# Patient Record
Sex: MALE | Race: WHITE | URBAN - METROPOLITAN AREA
[De-identification: names, ages, dates, MRNs, and addresses within clinical notes are randomized per-mention and may not be internally consistent; named-entity substitution may affect disease eponyms.]

---

## 2017-11-07 ENCOUNTER — OFFICE VISIT (OUTPATIENT)
Dept: DERMATOLOGY | Facility: AMBULATORY SURGERY CENTER | Age: 67
End: 2017-11-07

## 2017-11-07 VITALS
SYSTOLIC BLOOD PRESSURE: 122 MMHG | BODY MASS INDEX: 23.9 KG/M2 | DIASTOLIC BLOOD PRESSURE: 60 MMHG | HEIGHT: 64 IN | TEMPERATURE: 97.9 F | RESPIRATION RATE: 18 BRPM | OXYGEN SATURATION: 98 % | WEIGHT: 140 LBS | HEART RATE: 73 BPM

## 2017-11-07 DIAGNOSIS — D22.9 MULTIPLE BENIGN NEVI: ICD-10-CM

## 2017-11-07 DIAGNOSIS — L57.0 ACTINIC KERATOSIS: Primary | ICD-10-CM

## 2017-11-07 DIAGNOSIS — L82.0 INFLAMED SEBORRHEIC KERATOSIS: ICD-10-CM

## 2017-11-07 DIAGNOSIS — L82.1 SEBORRHEIC KERATOSES: ICD-10-CM

## 2017-11-07 DIAGNOSIS — L72.9 CYST OF SKIN: ICD-10-CM

## 2017-11-07 DIAGNOSIS — D18.01 CHERRY ANGIOMA: ICD-10-CM

## 2017-11-07 RX ORDER — BETAMETHASONE VALERATE 1.2 MG/G
OINTMENT TOPICAL 2 TIMES DAILY
Qty: 45 G | Refills: 2 | Status: SHIPPED | OUTPATIENT
Start: 2017-11-07

## 2017-11-07 RX ORDER — BENZONATATE 100 MG/1
100 CAPSULE ORAL AS NEEDED
COMMUNITY
Start: 2017-09-30

## 2017-11-07 RX ORDER — AZELASTINE 1 MG/ML
SPRAY, METERED NASAL
COMMUNITY
Start: 2017-10-29

## 2017-11-07 RX ORDER — CODEINE PHOSPHATE AND GUAIFENESIN 10; 100 MG/5ML; MG/5ML
5 SOLUTION ORAL AS NEEDED
COMMUNITY
Start: 2017-09-30

## 2017-11-07 NOTE — MR AVS SNAPSHOT
Visit Information Date & Time Provider Department Dept. Phone Encounter #  
 11/7/2017  9:00 AM Nicole Hdz NP Stephanie [phone] Upcoming Health Maintenance Date Due Hepatitis C Screening 1950 DTaP/Tdap/Td series (1 - Tdap) 11/11/1972 FOBT Q 1 YEAR AGE 50-75 11/11/2001 ZOSTER VACCINE AGE 60> 9/11/2011 GLAUCOMA SCREENING Q2Y 11/11/2016 Pneumococcal 65+ Low/Medium Risk (1 of 2 - PCV13) 11/11/2016 MEDICARE YEARLY EXAM 11/11/2016 Allergies as of 11/7/2017  Review Complete On: 11/7/2017 By: Dolly Alberto No Known Allergies Current Immunizations  Reviewed on 11/7/2017 Name Date Influenza High Dose Vaccine PF 10/6/2017 Reviewed by Andrea Galindo on 11/7/2017 at  9:51 AM  
Vitals BP Pulse Temp Resp Height(growth percentile) Weight(growth percentile) 122/60 (BP 1 Location: Right arm, BP Patient Position: Sitting) 73 97.9 °F (36.6 °C) (Oral) 18 5' 4\" (1.626 m) 140 lb (63.5 kg) SpO2 BMI Smoking Status 98% 24.03 kg/m2 Former Smoker BMI and BSA Data Body Mass Index Body Surface Area 24.03 kg/m 2 1.69 m 2 Preferred Pharmacy Pharmacy Name Phone [facility] [address], [address] [phone] Your Updated Medication List  
  
   
This list is accurate as of: 11/7/17  9:54 AM.  Always use your most recent med list. amLODIPine-benazepril 5-40 mg per capsule Commonly known as:  Tresa Rounds Take 1 Cap by mouth daily. aspirin 325 mg tablet Commonly known as:  ASPIRIN Take 325 mg by mouth daily. atorvastatin 20 mg tablet Commonly known as:  LIPITOR Take  by mouth daily. azelastine 137 mcg (0.1 %) nasal spray Commonly known as:  ASTELIN  
  
 benzonatate 100 mg capsule Commonly known as:  TESSALON  
  
 CLARITIN 10 mg tablet Generic drug:  loratadine Take 10 mg by mouth.  
  
 clobetasol 0.05 % ointment Commonly known as:  Charley Reagin Apply  to affected area two (2) times a day. FISH OIL 1,000 mg Cap Generic drug:  omega-3 fatty acids-vitamin e Take 1 Cap by mouth.  
  
 guaiFENesin-codeine 100-10 mg/5 mL solution Commonly known as:  ROBITUSSIN AC  
  
 metaxalone 800 mg tablet Commonly known as:  SKELAXIN Take  by mouth.  
  
 miSOPROStol 200 mcg tablet Commonly known as:  CYTOTEC Take 200 mcg by mouth four (4) times daily. multivitamin tablet Commonly known as:  ONE A DAY Take 1 Tab by mouth daily. Patient Instructions Self Skin Exam and Sunscreens Early detection and treatment is essential in the treatment of all forms of skin cancer. If caught early, all forms of skin cancer are curable. In addition to your regular visits, you should perform a monthly skin examination. Over time, you become familiar with what is normally found on your skin and can identify new or suspicious spots. One of the screening tools you can use to assess your skin is to follow the ABCDEs: 
 
A= Asymmetry (One half is unlike the other half) B= Border (An irregular, scalloped or poorly defined edge) C= Color (Is varied from one area to another, has shades of tan, brown/ black,       white, red or blue) D= Diameter (Spots larger than 6mm or a pencil eraser) E= Evolving (New spots or one that is changing in size, shape, or color) A follow- up interval will be customized based on your history of skin cancer or level of skin damage and risk factors. In any case, if you notice a suspicious or new spot, an appointment should be arranged between regular visits. Everyone should use sunscreen and sun-safe practices, which is especially important for those with a personal or family history of skin cancer. Suggestions for this include: 1. Use daily moisturizers containing SPF 30 or higher. 2. Wear long sleeve clothing with UPF ratings and a broad-brimmed hat. 3. Apply sunscreen with SPF 30 or higher to all sun exposed areas if you are going to be in the sun. A broad spectrum UVA/ UVB sunscreen is best.  Dont forget to REAPPLY every two hours or more often if swimming or sweating! 4. Avoid outside activities during peak sun hours, especially in the summer (10am- 2pm). 5. DO NOT use tanning beds. Using sunscreen and sun-safe practices can help reduce the likelihood of developing skin cancer or additional skin cancers in those previously diagnosed. Introducing [facility]! [address] [facility] introduces [facility] patient portal. Now you can access parts of your medical record, email your doctor's office, and request medication refills online. 1. In your internet browser, go to [email or web address] 2. Click on the First Time User? Click Here link in the Sign In box. You will see the New Member Sign Up page. 3. Enter your [facility] Access Code exactly as it appears below. You will not need to use this code after youve completed the sign-up process. If you do not sign up before the expiration date, you must request a new code. · [facility] Access Code: [email or web address] Expires: 2/5/2018  9:54 AM 
 
4. Enter the last four digits of your Social Security Number (xxxx) and Date of Birth (mm/dd/yyyy) as indicated and click Submit. You will be taken to the next sign-up page. 5. Create a [id number] ID. This will be your [facility] login ID and cannot be changed, so think of one that is secure and easy to remember. 6. Create a [facility] password. You can change your password at any time. 7. Enter your Password Reset Question and Answer. This can be used at a later time if you forget your password. 8. Enter your e-mail address. You will receive e-mail notification when new information is available in [address] 9. Click Sign Up. You can now view and download portions of your medical record. 10. Click the Download Summary menu link to download a portable copy of your medical information. If you have questions, please visit the Frequently Asked Questions section of the [employer] website. Remember, [facility] is NOT to be used for urgent needs. For medical emergencies, dial 911. Now available from your iPhone and Android! Please provide this summary of care documentation to your next provider. Your primary care clinician is listed as Kanwal Garcia. If you have any questions after today's visit, please call [phone].

## 2017-11-07 NOTE — PROGRESS NOTES
Pan Sood is a 73 y.o. male  Chief Complaint   Patient presents with    Skin Exam     1. Have you been to the ER, urgent care clinic since your last visit? Hospitalized since your last visit? No    2. Have you seen or consulted any other health care providers outside of the [address] [place] [address] since your last visit? Include any pap smears or colon screening.  No

## 2017-11-07 NOTE — PROGRESS NOTES
Name: Zeke Khalil       Age: 73 y.o. Date: 11/7/2017    Chief Complaint:   Chief Complaint   Patient presents with    Skin Exam       Subjective:    HPI  Mr. Zeke Khalil is a 73 y.o. male who presents for a skin exam.  The patient's last skin exam was on 8/9/16 and the patient does have current complaints related to his skin. He reports growing lesions on the forehead that bother. A lesion that is stable under his glasses on the nose on the right side. There is a scaly lesion in front of the left ear. The patient's pertinent skin history includes : no personal history of skin cancer but does have a history of AK. Reports significant sun exposure through work and recreation. ROS: Constitutional: Negative. Dermatological : positive for - skin lesion changes      Social History     Social History    Marital status:      Spouse name: N/A    Number of children: N/A    Years of education: N/A     Occupational History    Not on file. Social History Main Topics    Smoking status: Former Smoker    Smokeless tobacco: Never Used    Alcohol use No    Drug use: No    Sexual activity: Not on file     Other Topics Concern    Not on file     Social History Narrative       Family History   Problem Relation Age of Onset    No Known Problems Mother     No Known Problems Father        Past Medical History:   Diagnosis Date    Essential hypertension     High cholesterol     Sun-damaged skin        Past Surgical History:   Procedure Laterality Date    CARDIAC SURG PROCEDURE UNLIST      HX ORTHOPAEDIC         Current Outpatient Prescriptions   Medication Sig Dispense Refill    azelastine (ASTELIN) 137 mcg (0.1 %) nasal spray       betamethasone valerate (VALISONE) 0.1 % ointment Apply  to affected area two (2) times a day. 45 g 2    loratadine (CLARITIN) 10 mg tablet Take 10 mg by mouth.  atorvastatin (LIPITOR) 20 mg tablet Take  by mouth daily.       amLODIPine-benazepril (LOTREL) 5-40 mg per capsule Take 1 Cap by mouth daily.  misoprostol (CYTOTEC) 200 mcg tablet Take 200 mcg by mouth four (4) times daily.  multivitamin (ONE A DAY) tablet Take 1 Tab by mouth daily.  omega-3 fatty acids-vitamin e (FISH OIL) 1,000 mg cap Take 1 Cap by mouth.  aspirin (ASPIRIN) 325 mg tablet Take 325 mg by mouth daily.  benzonatate (TESSALON) 100 mg capsule       guaiFENesin-codeine (ROBITUSSIN AC) 100-10 mg/5 mL solution       metaxalone (SKELAXIN) 800 mg tablet Take  by mouth. No Known Allergies      Objective:    Visit Vitals    /60 (BP 1 Location: Right arm, BP Patient Position: Sitting)    Pulse 73    Temp 97.9 °F (36.6 °C) (Oral)    Resp 18    Ht 5' 4\" (1.626 m)    Wt 63.5 kg (140 lb)    SpO2 98%    BMI 24.03 kg/m2       Kaleigh Edmond is a 73 y.o. male who appears well and in no distress. He is awake, alert, and oriented. There is no preauricular, submandibular, or cervical lymphadenopathy. A skin examination was performed including his scalp, face (including eyelids), ears, neck, chest, back, abdomen, upper extremities (including digits/nails), lower extremities (mid thigh to ankles), breasts; genital skin was not examined. His scattered stuck on waxy macules and keratotic papules consistent with seborrheic keratoses-including lesions of concern on his forehead, nose, and left preauricular skin. There are scattered cherry angiomas. He has a thin scaled AK on the right mid helical rim. There are a few scattered medium brown nevi without concerning features for severe atypia. Two small cystic structures, non inflamed with punctum on the upper back. Assessment/Plan: 1. Actinic Keratoses. The diagnosis of this precancerous lesion related to sun exposure was reviewed. Verbal consent was obtained. I treated 1 lesions with cryotherapy and post-cryotherapy care was reviewed. 2.Seborrheic keratoses.   The diagnosis was reviewed and the patient was reassured that no treatment is needed for these benign lesions. 3.Inflamed seborrheic keratoses. The diagnosis and treatment with liquid nitrogen cryotherapy were reviewed. The risk or persistence or recurrence of the keratosis and the potential for pigment change at the treated site were reviewed. Verbal consent was obtained. I treated 2 lesions with cryotherapy and care was reviewed. 4. Normal nevi. The diagnosis of normal nevi was reviewed. I discussed sun protection, sunscreen use, the warning signs of skin cancer, the need for self-skin examinations, and the need for regular practitioner exams every 1 year. The patient should follow up sooner as needed if new, changing, or symptomatic skin lesions arise. 5.Cherry angiomas. The diagnosis was reviewed and the patient was reassured that no treatment is needed for these benign lesions. 6. Cyst of back skin. No treatment needed at present. [facility]   OFFICE PROCEDURE PROGRESS NOTE   Chart reviewed for the following:   Aj COX, have reviewed the History, Physical and updated the Allergic reactions for [place] performed immediately prior to start of procedure:   Aj COX, have performed the following reviews on [address]   prior to the start of the procedure:     * Patient was identified by name and date of birth   * Agreement on procedure being performed was verified   * Risks and Benefits explained to the patient   * Procedure site verified and marked as necessary   * Patient was positioned for comfort   * Verbal consent was obtained. Time: 5752  Date of procedure: 11/7/2017  Procedure performed by: Kingsley Perez.  Kathy Childs DNP  Provider assisted by: Jonathan Christiansen   Patient assisted by: self   How tolerated by patient: tolerated the procedure well with no complications   Comments: none

## 2018-10-08 ENCOUNTER — OFFICE VISIT (OUTPATIENT)
Dept: DERMATOLOGY | Facility: AMBULATORY SURGERY CENTER | Age: 68
End: 2018-10-08

## 2018-10-08 VITALS
SYSTOLIC BLOOD PRESSURE: 142 MMHG | HEIGHT: 64 IN | RESPIRATION RATE: 18 BRPM | OXYGEN SATURATION: 99 % | DIASTOLIC BLOOD PRESSURE: 80 MMHG | BODY MASS INDEX: 23.9 KG/M2 | TEMPERATURE: 98.3 F | WEIGHT: 140 LBS | HEART RATE: 91 BPM

## 2018-10-08 DIAGNOSIS — R23.3 BRUISES EASILY: ICD-10-CM

## 2018-10-08 DIAGNOSIS — L57.0 ACTINIC KERATOSES: ICD-10-CM

## 2018-10-08 DIAGNOSIS — L73.8 SEBACEOUS HYPERPLASIA OF FACE: ICD-10-CM

## 2018-10-08 DIAGNOSIS — D22.9 MULTIPLE BENIGN NEVI: ICD-10-CM

## 2018-10-08 DIAGNOSIS — L21.9 SEBORRHEIC DERMATITIS: ICD-10-CM

## 2018-10-08 DIAGNOSIS — L82.1 SEBORRHEIC KERATOSES: ICD-10-CM

## 2018-10-08 DIAGNOSIS — L82.0 INFLAMED SEBORRHEIC KERATOSIS: Primary | ICD-10-CM

## 2018-10-08 RX ORDER — METHOCARBAMOL 750 MG/1
TABLET, FILM COATED ORAL AS NEEDED
COMMUNITY
Start: 2018-09-03

## 2018-10-08 NOTE — PROGRESS NOTES
Written by Kash Mcnair, as dictated by Amber Peterson, [address]. Name: Jules Cedeno       Age: 78 y.o. Date: 10/8/2018    Chief Complaint:   Chief Complaint   Patient presents with    Skin Exam     spots on forehead, on under left arm, one on right arm        Subjective:    HPI  Mr. Jules Cedeno is a 78 y.o. male who presents for a partial skin exam.  The patient's last skin exam was on 11/07/17 and the patient does have current complaints related to his skin. He reports a few scaly spots on his forehead. He also reports a rough and raised lesion on his left forearm that he noted recently. He also reports an itchy and burning lesion on his right forearm that he picks at and it continues to scab. He reports a tender lesion on his chest that he also picks at and the lesion continues to scab. He notes some flay skin between his eyebrows. He also reports concern over bruising easily. The patient's pertinent skin history includes : no personal history of skin cancer but does have a history of AK. Reports significant sun exposure through work and recreation. ROS: Constitutional: Negative. Dermatological : positive for - skin lesion changes    Social History     Social History    Marital status:      Spouse name: N/A    Number of children: N/A    Years of education: N/A     Occupational History    Not on file.      Social History Main Topics    Smoking status: Former Smoker    Smokeless tobacco: Never Used    Alcohol use No    Drug use: No    Sexual activity: Not on file     Other Topics Concern    Not on file     Social History Narrative       Family History   Problem Relation Age of Onset    No Known Problems Mother     No Known Problems Father        Past Medical History:   Diagnosis Date    Essential hypertension     High cholesterol     Sun-damaged skin        Past Surgical History:   Procedure Laterality Date    CARDIAC SURG PROCEDURE UNLIST      HX ORTHOPAEDIC         Current Outpatient Prescriptions   Medication Sig Dispense Refill    methocarbamol (ROBAXIN) 750 mg tablet as needed.  azelastine (ASTELIN) 137 mcg (0.1 %) nasal spray       benzonatate (TESSALON) 100 mg capsule       guaiFENesin-codeine (ROBITUSSIN AC) 100-10 mg/5 mL solution       betamethasone valerate (VALISONE) 0.1 % ointment Apply  to affected area two (2) times a day. 45 g 2    loratadine (CLARITIN) 10 mg tablet Take 10 mg by mouth.  atorvastatin (LIPITOR) 20 mg tablet Take  by mouth daily.  amLODIPine-benazepril (LOTREL) 5-40 mg per capsule Take 1 Cap by mouth daily.  misoprostol (CYTOTEC) 200 mcg tablet Take 200 mcg by mouth four (4) times daily.  metaxalone (SKELAXIN) 800 mg tablet Take  by mouth.  multivitamin (ONE A DAY) tablet Take 1 Tab by mouth daily.  omega-3 fatty acids-vitamin e (FISH OIL) 1,000 mg cap Take 1 Cap by mouth.  aspirin (ASPIRIN) 325 mg tablet Take 325 mg by mouth daily. No Known Allergies      Objective:    Visit Vitals    /80 (BP 1 Location: Left arm, BP Patient Position: Sitting)    Pulse 91    Temp 98.3 °F (36.8 °C) (Oral)    Resp 18    Ht 5' 4\" (1.626 m)    Wt 140 lb (63.5 kg)    SpO2 99%    BMI 24.03 kg/m2       Rigo Schultz is a 78 y.o. male who appears well and in no distress. He is awake, alert, and oriented. There is no preauricular, submandibular, or cervical lymphadenopathy. A skin examination was performed including his scalp, face (including eyelids), ears, neck, chest, upper extremities. He has inflamed seborrheic keratoses on his left forearm, right forearm, mid chest, and forehead x2. He has thin-scaled actinic keratoses on his right nasal tip and left lateral cheek. He has scattered waxy macules and keratotic papules consistent with seborrheic keratoses. There are pink/yellow papules on the face consistent with sebaceous hyperplasia.  He has dry and flaky skin between his eyebrows consistent with seborrheic dermatitis. He has pink intradermal nevi and brown junctional nevi, no concerning features for severe atypia. He has ecchymosis on his forearms. Assessment/Plan:    1. Inflamed seborrheic keratoses. The diagnosis and treatment with liquid nitrogen cryotherapy were reviewed. The risk or persistence or recurrence of the keratosis and the potential for pigment change at the treated site were reviewed. Verbal consent was obtained. I treated 5 lesions with cryotherapy and care was reviewed.     2. Actinic Keratoses. The diagnosis of this precancerous lesion related to sun exposure was reviewed. Verbal consent was obtained. I treated 2 lesions with cryotherapy and post-cryotherapy care was reviewed. 3. Seborrheic keratoses. The diagnosis was reviewed and the patient was reassured that no treatment is needed for these benign lesions. 4. Sebaceous Hyperplasia. The diagnosis was discussed as well as the benign nature of this condition. The patient was reassured that no treatment is needed at this time. 5. Seborrheic Dermatitis. The diagnosis was discussed and over the counter recommendations [facility] and antifungal cream) were made for treatment. 6. Normal nevi. The diagnosis of normal nevi was reviewed. I discussed sun protection, sunscreen use, the warning signs of skin cancer, the need for self-skin examinations, and the need for regular practitioner exams every 1 year. The patient should follow up sooner as needed if new, changing, or symptomatic skin lesions arise. 7. Bruises easily. The diagnosis was discussed. I recommended the use of DerMend to help with healing of these lesions. This plan was reviewed with the patient and patient agrees. All questions were answered. This scribe documentation was reviewed by me and accurately reflects the examination and decisions made by me.     [address] [facility]   OFFICE PROCEDURE PROGRESS NOTE   Chart reviewed for the following:   Sindhu COX, have reviewed the History, Physical and updated the Allergic reactions for [place] performed immediately prior to start of procedure:   Sindhu COX, have performed the following reviews on [address]   prior to the start of the procedure:     * Patient was identified by name and date of birth   * Agreement on procedure being performed was verified   * Risks and Benefits explained to the patient   * Procedure site verified and marked as necessary   * Patient was positioned for comfort   * Consent was signed and verified     Time: 10:35 AM  Date of procedure: 10/8/2018  Procedure performed by: Leslie Santos.  Ankush Almanza DNP  Provider assisted by: self   Patient assisted by: self   How tolerated by patient: tolerated the procedure well with no complications   Comments: none

## 2018-10-08 NOTE — MR AVS SNAPSHOT
455 Located within [facility] [address] [place] [address] [address] [place] [address] 
[phone] Patient: Laure Peoples MRN: [id number] Visit Information Date & Time Provider Department Dept. Phone Encounter #  
 10/8/2018 10:30 AM BARRINGTON Garcia [id number] [phone] [id number] Your Appointments 10/8/2018 10:30 AM  
Office Visit with BARRINGTON Garcia [address]057 [address] [place] [address]) Appt Note: est.pt full skin exam  
 [facility] [address] [facility] [place] [address]  
[address] [facility] [address] [place] [id number] Upcoming Health Maintenance Date Due Hepatitis C Screening 1950 DTaP/Tdap/Td series (1 - Tdap) 11/11/1972 Shingrix Vaccine Age 50> (1 of 2) 11/11/2001 FOBT Q 1 YEAR AGE 50-75 11/11/2001 GLAUCOMA SCREENING Q2Y 11/11/2016 Pneumococcal 65+ Low/Medium Risk (1 of 2 - PCV13) 11/11/2016 MEDICARE YEARLY EXAM 3/14/2018 Influenza Age 5 to Adult 8/1/2018 Allergies as of 10/8/2018  Review Complete On: 10/8/2018 By: Moncho Nichols No Known Allergies Current Immunizations  Reviewed on 11/7/2017 Name Date Influenza High Dose Vaccine PF 10/6/2017 Not reviewed this visit Vitals BP Pulse Temp Resp Height(growth percentile) Weight(growth percentile) 142/80 (BP 1 Location: Left arm, BP Patient Position: Sitting) 91 98.3 °F (36.8 °C) (Oral) 18 5' 4\" (1.626 m) 140 lb (63.5 kg) SpO2 BMI Smoking Status 99% 24.03 kg/m2 Former Smoker BMI and BSA Data Body Mass Index Body Surface Area 24.03 kg/m 2 1.69 m 2 Preferred Pharmacy Pharmacy Name Phone [facility] [address], [address] [phone] Your Updated Medication List  
  
   
This list is accurate as of 10/8/18 10:26 AM.  Always use your most recent med list. amLODIPine-benazepril 5-40 mg per capsule Commonly known as:  Ashley Palin Take 1 Cap by mouth daily. aspirin 325 mg tablet Commonly known as:  ASPIRIN Take 325 mg by mouth daily. atorvastatin 20 mg tablet Commonly known as:  LIPITOR Take  by mouth daily. azelastine 137 mcg (0.1 %) nasal spray Commonly known as:  ASTELIN  
  
 benzonatate 100 mg capsule Commonly known as:  TESSALON  
  
 betamethasone valerate 0.1 % ointment Commonly known as:  Wadikacey Nordmann Apply  to affected area two (2) times a day. CLARITIN 10 mg tablet Generic drug:  loratadine Take 10 mg by mouth. FISH OIL 1,000 mg Cap Generic drug:  omega-3 fatty acids-vitamin e Take 1 Cap by mouth.  
  
 guaiFENesin-codeine 100-10 mg/5 mL solution Commonly known as:  ROBITUSSIN AC  
  
 metaxalone 800 mg tablet Commonly known as:  SKELAXIN Take  by mouth. methocarbamol 750 mg tablet Commonly known as:  ROBAXIN  
as needed. miSOPROStol 200 mcg tablet Commonly known as:  CYTOTEC Take 200 mcg by mouth four (4) times daily. multivitamin tablet Commonly known as:  ONE A DAY Take 1 Tab by mouth daily. Patient Instructions Self Skin Exam and Sunscreens Early detection and treatment is essential in the treatment of all forms of skin cancer. If caught early, all forms of skin cancer are curable. In addition to your regular visits, you should perform a monthly skin examination. Over time, you become familiar with what is normally found on your skin and can identify new or suspicious spots. One of the screening tools you can use to assess your skin is to follow the ABCDEs: 
 
A= Asymmetry (One half is unlike the other half) B= Border (An irregular, scalloped or poorly defined edge) C= Color (Is varied from one area to another, has shades of tan, brown/ black,       white, red or blue) D= Diameter (Spots larger than 6mm or a pencil eraser) E= Evolving (New spots or one that is changing in size, shape, or color) A follow- up interval will be customized based on your history of skin cancer or level of skin damage and risk factors. In any case, if you notice a suspicious or new spot, an appointment should be arranged between regular visits. Everyone should use sunscreen and sun-safe practices, which is especially important for those with a personal or family history of skin cancer. Suggestions for this include: 1. Use daily moisturizers containing SPF 30 or higher. 2. Wear long sleeve clothing with UPF ratings and a broad-brimmed hat. 3. Apply sunscreen with SPF 30 or higher to all sun exposed areas if you are going to be in the sun. A broad spectrum UVA/ UVB sunscreen is best.  Dont forget to REAPPLY every two hours or more often if swimming or sweating! 4. Avoid outside activities during peak sun hours, especially in the summer (10am- 2pm). 5. DO NOT use tanning beds. Using sunscreen and sun-safe practices can help reduce the likelihood of developing skin cancer or additional skin cancers in those previously diagnosed. Introducing [facility]! [employer] introduces [employer] patient portal. Now you can access parts of your medical record, email your doctor's office, and request medication refills online. 1. In your internet browser, go to [email or web address] 2. Click on the First Time User? Click Here link in the Sign In box. You will see the New Member Sign Up page. 3. Enter your [employer] Access Code exactly as it appears below. You will not need to use this code after youve completed the sign-up process. If you do not sign up before the expiration date, you must request a new code. · [employer] Access Code: [email or web address] Expires: 1/6/2019 10:26 AM 
 
4.  Enter the last four digits of your Social Security Number (xxxx) and Date of Birth (mm/dd/yyyy) as indicated and click Submit. You will be taken to the next sign-up page. 5. Create a [employer] ID. This will be your [employer] login ID and cannot be changed, so think of one that is secure and easy to remember. 6. Create a [employer] password. You can change your password at any time. 7. Enter your Password Reset Question and Answer. This can be used at a later time if you forget your password. 8. Enter your e-mail address. You will receive e-mail notification when new information is available in [address] 9. Click Sign Up. You can now view and download portions of your medical record. 10. Click the Download Summary menu link to download a portable copy of your medical information. If you have questions, please visit the Frequently Asked Questions section of the [employer] website. Remember, [employer] is NOT to be used for urgent needs. For medical emergencies, dial 911. Now available from your iPhone and Android! Please provide this summary of care documentation to your next provider. Your primary care clinician is listed as Melissa Marcelo. If you have any questions after today's visit, please call [phone].

## 2018-10-08 NOTE — PROGRESS NOTES
Chief Complaint   Patient presents with    Skin Exam     spots on forehead, on under left arm, one on right arm

## 2019-10-23 ENCOUNTER — OFFICE VISIT (OUTPATIENT)
Dept: DERMATOLOGY | Facility: AMBULATORY SURGERY CENTER | Age: 69
End: 2019-10-23

## 2019-10-23 VITALS
BODY MASS INDEX: 23.9 KG/M2 | SYSTOLIC BLOOD PRESSURE: 134 MMHG | HEART RATE: 67 BPM | RESPIRATION RATE: 16 BRPM | WEIGHT: 140 LBS | TEMPERATURE: 97.6 F | OXYGEN SATURATION: 98 % | HEIGHT: 64 IN | DIASTOLIC BLOOD PRESSURE: 68 MMHG

## 2019-10-23 DIAGNOSIS — L57.0 ACTINIC KERATOSIS: Primary | ICD-10-CM

## 2019-10-23 DIAGNOSIS — L82.1 SEBORRHEIC KERATOSES: ICD-10-CM

## 2019-10-23 DIAGNOSIS — B07.9 VERRUCA VULGARIS: ICD-10-CM

## 2019-10-23 DIAGNOSIS — D18.01 CHERRY ANGIOMA: ICD-10-CM

## 2019-10-23 DIAGNOSIS — L82.0 INFLAMED SEBORRHEIC KERATOSIS: ICD-10-CM

## 2019-10-23 DIAGNOSIS — D22.9 MULTIPLE BENIGN NEVI: ICD-10-CM

## 2019-10-23 NOTE — PROGRESS NOTES
Written by Joaquina Pena, as dictated by Jessica Santiago, [address]. Name: Marj Andres       Age: 80 y.o. Date: 10/23/2019    Chief Complaint:   Chief Complaint   Patient presents with    Skin Exam       Subjective:    HPI  Mr. Marj Andres is a 80 y.o. male who presents for a full skin exam.  The patient's last skin exam was on 10/8/18 and the patient does have current complaints related to his skin. He states that he has a bump on his right ala that he's noticed recently. This is asymptomatic. He is also bothered by a dark lesion on the right side of his nose where his glasses sit - present for many years. Additionally, he reports a wart on his right hand. He has tried tape but this did not give relief. He is feeling well and in his usual state of health today. He has no current illnesses, no other skin concerns. His allergies, medications, medical, and social history are reviewed by me today. The patient's pertinent skin history includes : no personal history of skin cancer but does have a history of AK.     ROS: Constitutional: Negative.     Dermatological : positive for - skin lesion changes    Social History     Socioeconomic History    Marital status:      Spouse name: Not on file    Number of children: Not on file    Years of education: Not on file    Highest education level: Not on file   Occupational History    Not on file   Social Needs    Financial resource strain: Not on file    Food insecurity:     Worry: Not on file     Inability: Not on file    Transportation needs:     Medical: Not on file     Non-medical: Not on file   Tobacco Use    Smoking status: Former Smoker    Smokeless tobacco: Never Used   Substance and Sexual Activity    Alcohol use: No     Alcohol/week: 0.0 standard drinks    Drug use: No    Sexual activity: Not on file   Lifestyle    Physical activity:     Days per week: Not on file     Minutes per session: Not on file    Stress: Not on file   Relationships    Social connections:     Talks on phone: Not on file     Gets together: Not on file     Attends [employer] service: Not on file     Active member of club or organization: Not on file     Attends meetings of clubs or organizations: Not on file     Relationship status: Not on file    Intimate partner violence:     Fear of current or ex partner: Not on file     Emotionally abused: Not on file     Physically abused: Not on file     Forced sexual activity: Not on file   Other Topics Concern    Not on file   Social History Narrative    Not on file       Family History   Problem Relation Age of Onset    No Known Problems Mother     No Known Problems Father        Past Medical History:   Diagnosis Date    Essential hypertension     High cholesterol     Sun-damaged skin        Past Surgical History:   Procedure Laterality Date    CARDIAC SURG PROCEDURE UNLIST      HX ORTHOPAEDIC         Current Outpatient Medications   Medication Sig Dispense Refill    methocarbamol (ROBAXIN) 750 mg tablet as needed.  azelastine (ASTELIN) 137 mcg (0.1 %) nasal spray       benzonatate (TESSALON) 100 mg capsule Take 100 mg by mouth as needed.  guaiFENesin-codeine (ROBITUSSIN AC) 100-10 mg/5 mL solution Take 5 mL by mouth as needed.  betamethasone valerate (VALISONE) 0.1 % ointment Apply  to affected area two (2) times a day. 45 g 2    loratadine (CLARITIN) 10 mg tablet Take 10 mg by mouth.  atorvastatin (LIPITOR) 20 mg tablet Take  by mouth daily.  amLODIPine-benazepril (LOTREL) 5-40 mg per capsule Take 1 Cap by mouth daily.  misoprostol (CYTOTEC) 200 mcg tablet Take 200 mcg by mouth daily.  multivitamin (ONE A DAY) tablet Take 1 Tab by mouth daily.  omega-3 fatty acids-vitamin e (FISH OIL) 1,000 mg cap Take 1 Cap by mouth.  aspirin (ASPIRIN) 325 mg tablet Take 325 mg by mouth daily.  metaxalone (SKELAXIN) 800 mg tablet Take  by mouth.          No Known Allergies      Objective:    Visit Vitals  /68 (BP 1 Location: Left arm, BP Patient Position: Sitting)   Pulse 67   Temp 97.6 °F (36.4 °C) (Oral)   Resp 16   Ht 5' 4\" (1.626 m)   Wt 140 lb (63.5 kg)   SpO2 98%   BMI 24.03 kg/m²       Virginia Riddle is a 80 y.o. male who appears well and in no distress. He is awake, alert, and oriented. A skin examination was performed including his scalp, face (including eyelids), ears, neck, chest, back, abdomen, upper extremities (including digits/nails), lower extremities, breasts, buttocks; genital skin was not examined. He has scattered waxy macules and keratotic papules consistent with seborrheic keratoses. He has pink intradermal nevi and brown junctional nevi, no concerning features for severe atypia. He has lentigines on sun exposed areas.  He has scattered red papules consistent with cherry angiomas. He has thin-scaled actinic keratoses on his right dorsal hand. He has inflamed seborrheic keratoses on the mid-abdomen and right upper nasal sidewall, including the lesions of his concern. He has a verrucous papule with dots of hemorrhage consistent with verruca vulgaris on his right thumb. He also has a subcutaneous nodule on his left upper back most consistent with an epidermal inclusion cyst. He has a fibrous papule on the right alar crease, including the lesion of his concern. Assessment/Plan:  1. Seborrheic keratoses. The diagnosis was reviewed and the patient was reassured that no treatment is needed for these benign lesions. 2. Normal nevi. The diagnosis of normal nevi was reviewed. I discussed sun protection, sunscreen use, the warning signs of skin cancer, mole monitoring, the need for self-skin examinations, and the need for regular practitioner exams. The patient should follow up sooner as needed if new, changing, or symptomatic skin lesions arise. 3. Solar lentigos. The diagnosis and relationship to sun exposure was reviewed.   Shelly Peterson protection advised. 4. Cherry angiomas. The diagnosis was reviewed and the patient was reassured that no treatment is needed for these benign lesions. 5. Actinic Keratoses. The diagnosis of this precancerous lesion related to sun exposure was reviewed. Verbal consent was obtained. I treated 1 lesions with cryotherapy and post-cryotherapy care was reviewed. 6. Inflamed seborrheic keratoses. The diagnosis and treatment with liquid nitrogen cryotherapy were reviewed. The risk or persistence or recurrence of the keratosis and the potential for pigment change at the treated site were reviewed. Verbal consent was obtained. I treated 2 lesions with cryotherapy and care was reviewed. 7. Verruca vulgaris. The diagnosis was discussed. The lesion was treated in double-freeze thaw method with cryotherapy. Next skin exam:  1 year    This plan was reviewed with the patient and patient agrees. All questions were answered. This scribe documentation was reviewed by me and accurately reflects the examination and decisions made by me. [facility]   OFFICE PROCEDURE PROGRESS NOTE   Chart reviewed for the following:   Aj COX, have reviewed the History, Physical and updated the Allergic reactions for [place] performed immediately prior to start of procedure:   Aj COX, have performed the following reviews on [address]   prior to the start of the procedure:     * Patient was identified by name and date of birth   * Agreement on procedure being performed was verified   * Risks and Benefits explained to the patient   * Procedure site verified and marked as necessary   * Patient was positioned for comfort   * Consent was signed and verified     Time: 3:10 PM  Date of procedure: 10/23/2019  Procedure performed by: Amirah Baker.  Felipe Jones  Provider assisted by: self  Patient assisted by: self   How tolerated by patient: tolerated the procedure well with no complications   Comments: none

## 2019-10-23 NOTE — PROGRESS NOTES
Room: 6    Identified pt with two pt identifiers(name and ). Reviewed record in preparation for visit and have obtained necessary documentation. All patient medications has been reviewed. Chief Complaint   Patient presents with    Skin Exam       Health Maintenance Due   Topic    Hepatitis C Screening     DTaP/Tdap/Td series (1 - Tdap)    Shingrix Vaccine Age 50> (1 of 2)    FOBT Q 1 YEAR AGE 54-65     GLAUCOMA SCREENING Q2Y     Pneumococcal 65+ years (1 of 2 - PCV13)    MEDICARE YEARLY EXAM     Influenza Age 5 to Adult        Vitals:    10/23/19 1441   Weight: 63.5 kg (140 lb)   Height: 5' 4\" (1.626 m)   PainSc:   0 - No pain           Patient is accompanied by self I have received verbal consent from Aftab Garcia to discuss any/all medical information while they are present in the room.

## 2019-11-20 ENCOUNTER — OFFICE VISIT (OUTPATIENT)
Dept: DERMATOLOGY | Facility: AMBULATORY SURGERY CENTER | Age: 69
End: 2019-11-20

## 2019-11-20 VITALS
HEIGHT: 64 IN | DIASTOLIC BLOOD PRESSURE: 70 MMHG | TEMPERATURE: 98 F | HEART RATE: 77 BPM | SYSTOLIC BLOOD PRESSURE: 130 MMHG | WEIGHT: 140 LBS | RESPIRATION RATE: 16 BRPM | BODY MASS INDEX: 23.9 KG/M2 | OXYGEN SATURATION: 97 %

## 2019-11-20 DIAGNOSIS — L82.0 INFLAMED SEBORRHEIC KERATOSIS: Primary | ICD-10-CM

## 2019-11-20 DIAGNOSIS — L82.1 SEBORRHEIC KERATOSES: ICD-10-CM

## 2019-11-20 NOTE — PROGRESS NOTES
Written by Allison Azul, as dictated by Betty Linder, [address]. Name: Sherman Vega       Age: 77 y.o. Date: 11/20/2019    Chief Complaint: No chief complaint on file. Subjective:    HPI:  Mr.. Sherman Vega is a 77 y.o. male who presents for for treatment of bothersome lesions on the mid abdomen, left chest, and the right nasal sidewall. The mid abdomen and right nasal sidewall was treated by me with cryotherapy at his last visit. He reports improvement but still raised and bothersome. He reports resolution of the AK on the right hand s/p cryotherapy at last visit. The lesion on the left chest is raised as well and bothersome due to this raised nature. He is feeling well and in his usual state of health today. He has no current illnesses, no other skin concerns. His allergies, medications, medical, and social history are reviewed by me today.     ROS: Consitutional: Negative  Dermatological : positive for - skin lesion changes    Social History     Socioeconomic History    Marital status:      Spouse name: Not on file    Number of children: Not on file    Years of education: Not on file    Highest education level: Not on file   Occupational History    Not on file   Social Needs    Financial resource strain: Not on file    Food insecurity:     Worry: Not on file     Inability: Not on file    Transportation needs:     Medical: Not on file     Non-medical: Not on file   Tobacco Use    Smoking status: Former Smoker    Smokeless tobacco: Never Used   Substance and Sexual Activity    Alcohol use: No     Alcohol/week: 0.0 standard drinks    Drug use: No    Sexual activity: Not on file   Lifestyle    Physical activity:     Days per week: Not on file     Minutes per session: Not on file    Stress: Not on file   Relationships    Social connections:     Talks on phone: Not on file     Gets together: Not on file     Attends Anabaptism service: Not on file     Active member of club or organization: Not on file     Attends meetings of clubs or organizations: Not on file     Relationship status: Not on file    Intimate partner violence:     Fear of current or ex partner: Not on file     Emotionally abused: Not on file     Physically abused: Not on file     Forced sexual activity: Not on file   Other Topics Concern    Not on file   Social History Narrative    Not on file       Family History   Problem Relation Age of Onset    No Known Problems Mother     No Known Problems Father        Past Medical History:   Diagnosis Date    Essential hypertension     High cholesterol     Sun-damaged skin        Past Surgical History:   Procedure Laterality Date    CARDIAC SURG PROCEDURE UNLIST      HX ORTHOPAEDIC         Current Outpatient Medications   Medication Sig Dispense Refill    methocarbamol (ROBAXIN) 750 mg tablet as needed.  azelastine (ASTELIN) 137 mcg (0.1 %) nasal spray       benzonatate (TESSALON) 100 mg capsule Take 100 mg by mouth as needed.  guaiFENesin-codeine (ROBITUSSIN AC) 100-10 mg/5 mL solution Take 5 mL by mouth as needed.  betamethasone valerate (VALISONE) 0.1 % ointment Apply  to affected area two (2) times a day. 45 g 2    loratadine (CLARITIN) 10 mg tablet Take 10 mg by mouth.  atorvastatin (LIPITOR) 20 mg tablet Take  by mouth daily.  amLODIPine-benazepril (LOTREL) 5-40 mg per capsule Take 1 Cap by mouth daily.  misoprostol (CYTOTEC) 200 mcg tablet Take 200 mcg by mouth daily.  multivitamin (ONE A DAY) tablet Take 1 Tab by mouth daily.  omega-3 fatty acids-vitamin e (FISH OIL) 1,000 mg cap Take 1 Cap by mouth.  aspirin (ASPIRIN) 325 mg tablet Take 325 mg by mouth daily.  metaxalone (SKELAXIN) 800 mg tablet Take  by mouth.          No Known Allergies      Objective:    Visit Vitals  /70 (BP 1 Location: Left arm, BP Patient Position: Sitting)   Pulse 77   Temp 98 °F (36.7 °C) (Oral)   Resp 16   Ht 5' 4\" (1.626 m)   Wt 140 lb (63.5 kg)   SpO2 97%   BMI 24.03 kg/m²       Je Travis is a 77 y.o. male who appears well and in no distress. He is awake, alert, and oriented. A limited skin examination was completed including the nose, chest, right hand and abdomen. .    He has scattered waxy macules and keratotic papules consistent with seborrheic keratoses. He has inflamed seborrheic keratoses on the left chest, mid abdomen, and right nasal sidewall. The AK on the right hand noted at the prior visit has resolved. Assessment/Plan:  1, Seborrheic keratoses. The diagnosis was reviewed and the patient was reassured that no treatment is needed for these benign lesions. 2. Inflamed seborrheic keratoses. The diagnosis and treatment with liquid nitrogen cryotherapy were reviewed. The risk or persistence or recurrence of the keratosis and the potential for pigment change at the treated site were reviewed. Verbal consent was obtained. I treated 3 lesions with cryotherapy and care was reviewed. 3. History of Actinic keratoses. Next skin exam:  1 year    This plan was reviewed with the patient and patient agrees. All questions were answered. This scribe documentation was reviewed by me and accurately reflects the examination and decisions made by me. [facility]   OFFICE PROCEDURE PROGRESS NOTE   Chart reviewed for the following:   IAj, have reviewed the History, Physical and updated the Allergic reactions for [place] performed immediately prior to start of procedure:   Aj COX, have performed the following reviews on Je Travis   prior to the start of the procedure:     * Patient was identified by name and date of birth   * Agreement on procedure being performed was verified   * Risks and Benefits explained to the patient   * Procedure site verified and marked as necessary   * Patient was positioned for comfort   * Consent was signed and verified     Time: 8:20 AM  Date of procedure: 11/20/2019  Procedure performed by: Alejandra Antoine.  Becki Johnson DNP  Provider assisted by: self  Patient assisted by: self   How tolerated by patient: tolerated the procedure well with no complications   Comments: none

## 2019-11-20 NOTE — PROGRESS NOTES
Room: 6    Identified pt with two pt identifiers(name and ). Reviewed record in preparation for visit and have obtained necessary documentation. All patient medications has been reviewed. Chief Complaint   Patient presents with    Skin Exam       Health Maintenance Due   Topic    Hepatitis C Screening     DTaP/Tdap/Td series (1 - Tdap)    Shingrix Vaccine Age 50> (1 of 2)    FOBT Q 1 YEAR AGE 50-75     GLAUCOMA SCREENING Q2Y     Pneumococcal 65+ years (1 of 1 - PPSV23)    MEDICARE YEARLY EXAM     Influenza Age 5 to Adult        Vitals:    19 0811   BP: 130/70   Pulse: 77   Resp: 16   Temp: 98 °F (36.7 °C)   TempSrc: Oral   SpO2: 97%   Weight: 63.5 kg (140 lb)   Height: 5' 4\" (1.626 m)   PainSc:   0 - No pain       1. Have you been to the ER, urgent care clinic since your last visit? Hospitalized since your last visit? No    2. Have you seen or consulted any other health care providers outside of the [address] [place] [address] since your last visit? Include any pap smears or colon screening. No    Patient is accompanied by self I have received verbal consent from Jennie Banerjee to discuss any/all medical information while they are present in the room.

## 2023-02-13 ENCOUNTER — OFFICE VISIT (OUTPATIENT)
Dept: CARDIOLOGY CLINIC | Age: 73
End: 2023-02-13
Payer: MEDICARE

## 2023-02-13 VITALS
WEIGHT: 135.2 LBS | RESPIRATION RATE: 18 BRPM | OXYGEN SATURATION: 99 % | BODY MASS INDEX: 23.08 KG/M2 | DIASTOLIC BLOOD PRESSURE: 80 MMHG | SYSTOLIC BLOOD PRESSURE: 150 MMHG | HEART RATE: 82 BPM | HEIGHT: 64 IN

## 2023-02-13 DIAGNOSIS — I10 ESSENTIAL HYPERTENSION: ICD-10-CM

## 2023-02-13 DIAGNOSIS — E78.2 MIXED HYPERLIPIDEMIA: ICD-10-CM

## 2023-02-13 DIAGNOSIS — Z86.73 HISTORY OF CVA (CEREBROVASCULAR ACCIDENT): ICD-10-CM

## 2023-02-13 DIAGNOSIS — R07.9 CHEST PAIN, UNSPECIFIED TYPE: Primary | ICD-10-CM

## 2023-02-13 DIAGNOSIS — I77.9 CAROTID ARTERY DISEASE, UNSPECIFIED LATERALITY, UNSPECIFIED TYPE (HCC): ICD-10-CM

## 2023-02-13 PROCEDURE — 1123F ACP DISCUSS/DSCN MKR DOCD: CPT | Performed by: STUDENT IN AN ORGANIZED HEALTH CARE EDUCATION/TRAINING PROGRAM

## 2023-02-13 PROCEDURE — 93010 ELECTROCARDIOGRAM REPORT: CPT | Performed by: STUDENT IN AN ORGANIZED HEALTH CARE EDUCATION/TRAINING PROGRAM

## 2023-02-13 PROCEDURE — 99204 OFFICE O/P NEW MOD 45 MIN: CPT | Performed by: STUDENT IN AN ORGANIZED HEALTH CARE EDUCATION/TRAINING PROGRAM

## 2023-02-13 PROCEDURE — 3017F COLORECTAL CA SCREEN DOC REV: CPT | Performed by: STUDENT IN AN ORGANIZED HEALTH CARE EDUCATION/TRAINING PROGRAM

## 2023-02-13 PROCEDURE — 3077F SYST BP >= 140 MM HG: CPT | Performed by: STUDENT IN AN ORGANIZED HEALTH CARE EDUCATION/TRAINING PROGRAM

## 2023-02-13 PROCEDURE — G8428 CUR MEDS NOT DOCUMENT: HCPCS | Performed by: STUDENT IN AN ORGANIZED HEALTH CARE EDUCATION/TRAINING PROGRAM

## 2023-02-13 PROCEDURE — G8420 CALC BMI NORM PARAMETERS: HCPCS | Performed by: STUDENT IN AN ORGANIZED HEALTH CARE EDUCATION/TRAINING PROGRAM

## 2023-02-13 PROCEDURE — G8536 NO DOC ELDER MAL SCRN: HCPCS | Performed by: STUDENT IN AN ORGANIZED HEALTH CARE EDUCATION/TRAINING PROGRAM

## 2023-02-13 PROCEDURE — G8510 SCR DEP NEG, NO PLAN REQD: HCPCS | Performed by: STUDENT IN AN ORGANIZED HEALTH CARE EDUCATION/TRAINING PROGRAM

## 2023-02-13 PROCEDURE — G0463 HOSPITAL OUTPT CLINIC VISIT: HCPCS | Performed by: STUDENT IN AN ORGANIZED HEALTH CARE EDUCATION/TRAINING PROGRAM

## 2023-02-13 PROCEDURE — 3079F DIAST BP 80-89 MM HG: CPT | Performed by: STUDENT IN AN ORGANIZED HEALTH CARE EDUCATION/TRAINING PROGRAM

## 2023-02-13 PROCEDURE — 1101F PT FALLS ASSESS-DOCD LE1/YR: CPT | Performed by: STUDENT IN AN ORGANIZED HEALTH CARE EDUCATION/TRAINING PROGRAM

## 2023-02-13 PROCEDURE — 93005 ELECTROCARDIOGRAM TRACING: CPT | Performed by: STUDENT IN AN ORGANIZED HEALTH CARE EDUCATION/TRAINING PROGRAM

## 2023-02-13 RX ORDER — METHOCARBAMOL 500 MG/1
TABLET, FILM COATED ORAL
COMMUNITY
Start: 2023-01-06

## 2023-02-13 RX ORDER — ASPIRIN 81 MG/1
TABLET ORAL DAILY
COMMUNITY

## 2023-02-13 NOTE — PROGRESS NOTES
Orders for echo, exercise cardiolite per Dr. Brenda Lemus' VO. Requested labs from PCP, Vasc doppler from [address] [place] [address].

## 2023-02-13 NOTE — LETTER
2/13/2023 9:25 AM    Mr. Consuelo Kevin  [address] [facility] [address]        Dear Dr. Nancy Gordon (vascular),    Please fax us the most recent:    PCP:  -most recent lab results    [address] [facility] Vascular:  -doppler within the last 6 months    so that we may update the patient's records for continuity of care.      Our fax number: [phone]    Patient:   Consuelo Kevin  1950      Future Appointments   Date Time Provider Andrew Golden   3/29/2023  8:00 AM ECHOMELANIE CAVSF BS AMB   3/29/2023  9:00 AM NUCLEAR, MELANIE [facility] BS AMB   4/12/2023  9:40 AM Maida Alarcon DO [facility]AV[facility] BS AMB         Sincerely,      Yola Beckman DO

## 2023-02-13 NOTE — PROGRESS NOTES
Rayray Gibson,   [facility] Erika Martinez, [address], [address] [facility]    Office [phone],ARN [phone]           Maldonado Dempsey is a 71 y.o. male self-referred for evaluation of chest pain symptoms      Assessment/Recommendations:      ICD-10-CM ICD-9-CM    1. Chest pain, unspecified type  R07.9 786.50 AMB POC EKG ROUTINE W/ 12 LEADS, INTER & REP      2. Essential hypertension  I10 401.9       3. Mixed hyperlipidemia  E78.2 272.2       4. History of CVA (cerebrovascular accident)  Z86.73 V12.54       5. Carotid artery disease, unspecified laterality, unspecified type (HCC)  I77.9 447.9           Stable angina symptoms. Patient reports if she starts walking at a brisk pace she will get some discomfort in his chest.  If he continues to walk the chest pain will subside. Mild escalation in symptoms recently. No exertional dyspnea. History of vascular disease including stroke at age 40 and carotid artery disease.  -Recommend to proceed with exercise Cardiolite and echocardiogram for further evaluation  -Continue aspirin and statin therapy. We will obtain lipids from his primary care physician. He is currently on atorvastatin 20 mg daily which likely is underdosed given his degree of vascular disease. Likely will need titration of his statin therapy. Retention-mildly elevated in the office today. Excellent blood pressure control at home. Blood pressures run in the 110s/70s on his current regiment of amlodipine-benazepril 5-40 mg daily    hx of CVA at age 40    Carotid artery disease. Reports having a right carotid artery stenosis of 85%. Followed by vascular surgery at [address] [place] [address].  Will obtain records. Primary Care Physician- Concepción Hoyos MD    Follow-up completion above stress testing        Subjective:  71 y.o. is the office for evaluation of chest pain symptoms. Patient was self-referred.   Reports having history of stroke at age 40 and right carotid artery stenosis. He has a history of controlled hypertension and hyperlipidemia. He is very active walks 4 to 6 miles per day with his daughter. He reports that if he starts walking at a brisk pace he will start developing some tightness in his chest and his bilateral arms. He continues to walk the chest pain symptoms will makenzie. If he begins walking at a slower pace he will develop any anginal chest pain symptoms. He does not notice any angina with any other activities. No exertional dyspnea. Remains on aspirin and statin therapy. Blood pressures at home are very well controlled at 110s/70s      Past Medical History:   Diagnosis Date    Essential hypertension     High cholesterol     Stroke ([facility] [address]     Sun-damaged skin         Past Surgical History:   Procedure Laterality Date    HX ORTHOPAEDIC      DE UNLISTED PROCEDURE CARDIAC SURGERY           Current Outpatient Medications:     methocarbamoL (ROBAXIN) 500 mg tablet, , Disp: , Rfl:     aspirin delayed-release 81 mg tablet, Take  by mouth daily. , Disp: , Rfl:     azelastine (ASTELIN) 137 mcg (0.1 %) nasal spray, , Disp: , Rfl:     benzonatate (TESSALON) 100 mg capsule, Take 100 mg by mouth as needed. , Disp: , Rfl:     guaiFENesin-codeine (ROBITUSSIN AC) 100-10 mg/5 mL solution, Take 5 mL by mouth as needed. , Disp: , Rfl:     loratadine (CLARITIN) 10 mg tablet, Take 10 mg by mouth., Disp: , Rfl:     atorvastatin (LIPITOR) 20 mg tablet, Take  by mouth daily. , Disp: , Rfl:     amLODIPine-benazepril (LOTREL) 5-40 mg per capsule, Take 1 Cap by mouth daily. , Disp: , Rfl:     misoprostol (CYTOTEC) 200 mcg tablet, Take 200 mcg by mouth daily. , Disp: , Rfl:     multivitamin (ONE A DAY) tablet, Take 1 Tab by mouth daily. , Disp: , Rfl:     omega-3 fatty acids-vitamin e 1,000 mg cap, Take 1 Cap by mouth., Disp: , Rfl:     methocarbamol (ROBAXIN) 750 mg tablet, as needed.  (Patient not taking: Reported on 2/13/2023), Disp: , Rfl:     betamethasone valerate (John Scales) 0.1 % ointment, Apply  to affected area two (2) times a day. (Patient not taking: Reported on 2/13/2023), Disp: 45 g, Rfl: 2    metaxalone (SKELAXIN) 800 mg tablet, Take  by mouth. (Patient not taking: Reported on 2/13/2023), Disp: , Rfl:     aspirin (ASPIRIN) 325 mg tablet, Take 325 mg by mouth daily. (Patient not taking: Reported on 2/13/2023), Disp: , Rfl:     No Known Allergies     Family History   Problem Relation Age of Onset    No Known Problems Mother     No Known Problems Father        Social History     Tobacco Use    Smoking status: Former    Smokeless tobacco: Never   Substance Use Topics    Alcohol use: No     Alcohol/week: 0.0 standard drinks    Drug use: No       Review of Symptoms:  Pertinent Positive:stable Angina pectoris  Pertinent Negative:No  dyspnea on exertion, shortness of breath, orthopnea, PND    All Other systems reviewed and are negative for a Comprehensive ROS (10+)    Physical Exam    Blood pressure (!) 150/80, pulse 82, resp. rate 18, height 5' 4\" (1.626 m), weight 135 lb 3.2 oz (61.3 kg), SpO2 99 %. Constitutional:  well-developed and well-nourished. No distress. HENT: Normocephalic. Eyes: No scleral icterus. Neck:  Neck supple. No JVD present. Pulmonary/Chest: Effort normal and breath sounds normal. No respiratory distress, wheezes or rales. Cardiovascular: Normal rate, regular rhythm, S1 S2 . Exam reveals no gallop and no friction rub. No murmur heard. No edema. Extremities:  Normal muscle tone  Abdominal:   No abnormal distension. Neurological:  Moving all extremities, cranial nerves appear grossly intact. Skin: Skin is not cold. Not diaphoretic. No erythema. Psychiatric:  Grossly normal mood and affect. Intact insight. Objective Data:     Investigations personally reviewed and interpreted    ECG 2/13/2023, normal sinus rhythm, normal electrocardiogram          Investigations reviewed                      ATTENTION:   This medical record was transcribed using an electronic medical records/speech recognition system. Although proofread, it may and can contain electronic, spelling and other errors. Corrections may be executed at a later time. Please feel free to contact us for any clarifications as needed.

## 2023-02-13 NOTE — PROGRESS NOTES
Room 6     Identified pt with two pt identifiers(name and ). Reviewed record in preparation for visit and have obtained necessary documentation. All patient medications has been reviewed. Chief Complaint   Patient presents with    New Patient     Patient c/o chest tightness with walking/ exercising x 1 year. 3 most recent PHQ Screens 2023   Little interest or pleasure in doing things Not at all   Feeling down, depressed, irritable, or hopeless Not at all   Total Score PHQ 2 0     No flowsheet data found. Health Maintenance Due   Topic    Hepatitis C Screening     Depression Screen     COVID-19 Vaccine (1)    Lipid Screen     DTaP/Tdap/Td series (1 - Tdap)    Colorectal Cancer Screening Combo     Shingles Vaccine (1 of 2)    Pneumococcal 65+ years (1 - PCV)    Flu Vaccine (1)    Medicare Yearly Exam      Health Maintenance Review: Patient reminded of \"due or due soon\" health maintenance. I have asked the patient to contact his/her primary care provider (PCP) for follow-up on his/her health maintenance. Vitals:    23 0851   BP: (!) 150/80   Pulse: 82   Resp: 18   SpO2: 99%   Weight: 135 lb 3.2 oz (61.3 kg)   Height: 5' 4\" (1.626 m)   PainSc:   0 - No pain       Wt Readings from Last 3 Encounters:   23 135 lb 3.2 oz (61.3 kg)   19 140 lb (63.5 kg)   10/23/19 140 lb (63.5 kg)     Temp Readings from Last 3 Encounters:   19 98 °F (36.7 °C) (Oral)   10/23/19 97.6 °F (36.4 °C) (Oral)   10/08/18 98.3 °F (36.8 °C) (Oral)     BP Readings from Last 3 Encounters:   23 (!) 150/80   19 130/70   10/23/19 134/68     Pulse Readings from Last 3 Encounters:   23 82   19 77   10/23/19 67       1. \"Have you been to the ER, urgent care clinic since your last visit? Hospitalized since your last visit? \" No    2. \"Have you seen or consulted any other health care providers outside of the [address] [place] [address] since your last visit? \" No     3.  For patients aged 39-70: Has the patient had a colonoscopy / FIT/ Cologuard? Yes      Patient is accompanied by daughter I have received verbal consent from Angelia Boggs to discuss any/all medical information while they are present in the room.

## 2023-03-29 ENCOUNTER — ANCILLARY PROCEDURE (OUTPATIENT)
Dept: CARDIOLOGY CLINIC | Age: 73
End: 2023-03-29
Payer: MEDICARE

## 2023-03-29 VITALS
BODY MASS INDEX: 23.05 KG/M2 | DIASTOLIC BLOOD PRESSURE: 82 MMHG | WEIGHT: 135 LBS | HEIGHT: 64 IN | SYSTOLIC BLOOD PRESSURE: 138 MMHG

## 2023-03-29 DIAGNOSIS — R07.9 CHEST PAIN, UNSPECIFIED TYPE: ICD-10-CM

## 2023-03-29 DIAGNOSIS — I10 ESSENTIAL HYPERTENSION: ICD-10-CM

## 2023-03-29 DIAGNOSIS — Z86.73 HISTORY OF CVA (CEREBROVASCULAR ACCIDENT): ICD-10-CM

## 2023-03-29 PROCEDURE — 93306 TTE W/DOPPLER COMPLETE: CPT | Performed by: STUDENT IN AN ORGANIZED HEALTH CARE EDUCATION/TRAINING PROGRAM

## 2023-03-30 LAB
ECHO AO ASC DIAM: 3.1 CM
ECHO AO ASCENDING AORTA INDEX: 1.87 CM/M2
ECHO AO ROOT DIAM: 2.8 CM
ECHO AO ROOT INDEX: 1.69 CM/M2
ECHO AV AREA PEAK VELOCITY: 1.3 CM2
ECHO AV AREA VTI: 1.4 CM2
ECHO AV AREA/BSA PEAK VELOCITY: 0.8 CM2/M2
ECHO AV AREA/BSA VTI: 0.8 CM2/M2
ECHO AV MEAN GRADIENT: 11 MMHG
ECHO AV MEAN VELOCITY: 1.5 M/S
ECHO AV PEAK GRADIENT: 23 MMHG
ECHO AV PEAK VELOCITY: 2.4 M/S
ECHO AV VELOCITY RATIO: 0.46
ECHO AV VTI: 42.6 CM
ECHO LA DIAMETER INDEX: 2.35 CM/M2
ECHO LA DIAMETER: 3.9 CM
ECHO LA TO AORTIC ROOT RATIO: 1.39
ECHO LA VOL 2C: 35 ML (ref 18–58)
ECHO LA VOL 4C: 32 ML (ref 18–58)
ECHO LA VOL BP: 34 ML (ref 18–58)
ECHO LA VOL/BSA BIPLANE: 20 ML/M2 (ref 16–34)
ECHO LA VOLUME AREA LENGTH: 36 ML
ECHO LA VOLUME INDEX A2C: 21 ML/M2 (ref 16–34)
ECHO LA VOLUME INDEX A4C: 19 ML/M2 (ref 16–34)
ECHO LA VOLUME INDEX AREA LENGTH: 22 ML/M2 (ref 16–34)
ECHO LV E' LATERAL VELOCITY: 11 CM/S
ECHO LV E' SEPTAL VELOCITY: 11 CM/S
ECHO LV EDV A2C: 69 ML
ECHO LV EDV A4C: 79 ML
ECHO LV EDV BP: 75 ML (ref 67–155)
ECHO LV EDV INDEX A4C: 48 ML/M2
ECHO LV EDV INDEX BP: 45 ML/M2
ECHO LV EDV NDEX A2C: 42 ML/M2
ECHO LV EJECTION FRACTION A2C: 59 %
ECHO LV EJECTION FRACTION A4C: 67 %
ECHO LV EJECTION FRACTION BIPLANE: 64 % (ref 55–100)
ECHO LV ESV A2C: 28 ML
ECHO LV ESV A4C: 26 ML
ECHO LV ESV BP: 27 ML (ref 22–58)
ECHO LV ESV INDEX A2C: 17 ML/M2
ECHO LV ESV INDEX A4C: 16 ML/M2
ECHO LV ESV INDEX BP: 16 ML/M2
ECHO LV FRACTIONAL SHORTENING: 36 % (ref 28–44)
ECHO LV INTERNAL DIMENSION DIASTOLE INDEX: 2.71 CM/M2
ECHO LV INTERNAL DIMENSION DIASTOLIC: 4.5 CM (ref 4.2–5.9)
ECHO LV INTERNAL DIMENSION SYSTOLIC INDEX: 1.75 CM/M2
ECHO LV INTERNAL DIMENSION SYSTOLIC: 2.9 CM
ECHO LV IVSD: 0.8 CM (ref 0.6–1)
ECHO LV MASS 2D: 113.6 G (ref 88–224)
ECHO LV MASS INDEX 2D: 68.5 G/M2 (ref 49–115)
ECHO LV POSTERIOR WALL DIASTOLIC: 0.8 CM (ref 0.6–1)
ECHO LV RELATIVE WALL THICKNESS RATIO: 0.36
ECHO LVOT AREA: 2.8 CM2
ECHO LVOT AV VTI INDEX: 0.51
ECHO LVOT DIAM: 1.9 CM
ECHO LVOT MEAN GRADIENT: 3 MMHG
ECHO LVOT PEAK GRADIENT: 5 MMHG
ECHO LVOT PEAK VELOCITY: 1.1 M/S
ECHO LVOT STROKE VOLUME INDEX: 37.2 ML/M2
ECHO LVOT SV: 61.8 ML
ECHO LVOT VTI: 21.8 CM
ECHO MV A VELOCITY: 0.81 M/S
ECHO MV AREA PHT: 5.3 CM2
ECHO MV E DECELERATION TIME (DT): 144.2 MS
ECHO MV E VELOCITY: 1 M/S
ECHO MV E/A RATIO: 1.23
ECHO MV E/E' LATERAL: 9.09
ECHO MV E/E' RATIO (AVERAGED): 9.09
ECHO MV E/E' SEPTAL: 9.09
ECHO MV PRESSURE HALF TIME (PHT): 41.8 MS
ECHO RV FREE WALL PEAK S': 18 CM/S
ECHO RV INTERNAL DIMENSION: 3 CM
ECHO RV TAPSE: 2.2 CM (ref 1.7–?)

## 2023-03-30 PROCEDURE — 93306 TTE W/DOPPLER COMPLETE: CPT | Performed by: STUDENT IN AN ORGANIZED HEALTH CARE EDUCATION/TRAINING PROGRAM

## 2023-04-05 ENCOUNTER — ANCILLARY PROCEDURE (OUTPATIENT)
Dept: CARDIOLOGY CLINIC | Age: 73
End: 2023-04-05
Payer: MEDICARE

## 2023-04-05 LAB
NUC STRESS EJECTION FRACTION: 74 %
STRESS ANGINA INDEX: 0
STRESS BASELINE DIAS BP: 82 MMHG
STRESS BASELINE HR: 104 BPM
STRESS BASELINE SYS BP: 166 MMHG
STRESS ESTIMATED WORKLOAD: 4.6 METS
STRESS EXERCISE DUR MIN: 2 MIN
STRESS EXERCISE DUR SEC: 46 SEC
STRESS O2 SAT PEAK: 99 %
STRESS O2 SAT REST: 99 %
STRESS PEAK DIAS BP: 70 MMHG
STRESS PEAK SYS BP: 152 MMHG
STRESS PERCENT HR ACHIEVED: 95 %
STRESS POST PEAK HR: 142 BPM
STRESS RATE PRESSURE PRODUCT: NORMAL BPM*MMHG
STRESS TARGET HR: 149 BPM
TID: 1.14

## 2023-04-05 PROCEDURE — 93017 CV STRESS TEST TRACING ONLY: CPT | Performed by: STUDENT IN AN ORGANIZED HEALTH CARE EDUCATION/TRAINING PROGRAM

## 2023-04-05 PROCEDURE — A9500 TC99M SESTAMIBI: HCPCS | Performed by: STUDENT IN AN ORGANIZED HEALTH CARE EDUCATION/TRAINING PROGRAM

## 2023-04-05 RX ORDER — TETRAKIS(2-METHOXYISOBUTYLISOCYANIDE)COPPER(I) TETRAFLUOROBORATE 1 MG/ML
30 INJECTION, POWDER, LYOPHILIZED, FOR SOLUTION INTRAVENOUS ONCE
Status: COMPLETED
Start: 2023-04-05 | End: 2023-04-05

## 2023-04-05 RX ORDER — TETRAKIS(2-METHOXYISOBUTYLISOCYANIDE)COPPER(I) TETRAFLUOROBORATE 1 MG/ML
8.2 INJECTION, POWDER, LYOPHILIZED, FOR SOLUTION INTRAVENOUS ONCE
Status: COMPLETED
Start: 2023-04-05 | End: 2023-04-05

## 2023-04-05 RX ADMIN — TECHNETIUM TC 99M SESTAMIBI 8.2 MILLICURIE: 1 INJECTION, POWDER, FOR SOLUTION INTRAVENOUS at 14:21

## 2023-04-05 RX ADMIN — TECHNETIUM TC 99M SESTAMIBI 25.2 MILLICURIE: 1 INJECTION, POWDER, FOR SOLUTION INTRAVENOUS at 13:55

## 2023-04-06 PROCEDURE — 78452 HT MUSCLE IMAGE SPECT MULT: CPT | Performed by: STUDENT IN AN ORGANIZED HEALTH CARE EDUCATION/TRAINING PROGRAM

## 2023-04-06 PROCEDURE — 93016 CV STRESS TEST SUPVJ ONLY: CPT | Performed by: STUDENT IN AN ORGANIZED HEALTH CARE EDUCATION/TRAINING PROGRAM

## 2023-04-06 PROCEDURE — 93018 CV STRESS TEST I&R ONLY: CPT | Performed by: STUDENT IN AN ORGANIZED HEALTH CARE EDUCATION/TRAINING PROGRAM

## 2023-04-27 ENCOUNTER — OFFICE VISIT (OUTPATIENT)
Dept: CARDIOLOGY CLINIC | Age: 73
End: 2023-04-27
Payer: MEDICARE

## 2023-04-27 VITALS
DIASTOLIC BLOOD PRESSURE: 88 MMHG | HEIGHT: 64 IN | SYSTOLIC BLOOD PRESSURE: 158 MMHG | HEART RATE: 96 BPM | WEIGHT: 138 LBS | OXYGEN SATURATION: 98 % | BODY MASS INDEX: 23.56 KG/M2

## 2023-04-27 DIAGNOSIS — I20.8 STABLE ANGINA PECTORIS (HCC): Primary | ICD-10-CM

## 2023-04-27 DIAGNOSIS — I77.9 CAROTID ARTERY DISEASE, UNSPECIFIED LATERALITY, UNSPECIFIED TYPE (HCC): ICD-10-CM

## 2023-04-27 DIAGNOSIS — I10 ESSENTIAL HYPERTENSION: ICD-10-CM

## 2023-04-27 DIAGNOSIS — E78.2 MIXED HYPERLIPIDEMIA: ICD-10-CM

## 2023-04-27 PROCEDURE — G0463 HOSPITAL OUTPT CLINIC VISIT: HCPCS | Performed by: STUDENT IN AN ORGANIZED HEALTH CARE EDUCATION/TRAINING PROGRAM

## 2023-04-27 RX ORDER — ASPIRIN 81 MG/1
81 TABLET ORAL DAILY
COMMUNITY

## 2023-04-27 NOTE — PROGRESS NOTES
Radhika Farrell, DO  [facility] Erika Martinez, [address] [facility], [address] [facility], [place]    Office [phone],[facility] [phone]           Zac Sifuentes is a 71 y.o. male self-referred for evaluation of chest pain symptoms      Assessment/Recommendations:      ICD-10-CM ICD-9-CM    1. Stable angina pectoris (HCC)  I20.8 413.9       2. Essential hypertension  I10 401.9       3. Mixed hyperlipidemia  E78.2 272.2       4. Carotid artery disease, unspecified laterality, unspecified type (HCC)  I77.9 447.9             Stable angina symptoms. Myocardial perfusion imaging 3/23 showed normal myocardial perfusion imaging. Echo showed normal LV function with mild aortic stenosis  -Continue aspirin and statin therapy.    -Obtain lipid panel from primary care physician thank you    Mild aortic stenosis- noted on echo 3/23, recommend f/up in ~ 2 years    Hypertension, demonstrating severe whitecoat syndrome. Home blood pressure this morning was 112/70, continue amlodipine-benazepril 5-40 mg daily    hx of CVA at age 40  -Continue aspirin and statin therapy    Carotid artery disease. Reports having a right carotid artery stenosis of 85%. Followed by vascular surgery at [address] Physician- Zachary Izaguirre MD      Follow-up 1 year        Subjective:  71 y.o. is the office for evaluation of chest pain symptoms. Reports no anginal chest pain symptoms with day-to-day activity. Reports if he tries a walk at a faster pace with his daughter he will initially get some tightness in his chest that will subside if he continues to walk. He walked nearly 5 miles today without any anginal chest pain symptoms.       Past Medical History:   Diagnosis Date    Essential hypertension     High cholesterol     Stroke ([address]     Sun-damaged skin         Past Surgical History:   Procedure Laterality Date    HX ORTHOPAEDIC      IN UNLISTED PROCEDURE CARDIAC SURGERY           Current Outpatient Medications:     aspirin delayed-release 81 mg tablet, Take 1 Tablet by mouth daily. , Disp: , Rfl:     methocarbamoL (ROBAXIN) 500 mg tablet, , Disp: , Rfl:     aspirin delayed-release 81 mg tablet, Take  by mouth daily. , Disp: , Rfl:     azelastine (ASTELIN) 137 mcg (0.1 %) nasal spray, , Disp: , Rfl:     benzonatate (TESSALON) 100 mg capsule, Take 1 Capsule by mouth as needed. , Disp: , Rfl:     loratadine (CLARITIN) 10 mg tablet, Take 1 Tablet by mouth., Disp: , Rfl:     atorvastatin (LIPITOR) 20 mg tablet, Take  by mouth daily. , Disp: , Rfl:     amLODIPine-benazepril (LOTREL) 5-40 mg per capsule, Take 1 Capsule by mouth daily. , Disp: , Rfl:     misoprostol (CYTOTEC) 200 mcg tablet, Take 1 Tablet by mouth daily. , Disp: , Rfl:     multivitamin (ONE A DAY) tablet, Take 1 Tablet by mouth daily. , Disp: , Rfl:     omega-3 fatty acids-vitamin e 1,000 mg cap, Take 1 Capsule by mouth., Disp: , Rfl:     aspirin (ASPIRIN) 325 mg tablet, Take 325 mg by mouth daily. (Patient not taking: Reported on 2/13/2023), Disp: , Rfl:     No Known Allergies     Family History   Problem Relation Age of Onset    No Known Problems Mother     No Known Problems Father        Social History     Tobacco Use    Smoking status: Former    Smokeless tobacco: Never   Substance Use Topics    Alcohol use: No     Alcohol/week: 0.0 standard drinks    Drug use: No       Review of Symptoms:  Pertinent Positive:stable Angina pectoris  Pertinent Negative:No  dyspnea on exertion, shortness of breath, orthopnea, PND    All Other systems reviewed and are negative for a Comprehensive ROS (10+)    Physical Exam    Blood pressure (!) 158/88, pulse 96, height 5' 4\" (1.626 m), weight 138 lb (62.6 kg), SpO2 98 %. Constitutional:  well-developed and well-nourished. No distress. HENT: Normocephalic. Eyes: No scleral icterus. Neck:  Neck supple. No JVD present.    Pulmonary/Chest: Effort normal and breath sounds normal. No respiratory distress, wheezes or rales. Cardiovascular: Normal rate, regular rhythm, S1 S2 . Exam reveals no gallop and no friction rub. No murmur heard. No edema. Extremities:  Normal muscle tone  Abdominal:   No abnormal distension. Neurological:  Moving all extremities, cranial nerves appear grossly intact. Skin: Skin is not cold. Not diaphoretic. No erythema. Psychiatric:  Grossly normal mood and affect. Intact insight. Objective Data: Investigations personally reviewed and interpreted    ECG 2/13/2023, normal sinus rhythm, normal electrocardiogram          Investigations reviewed        04/05/23    NUCLEAR CARDIAC STRESS TEST 04/06/2023 4/13/2023    Interpretation Summary    Nuclear Findings: LV perfusion is normal.    Nuclear Findings: Normal left ventricular systolic function post-stress. Nuclear Findings: The study is negative for myocardial ischemia. ECG: Resting ECG demonstrates normal sinus rhythm. Stress Test: Patient had episodes of NSVT during pretest which resolved to NSR w/cough. Dr. Deysi Garduno consulted. Advised OK to stress as long as her stays in NSR. A Farzad protocol stress test was performed. Overall, the patient's exercise capacity was below average for their age. The patient reached stage 1 of the protocol and was stressed for 2 min and 46 sec. Hemodynamics are adequate for diagnosis. Blood pressure demonstrated a normal response and heart rate demonstrated an exaggerated response to stress. The patient's heart rate recovery was normal. The patient reported fatigue and no chest pain during the stress test.    Post-stress ejection fraction is 74%. Signed by: Kareem Hathaway DO on 4/6/2023  8:39 AM  03/29/23    ECHO ADULT COMPLETE 03/30/2023 3/30/2023    Interpretation Summary    Left Ventricle: Normal left ventricular systolic function with an estimated EF of 60 - 65%. Left ventricle size is normal. Normal wall thickness. Normal wall motion. Normal diastolic function. Aortic Valve: Tricuspid valve. Mildly calcified cusp. Mild stenosis of the aortic valve. AV mean gradient is 11 mmHg. AV peak gradient is 23 mmHg. AV peak velocity is 2.4 m/s. AV area by continuity VTI is 1.4 cm2. Mitral Valve: Trace regurgitation. Tricuspid Valve: Trace regurgitation. Signed by: Re Crow DO on 3/30/2023 10:57 AM                  ATTENTION:   This medical record was transcribed using an electronic medical records/speech recognition system. Although proofread, it may and can contain electronic, spelling and other errors. Corrections may be executed at a later time. Please feel free to contact us for any clarifications as needed.

## 2023-04-27 NOTE — PROGRESS NOTES
Diaz Ceron is a 71 y.o. male    158/88, P 96, O2 98%    Chief Complaint   Patient presents with    Follow-up       Chest pain NO  SOB NO  Dizziness NO  Swelling NO  Recent hospital visit NO  Refills NO  COVID VACCINE YES  HAD COVID?  NO    HAD RECENT NUC AND ECHO Estlander Flap (Upper To Lower Lip) Text: The defect of the lower lip was assessed and measured.  Given the location and size of the defect, an Estlander flap was deemed most appropriate.  Using a sterile surgical marker, an appropriate Estlander flap was measured and drawn on the upper lip. Local anesthesia was then infiltrated. A scalpel was then used to incise the lateral aspect of the flap, through skin, muscle and mucosa, leaving the flap pedicled medially.  The flap was then rotated and positioned to fill the lower lip defect.  The flap was then sutured into place with a three layer technique, closing the orbicularis oris muscle layer with subcutaneous buried sutures, followed by a mucosal layer and an epidermal layer.

## 2023-05-12 ENCOUNTER — CLINICAL DOCUMENTATION (OUTPATIENT)
Age: 73
End: 2023-05-12

## 2023-05-25 RX ORDER — ATORVASTATIN CALCIUM 20 MG/1
TABLET, FILM COATED ORAL DAILY
COMMUNITY

## 2023-05-25 RX ORDER — MISOPROSTOL 200 UG/1
200 TABLET ORAL DAILY
COMMUNITY

## 2023-05-25 RX ORDER — AMLODIPINE AND BENAZEPRIL HYDROCHLORIDE 5; 40 MG/1; MG/1
1 CAPSULE ORAL DAILY
COMMUNITY

## 2023-05-25 RX ORDER — LORATADINE 10 MG/1
10 TABLET ORAL
COMMUNITY

## 2023-05-25 RX ORDER — ASPIRIN 325 MG
325 TABLET ORAL DAILY
COMMUNITY

## 2023-05-25 RX ORDER — ASPIRIN 81 MG/1
TABLET ORAL DAILY
COMMUNITY

## 2023-05-25 RX ORDER — BENZONATATE 100 MG/1
100 CAPSULE ORAL PRN
COMMUNITY
Start: 2017-09-30

## 2023-05-25 RX ORDER — AZELASTINE 1 MG/ML
SPRAY, METERED NASAL
COMMUNITY
Start: 2017-10-29

## 2023-05-25 RX ORDER — METHOCARBAMOL 500 MG/1
TABLET, FILM COATED ORAL
COMMUNITY
Start: 2023-01-06

## 2023-12-29 ENCOUNTER — ANESTHESIA (OUTPATIENT)
Facility: HOSPITAL | Age: 73
End: 2023-12-29
Payer: MEDICARE

## 2023-12-29 ENCOUNTER — ANESTHESIA EVENT (OUTPATIENT)
Facility: HOSPITAL | Age: 73
End: 2023-12-29
Payer: MEDICARE

## 2023-12-29 ENCOUNTER — HOSPITAL ENCOUNTER (OUTPATIENT)
Facility: HOSPITAL | Age: 73
Setting detail: OUTPATIENT SURGERY
Discharge: HOME | End: 2023-12-29
Attending: INTERNAL MEDICINE | Admitting: INTERNAL MEDICINE
Payer: MEDICARE

## 2023-12-29 VITALS
SYSTOLIC BLOOD PRESSURE: 116 MMHG | HEART RATE: 89 BPM | RESPIRATION RATE: 17 BRPM | WEIGHT: 128.53 LBS | OXYGEN SATURATION: 100 % | BODY MASS INDEX: 23.65 KG/M2 | DIASTOLIC BLOOD PRESSURE: 63 MMHG | TEMPERATURE: 98.5 F | HEIGHT: 62 IN

## 2023-12-29 PROCEDURE — 2580000003 HC RX 258: Performed by: NURSE ANESTHETIST, CERTIFIED REGISTERED

## 2023-12-29 PROCEDURE — 3700000000 HC ANESTHESIA ATTENDED CARE: Performed by: INTERNAL MEDICINE

## 2023-12-29 PROCEDURE — 7100000011 HC PHASE II RECOVERY - ADDTL 15 MIN: Performed by: INTERNAL MEDICINE

## 2023-12-29 PROCEDURE — 6360000002 HC RX W HCPCS: Performed by: NURSE ANESTHETIST, CERTIFIED REGISTERED

## 2023-12-29 PROCEDURE — 3700000001 HC ADD 15 MINUTES (ANESTHESIA): Performed by: INTERNAL MEDICINE

## 2023-12-29 PROCEDURE — 2500000003 HC RX 250 WO HCPCS: Performed by: NURSE ANESTHETIST, CERTIFIED REGISTERED

## 2023-12-29 PROCEDURE — 7100000010 HC PHASE II RECOVERY - FIRST 15 MIN: Performed by: INTERNAL MEDICINE

## 2023-12-29 PROCEDURE — 3600007502: Performed by: INTERNAL MEDICINE

## 2023-12-29 PROCEDURE — 88305 TISSUE EXAM BY PATHOLOGIST: CPT

## 2023-12-29 PROCEDURE — 3600007512: Performed by: INTERNAL MEDICINE

## 2023-12-29 RX ORDER — SODIUM CHLORIDE 0.9 % (FLUSH) 0.9 %
5-40 SYRINGE (ML) INJECTION EVERY 12 HOURS SCHEDULED
Status: DISCONTINUED | OUTPATIENT
Start: 2023-12-29 | End: 2023-12-29 | Stop reason: HOSPADM

## 2023-12-29 RX ORDER — PROPOFOL 10 MG/ML
INJECTION, EMULSION INTRAVENOUS PRN
Status: DISCONTINUED | OUTPATIENT
Start: 2023-12-29 | End: 2023-12-29 | Stop reason: SDUPTHER

## 2023-12-29 RX ORDER — SODIUM CHLORIDE 9 MG/ML
25 INJECTION, SOLUTION INTRAVENOUS PRN
Status: DISCONTINUED | OUTPATIENT
Start: 2023-12-29 | End: 2023-12-29 | Stop reason: HOSPADM

## 2023-12-29 RX ORDER — EPHEDRINE SULFATE/0.9% NACL/PF 50 MG/5 ML
SYRINGE (ML) INTRAVENOUS PRN
Status: DISCONTINUED | OUTPATIENT
Start: 2023-12-29 | End: 2023-12-29 | Stop reason: SDUPTHER

## 2023-12-29 RX ORDER — SODIUM CHLORIDE 0.9 % (FLUSH) 0.9 %
5-40 SYRINGE (ML) INJECTION PRN
Status: DISCONTINUED | OUTPATIENT
Start: 2023-12-29 | End: 2023-12-29 | Stop reason: HOSPADM

## 2023-12-29 RX ORDER — 0.9 % SODIUM CHLORIDE 0.9 %
INTRAVENOUS SOLUTION INTRAVENOUS PRN
Status: DISCONTINUED | OUTPATIENT
Start: 2023-12-29 | End: 2023-12-29 | Stop reason: SDUPTHER

## 2023-12-29 RX ORDER — LIDOCAINE HYDROCHLORIDE 20 MG/ML
INJECTION, SOLUTION EPIDURAL; INFILTRATION; INTRACAUDAL; PERINEURAL PRN
Status: DISCONTINUED | OUTPATIENT
Start: 2023-12-29 | End: 2023-12-29 | Stop reason: SDUPTHER

## 2023-12-29 RX ADMIN — Medication 15 MG: at 13:03

## 2023-12-29 RX ADMIN — PROPOFOL 80 MG: 10 INJECTION, EMULSION INTRAVENOUS at 12:50

## 2023-12-29 RX ADMIN — PROPOFOL 150 MCG/KG/MIN: 10 INJECTION, EMULSION INTRAVENOUS at 12:49

## 2023-12-29 RX ADMIN — SODIUM CHLORIDE 350 ML: 900 INJECTION, SOLUTION INTRAVENOUS at 13:08

## 2023-12-29 RX ADMIN — LIDOCAINE HYDROCHLORIDE 25 MG: 20 INJECTION, SOLUTION EPIDURAL; INFILTRATION; INTRACAUDAL; PERINEURAL at 12:49

## 2023-12-29 ASSESSMENT — PAIN - FUNCTIONAL ASSESSMENT: PAIN_FUNCTIONAL_ASSESSMENT: 0-10

## 2023-12-29 NOTE — H&P
[address] Esthela Arteaga MD  [phone]          Outpatient H&P Note    Shreya Barry  :  1950  [facility] Medical Record Number: [id number]  Patient: Shreya Barry    Physician: Julio Cesar Sarah MD    Vital Signs: See nursing notes    Allergies: No Known Allergies    Chief Complaint: Colon cancer screening    History of Present Illness: Last exam . #8 personal history of mild aortic stenosis, normal myocardial perfusion studies, normal left ventricular function. No difficulty breathing, extremity edema    Justification for Procedure: Colon cancer screening    History:  Past Medical History:   Diagnosis Date    CAD (coronary artery disease)     Colon polyp     Environmental and seasonal allergies     Essential hypertension     High cholesterol     Stroke ([address])     Sun-damaged skin       Past Surgical History:   Procedure Laterality Date    COLONOSCOPY      ELBOW SURGERY Left     DC UNLISTED PROCEDURE CARDIAC SURGERY        Social History     Socioeconomic History    Marital status:      Spouse name: None    Number of children: None    Years of education: None    Highest education level: None   Tobacco Use    Smoking status: Former     Types: Cigarettes    Smokeless tobacco: Never   Vaping Use    Vaping Use: Never used   Substance and Sexual Activity    Alcohol use: No     Alcohol/week: 0.0 standard drinks of alcohol    Drug use: No      Family History   Problem Relation Age of Onset    No Known Problems Father     No Known Problems Mother        Medications:   Prior to Admission medications    Medication Sig Start Date End Date Taking?  Authorizing Provider   Omega-3 Fatty Acids (FISH OIL PO) Take 1 capsule by mouth daily   Yes ProviderKelsey MD   Triamcinolone Acetonide (NASACORT ALLERGY 24HR NA) 1 spray by Nasal route as needed   Yes ProviderKelsey MD   amLODIPine-benazepril (LOTREL) 5-40 MG per capsule Take 1 capsule by mouth daily    Automatic Reconciliation, Ar   aspirin 81 MG EC tablet Take by mouth daily    Automatic Reconciliation, Ar   atorvastatin (LIPITOR) 20 MG tablet Take by mouth daily    Automatic Reconciliation, Ar   azelastine (ASTELIN) 0.1 % nasal spray ceived the following from [facility]: Outside name: azelastine (ASTELIN) 137 mcg (0.1 %) nasal spray 10/29/17   Automatic Reconciliation, Ar   benzonatate (TESSALON) 100 MG capsule Take 1 capsule by mouth as needed 9/30/17   Automatic Reconciliation, Ar   loratadine (CLARITIN) 10 MG tablet Take 1 tablet by mouth    Automatic Reconciliation, Ar   methocarbamol (ROBAXIN) 500 MG tablet ceived the following from [facility]: Outside name: methocarbamoL (ROBAXIN) 500 mg tablet 1/6/23   Automatic Reconciliation, Ar   miSOPROStol (CYTOTEC) 200 MCG tablet Take 1 tablet by mouth daily    Automatic Reconciliation, Ar       Physical Exam:   General: Comfortable   HEENT: Head: Normal, normocephalic, atraumatic.   Heart: regular rate and rhythm   Lungs: chest clear, no wheezing, rales, normal symmetric air entry   Abdominal: Bowel sounds are normal, liver is not enlarged, spleen is not enlarged           Findings/Diagnosis: Colon cancer screening    Plan of Care/Planned Procedure: I've discussed colonoscopy possible biopsy, polypectomy, cautery, injection, alternatives, complications including but not limited to pain, cardiopulmonary event, bleeding, perforation requiring additional blood transfusion or operative repair; all questions answered.

## 2023-12-29 NOTE — ANESTHESIA POSTPROCEDURE EVALUATION
Department of Anesthesiology  Postprocedure Note    Patient: Chiquita Kate  MRN: [id number]  YOB: 1950  Date of evaluation: 12/29/2023    Procedure Summary       Date: 12/29/23 Room / Location: [facility] ENDO 02 / [facility] ENDOSCOPY    Anesthesia Start: 1245 Anesthesia Stop: 5    Procedures:       COLONOSCOPY (Lower GI Region)      COLONOSCOPY POLYPECTOMY SNARE/COLD BIOPSY (Lower GI Region) Diagnosis:       Special screening for malignant neoplasms, colon      Aortic valve stenosis, etiology of cardiac valve disease unspecified      (Special screening for malignant neoplasms, colon [Z12.11])      (Aortic valve stenosis, etiology of cardiac valve disease unspecified [I35.0])    Surgeons: Josh Whittington MD Responsible Provider: Rafael Saenz MD    Anesthesia Type: MAC ASA Status: 3            Anesthesia Type: MAC    Matthew Phase I: Matthew Score: 10    Matthew Phase II: Matthew Score: 10    Anesthesia Post Evaluation    No notable events documented.

## 2023-12-29 NOTE — PROGRESS NOTES
Kya [facility]  1950  [id number]    Situation:  Verbal report received from: Michael aBrnes RN  Procedure: Procedure(s):  COLONOSCOPY  COLONOSCOPY POLYPECTOMY SNARE/COLD BIOPSY    Background:    Preoperative diagnosis: Special screening for malignant neoplasms, colon [Z12.11]  Aortic valve stenosis, etiology of cardiac valve disease unspecified [I35.0]  Postoperative diagnosis: * No post-op diagnosis entered *    :  Dr. Laura So  Assistant(s): Circulator: Lori Arechiga RN  Endoscopy Technician: Bina Gaxiola      Assessment:  Intra-procedure medications     Anesthesia gave intra-procedure sedation and medications, see anesthesia flow sheet     Intravenous fluids: NS@ Roxene Lowe     Vital signs stable yes    Abdominal assessment: round and soft yes    Recommendation:  Discharge patient per MD order yes  Family or Friend yes  Permission to share finding with family or friend yes    Endoscopy discharge instructions have been reviewed and given to patient. The patient verbalized understanding and acceptance of instructions @ 1:32pm.    Dr. Laura So discussed with patient procedure findings and next steps.

## 2023-12-29 NOTE — PROGRESS NOTES
Initial RN admission and assessment performed and documented in Endoscopy navigator. Patient evaluated by anesthesia in pre-procedure holding. All procedural vital signs, airway assessment, and level of consciousness information monitored and recorded by anesthesia staff on the anesthesia record. Report received from [address] [place] [address] post procedure. Patient transported to recovery area by RN. Report given to post procedure RNFloyd [facility] bedside immediately following procedure by [facility].

## 2023-12-29 NOTE — OP NOTE
[address] Esthela Arteaga MD  [phone]      2023    Colonoscopy Procedure Note  Tommy Iraheta  :  1950  [facility] Medical Record Number: [id number]    Indications:   Colon cancer screening  PCP:  Fran Barton MD  Anesthesia/Sedation: see nursing notes  Endoscopist:  Dr. Iban Simon  Assistants: None  Complications:  None  Estimate Blood Loss:  None    Permit:  The indications, risks, benefits and alternatives were reviewed with the patient or their decision maker who was provided an opportunity to ask questions and all questions were answered. The specific risks of colonoscopy with conscious sedation were reviewed, including but not limited to anesthetic complication, bleeding, adverse drug reaction, missed lesion, infection, IV site reactions, and intestinal perforation which would lead to the need for surgical repair. Alternatives to colonoscopy including radiographic imaging, observation without testing, or laboratory testing were reviewed including the limitations of those alternatives. After considering the options and having all their questions answered, the patient or their decision maker provided both verbal and written consent to proceed. Procedure in Detail:  After obtaining informed consent, positioning of the patient in the left lateral decubitus position, and conduction of a pre-procedure pause or \"time out\" the endoscope was introduced into the anus and advanced to the terminal ileum. The quality of the colonic preparation was good. A careful inspection was made as the colonoscope was withdrawn, findings and interventions are described below.     Appendiceal orifice photographed    Findings:       -Diverticulosis numerous with colonic deformity; inflammatory changes not identified  3 mm sessile cecal polyp  Exam otherwise unremarkable    Specimens:   Polyp removed

## 2023-12-29 NOTE — DISCHARGE INSTRUCTIONS
Raizamadeleine Griffin  [id number]  1950    DISCHARGE INSTRUCTIONS    Results:  Impression:  Colon polyp, colonic diverticulosis    Recommendations:   Because of age routine follow-up examination not recommended  Discomfort:  Redness at IV site- apply warm compress to area; if redness or soreness persist- contact your physician. There may be a slight amount of blood passed from the rectum. Gaseous discomfort - walking, belching will help relieve any discomfort. You may not operate a vehicle for 12 hours. You may not engage in an occupation involving machinery or appliances for rest of today. You may not drink alcoholic beverages for at least 12 hours. Avoid making any critical decisions for at least 24 hours. DIET:   High fiber diet. Medications:                Resume usual medications today   ACTIVITY:  You may resume your normal daily activities it is recommended that you spend the remainder of the day resting -  avoid any strenuous activity. CALL M.D. ANY SIGN OF:   Increasing pain, nausea, vomiting  Abdominal distension (swelling)  New increased bleeding (oral or rectal)  Fever (chills)  Pain in chest area  Bloody discharge from nose or mouth  Shortness of breath     Follow-up Instructions:  Call Dr. Hernando Hernandez if you have any questions or problems.       DISCHARGE SUMMARY from Nurse    The following personal items collected during your admission are returned to you:   Dental Appliance:    Vision:    Hearing Aid:    Jewelry:    Clothing:    Other Valuables:    Valuables sent to safe:

## 2024-02-20 ENCOUNTER — HOSPITAL ENCOUNTER (OUTPATIENT)
Facility: HOSPITAL | Age: 74
Discharge: HOME | End: 2024-02-23
Payer: MEDICARE

## 2024-02-20 DIAGNOSIS — M54.14 RADICULOPATHY OF THORACIC REGION: ICD-10-CM

## 2024-02-20 PROCEDURE — 72146 MRI CHEST SPINE W/O DYE: CPT

## 2024-04-22 ENCOUNTER — OFFICE VISIT (OUTPATIENT)
Age: 74
End: 2024-04-22
Payer: MEDICARE

## 2024-04-22 VITALS
WEIGHT: 134 LBS | SYSTOLIC BLOOD PRESSURE: 150 MMHG | BODY MASS INDEX: 24.66 KG/M2 | HEIGHT: 62 IN | DIASTOLIC BLOOD PRESSURE: 80 MMHG

## 2024-04-22 DIAGNOSIS — E78.2 MIXED HYPERLIPIDEMIA: ICD-10-CM

## 2024-04-22 DIAGNOSIS — I77.9 CAROTID ARTERY DISORDER (HCC): ICD-10-CM

## 2024-04-22 DIAGNOSIS — I10 ESSENTIAL (PRIMARY) HYPERTENSION: Primary | ICD-10-CM

## 2024-04-22 DIAGNOSIS — I35.0 NONRHEUMATIC AORTIC VALVE STENOSIS: ICD-10-CM

## 2024-04-22 DIAGNOSIS — I25.10 CORONARY ARTERY DISEASE INVOLVING NATIVE CORONARY ARTERY OF NATIVE HEART WITHOUT ANGINA PECTORIS: ICD-10-CM

## 2024-04-22 PROCEDURE — 93005 ELECTROCARDIOGRAM TRACING: CPT | Performed by: STUDENT IN AN ORGANIZED HEALTH CARE EDUCATION/TRAINING PROGRAM

## 2024-04-22 PROCEDURE — 3077F SYST BP >= 140 MM HG: CPT | Performed by: STUDENT IN AN ORGANIZED HEALTH CARE EDUCATION/TRAINING PROGRAM

## 2024-04-22 PROCEDURE — 1123F ACP DISCUSS/DSCN MKR DOCD: CPT | Performed by: STUDENT IN AN ORGANIZED HEALTH CARE EDUCATION/TRAINING PROGRAM

## 2024-04-22 PROCEDURE — 93010 ELECTROCARDIOGRAM REPORT: CPT | Performed by: STUDENT IN AN ORGANIZED HEALTH CARE EDUCATION/TRAINING PROGRAM

## 2024-04-22 PROCEDURE — 99214 OFFICE O/P EST MOD 30 MIN: CPT | Performed by: STUDENT IN AN ORGANIZED HEALTH CARE EDUCATION/TRAINING PROGRAM

## 2024-04-22 PROCEDURE — 3079F DIAST BP 80-89 MM HG: CPT | Performed by: STUDENT IN AN ORGANIZED HEALTH CARE EDUCATION/TRAINING PROGRAM

## 2024-04-22 PROCEDURE — G8420 CALC BMI NORM PARAMETERS: HCPCS | Performed by: STUDENT IN AN ORGANIZED HEALTH CARE EDUCATION/TRAINING PROGRAM

## 2024-04-22 PROCEDURE — 1036F TOBACCO NON-USER: CPT | Performed by: STUDENT IN AN ORGANIZED HEALTH CARE EDUCATION/TRAINING PROGRAM

## 2024-04-22 PROCEDURE — 3017F COLORECTAL CA SCREEN DOC REV: CPT | Performed by: STUDENT IN AN ORGANIZED HEALTH CARE EDUCATION/TRAINING PROGRAM

## 2024-04-22 PROCEDURE — G8428 CUR MEDS NOT DOCUMENT: HCPCS | Performed by: STUDENT IN AN ORGANIZED HEALTH CARE EDUCATION/TRAINING PROGRAM

## 2024-04-22 RX ORDER — CHOLECALCIFEROL (VITAMIN D3) 1250 MCG
2000 CAPSULE ORAL DAILY
COMMUNITY

## 2024-04-22 RX ORDER — ALENDRONATE SODIUM 70 MG/1
70 TABLET ORAL
COMMUNITY

## 2024-04-22 RX ORDER — CELECOXIB 100 MG/1
100 CAPSULE ORAL 2 TIMES DAILY
COMMUNITY

## 2024-04-22 NOTE — PROGRESS NOTES
[place]  [address] [facility], [address]  [place] [address]    Office [phone],Fax [phone]           Hector Jensen Jr is a 73 y.o. male presents to the office for f/up visit.      Assessment/Recommendations:     Diagnosis Orders   1. Essential (primary) hypertension  EKG 12 Lead      2. Mixed hyperlipidemia        3. Coronary artery disease involving native coronary artery of native heart without angina pectoris  Vascular duplex carotid bilateral    Echo (TTE) complete (PRN contrast/bubble/strain/3D)      4. Carotid artery disorder (HCC)  Vascular duplex carotid bilateral    Echo (TTE) complete (PRN contrast/bubble/strain/3D)      5. Nonrheumatic aortic valve stenosis  Echo (TTE) complete (PRN contrast/bubble/strain/3D)           Stable angina symptoms.  Myocardial perfusion imaging 3/23 showed normal myocardial perfusion imaging.  Echo showed normal LV function with mild aortic stenosis   -Continue aspirin and statin therapy.        Mild aortic stenosis- noted on echo 3/23,repeat echo @ f/up in 1 year      Hypertension,  continue amlodipine-benazepril 5-40 mg daily  recently elevated due to nsaid use.  Advised pt to limit nsaid due to htn and vascular disease      hx of CVA at age 46   -Continue aspirin and statin therapy      Carotid artery disease.  Reports having a right carotid artery stenosis of 85%.   - repeat carotid duplex    Osteoperosis with recent pathological spine fracture         Primary Care Physician- Dada Goss Jr., APRN - NP    Follow-up 1 year        Subjective:   Activity limited by recent spine fracture.  No angina, dyspnea.  Bp elevated to ~ 150mmhg since starting celebrex      Past Medical History:   Diagnosis Date    CAD (coronary artery disease)     Colon polyp     Environmental and seasonal allergies     Essential hypertension     High cholesterol     Stroke (HCC)     Sun-damaged skin         Past Surgical History:   Procedure

## 2024-04-22 NOTE — PROGRESS NOTES
Chief Complaint   Patient presents with    Hypertension    Coronary Artery Disease     HDL     Vitals:    04/22/24 0826   Height: 1.575 m (5' 2\")      Ht 1.575 m (5' 2\")   BMI 23.51 kg/m²         Discharged

## 2024-06-11 ENCOUNTER — ANCILLARY PROCEDURE (OUTPATIENT)
Age: 74
End: 2024-06-11
Payer: MEDICARE

## 2024-06-11 DIAGNOSIS — I25.10 CORONARY ARTERY DISEASE INVOLVING NATIVE CORONARY ARTERY OF NATIVE HEART WITHOUT ANGINA PECTORIS: ICD-10-CM

## 2024-06-11 DIAGNOSIS — I77.9 CAROTID ARTERY DISORDER (HCC): ICD-10-CM

## 2024-06-11 LAB
VAS LEFT CCA DIST EDV: 14.9 CM/S
VAS LEFT CCA DIST PSV: 89 CM/S
VAS LEFT CCA PROX EDV: 16.2 CM/S
VAS LEFT CCA PROX PSV: 124.4 CM/S
VAS LEFT ECA EDV: 8.54 CM/S
VAS LEFT ECA PSV: 80.5 CM/S
VAS LEFT ICA DIST EDV: 18.4 CM/S
VAS LEFT ICA DIST PSV: 92.4 CM/S
VAS LEFT ICA MID EDV: 25 CM/S
VAS LEFT ICA MID PSV: 100.6 CM/S
VAS LEFT ICA PROX EDV: 23.7 CM/S
VAS LEFT ICA PROX PSV: 93.7 CM/S
VAS LEFT ICA/CCA PSV: 0.81 NO UNITS
VAS LEFT VERTEBRAL EDV: 19.34 CM/S
VAS LEFT VERTEBRAL PSV: 57.7 CM/S
VAS RIGHT CCA DIST EDV: 16.3 CM/S
VAS RIGHT CCA DIST PSV: 92.3 CM/S
VAS RIGHT CCA PROX EDV: 8.8 CM/S
VAS RIGHT CCA PROX PSV: 103.5 CM/S
VAS RIGHT ECA EDV: 12.16 CM/S
VAS RIGHT ECA PSV: 112.1 CM/S
VAS RIGHT ICA DIST EDV: 16.2 CM/S
VAS RIGHT ICA DIST PSV: 70.7 CM/S
VAS RIGHT ICA MID EDV: 14.8 CM/S
VAS RIGHT ICA MID PSV: 70.7 CM/S
VAS RIGHT ICA PROX EDV: 15.3 CM/S
VAS RIGHT ICA PROX PSV: 83.4 CM/S
VAS RIGHT ICA/CCA PSV: 0.8 NO UNITS
VAS RIGHT VERTEBRAL EDV: 5.46 CM/S
VAS RIGHT VERTEBRAL PSV: 17.6 CM/S

## 2024-06-11 PROCEDURE — 93880 EXTRACRANIAL BILAT STUDY: CPT | Performed by: SPECIALIST

## 2024-10-28 ENCOUNTER — TELEPHONE (OUTPATIENT)
Age: 74
End: 2024-10-28

## 2024-10-28 NOTE — TELEPHONE ENCOUNTER
Spoke with the pts daughter,  identified the pt with name and .  B/P 160-170/70's. HR 70-80, and some \"blood in his eye\"    I assured her that the B/Ps are alright, but if he continues to get high readings when he's at rest, to let us know.    As for the blood in the eye, the pt is only on ASA 81 mg, and his B/P was not high enough to indicate vessel eruption. I advised if he's having any visual disturbances to be evaluated by an eye doctor. Debby expressed understanding and agreement. Pt has no further questions or concerns at this time.

## 2024-11-04 PROBLEM — I20.89 OTHER FORMS OF ANGINA PECTORIS (HCC): Status: ACTIVE | Noted: 2024-11-04

## 2024-11-04 NOTE — PROGRESS NOTES
Primary Cardiologist:  DO Erika Collier, APRN - NP   [address] [address], [address]  [place] [address]    Office [phone],Fax [phone]           Hector Jensen Jr is a 73 y.o. male presents to the office for f/up visit.      Assessment/Recommendations:     Diagnosis Orders   1. Benign hypertension        2. Coronary artery disease involving native coronary artery of native heart without angina pectoris        3. Other forms of angina pectoris (HCC)        4. Nonrheumatic aortic valve stenosis        5. Personal history of transient ischemic attack (TIA), and cerebral infarction without residual deficits        6. Hx of completed stroke        7. Carotid artery disorder (HCC)               Stable angina symptoms.  Myocardial perfusion imaging 3/23 showed normal myocardial perfusion imaging.  Echo showed normal LV function with mild aortic stenosis   -Continue aspirin and statin therapy.        Mild aortic stenosis- noted on echo 3/23,repeat echo @ f/up in 1 year      Hypertension- continue amlodipine-benazepril 5-40 mg daily, bisoprolol 5mg added by PCP recently. BP controlled today. Will bring cuff in for correlation BP elevated at times due to chronic pain. Advised pt to limit nsaid due to htn and vascular disease. Will monitor BP and inform us if BP >140 consistently.      hx of CVA at age 46   -Continue aspirin and statin therapy      Carotid artery disease.  Reports having a right carotid artery stenosis of 85%.   - 6/11/24 mild <50% LICA. Intimal hyperplasia without significant stenosis within the endarterectomy site in the right internal carotid artery.Normal antegrade flow involving the bilateral vertebral artery, left > right    Osteoperosis with recent pathological spine fracture       Primary Care Physician- Dada Goss Jr., APRN - NP    Follow-up in April with Echo        Subjective:  Here with family. Discussed meds and BP control.

## 2024-11-05 ENCOUNTER — OFFICE VISIT (OUTPATIENT)
Age: 74
End: 2024-11-05
Payer: MEDICARE

## 2024-11-05 VITALS
HEIGHT: 62 IN | OXYGEN SATURATION: 98 % | RESPIRATION RATE: 18 BRPM | HEART RATE: 64 BPM | BODY MASS INDEX: 22.45 KG/M2 | SYSTOLIC BLOOD PRESSURE: 138 MMHG | WEIGHT: 122 LBS | DIASTOLIC BLOOD PRESSURE: 62 MMHG

## 2024-11-05 DIAGNOSIS — I77.9 CAROTID ARTERY DISORDER (HCC): ICD-10-CM

## 2024-11-05 DIAGNOSIS — I35.0 NONRHEUMATIC AORTIC VALVE STENOSIS: ICD-10-CM

## 2024-11-05 DIAGNOSIS — Z86.73 HX OF COMPLETED STROKE: ICD-10-CM

## 2024-11-05 DIAGNOSIS — I25.10 CORONARY ARTERY DISEASE INVOLVING NATIVE CORONARY ARTERY OF NATIVE HEART WITHOUT ANGINA PECTORIS: ICD-10-CM

## 2024-11-05 DIAGNOSIS — I10 BENIGN HYPERTENSION: Primary | ICD-10-CM

## 2024-11-05 DIAGNOSIS — Z86.73 PERSONAL HISTORY OF TRANSIENT ISCHEMIC ATTACK (TIA), AND CEREBRAL INFARCTION WITHOUT RESIDUAL DEFICITS: ICD-10-CM

## 2024-11-05 DIAGNOSIS — I20.89 OTHER FORMS OF ANGINA PECTORIS (HCC): ICD-10-CM

## 2024-11-05 PROCEDURE — 99214 OFFICE O/P EST MOD 30 MIN: CPT

## 2024-11-05 RX ORDER — BISOPROLOL FUMARATE 5 MG/1
5 TABLET, FILM COATED ORAL DAILY
COMMUNITY

## 2024-11-05 NOTE — PROGRESS NOTES
had concerns including Coronary Artery Disease and Hypertension.    Vitals:    11/05/24 1521   BP: 138/62   Site: Left Upper Arm   Position: Sitting   Pulse: 64   Resp: 18   SpO2: 98%   Weight: 55.3 kg (122 lb)   Height: 1.575 m (5' 2\")        Chest pain No    Refills No        1. Have you been to the ER, urgent care clinic since your last visit? No       Hospitalized since your last visit? No       Where?        Date?

## 2025-04-22 ENCOUNTER — ANCILLARY PROCEDURE (OUTPATIENT)
Age: 75
End: 2025-04-22
Payer: MEDICARE

## 2025-04-22 VITALS
WEIGHT: 122 LBS | HEART RATE: 61 BPM | BODY MASS INDEX: 22.45 KG/M2 | DIASTOLIC BLOOD PRESSURE: 70 MMHG | HEIGHT: 62 IN | SYSTOLIC BLOOD PRESSURE: 130 MMHG

## 2025-04-22 DIAGNOSIS — I77.9 CAROTID ARTERY DISORDER: ICD-10-CM

## 2025-04-22 DIAGNOSIS — I35.0 NONRHEUMATIC AORTIC VALVE STENOSIS: ICD-10-CM

## 2025-04-22 DIAGNOSIS — I25.10 CORONARY ARTERY DISEASE INVOLVING NATIVE CORONARY ARTERY OF NATIVE HEART WITHOUT ANGINA PECTORIS: ICD-10-CM

## 2025-04-22 PROCEDURE — 93306 TTE W/DOPPLER COMPLETE: CPT | Performed by: SPECIALIST

## 2025-04-23 LAB
ECHO AO ASC DIAM: 3.1 CM
ECHO AO ASCENDING AORTA INDEX: 2 CM/M2
ECHO AO ROOT DIAM: 3 CM
ECHO AO ROOT INDEX: 1.94 CM/M2
ECHO AV AREA PEAK VELOCITY: 1.9 CM2
ECHO AV AREA VTI: 1.9 CM2
ECHO AV AREA/BSA PEAK VELOCITY: 1.2 CM2/M2
ECHO AV AREA/BSA VTI: 1.2 CM2/M2
ECHO AV MEAN GRADIENT: 11 MMHG
ECHO AV MEAN VELOCITY: 1.5 M/S
ECHO AV PEAK GRADIENT: 23 MMHG
ECHO AV PEAK VELOCITY: 2.4 M/S
ECHO AV VELOCITY RATIO: 0.5
ECHO AV VTI: 51.1 CM
ECHO BSA: 1.56 M2
ECHO EST RA PRESSURE: 3 MMHG
ECHO LA DIAMETER INDEX: 2.52 CM/M2
ECHO LA DIAMETER: 3.9 CM
ECHO LA TO AORTIC ROOT RATIO: 1.3
ECHO LA VOL A-L A2C: 45 ML (ref 18–58)
ECHO LA VOL A-L A4C: 43 ML (ref 18–58)
ECHO LA VOL MOD A2C: 42 ML (ref 18–58)
ECHO LA VOL MOD A4C: 40 ML (ref 18–58)
ECHO LA VOLUME AREA LENGTH: 45 ML
ECHO LA VOLUME INDEX A-L A2C: 29 ML/M2 (ref 16–34)
ECHO LA VOLUME INDEX A-L A4C: 28 ML/M2 (ref 16–34)
ECHO LA VOLUME INDEX AREA LENGTH: 29 ML/M2 (ref 16–34)
ECHO LA VOLUME INDEX MOD A2C: 27 ML/M2 (ref 16–34)
ECHO LA VOLUME INDEX MOD A4C: 26 ML/M2 (ref 16–34)
ECHO LV E' LATERAL VELOCITY: 8.8 CM/S
ECHO LV E' SEPTAL VELOCITY: 7.35 CM/S
ECHO LV EDV A2C: 70 ML
ECHO LV EDV A4C: 76 ML
ECHO LV EDV BP: 73 ML (ref 67–155)
ECHO LV EDV INDEX A4C: 49 ML/M2
ECHO LV EDV INDEX BP: 47 ML/M2
ECHO LV EDV NDEX A2C: 45 ML/M2
ECHO LV EF PHYSICIAN: 60 %
ECHO LV EJECTION FRACTION A2C: 61 %
ECHO LV EJECTION FRACTION A4C: 60 %
ECHO LV ESV A2C: 27 ML
ECHO LV ESV A4C: 30 ML
ECHO LV ESV BP: 30 ML (ref 22–58)
ECHO LV ESV INDEX A2C: 17 ML/M2
ECHO LV ESV INDEX A4C: 19 ML/M2
ECHO LV ESV INDEX BP: 19 ML/M2
ECHO LV FRACTIONAL SHORTENING: 31 % (ref 28–44)
ECHO LV INTERNAL DIMENSION DIASTOLE INDEX: 2.9 CM/M2
ECHO LV INTERNAL DIMENSION DIASTOLIC: 4.5 CM (ref 4.2–5.9)
ECHO LV INTERNAL DIMENSION SYSTOLIC INDEX: 2 CM/M2
ECHO LV INTERNAL DIMENSION SYSTOLIC: 3.1 CM
ECHO LV IVSD: 0.8 CM (ref 0.6–1)
ECHO LV MASS 2D: 123.1 G (ref 88–224)
ECHO LV MASS INDEX 2D: 79.4 G/M2 (ref 49–115)
ECHO LV POSTERIOR WALL DIASTOLIC: 0.9 CM (ref 0.6–1)
ECHO LV RELATIVE WALL THICKNESS RATIO: 0.4
ECHO LVOT AREA: 3.8 CM2
ECHO LVOT AV VTI INDEX: 0.51
ECHO LVOT DIAM: 2.2 CM
ECHO LVOT MEAN GRADIENT: 3 MMHG
ECHO LVOT PEAK GRADIENT: 6 MMHG
ECHO LVOT PEAK VELOCITY: 1.2 M/S
ECHO LVOT STROKE VOLUME INDEX: 64 ML/M2
ECHO LVOT SV: 99.2 ML
ECHO LVOT VTI: 26.1 CM
ECHO MV A VELOCITY: 0.74 M/S
ECHO MV AREA PHT: 4.7 CM2
ECHO MV E DECELERATION TIME (DT): 161.9 MS
ECHO MV E VELOCITY: 0.99 M/S
ECHO MV E/A RATIO: 1.34
ECHO MV E/E' LATERAL: 11.25
ECHO MV E/E' RATIO (AVERAGED): 12.36
ECHO MV E/E' SEPTAL: 13.47
ECHO MV PRESSURE HALF TIME (PHT): 47 MS
ECHO RIGHT VENTRICULAR SYSTOLIC PRESSURE (RVSP): 31 MMHG
ECHO RV FREE WALL PEAK S': 13.6 CM/S
ECHO RV INTERNAL DIMENSION: 3.3 CM
ECHO RV TAPSE: 2.1 CM (ref 1.7–?)
ECHO TV REGURGITANT MAX VELOCITY: 2.64 M/S
ECHO TV REGURGITANT PEAK GRADIENT: 28 MMHG

## 2025-04-26 ENCOUNTER — RESULTS FOLLOW-UP (OUTPATIENT)
Age: 75
End: 2025-04-26

## 2025-05-05 ENCOUNTER — OFFICE VISIT (OUTPATIENT)
Age: 75
End: 2025-05-05
Payer: MEDICARE

## 2025-05-05 VITALS
HEART RATE: 65 BPM | DIASTOLIC BLOOD PRESSURE: 70 MMHG | WEIGHT: 135 LBS | HEIGHT: 62 IN | SYSTOLIC BLOOD PRESSURE: 140 MMHG | BODY MASS INDEX: 24.84 KG/M2 | RESPIRATION RATE: 18 BRPM | OXYGEN SATURATION: 99 %

## 2025-05-05 DIAGNOSIS — I20.2 REFRACTORY ANGINA PECTORIS: Primary | ICD-10-CM

## 2025-05-05 DIAGNOSIS — I35.0 NONRHEUMATIC AORTIC VALVE STENOSIS: ICD-10-CM

## 2025-05-05 DIAGNOSIS — E78.2 MIXED HYPERLIPIDEMIA: ICD-10-CM

## 2025-05-05 DIAGNOSIS — I10 BENIGN HYPERTENSION: ICD-10-CM

## 2025-05-05 DIAGNOSIS — Z01.818 PRE-OP TESTING: Primary | ICD-10-CM

## 2025-05-05 PROCEDURE — 99214 OFFICE O/P EST MOD 30 MIN: CPT | Performed by: STUDENT IN AN ORGANIZED HEALTH CARE EDUCATION/TRAINING PROGRAM

## 2025-05-05 PROCEDURE — 93005 ELECTROCARDIOGRAM TRACING: CPT | Performed by: STUDENT IN AN ORGANIZED HEALTH CARE EDUCATION/TRAINING PROGRAM

## 2025-05-05 PROCEDURE — G8420 CALC BMI NORM PARAMETERS: HCPCS | Performed by: STUDENT IN AN ORGANIZED HEALTH CARE EDUCATION/TRAINING PROGRAM

## 2025-05-05 PROCEDURE — G2211 COMPLEX E/M VISIT ADD ON: HCPCS | Performed by: STUDENT IN AN ORGANIZED HEALTH CARE EDUCATION/TRAINING PROGRAM

## 2025-05-05 PROCEDURE — 3078F DIAST BP <80 MM HG: CPT | Performed by: STUDENT IN AN ORGANIZED HEALTH CARE EDUCATION/TRAINING PROGRAM

## 2025-05-05 PROCEDURE — 3017F COLORECTAL CA SCREEN DOC REV: CPT | Performed by: STUDENT IN AN ORGANIZED HEALTH CARE EDUCATION/TRAINING PROGRAM

## 2025-05-05 PROCEDURE — 1159F MED LIST DOCD IN RCRD: CPT | Performed by: STUDENT IN AN ORGANIZED HEALTH CARE EDUCATION/TRAINING PROGRAM

## 2025-05-05 PROCEDURE — 1036F TOBACCO NON-USER: CPT | Performed by: STUDENT IN AN ORGANIZED HEALTH CARE EDUCATION/TRAINING PROGRAM

## 2025-05-05 PROCEDURE — 93010 ELECTROCARDIOGRAM REPORT: CPT | Performed by: STUDENT IN AN ORGANIZED HEALTH CARE EDUCATION/TRAINING PROGRAM

## 2025-05-05 PROCEDURE — 1126F AMNT PAIN NOTED NONE PRSNT: CPT | Performed by: STUDENT IN AN ORGANIZED HEALTH CARE EDUCATION/TRAINING PROGRAM

## 2025-05-05 PROCEDURE — G8427 DOCREV CUR MEDS BY ELIG CLIN: HCPCS | Performed by: STUDENT IN AN ORGANIZED HEALTH CARE EDUCATION/TRAINING PROGRAM

## 2025-05-05 PROCEDURE — 1123F ACP DISCUSS/DSCN MKR DOCD: CPT | Performed by: STUDENT IN AN ORGANIZED HEALTH CARE EDUCATION/TRAINING PROGRAM

## 2025-05-05 PROCEDURE — 3077F SYST BP >= 140 MM HG: CPT | Performed by: STUDENT IN AN ORGANIZED HEALTH CARE EDUCATION/TRAINING PROGRAM

## 2025-05-05 NOTE — PROGRESS NOTES
[address] [address] [facility] [address], [address]  [place] [address]    Office [phone],Fax [phone]           Hector Jensen Jr is a 74 y.o. male presents to the office for f/up visit.      Assessment/Recommendations:     Diagnosis Orders   1. Refractory angina pectoris  EKG 12 Lead      2. Nonrheumatic aortic valve stenosis        3. Benign hypertension        4. Mixed hyperlipidemia              Angina pectoris.  Escalating angina symptoms   Myocardial perfusion imaging 3/23 showed normal myocardial perfusion imaging.  Echo showed normal LV function with mild aortic stenosis   -Continue aspirin and statin therapy.     - recommend cardiac catheterization due to escalating angina pectoris symptoms  Risk and benefit of cardiac catheterization/PCI was described in detail to patient.  (risk of vascular access complication with potential surgical intervention for management, stroke, myocardial infarction, emergent open heart surgery and death).  Patient wishes to proceed with coronary angiography with possible intervention.         Mild aortic stenosis  Echo 4/2025.  AV mean gradient is 11 mmHg. AV peak gradient is 23 mmHg. AV peak velocity is 2.4 m/s. AV area by continuity VTI is 1.9 cm2.       Hypertension-mildly elevated in the office today.  Continue amlodipine-benazepril 5-40 mg daily, bisoprolol 5mg      hx of CVA at age 46   -Continue aspirin and statin therapy       Carotid artery disease.  Reports having a right carotid artery stenosis of 85%.   - 6/11/24 mild <50% LICA. Intimal hyperplasia without significant stenosis within the endarterectomy site in the right internal carotid artery.Normal antegrade flow involving the bilateral vertebral artery, left > right      Osteoperosis with hx of pathological spine fracture       Primary Care Physician- Dada Goss Jr., APRN - NP    Patient is an established with plan for longitudinal relationship and ongoing assessment and management of CAD

## 2025-05-05 NOTE — PROGRESS NOTES
had concerns including Coronary Artery Disease.    Vitals:    05/05/25 1134 05/05/25 1151   BP: (!) 140/70 (!) 140/70   BP Site: Left Upper Arm Left Upper Arm   Patient Position: Sitting Sitting   Pulse: 65    Resp: 18    SpO2: 99%    Weight: 61.2 kg (135 lb)    Height: 1.575 m (5' 2\")         Chest pain No    Refills No        1. Have you been to the ER, urgent care clinic since your last visit? No       Hospitalized since your last visit? No       Where?        Date?

## 2025-05-07 ENCOUNTER — TELEPHONE (OUTPATIENT)
Age: 75
End: 2025-05-07

## 2025-05-11 ENCOUNTER — HOSPITAL ENCOUNTER (INPATIENT)
Facility: HOSPITAL | Age: 75
LOS: 1 days | Discharge: ANOTHER HOSPITAL | DRG: 281 | End: 2025-05-12
Attending: STUDENT IN AN ORGANIZED HEALTH CARE EDUCATION/TRAINING PROGRAM | Admitting: FAMILY MEDICINE
Payer: MEDICARE

## 2025-05-11 ENCOUNTER — APPOINTMENT (OUTPATIENT)
Facility: HOSPITAL | Age: 75
DRG: 281 | End: 2025-05-11
Payer: MEDICARE

## 2025-05-11 DIAGNOSIS — I24.9 ACUTE CORONARY SYNDROME (HCC): ICD-10-CM

## 2025-05-11 DIAGNOSIS — R07.9 CHEST PAIN, UNSPECIFIED TYPE: ICD-10-CM

## 2025-05-11 DIAGNOSIS — I21.4 NSTEMI (NON-ST ELEVATED MYOCARDIAL INFARCTION) (HCC): ICD-10-CM

## 2025-05-11 DIAGNOSIS — R79.89 ELEVATED TROPONIN: Primary | ICD-10-CM

## 2025-05-11 DIAGNOSIS — R79.89 ELEVATED BRAIN NATRIURETIC PEPTIDE (BNP) LEVEL: ICD-10-CM

## 2025-05-11 LAB
ALBUMIN SERPL-MCNC: 3.8 G/DL (ref 3.5–5)
ALBUMIN/GLOB SERPL: 1.1 (ref 1.1–2.2)
ALP SERPL-CCNC: 60 U/L (ref 45–117)
ALT SERPL-CCNC: 26 U/L (ref 12–78)
ANION GAP SERPL CALC-SCNC: 6 MMOL/L (ref 2–12)
AST SERPL-CCNC: 18 U/L (ref 15–37)
BASOPHILS # BLD: 0.03 K/UL (ref 0–0.1)
BASOPHILS NFR BLD: 0.4 % (ref 0–1)
BILIRUB SERPL-MCNC: 0.3 MG/DL (ref 0.2–1)
BUN SERPL-MCNC: 10 MG/DL (ref 6–20)
BUN/CREAT SERPL: 10 (ref 12–20)
CALCIUM SERPL-MCNC: 9.3 MG/DL (ref 8.5–10.1)
CHLORIDE SERPL-SCNC: 102 MMOL/L (ref 97–108)
CO2 SERPL-SCNC: 26 MMOL/L (ref 21–32)
COMMENT:: NORMAL
CREAT SERPL-MCNC: 1.03 MG/DL (ref 0.7–1.3)
DIFFERENTIAL METHOD BLD: NORMAL
EOSINOPHIL # BLD: 0.06 K/UL (ref 0–0.4)
EOSINOPHIL NFR BLD: 0.8 % (ref 0–7)
ERYTHROCYTE [DISTWIDTH] IN BLOOD BY AUTOMATED COUNT: 11.9 % (ref 11.5–14.5)
GLOBULIN SER CALC-MCNC: 3.5 G/DL (ref 2–4)
GLUCOSE SERPL-MCNC: 179 MG/DL (ref 65–100)
HCT VFR BLD AUTO: 40.2 % (ref 36.6–50.3)
HGB BLD-MCNC: 13.6 G/DL (ref 12.1–17)
IMM GRANULOCYTES # BLD AUTO: 0.01 K/UL (ref 0–0.04)
IMM GRANULOCYTES NFR BLD AUTO: 0.1 % (ref 0–0.5)
LYMPHOCYTES # BLD: 1.62 K/UL (ref 0.8–3.5)
LYMPHOCYTES NFR BLD: 21.6 % (ref 12–49)
MAGNESIUM SERPL-MCNC: 1.9 MG/DL (ref 1.6–2.4)
MCH RBC QN AUTO: 31.3 PG (ref 26–34)
MCHC RBC AUTO-ENTMCNC: 33.8 G/DL (ref 30–36.5)
MCV RBC AUTO: 92.4 FL (ref 80–99)
MONOCYTES # BLD: 0.76 K/UL (ref 0–1)
MONOCYTES NFR BLD: 10.1 % (ref 5–13)
NEUTS SEG # BLD: 5.01 K/UL (ref 1.8–8)
NEUTS SEG NFR BLD: 67 % (ref 32–75)
NRBC # BLD: 0 K/UL (ref 0–0.01)
NRBC BLD-RTO: 0 PER 100 WBC
NT PRO BNP: 293 PG/ML
PLATELET # BLD AUTO: 181 K/UL (ref 150–400)
PMV BLD AUTO: 9.8 FL (ref 8.9–12.9)
POTASSIUM SERPL-SCNC: 3.4 MMOL/L (ref 3.5–5.1)
PROT SERPL-MCNC: 7.3 G/DL (ref 6.4–8.2)
RBC # BLD AUTO: 4.35 M/UL (ref 4.1–5.7)
SODIUM SERPL-SCNC: 134 MMOL/L (ref 136–145)
SPECIMEN HOLD: NORMAL
TROPONIN I SERPL HS-MCNC: 80 NG/L (ref 0–76)
WBC # BLD AUTO: 7.5 K/UL (ref 4.1–11.1)

## 2025-05-11 PROCEDURE — 99285 EMERGENCY DEPT VISIT HI MDM: CPT

## 2025-05-11 PROCEDURE — 85025 COMPLETE CBC W/AUTO DIFF WBC: CPT

## 2025-05-11 PROCEDURE — 93005 ELECTROCARDIOGRAM TRACING: CPT | Performed by: STUDENT IN AN ORGANIZED HEALTH CARE EDUCATION/TRAINING PROGRAM

## 2025-05-11 PROCEDURE — 36415 COLL VENOUS BLD VENIPUNCTURE: CPT

## 2025-05-11 PROCEDURE — 83880 ASSAY OF NATRIURETIC PEPTIDE: CPT

## 2025-05-11 PROCEDURE — 83735 ASSAY OF MAGNESIUM: CPT

## 2025-05-11 PROCEDURE — 83036 HEMOGLOBIN GLYCOSYLATED A1C: CPT

## 2025-05-11 PROCEDURE — 80053 COMPREHEN METABOLIC PANEL: CPT

## 2025-05-11 PROCEDURE — 84484 ASSAY OF TROPONIN QUANT: CPT

## 2025-05-11 PROCEDURE — 6370000000 HC RX 637 (ALT 250 FOR IP): Performed by: STUDENT IN AN ORGANIZED HEALTH CARE EDUCATION/TRAINING PROGRAM

## 2025-05-11 PROCEDURE — 71046 X-RAY EXAM CHEST 2 VIEWS: CPT

## 2025-05-11 RX ORDER — ASPIRIN 81 MG/1
324 TABLET, CHEWABLE ORAL
Status: COMPLETED | OUTPATIENT
Start: 2025-05-11 | End: 2025-05-11

## 2025-05-11 RX ADMIN — ASPIRIN 324 MG: 81 TABLET, CHEWABLE ORAL at 22:54

## 2025-05-11 ASSESSMENT — PAIN DESCRIPTION - LOCATION: LOCATION: BACK

## 2025-05-11 ASSESSMENT — PAIN DESCRIPTION - DESCRIPTORS: DESCRIPTORS: ACHING

## 2025-05-11 ASSESSMENT — PAIN SCALES - GENERAL
PAINLEVEL_OUTOF10: 3
PAINLEVEL_OUTOF10: 0

## 2025-05-11 ASSESSMENT — PAIN DESCRIPTION - PAIN TYPE: TYPE: CHRONIC PAIN

## 2025-05-11 ASSESSMENT — PAIN DESCRIPTION - ORIENTATION: ORIENTATION: POSTERIOR

## 2025-05-11 ASSESSMENT — PAIN - FUNCTIONAL ASSESSMENT: PAIN_FUNCTIONAL_ASSESSMENT: 0-10

## 2025-05-12 ENCOUNTER — APPOINTMENT (OUTPATIENT)
Facility: HOSPITAL | Age: 75
DRG: 281 | End: 2025-05-12
Attending: FAMILY MEDICINE
Payer: MEDICARE

## 2025-05-12 ENCOUNTER — HOSPITAL ENCOUNTER (INPATIENT)
Facility: HOSPITAL | Age: 75
LOS: 8 days | Discharge: HOME HEALTH | DRG: 235 | End: 2025-05-20
Attending: INTERNAL MEDICINE | Admitting: STUDENT IN AN ORGANIZED HEALTH CARE EDUCATION/TRAINING PROGRAM
Payer: MEDICARE

## 2025-05-12 VITALS
WEIGHT: 135 LBS | BODY MASS INDEX: 23.05 KG/M2 | TEMPERATURE: 97.9 F | HEIGHT: 64 IN | OXYGEN SATURATION: 94 % | SYSTOLIC BLOOD PRESSURE: 121 MMHG | RESPIRATION RATE: 17 BRPM | DIASTOLIC BLOOD PRESSURE: 65 MMHG | HEART RATE: 60 BPM

## 2025-05-12 DIAGNOSIS — I25.10 CAD IN NATIVE ARTERY: Primary | ICD-10-CM

## 2025-05-12 DIAGNOSIS — I21.4 NON-ST ELEVATION MYOCARDIAL INFARCTION (NSTEMI) (HCC): Primary | ICD-10-CM

## 2025-05-12 DIAGNOSIS — Z95.1 S/P CABG X 3: ICD-10-CM

## 2025-05-12 PROBLEM — R79.89 ELEVATED TROPONIN: Status: ACTIVE | Noted: 2025-05-12

## 2025-05-12 LAB
ANION GAP SERPL CALC-SCNC: 4 MMOL/L (ref 2–12)
APTT PPP: 25.7 SEC (ref 22.1–31)
BUN SERPL-MCNC: 9 MG/DL (ref 6–20)
BUN/CREAT SERPL: 11 (ref 12–20)
CALCIUM SERPL-MCNC: 9 MG/DL (ref 8.5–10.1)
CHLORIDE SERPL-SCNC: 106 MMOL/L (ref 97–108)
CHOLEST SERPL-MCNC: 117 MG/DL
CO2 SERPL-SCNC: 25 MMOL/L (ref 21–32)
CREAT SERPL-MCNC: 0.8 MG/DL (ref 0.7–1.3)
ECHO AO ARCH DIAM: 2.8 CM
ECHO AO ASC DIAM: 3 CM
ECHO AO ASCENDING AORTA INDEX: 1.81 CM/M2
ECHO AV AREA PEAK VELOCITY: 2.1 CM2
ECHO AV AREA VTI: 2.4 CM2
ECHO AV AREA/BSA PEAK VELOCITY: 1.3 CM2/M2
ECHO AV AREA/BSA VTI: 1.4 CM2/M2
ECHO AV MEAN GRADIENT: 11 MMHG
ECHO AV MEAN VELOCITY: 1.6 M/S
ECHO AV PEAK GRADIENT: 21 MMHG
ECHO AV PEAK VELOCITY: 2.3 M/S
ECHO AV VELOCITY RATIO: 0.65
ECHO AV VTI: 49.6 CM
ECHO BSA: 1.66 M2
ECHO EST RA PRESSURE: 3 MMHG
ECHO LA DIAMETER INDEX: 1.57 CM/M2
ECHO LA DIAMETER: 2.6 CM
ECHO LA VOL A-L A2C: 49 ML (ref 18–58)
ECHO LA VOL A-L A4C: 49 ML (ref 18–58)
ECHO LA VOL BP: 47 ML (ref 18–58)
ECHO LA VOL MOD A2C: 47 ML (ref 18–58)
ECHO LA VOL MOD A4C: 45 ML (ref 18–58)
ECHO LA VOL/BSA BIPLANE: 28 ML/M2 (ref 16–34)
ECHO LA VOLUME AREA LENGTH: 50 ML
ECHO LA VOLUME INDEX A-L A2C: 30 ML/M2 (ref 16–34)
ECHO LA VOLUME INDEX A-L A4C: 30 ML/M2 (ref 16–34)
ECHO LA VOLUME INDEX AREA LENGTH: 30 ML/M2 (ref 16–34)
ECHO LA VOLUME INDEX MOD A2C: 28 ML/M2 (ref 16–34)
ECHO LA VOLUME INDEX MOD A4C: 27 ML/M2 (ref 16–34)
ECHO LV E' LATERAL VELOCITY: 11.25 CM/S
ECHO LV E' SEPTAL VELOCITY: 8.39 CM/S
ECHO LV EDV A2C: 53 ML
ECHO LV EDV A4C: 71 ML
ECHO LV EDV BP: 66 ML (ref 67–155)
ECHO LV EDV INDEX A4C: 43 ML/M2
ECHO LV EDV INDEX BP: 40 ML/M2
ECHO LV EDV NDEX A2C: 32 ML/M2
ECHO LV EF PHYSICIAN: 55 %
ECHO LV EJECTION FRACTION A2C: 47 %
ECHO LV EJECTION FRACTION A4C: 60 %
ECHO LV EJECTION FRACTION BIPLANE: 53 % (ref 55–100)
ECHO LV ESV A2C: 28 ML
ECHO LV ESV A4C: 28 ML
ECHO LV ESV BP: 31 ML (ref 22–58)
ECHO LV ESV INDEX A2C: 17 ML/M2
ECHO LV ESV INDEX A4C: 17 ML/M2
ECHO LV ESV INDEX BP: 19 ML/M2
ECHO LV FRACTIONAL SHORTENING: 31 % (ref 28–44)
ECHO LV INTERNAL DIMENSION DIASTOLE INDEX: 2.89 CM/M2
ECHO LV INTERNAL DIMENSION DIASTOLIC: 4.8 CM (ref 4.2–5.9)
ECHO LV INTERNAL DIMENSION SYSTOLIC INDEX: 1.99 CM/M2
ECHO LV INTERNAL DIMENSION SYSTOLIC: 3.3 CM
ECHO LV IVSD: 0.6 CM (ref 0.6–1)
ECHO LV MASS 2D: 88.3 G (ref 88–224)
ECHO LV MASS INDEX 2D: 53.2 G/M2 (ref 49–115)
ECHO LV POSTERIOR WALL DIASTOLIC: 0.6 CM (ref 0.6–1)
ECHO LV RELATIVE WALL THICKNESS RATIO: 0.25
ECHO LVOT AREA: 3.1 CM2
ECHO LVOT AV VTI INDEX: 0.72
ECHO LVOT DIAM: 2 CM
ECHO LVOT MEAN GRADIENT: 6 MMHG
ECHO LVOT PEAK GRADIENT: 9 MMHG
ECHO LVOT PEAK VELOCITY: 1.5 M/S
ECHO LVOT STROKE VOLUME INDEX: 67.5 ML/M2
ECHO LVOT SV: 112.1 ML
ECHO LVOT VTI: 35.7 CM
ECHO MV A VELOCITY: 1.09 M/S
ECHO MV E DECELERATION TIME (DT): 198.9 MS
ECHO MV E VELOCITY: 0.83 M/S
ECHO MV E/A RATIO: 0.76
ECHO MV E/E' LATERAL: 7.38
ECHO MV E/E' RATIO (AVERAGED): 8.64
ECHO MV E/E' SEPTAL: 9.89
ECHO MV REGURGITANT PEAK GRADIENT: 92 MMHG
ECHO MV REGURGITANT PEAK VELOCITY: 4.8 M/S
ECHO PULMONARY ARTERY END DIASTOLIC PRESSURE: 4 MMHG
ECHO PV MAX VELOCITY: 1.7 M/S
ECHO PV PEAK GRADIENT: 12 MMHG
ECHO PV REGURGITANT MAX VELOCITY: 1.1 M/S
ECHO RV FREE WALL PEAK S': 17.3 CM/S
ECHO RV INTERNAL DIMENSION: 3.2 CM
ECHO RV TAPSE: 2.2 CM (ref 1.7–?)
ECHO RVOT MEAN GRADIENT: 1 MMHG
ECHO RVOT PEAK GRADIENT: 2 MMHG
ECHO RVOT PEAK VELOCITY: 0.8 M/S
ECHO RVOT VTI: 18.1 CM
EKG ATRIAL RATE: 68 BPM
EKG ATRIAL RATE: 69 BPM
EKG DIAGNOSIS: NORMAL
EKG DIAGNOSIS: NORMAL
EKG P AXIS: 36 DEGREES
EKG P AXIS: 44 DEGREES
EKG P-R INTERVAL: 182 MS
EKG P-R INTERVAL: 190 MS
EKG Q-T INTERVAL: 370 MS
EKG Q-T INTERVAL: 386 MS
EKG QRS DURATION: 90 MS
EKG QRS DURATION: 98 MS
EKG QTC CALCULATION (BAZETT): 396 MS
EKG QTC CALCULATION (BAZETT): 410 MS
EKG R AXIS: 13 DEGREES
EKG R AXIS: 18 DEGREES
EKG T AXIS: 12 DEGREES
EKG T AXIS: 27 DEGREES
EKG VENTRICULAR RATE: 68 BPM
EKG VENTRICULAR RATE: 69 BPM
ERYTHROCYTE [DISTWIDTH] IN BLOOD BY AUTOMATED COUNT: 12 % (ref 11.5–14.5)
EST. AVERAGE GLUCOSE BLD GHB EST-MCNC: 108 MG/DL
GLUCOSE SERPL-MCNC: 114 MG/DL (ref 65–100)
HBA1C MFR BLD: 5.4 % (ref 4–5.6)
HCT VFR BLD AUTO: 39.7 % (ref 36.6–50.3)
HDLC SERPL-MCNC: 48 MG/DL
HDLC SERPL: 2.4 (ref 0–5)
HGB BLD-MCNC: 13.3 G/DL (ref 12.1–17)
INR PPP: 1 (ref 0.9–1.1)
LDLC SERPL CALC-MCNC: 61 MG/DL (ref 0–100)
MAGNESIUM SERPL-MCNC: 2 MG/DL (ref 1.6–2.4)
MAGNESIUM SERPL-MCNC: 2 MG/DL (ref 1.6–2.4)
MCH RBC QN AUTO: 31.5 PG (ref 26–34)
MCHC RBC AUTO-ENTMCNC: 33.5 G/DL (ref 30–36.5)
MCV RBC AUTO: 94.1 FL (ref 80–99)
NRBC # BLD: 0 K/UL (ref 0–0.01)
NRBC BLD-RTO: 0 PER 100 WBC
PLATELET # BLD AUTO: 175 K/UL (ref 150–400)
PMV BLD AUTO: 10.2 FL (ref 8.9–12.9)
POTASSIUM SERPL-SCNC: 3.9 MMOL/L (ref 3.5–5.1)
PROTHROMBIN TIME: 10.7 SEC (ref 9.2–11.2)
RBC # BLD AUTO: 4.22 M/UL (ref 4.1–5.7)
SODIUM SERPL-SCNC: 135 MMOL/L (ref 136–145)
THERAPEUTIC RANGE: NORMAL SECS (ref 58–77)
TRIGL SERPL-MCNC: 40 MG/DL
TROPONIN I SERPL HS-MCNC: 1679 NG/L (ref 0–76)
TROPONIN I SERPL HS-MCNC: 3312 NG/L (ref 0–76)
TROPONIN I SERPL HS-MCNC: 512 NG/L (ref 0–76)
TROPONIN I SERPL HS-MCNC: 5283 NG/L (ref 0–76)
TROPONIN I SERPL HS-MCNC: 6937 NG/L (ref 0–76)
UFH PPP CHRO-ACNC: 0.36 IU/ML
UFH PPP CHRO-ACNC: 0.46 IU/ML
UFH PPP CHRO-ACNC: <0.1 IU/ML
VLDLC SERPL CALC-MCNC: 8 MG/DL
WBC # BLD AUTO: 8.1 K/UL (ref 4.1–11.1)

## 2025-05-12 PROCEDURE — B2111ZZ FLUOROSCOPY OF MULTIPLE CORONARY ARTERIES USING LOW OSMOLAR CONTRAST: ICD-10-PCS | Performed by: INTERNAL MEDICINE

## 2025-05-12 PROCEDURE — 6360000002 HC RX W HCPCS: Performed by: FAMILY MEDICINE

## 2025-05-12 PROCEDURE — 6370000000 HC RX 637 (ALT 250 FOR IP): Performed by: FAMILY MEDICINE

## 2025-05-12 PROCEDURE — 36415 COLL VENOUS BLD VENIPUNCTURE: CPT

## 2025-05-12 PROCEDURE — 85520 HEPARIN ASSAY: CPT

## 2025-05-12 PROCEDURE — 94761 N-INVAS EAR/PLS OXIMETRY MLT: CPT

## 2025-05-12 PROCEDURE — 85730 THROMBOPLASTIN TIME PARTIAL: CPT

## 2025-05-12 PROCEDURE — 93458 L HRT ARTERY/VENTRICLE ANGIO: CPT | Performed by: INTERNAL MEDICINE

## 2025-05-12 PROCEDURE — 2580000003 HC RX 258: Performed by: STUDENT IN AN ORGANIZED HEALTH CARE EDUCATION/TRAINING PROGRAM

## 2025-05-12 PROCEDURE — 84484 ASSAY OF TROPONIN QUANT: CPT

## 2025-05-12 PROCEDURE — 85027 COMPLETE CBC AUTOMATED: CPT

## 2025-05-12 PROCEDURE — 93005 ELECTROCARDIOGRAM TRACING: CPT | Performed by: FAMILY MEDICINE

## 2025-05-12 PROCEDURE — 93306 TTE W/DOPPLER COMPLETE: CPT | Performed by: INTERNAL MEDICINE

## 2025-05-12 PROCEDURE — APPSS30 APP SPLIT SHARED TIME 16-30 MINUTES: Performed by: NURSE PRACTITIONER

## 2025-05-12 PROCEDURE — 6360000002 HC RX W HCPCS: Performed by: INTERNAL MEDICINE

## 2025-05-12 PROCEDURE — 93452 LEFT HRT CATH W/VENTRCLGRPHY: CPT | Performed by: INTERNAL MEDICINE

## 2025-05-12 PROCEDURE — 2500000003 HC RX 250 WO HCPCS: Performed by: STUDENT IN AN ORGANIZED HEALTH CARE EDUCATION/TRAINING PROGRAM

## 2025-05-12 PROCEDURE — 99152 MOD SED SAME PHYS/QHP 5/>YRS: CPT | Performed by: INTERNAL MEDICINE

## 2025-05-12 PROCEDURE — 93005 ELECTROCARDIOGRAM TRACING: CPT | Performed by: EMERGENCY MEDICINE

## 2025-05-12 PROCEDURE — 2580000003 HC RX 258: Performed by: FAMILY MEDICINE

## 2025-05-12 PROCEDURE — C1769 GUIDE WIRE: HCPCS | Performed by: INTERNAL MEDICINE

## 2025-05-12 PROCEDURE — 76937 US GUIDE VASCULAR ACCESS: CPT | Performed by: INTERNAL MEDICINE

## 2025-05-12 PROCEDURE — 2500000003 HC RX 250 WO HCPCS: Performed by: INTERNAL MEDICINE

## 2025-05-12 PROCEDURE — 6360000004 HC RX CONTRAST MEDICATION: Performed by: INTERNAL MEDICINE

## 2025-05-12 PROCEDURE — 2500000003 HC RX 250 WO HCPCS: Performed by: FAMILY MEDICINE

## 2025-05-12 PROCEDURE — C1894 INTRO/SHEATH, NON-LASER: HCPCS | Performed by: INTERNAL MEDICINE

## 2025-05-12 PROCEDURE — 2060000000 HC ICU INTERMEDIATE R&B

## 2025-05-12 PROCEDURE — 4A023N7 MEASUREMENT OF CARDIAC SAMPLING AND PRESSURE, LEFT HEART, PERCUTANEOUS APPROACH: ICD-10-PCS | Performed by: INTERNAL MEDICINE

## 2025-05-12 PROCEDURE — 93306 TTE W/DOPPLER COMPLETE: CPT

## 2025-05-12 PROCEDURE — 80048 BASIC METABOLIC PNL TOTAL CA: CPT

## 2025-05-12 PROCEDURE — 2709999900 HC NON-CHARGEABLE SUPPLY: Performed by: INTERNAL MEDICINE

## 2025-05-12 PROCEDURE — 80061 LIPID PANEL: CPT

## 2025-05-12 PROCEDURE — 85610 PROTHROMBIN TIME: CPT

## 2025-05-12 PROCEDURE — 83735 ASSAY OF MAGNESIUM: CPT

## 2025-05-12 RX ORDER — MORPHINE SULFATE 4 MG/ML
4 INJECTION, SOLUTION INTRAMUSCULAR; INTRAVENOUS EVERY 4 HOURS PRN
Status: DISCONTINUED | OUTPATIENT
Start: 2025-05-12 | End: 2025-05-12 | Stop reason: HOSPADM

## 2025-05-12 RX ORDER — SODIUM CHLORIDE 9 MG/ML
INJECTION, SOLUTION INTRAVENOUS CONTINUOUS
Status: CANCELLED | OUTPATIENT
Start: 2025-05-12 | End: 2025-05-13

## 2025-05-12 RX ORDER — HEPARIN SODIUM 1000 [USP'U]/ML
60 INJECTION, SOLUTION INTRAVENOUS; SUBCUTANEOUS ONCE
Status: COMPLETED | OUTPATIENT
Start: 2025-05-12 | End: 2025-05-12

## 2025-05-12 RX ORDER — HEPARIN SODIUM 1000 [USP'U]/ML
30 INJECTION, SOLUTION INTRAVENOUS; SUBCUTANEOUS PRN
Status: DISCONTINUED | OUTPATIENT
Start: 2025-05-12 | End: 2025-05-14

## 2025-05-12 RX ORDER — MORPHINE SULFATE 4 MG/ML
4 INJECTION, SOLUTION INTRAMUSCULAR; INTRAVENOUS EVERY 4 HOURS PRN
Refills: 0 | Status: CANCELLED | OUTPATIENT
Start: 2025-05-12

## 2025-05-12 RX ORDER — ASPIRIN 81 MG/1
81 TABLET ORAL DAILY
Status: DISCONTINUED | OUTPATIENT
Start: 2025-05-12 | End: 2025-05-12 | Stop reason: HOSPADM

## 2025-05-12 RX ORDER — ACETAMINOPHEN 325 MG/1
650 TABLET ORAL EVERY 4 HOURS PRN
Status: DISCONTINUED | OUTPATIENT
Start: 2025-05-12 | End: 2025-05-12 | Stop reason: HOSPADM

## 2025-05-12 RX ORDER — NITROGLYCERIN 0.4 MG/1
0.4 TABLET SUBLINGUAL EVERY 5 MIN PRN
Status: DISCONTINUED | OUTPATIENT
Start: 2025-05-12 | End: 2025-05-12 | Stop reason: HOSPADM

## 2025-05-12 RX ORDER — POTASSIUM CHLORIDE 7.45 MG/ML
10 INJECTION INTRAVENOUS PRN
Status: DISCONTINUED | OUTPATIENT
Start: 2025-05-12 | End: 2025-05-12 | Stop reason: HOSPADM

## 2025-05-12 RX ORDER — POTASSIUM CHLORIDE 750 MG/1
40 TABLET, EXTENDED RELEASE ORAL PRN
Status: CANCELLED | OUTPATIENT
Start: 2025-05-12

## 2025-05-12 RX ORDER — METOPROLOL SUCCINATE 50 MG/1
50 TABLET, EXTENDED RELEASE ORAL DAILY
Status: DISCONTINUED | OUTPATIENT
Start: 2025-05-12 | End: 2025-05-12 | Stop reason: HOSPADM

## 2025-05-12 RX ORDER — POTASSIUM CHLORIDE 7.45 MG/ML
10 INJECTION INTRAVENOUS PRN
Status: CANCELLED | OUTPATIENT
Start: 2025-05-12

## 2025-05-12 RX ORDER — DEXTROSE MONOHYDRATE 100 MG/ML
INJECTION, SOLUTION INTRAVENOUS CONTINUOUS PRN
Status: DISCONTINUED | OUTPATIENT
Start: 2025-05-12 | End: 2025-05-12 | Stop reason: HOSPADM

## 2025-05-12 RX ORDER — POLYETHYLENE GLYCOL 3350 17 G/17G
17 POWDER, FOR SOLUTION ORAL DAILY PRN
Status: DISCONTINUED | OUTPATIENT
Start: 2025-05-12 | End: 2025-05-14

## 2025-05-12 RX ORDER — TRAMADOL HYDROCHLORIDE 50 MG/1
50 TABLET ORAL EVERY 8 HOURS PRN
Status: CANCELLED | OUTPATIENT
Start: 2025-05-12

## 2025-05-12 RX ORDER — NITROGLYCERIN 0.4 MG/1
0.4 TABLET SUBLINGUAL EVERY 5 MIN PRN
Status: CANCELLED | OUTPATIENT
Start: 2025-05-12

## 2025-05-12 RX ORDER — HEPARIN SODIUM 10000 [USP'U]/100ML
5-30 INJECTION, SOLUTION INTRAVENOUS CONTINUOUS
Status: DISCONTINUED | OUTPATIENT
Start: 2025-05-12 | End: 2025-05-12 | Stop reason: HOSPADM

## 2025-05-12 RX ORDER — ATORVASTATIN CALCIUM 20 MG/1
80 TABLET, FILM COATED ORAL NIGHTLY
Status: CANCELLED | OUTPATIENT
Start: 2025-05-12

## 2025-05-12 RX ORDER — SODIUM CHLORIDE 0.9 % (FLUSH) 0.9 %
5-40 SYRINGE (ML) INJECTION EVERY 12 HOURS SCHEDULED
Status: CANCELLED | OUTPATIENT
Start: 2025-05-12

## 2025-05-12 RX ORDER — SODIUM CHLORIDE 0.9 % (FLUSH) 0.9 %
5-40 SYRINGE (ML) INJECTION EVERY 12 HOURS SCHEDULED
Status: DISCONTINUED | OUTPATIENT
Start: 2025-05-12 | End: 2025-05-14

## 2025-05-12 RX ORDER — LIDOCAINE HYDROCHLORIDE 10 MG/ML
INJECTION, SOLUTION INFILTRATION; PERINEURAL PRN
Status: DISCONTINUED | OUTPATIENT
Start: 2025-05-12 | End: 2025-05-12 | Stop reason: HOSPADM

## 2025-05-12 RX ORDER — SODIUM CHLORIDE 0.9 % (FLUSH) 0.9 %
5-40 SYRINGE (ML) INJECTION EVERY 12 HOURS SCHEDULED
Status: DISCONTINUED | OUTPATIENT
Start: 2025-05-12 | End: 2025-05-12 | Stop reason: HOSPADM

## 2025-05-12 RX ORDER — MAGNESIUM SULFATE IN WATER 40 MG/ML
2000 INJECTION, SOLUTION INTRAVENOUS PRN
Status: CANCELLED | OUTPATIENT
Start: 2025-05-12

## 2025-05-12 RX ORDER — MAGNESIUM SULFATE IN WATER 40 MG/ML
2000 INJECTION, SOLUTION INTRAVENOUS PRN
Status: DISCONTINUED | OUTPATIENT
Start: 2025-05-12 | End: 2025-05-12 | Stop reason: HOSPADM

## 2025-05-12 RX ORDER — ONDANSETRON 4 MG/1
4 TABLET, ORALLY DISINTEGRATING ORAL EVERY 8 HOURS PRN
Status: DISCONTINUED | OUTPATIENT
Start: 2025-05-12 | End: 2025-05-12 | Stop reason: HOSPADM

## 2025-05-12 RX ORDER — METOPROLOL SUCCINATE 50 MG/1
50 TABLET, EXTENDED RELEASE ORAL DAILY
Status: CANCELLED | OUTPATIENT
Start: 2025-05-13

## 2025-05-12 RX ORDER — MIDAZOLAM HYDROCHLORIDE 1 MG/ML
INJECTION, SOLUTION INTRAMUSCULAR; INTRAVENOUS PRN
Status: DISCONTINUED | OUTPATIENT
Start: 2025-05-12 | End: 2025-05-12 | Stop reason: HOSPADM

## 2025-05-12 RX ORDER — POTASSIUM CHLORIDE 750 MG/1
40 TABLET, EXTENDED RELEASE ORAL PRN
Status: DISCONTINUED | OUTPATIENT
Start: 2025-05-12 | End: 2025-05-12 | Stop reason: HOSPADM

## 2025-05-12 RX ORDER — SODIUM CHLORIDE 0.9 % (FLUSH) 0.9 %
5-40 SYRINGE (ML) INJECTION PRN
Status: CANCELLED | OUTPATIENT
Start: 2025-05-12

## 2025-05-12 RX ORDER — POTASSIUM CHLORIDE 750 MG/1
40 TABLET, EXTENDED RELEASE ORAL PRN
Status: DISCONTINUED | OUTPATIENT
Start: 2025-05-12 | End: 2025-05-13 | Stop reason: SDUPTHER

## 2025-05-12 RX ORDER — HEPARIN SODIUM 10000 [USP'U]/100ML
5-30 INJECTION, SOLUTION INTRAVENOUS CONTINUOUS
Status: CANCELLED | OUTPATIENT
Start: 2025-05-12

## 2025-05-12 RX ORDER — ACETAMINOPHEN 325 MG/1
650 TABLET ORAL EVERY 6 HOURS PRN
Status: DISCONTINUED | OUTPATIENT
Start: 2025-05-12 | End: 2025-05-12 | Stop reason: HOSPADM

## 2025-05-12 RX ORDER — SODIUM CHLORIDE 9 MG/ML
INJECTION, SOLUTION INTRAVENOUS CONTINUOUS
Status: DISPENSED | OUTPATIENT
Start: 2025-05-12 | End: 2025-05-13

## 2025-05-12 RX ORDER — HEPARIN SODIUM 1000 [USP'U]/ML
60 INJECTION, SOLUTION INTRAVENOUS; SUBCUTANEOUS PRN
Status: CANCELLED | OUTPATIENT
Start: 2025-05-12

## 2025-05-12 RX ORDER — SODIUM CHLORIDE 9 MG/ML
INJECTION, SOLUTION INTRAVENOUS PRN
Status: DISCONTINUED | OUTPATIENT
Start: 2025-05-12 | End: 2025-05-14

## 2025-05-12 RX ORDER — PHENYLEPHRINE HCL IN 0.9% NACL 0.4MG/10ML
SYRINGE (ML) INTRAVENOUS PRN
Status: DISCONTINUED | OUTPATIENT
Start: 2025-05-12 | End: 2025-05-12 | Stop reason: HOSPADM

## 2025-05-12 RX ORDER — PROCHLORPERAZINE EDISYLATE 5 MG/ML
10 INJECTION INTRAMUSCULAR; INTRAVENOUS EVERY 6 HOURS PRN
Status: DISCONTINUED | OUTPATIENT
Start: 2025-05-12 | End: 2025-05-12 | Stop reason: HOSPADM

## 2025-05-12 RX ORDER — HEPARIN SODIUM 1000 [USP'U]/ML
30 INJECTION, SOLUTION INTRAVENOUS; SUBCUTANEOUS PRN
Status: DISCONTINUED | OUTPATIENT
Start: 2025-05-12 | End: 2025-05-12 | Stop reason: HOSPADM

## 2025-05-12 RX ORDER — HEPARIN SODIUM 1000 [USP'U]/ML
INJECTION, SOLUTION INTRAVENOUS; SUBCUTANEOUS PRN
Status: DISCONTINUED | OUTPATIENT
Start: 2025-05-12 | End: 2025-05-12 | Stop reason: HOSPADM

## 2025-05-12 RX ORDER — ATORVASTATIN CALCIUM 20 MG/1
40 TABLET, FILM COATED ORAL NIGHTLY
Status: DISCONTINUED | OUTPATIENT
Start: 2025-05-12 | End: 2025-05-12

## 2025-05-12 RX ORDER — SODIUM CHLORIDE 9 MG/ML
INJECTION, SOLUTION INTRAVENOUS PRN
Status: DISCONTINUED | OUTPATIENT
Start: 2025-05-12 | End: 2025-05-12 | Stop reason: HOSPADM

## 2025-05-12 RX ORDER — FENTANYL CITRATE 50 UG/ML
INJECTION, SOLUTION INTRAMUSCULAR; INTRAVENOUS PRN
Status: DISCONTINUED | OUTPATIENT
Start: 2025-05-12 | End: 2025-05-12 | Stop reason: HOSPADM

## 2025-05-12 RX ORDER — HEPARIN SODIUM 1000 [USP'U]/ML
60 INJECTION, SOLUTION INTRAVENOUS; SUBCUTANEOUS PRN
Status: DISCONTINUED | OUTPATIENT
Start: 2025-05-12 | End: 2025-05-12 | Stop reason: HOSPADM

## 2025-05-12 RX ORDER — ONDANSETRON 2 MG/ML
4 INJECTION INTRAMUSCULAR; INTRAVENOUS EVERY 6 HOURS PRN
Status: DISCONTINUED | OUTPATIENT
Start: 2025-05-12 | End: 2025-05-12 | Stop reason: HOSPADM

## 2025-05-12 RX ORDER — CALCITONIN SALMON 200 [IU]/.09ML
1 SPRAY, METERED NASAL DAILY
Status: CANCELLED | OUTPATIENT
Start: 2025-05-13

## 2025-05-12 RX ORDER — ACETAMINOPHEN 650 MG/1
650 SUPPOSITORY RECTAL EVERY 6 HOURS PRN
Status: DISCONTINUED | OUTPATIENT
Start: 2025-05-12 | End: 2025-05-13 | Stop reason: SDUPTHER

## 2025-05-12 RX ORDER — FLUTICASONE PROPIONATE 50 MCG
1 SPRAY, SUSPENSION (ML) NASAL DAILY PRN
Status: DISCONTINUED | OUTPATIENT
Start: 2025-05-12 | End: 2025-05-12 | Stop reason: HOSPADM

## 2025-05-12 RX ORDER — TRAMADOL HYDROCHLORIDE 50 MG/1
50 TABLET ORAL EVERY 8 HOURS PRN
Status: DISCONTINUED | OUTPATIENT
Start: 2025-05-12 | End: 2025-05-12 | Stop reason: HOSPADM

## 2025-05-12 RX ORDER — ONDANSETRON 4 MG/1
4 TABLET, ORALLY DISINTEGRATING ORAL EVERY 8 HOURS PRN
Status: DISCONTINUED | OUTPATIENT
Start: 2025-05-12 | End: 2025-05-13 | Stop reason: SDUPTHER

## 2025-05-12 RX ORDER — ONDANSETRON 2 MG/ML
4 INJECTION INTRAMUSCULAR; INTRAVENOUS EVERY 6 HOURS PRN
Status: CANCELLED | OUTPATIENT
Start: 2025-05-12

## 2025-05-12 RX ORDER — LISINOPRIL 20 MG/1
40 TABLET ORAL DAILY
Status: DISCONTINUED | OUTPATIENT
Start: 2025-05-12 | End: 2025-05-12

## 2025-05-12 RX ORDER — BISACODYL 5 MG/1
5 TABLET, DELAYED RELEASE ORAL DAILY PRN
Status: DISCONTINUED | OUTPATIENT
Start: 2025-05-12 | End: 2025-05-12 | Stop reason: HOSPADM

## 2025-05-12 RX ORDER — ACETAMINOPHEN 325 MG/1
650 TABLET ORAL EVERY 6 HOURS PRN
Status: CANCELLED | OUTPATIENT
Start: 2025-05-12

## 2025-05-12 RX ORDER — ONDANSETRON 4 MG/1
4 TABLET, ORALLY DISINTEGRATING ORAL EVERY 8 HOURS PRN
Status: CANCELLED | OUTPATIENT
Start: 2025-05-12

## 2025-05-12 RX ORDER — HEPARIN SODIUM 1000 [USP'U]/ML
30 INJECTION, SOLUTION INTRAVENOUS; SUBCUTANEOUS PRN
Status: CANCELLED | OUTPATIENT
Start: 2025-05-12

## 2025-05-12 RX ORDER — MAGNESIUM SULFATE IN WATER 40 MG/ML
2000 INJECTION, SOLUTION INTRAVENOUS PRN
Status: DISCONTINUED | OUTPATIENT
Start: 2025-05-12 | End: 2025-05-13 | Stop reason: SDUPTHER

## 2025-05-12 RX ORDER — BISACODYL 5 MG/1
5 TABLET, DELAYED RELEASE ORAL DAILY PRN
Status: CANCELLED | OUTPATIENT
Start: 2025-05-12

## 2025-05-12 RX ORDER — ACETAMINOPHEN 650 MG/1
650 SUPPOSITORY RECTAL EVERY 6 HOURS PRN
Status: DISCONTINUED | OUTPATIENT
Start: 2025-05-12 | End: 2025-05-12 | Stop reason: HOSPADM

## 2025-05-12 RX ORDER — PROCHLORPERAZINE EDISYLATE 5 MG/ML
10 INJECTION INTRAMUSCULAR; INTRAVENOUS EVERY 6 HOURS PRN
Status: CANCELLED | OUTPATIENT
Start: 2025-05-12

## 2025-05-12 RX ORDER — ATORVASTATIN CALCIUM 20 MG/1
80 TABLET, FILM COATED ORAL NIGHTLY
Status: DISCONTINUED | OUTPATIENT
Start: 2025-05-12 | End: 2025-05-12 | Stop reason: HOSPADM

## 2025-05-12 RX ORDER — DEXTROSE MONOHYDRATE 100 MG/ML
INJECTION, SOLUTION INTRAVENOUS CONTINUOUS PRN
Status: CANCELLED | OUTPATIENT
Start: 2025-05-12

## 2025-05-12 RX ORDER — ASPIRIN 81 MG/1
81 TABLET ORAL DAILY
Status: CANCELLED | OUTPATIENT
Start: 2025-05-13

## 2025-05-12 RX ORDER — ONDANSETRON 2 MG/ML
4 INJECTION INTRAMUSCULAR; INTRAVENOUS EVERY 6 HOURS PRN
Status: DISCONTINUED | OUTPATIENT
Start: 2025-05-12 | End: 2025-05-13 | Stop reason: SDUPTHER

## 2025-05-12 RX ORDER — MISOPROSTOL 200 UG/1
200 TABLET ORAL DAILY
Status: DISCONTINUED | OUTPATIENT
Start: 2025-05-12 | End: 2025-05-12

## 2025-05-12 RX ORDER — IOPAMIDOL 755 MG/ML
INJECTION, SOLUTION INTRAVASCULAR PRN
Status: DISCONTINUED | OUTPATIENT
Start: 2025-05-12 | End: 2025-05-12 | Stop reason: HOSPADM

## 2025-05-12 RX ORDER — POTASSIUM CHLORIDE 7.45 MG/ML
10 INJECTION INTRAVENOUS PRN
Status: DISCONTINUED | OUTPATIENT
Start: 2025-05-12 | End: 2025-05-13 | Stop reason: SDUPTHER

## 2025-05-12 RX ORDER — HEPARIN SODIUM 10000 [USP'U]/100ML
5-30 INJECTION, SOLUTION INTRAVENOUS CONTINUOUS
Status: ACTIVE | OUTPATIENT
Start: 2025-05-12 | End: 2025-05-13

## 2025-05-12 RX ORDER — VERAPAMIL HYDROCHLORIDE 2.5 MG/ML
INJECTION INTRAVENOUS PRN
Status: DISCONTINUED | OUTPATIENT
Start: 2025-05-12 | End: 2025-05-12 | Stop reason: HOSPADM

## 2025-05-12 RX ORDER — SODIUM CHLORIDE 9 MG/ML
INJECTION, SOLUTION INTRAVENOUS PRN
Status: CANCELLED | OUTPATIENT
Start: 2025-05-12

## 2025-05-12 RX ORDER — ACETAMINOPHEN 325 MG/1
650 TABLET ORAL EVERY 4 HOURS PRN
Status: CANCELLED | OUTPATIENT
Start: 2025-05-12

## 2025-05-12 RX ORDER — SODIUM CHLORIDE 9 MG/ML
INJECTION, SOLUTION INTRAVENOUS CONTINUOUS
Status: DISCONTINUED | OUTPATIENT
Start: 2025-05-12 | End: 2025-05-12 | Stop reason: HOSPADM

## 2025-05-12 RX ORDER — SODIUM CHLORIDE 0.9 % (FLUSH) 0.9 %
5-40 SYRINGE (ML) INJECTION PRN
Status: DISCONTINUED | OUTPATIENT
Start: 2025-05-12 | End: 2025-05-12 | Stop reason: HOSPADM

## 2025-05-12 RX ORDER — ACETAMINOPHEN 325 MG/1
650 TABLET ORAL EVERY 6 HOURS PRN
Status: DISCONTINUED | OUTPATIENT
Start: 2025-05-12 | End: 2025-05-13 | Stop reason: SDUPTHER

## 2025-05-12 RX ORDER — ACETAMINOPHEN 650 MG/1
650 SUPPOSITORY RECTAL EVERY 6 HOURS PRN
Status: CANCELLED | OUTPATIENT
Start: 2025-05-12

## 2025-05-12 RX ORDER — CALCITONIN SALMON 200 [IU]/.09ML
1 SPRAY, METERED NASAL DAILY
Status: DISCONTINUED | OUTPATIENT
Start: 2025-05-12 | End: 2025-05-12 | Stop reason: HOSPADM

## 2025-05-12 RX ORDER — SODIUM CHLORIDE 0.9 % (FLUSH) 0.9 %
5-40 SYRINGE (ML) INJECTION PRN
Status: DISCONTINUED | OUTPATIENT
Start: 2025-05-12 | End: 2025-05-14

## 2025-05-12 RX ORDER — HEPARIN SODIUM 1000 [USP'U]/ML
60 INJECTION, SOLUTION INTRAVENOUS; SUBCUTANEOUS PRN
Status: DISCONTINUED | OUTPATIENT
Start: 2025-05-12 | End: 2025-05-14

## 2025-05-12 RX ADMIN — METOPROLOL SUCCINATE 50 MG: 50 TABLET, EXTENDED RELEASE ORAL at 08:58

## 2025-05-12 RX ADMIN — SODIUM CHLORIDE, PRESERVATIVE FREE 10 ML: 5 INJECTION INTRAVENOUS at 23:52

## 2025-05-12 RX ADMIN — SODIUM CHLORIDE: 0.9 INJECTION, SOLUTION INTRAVENOUS at 01:21

## 2025-05-12 RX ADMIN — ASPIRIN 81 MG: 81 TABLET, COATED ORAL at 08:58

## 2025-05-12 RX ADMIN — HEPARIN SODIUM 12 UNITS/KG/HR: 10000 INJECTION, SOLUTION INTRAVENOUS at 00:55

## 2025-05-12 RX ADMIN — HEPARIN SODIUM 3700 UNITS: 1000 INJECTION INTRAVENOUS; SUBCUTANEOUS at 00:51

## 2025-05-12 RX ADMIN — HEPARIN SODIUM 12 UNITS/KG/HR: 10000 INJECTION, SOLUTION INTRAVENOUS at 19:12

## 2025-05-12 RX ADMIN — SODIUM CHLORIDE, PRESERVATIVE FREE 10 ML: 5 INJECTION INTRAVENOUS at 08:58

## 2025-05-12 RX ADMIN — LISINOPRIL 40 MG: 20 TABLET ORAL at 08:58

## 2025-05-12 ASSESSMENT — PAIN DESCRIPTION - PAIN TYPE: TYPE: CHRONIC PAIN

## 2025-05-12 ASSESSMENT — ENCOUNTER SYMPTOMS
CHEST TIGHTNESS: 1
VOMITING: 0
ABDOMINAL PAIN: 0
NAUSEA: 0
SHORTNESS OF BREATH: 0
COLOR CHANGE: 0
DIARRHEA: 0
BLOOD IN STOOL: 0

## 2025-05-12 ASSESSMENT — PAIN SCALES - GENERAL
PAINLEVEL_OUTOF10: 0
PAINLEVEL_OUTOF10: 2

## 2025-05-12 ASSESSMENT — PAIN DESCRIPTION - LOCATION: LOCATION: BACK

## 2025-05-12 ASSESSMENT — PAIN DESCRIPTION - ORIENTATION: ORIENTATION: LOWER

## 2025-05-12 ASSESSMENT — PAIN DESCRIPTION - DESCRIPTORS: DESCRIPTORS: ACHING

## 2025-05-12 NOTE — ED NOTES
TRANSFER - OUT REPORT:    Verbal report given to Aimee RNrn on Hector Jensen Jr  being transferred to 3rd floor for routine progression of patient care       Report consisted of patient's Situation, Background, Assessment and   Recommendations(SBAR).     Information from the following report(s) Nurse Handoff Report, Intake/Output, MAR, and Recent Results was reviewed with the receiving nurse.    [place] Fall Assessment:    Presents to emergency department  because of falls (Syncope, seizure, or loss of consciousness): No  Age > 70: Yes  Altered Mental Status, Intoxication with alcohol or substance confusion (Disorientation, impaired judgment, poor safety awaremess, or inability to follow instructions): No  Impaired Mobility: Ambulates or transfers with assistive devices or assistance; Unable to ambulate or transer.: Yes  Nursing Judgement: Yes          Lines:   Peripheral IV 05/11/25 Distal;Left;Anterior Cephalic (Active)   Site Assessment Clean, dry & intact 05/12/25 0003   Line Status Blood return noted 05/12/25 0003   Line Care Connections checked and tightened 05/12/25 0003   Phlebitis Assessment No symptoms 05/12/25 0003   Infiltration Assessment 0 05/12/25 0003   Alcohol Cap Used No 05/12/25 0003   Dressing Status Clean, dry & intact 05/12/25 0003   Dressing Type Transparent 05/12/25 0003        Opportunity for questions and clarification was provided.      Patient transported with:  Monitor and Tech

## 2025-05-12 NOTE — DISCHARGE INSTRUCTIONS
Patient Discharge Instructions    Hetcor Jensen Jr / [id number] : 1950    Admitted 2025 Discharged: 2025     Radial Cardiac Catheterization/Angiography Discharge Instructions   It is normal to feel tired the first couple days. Take it easy and follow the physician’s instructions.   CHECK THE CATHETER INSERTION SITE DAILY:   Remove the wrist dressing 24 hours after the procedure.   You may shower 24 hours after the procedure. Wash with soap and water and pat dry.   Gentle cleaning of the site with soap and water is sufficient, cover with a dry clean dressing or bandage. Do not apply creams or powders to the area.   No soaking the wrist for 3 days.   Leave the puncture site open to air after 24 hours post-procedure.   CALL THE PHYSICIANS:   If the site becomes red, swollen or feels warm to the touch   If there is bleeding or drainage or if there is unusual pain at the radial site.   If there is any minor oozing, you may apply a band-aid and remove after 12 hours.   If the bleeding continues, hold pressure with the middle finger against the puncture site and the thumb against the back of the wrist,call 911 to be transported to the hospital.   DO NOT DRIVE YOURSELF, OR HAVE ANYONE ELSE DRIVE YOU - CALL 911.   ACTIVITY:   For the first 24 hours do not manipulate the wrist.   No lifting, pushing or pulling over 3-5 pounds with the affected wrist for 7 daysand no straining the insertion site. Do not life grocery bags or the garbage can, do not run the vacuum  or  for 7 days.   Start with short walks as in the hospital and gradually increase as tolerated each day. It is recommended to walk 30 minutes 5-7 days per week.   Follow your physician’s instructions on activity. Avoid walking outside in extremes of heat or cold. Walk inside when it is cold and windy or hot and humid.   Things to keep in mind:   No driving for at least 24 hours, or as designated by your physician.   Limit the

## 2025-05-12 NOTE — CARDIO/PULMONARY
[facility]- Referral  Chart review completed.  Noted: NSTEMI  MI patient meets criteria for outpatient cardiac rehab.  [facility] outpatient cardiac rehab referral will be initiated.    Educational handouts reviewed and provided on: MI  procedure, coronary artery disease, coronary artery risk factors, heart healthy eating, and community resources.    Reference information on [facility] also provided.    Cardiac Catheterization on 5/12/25 shows 3 vessel disease.  Pt will be evaluated for cardiac surgery.  Will hold on contacting the patient for CR enrollment until a decision regarding plan of care-CABG- will be decided.  SANDER Cowart RN

## 2025-05-12 NOTE — ED TRIAGE NOTES
Patient is a 74 year old male complaining of chest tightness x2 years and it has recently got worse.  Pt states it worsens on exertion and located on his \"chest muscles\".    Pt scheduled for blood work at [facility] tomorrow for potential stents to be paced.    Pt sees Luis Armando Golden for cardiology.    Denies n/v  Endorses SOB.

## 2025-05-12 NOTE — PROCEDURES
BRIEF PROCEDURE NOTE    Date of Procedure: 5/12/2025   Preoperative Diagnosis: NSTEMI  Postoperative Diagnosis: 3 V CAD    Procedure: Left heart cath, LV angiography, coronary angiography  Interventional Cardiologist: Braydon Sorto MD  Assistant : none  Anesthesia: local + IV sedation  I administered moderate sedation throughout this procedure. An independent trained observer pushed medications at my direction, and monitored the patient’s level of consciousness and physiological status throughout.  Estimated Blood Loss: Minimal    Access: R Radial Access - 6 F sheath    R CFA - Ultrasound/Fluoroscopic guided micropuncture access - 6 F sheath    Catheters: RCA : JR4                    LCA : JL4    Findings:     Calcified cors    L Main:  Med to large; Nml    LAD: Prox - Med; Distal - small ; Prox/ Mid - 80%; Small D1 - mild dz    LCflex:  Prox/Mid - Med; Prox 90%; Mid tandem 70-80%; Small OM1; OM2 - Med; prox 80%    RCA: Small to med; Mid 90% ; Mid to distal - [id number]% ; Haziness - prob calcified; PDA and PLB - small    LVEDP: 12 mm Hg    LVEF: Not assessed    No significant gradient across aortic valve.    PCI: none      Specimens Removed : None    Devices implanted : None    Complications: None    Closure Device: R Radial - TR band     3 V Dz - Recommend CTS eval    See full cath note.    Complications: none    Braydon Sorto MD

## 2025-05-12 NOTE — ED PROVIDER NOTES
[facility] EMERGENCY DEPARTMENT  EMERGENCY DEPARTMENT ENCOUNTER      Pt Name: Hector Jensen Jr  MRN: [id number]  Birthdate 1950  Date of evaluation: 5/11/2025  Provider: Jaspal Dumont DO    CHIEF COMPLAINT       Chief Complaint   Patient presents with    Chest Pain         HISTORY OF PRESENT ILLNESS   (Location/Symptom, Timing/Onset, Context/Setting, Quality, Duration, Modifying Factors, Severity)  Note limiting factors.   73-year-old male presents ED for evaluation of chest discomfort.  His symptoms happened when he ambulates.  Has been getting worse, worse describes it as pressure sensation in his chest.  No history of previous MI.  Does have history of coronary artery disease and follows with cardiology Dr. Golden and reports that they are going to schedule a cardiac catheterization.  He has a history of hypertension high cholesterol and coronary artery disease and stroke.              Review of External Medical Records:     Nursing Notes were reviewed.    REVIEW OF SYSTEMS    (2-9 systems for level 4, 10 or more for level 5)     Review of Systems   Constitutional:  Negative for fever.   Respiratory:  Negative for shortness of breath.    Cardiovascular:  Positive for chest pain.       Except as noted above the remainder of the review of systems was reviewed and negative.       PAST MEDICAL HISTORY     Past Medical History:   Diagnosis Date    CAD (coronary artery disease)     Colon polyp     Environmental and seasonal allergies     Essential hypertension     High cholesterol     Stroke (HCC)     Sun-damaged skin          SURGICAL HISTORY       Past Surgical History:   Procedure Laterality Date    CAROTID ENDARTERECTOMY Right     1996    COLONOSCOPY      COLONOSCOPY N/A 12/29/2023    COLONOSCOPY performed by Finesse Cruz MD at [facility] ENDOSCOPY    COLONOSCOPY N/A 12/29/2023    COLONOSCOPY POLYPECTOMY SNARE/COLD BIOPSY performed by Finesse Cruz MD at [facility] ENDOSCOPY    ELBOW SURGERY Left

## 2025-05-12 NOTE — PLAN OF CARE
Problem: Chronic Conditions and Co-morbidities  Goal: Patient's chronic conditions and co-morbidity symptoms are monitored and maintained or improved  Outcome: Progressing  Flowsheets (Taken 5/12/2025 0149)  Care Plan - Patient's Chronic Conditions and Co-Morbidity Symptoms are Monitored and Maintained or Improved: Monitor and assess patient's chronic conditions and comorbid symptoms for stability, deterioration, or improvement     Problem: Discharge Planning  Goal: Discharge to home or other facility with appropriate resources  Outcome: Progressing  Flowsheets (Taken 5/12/2025 0149)  Discharge to home or other facility with appropriate resources: Identify barriers to discharge with patient and caregiver     Problem: Pain  Goal: Verbalizes/displays adequate comfort level or baseline comfort level  Outcome: Progressing     Problem: ABCDS Injury Assessment  Goal: Absence of physical injury  Outcome: Progressing     Problem: Safety - Adult  Goal: Free from fall injury  Outcome: Progressing

## 2025-05-12 NOTE — PROGRESS NOTES
1717 TR Band removed.    1745 Per Pharmacy, start Heparin at 12 units. Recheck Heparin Anti Xa in 6 hours from the start.

## 2025-05-12 NOTE — DISCHARGE SUMMARY
Hospitalist Discharge Summary     Patient ID:  Hector Jensen Jr  [id number]  74 y.o.  1950    Admit date: 5/11/2025    Discharge date and time: 5/12/2025    Admission Diagnoses: Acute coronary syndrome (HCC) [I24.9]  Elevated troponin [R79.89]  NSTEMI (non-ST elevated myocardial infarction) (HCC) [I21.4]  Elevated brain natriuretic peptide (BNP) level [R79.89]  Chest pain, unspecified type [R07.9]    Discharge Diagnoses:    Principal Problem:    NSTEMI (non-ST elevated myocardial infarction) (HCC)  Active Problems:    Elevated troponin  Resolved Problems:    * No resolved hospital problems. *         Hospital Course:   Admit HPI:     Hector is a 74 y.o. male  with a medical history significant for hypertension, hyperlipidemia, environmental allergies, who presents to the ER with chest discomfort.   Describes chest discomfort as a pressure-like sensation in midsternal area and radiates to b/l UE.   Started a while ago. Usually intermittent and self limiting, however yesterday was persistent and would not subside.  Occurs mostly with activity, however occurred also at rest yesterday.  Has progressively worsened since initial onset associated with chest tightness, .  Denies palpitation, lower extremity edema, dyspnea, chills, body aches, fever, fatigue, generalized weakness, urinary changes, abdominal pain, nausea, vomiting, diarrhea, dark and/or bloody stools, lightheadedness, dizziness, headaches. Has been monitored by outpatient cardiology (Dr. Golden, [facility]). Was supposed to have Cardiac Cath scheduled soon to assess possible coronary artery disease.         During his ER evaluation, patient was hemodynamically stable, howeve elevated blood pressures. Labs were significant for decreased potassium, elevated glucose, elevated troponin x 2, slightly elevated BNP. EKG significant for NSR HR 72.  Borderline SC prolongation of 188.  No QTc prolongation.  No Q waves seen.  Good R wave

## 2025-05-12 NOTE — CARE COORDINATION
Care Management Initial Assessment  5/12/2025 9:17 AM  If patient is discharged prior to next notation, then this note serves as note for discharge by case management.    Reason for Admission:   Acute coronary syndrome (HCC) [I24.9]  Elevated troponin [R79.89]  NSTEMI (non-ST elevated myocardial infarction) (HCC) [I21.4]  Elevated brain natriuretic peptide (BNP) level [R79.89]  Chest pain, unspecified type [R07.9]  Procedure(s) (LRB):  Left heart cath / coronary angiography (N/A)       Patient Admission Status: Inpatient  Date Admitted to INP: 5/12  RUR: Readmission Risk Score: 5.5    Hospitalization in the last 30 days (Readmission):  No        Advance Care Planning:  Code Status: Full Code  Primary Healthcare Decision Maker:     Advance Directive: has NO advanced directive - not interested in additional information     __________________________________________________________________________  Assessment:      05/12/25 0917   Service Assessment   Patient Orientation Alert and Oriented   Cognition Alert   History Provided By Medical Record   Support Systems Spouse/Significant Other   Prior Functional Level Independent in ADLs/IADLs   Discharge Planning   Patient expects to be discharged to: House   Services At/After Discharge   Mode of Transport at Discharge Self   Confirm Follow Up Transport Self     Comments: Patient with low readmission risk score of 6%. No identified CM needs at this time. Please consult CM for any discharge planning needs that may arise.     Discharge Concerns: []Yes [x]No []Unknown   Describe:    Financial concerns/barriers: []Yes, explain: []No [x]Unknown/Not discussed  __________________________________________________________________________    Insurer:   Active Insurance as of 5/11/2025       Primary Coverage       Payor Plan Insurance Group Employer/Plan Group    MEDICARE MEDICARE PART A AND B        Payor Address Payor Phone Number Payor Fax Number Effective Dates    [address] [address]

## 2025-05-12 NOTE — PROGRESS NOTES
Chart reviewed in preparation for transfer to [place].    Please continue to hold lisinopril (ACE Inhibitors) and misoprostol for consideration of CABG.  Please utilize other anti-hypertensives and Tylenol for pain in the interim.    Complete consult to follow pending transfer to facility.    Susu Brandon PA-C

## 2025-05-12 NOTE — CONSULTS
ATTENDING CARDIOLOGIST  The patient was personally examined and chart reviewed. All the elements of history and examination were personally performed and I agree with the plan as listed by advanced practice provider.    Treatment plan was addressed with the patient.      Subjective:  Exertional chest pain that sounds like classic angina    NSTEMI troponin greater than 5000    Recently seen by Dr. Golden who advised cardiac catheterization    Blood pressure (!) 148/65, pulse 75, temperature 98.4 °F (36.9 °C), temperature source Tympanic, resp. rate 18, height 1.626 m (5' 4\"), weight 61.2 kg (135 lb), SpO2 100%.  Normal rate, regular rhythm, S1/S2  Lungs clear      A/P:  #1.  NSTEMI-plans for cardiac catheterization today.  Continue aspirin, statin, beta-blocker, IV heparin.  Echo shows normal LV function.  There was mention of mild aortic stenosis on prior echo but on my interpretation is  aortic sclerosis without stenosis-peak velocity 2.3 m/s, peak gradient 21 mmHg.  #2.  History of R carotid endarterectomy  #3. HTN -lisinopril 40, Toprol-XL 50  #4. HLD-atorvastatin 80  Lab Results   Component Value Date    LDL 61 2025             []    High complexity decision making was performed  []    Patient is at high-risk of decompensation with multiple organ involvement        Amaya Monroe MS, MD, [facility]        Amaya Monroe MS, MD, [facility]  [phone] (P)  [phone] (F)        [facility] CARDIOLOGY                    Cardiology Care Note     [x]Initial Encounter     []Follow-up    Patient Name: Hector Jensen Jr - :1950 - MRN:[id number]  Primary Cardiologist: Luis Armando Golden DO  Consulting Cardiologist: Amaya Monroe MD     Reason for encounter: NSTEMI    HPI:       Hector Jensen Jr is a 74 y.o. male with PMH significant for AS, HTN, prior CVA, carotid artery dz s/p endarterectomy and HLD.     Pt presented to the ED with complaints of progressing chest discomfort, described as

## 2025-05-12 NOTE — H&P
Hospitalist Admission Note    NAME: Hector Jensen Jr   :  1950   MRN:  [id number]     Date/Time:  2025 11:20 PM    Patient PCP: Dada Goss Jr., APRN - NP - Admission Date: 2025       Assessment & Plan:     Primary Problem:    # NSTEMI (non-ST elevated myocardial infarction) ([facility])  - Troponin 80-->512-->1679-->3312  - Initial EKG showed inverted T waves in lead III with diffuse peaked T waves in multiple leads otherwise no significant ischemic changes were noted.  Repeat EKG unchanged.  - Chest pain/discomfort resolved  - Admit to inpatient on IMCU med/tele  - Start heparin per ACS protocol  - Continue ASA 81 mg daily  - Increase home atorvastatin dose to 80 mg nightly  - Continue home beta-blocker and ACE equivalent  - Hold home amlodipine   - Trend troponin  - N.p.o.  - IV fluids  - Sublingual nitro and morphine as needed chest pain  - As needed antiemetics  - Echo in a.m.  - Check lipids and hemoglobin A1c  - Consult cardiology follow-up recommendation ([facility] group)    Active Hospital Problems:    # Elevated BNP  -   - Although not significantly elevated, may be initial sign of HF in the current setting of ongoing NSTEMI  - No significant signs of volume overload on imaging and on exam  - Strict I's and O's  - Daily weights  - Echo and cardiology as above  - To consider trending BNP, however will defer to daytime provider at this time.    # Hypokalemia  - Potassium 3.4  - Check magnesium  - Monitor replete per electrolyte protocol    # Hyperglycemia  -   - No known history of diabetes, however will assess in the setting of ongoing NSTEMI  - Check hemoglobin A1c  - Monitor BG with routine labs    # Chronic T7 Compression Fracture   # History of Osteoprosis   - Noted on recent imaging   - Intranasal Calcitonin daily   - Tramadol prn   - Check Vitamin D             Chronic Condition as

## 2025-05-13 ENCOUNTER — ANESTHESIA EVENT (OUTPATIENT)
Facility: HOSPITAL | Age: 75
End: 2025-05-13
Payer: MEDICARE

## 2025-05-13 ENCOUNTER — APPOINTMENT (OUTPATIENT)
Facility: HOSPITAL | Age: 75
DRG: 235 | End: 2025-05-13
Attending: INTERNAL MEDICINE
Payer: MEDICARE

## 2025-05-13 LAB
25(OH)D3 SERPL-MCNC: 33.8 NG/ML (ref 30–100)
ALBUMIN SERPL-MCNC: 3.3 G/DL (ref 3.5–5)
ALBUMIN/GLOB SERPL: 1.2 (ref 1.1–2.2)
ALP SERPL-CCNC: 60 U/L (ref 45–117)
ALT SERPL-CCNC: 23 U/L (ref 12–78)
ANION GAP SERPL CALC-SCNC: 3 MMOL/L (ref 2–12)
APPEARANCE UR: CLEAR
APTT PPP: 50.9 SEC (ref 22.1–31)
AST SERPL-CCNC: 32 U/L (ref 15–37)
BACTERIA URNS QL MICRO: NEGATIVE /HPF
BILIRUB SERPL-MCNC: 0.5 MG/DL (ref 0.2–1)
BILIRUB UR QL: NEGATIVE
BUN SERPL-MCNC: 9 MG/DL (ref 6–20)
BUN/CREAT SERPL: 12 (ref 12–20)
CALCIUM SERPL-MCNC: 8.5 MG/DL (ref 8.5–10.1)
CHLORIDE SERPL-SCNC: 106 MMOL/L (ref 97–108)
CO2 SERPL-SCNC: 24 MMOL/L (ref 21–32)
COLOR UR: ABNORMAL
CREAT SERPL-MCNC: 0.78 MG/DL (ref 0.7–1.3)
ECHO BSA: 1.66 M2
EPITH CASTS URNS QL MICRO: ABNORMAL /LPF
ERYTHROCYTE [DISTWIDTH] IN BLOOD BY AUTOMATED COUNT: 12.1 % (ref 11.5–14.5)
EST. AVERAGE GLUCOSE BLD GHB EST-MCNC: 105 MG/DL
FIBRINOGEN PPP-MCNC: 331 MG/DL (ref 200–475)
GLOBULIN SER CALC-MCNC: 2.7 G/DL (ref 2–4)
GLUCOSE SERPL-MCNC: 93 MG/DL (ref 65–100)
GLUCOSE UR STRIP.AUTO-MCNC: NEGATIVE MG/DL
HBA1C MFR BLD: 5.3 % (ref 4–5.6)
HCT VFR BLD AUTO: 41.2 % (ref 36.6–50.3)
HEMOCCULT STL QL: NEGATIVE
HGB BLD-MCNC: 13.6 G/DL (ref 12.1–17)
HGB UR QL STRIP: NEGATIVE
HYALINE CASTS URNS QL MICRO: ABNORMAL /LPF (ref 0–5)
INR PPP: 1 (ref 0.9–1.1)
KETONES UR QL STRIP.AUTO: 15 MG/DL
LEUKOCYTE ESTERASE UR QL STRIP.AUTO: NEGATIVE
MAGNESIUM SERPL-MCNC: 2.2 MG/DL (ref 1.6–2.4)
MCH RBC QN AUTO: 31.6 PG (ref 26–34)
MCHC RBC AUTO-ENTMCNC: 33 G/DL (ref 30–36.5)
MCV RBC AUTO: 95.6 FL (ref 80–99)
NITRITE UR QL STRIP.AUTO: NEGATIVE
NRBC # BLD: 0 K/UL (ref 0–0.01)
NRBC BLD-RTO: 0 PER 100 WBC
NT PRO BNP: 1003 PG/ML
PH UR STRIP: 7 (ref 5–8)
PLATELET # BLD AUTO: 154 K/UL (ref 150–400)
PMV BLD AUTO: 10.3 FL (ref 8.9–12.9)
POTASSIUM SERPL-SCNC: 4.2 MMOL/L (ref 3.5–5.1)
PROT SERPL-MCNC: 6 G/DL (ref 6.4–8.2)
PROT UR STRIP-MCNC: NEGATIVE MG/DL
PROTHROMBIN TIME: 11.1 SEC (ref 9.2–11.2)
RBC # BLD AUTO: 4.31 M/UL (ref 4.1–5.7)
RBC #/AREA URNS HPF: ABNORMAL /HPF (ref 0–5)
SODIUM SERPL-SCNC: 133 MMOL/L (ref 136–145)
SP GR UR REFRACTOMETRY: 1.01 (ref 1–1.03)
THERAPEUTIC RANGE: ABNORMAL SECS (ref 58–77)
UFH PPP CHRO-ACNC: 0.29 IU/ML
UFH PPP CHRO-ACNC: 0.45 IU/ML
UFH PPP CHRO-ACNC: 0.55 IU/ML
URINE CULTURE IF INDICATED: ABNORMAL
UROBILINOGEN UR QL STRIP.AUTO: 0.2 EU/DL (ref 0.2–1)
VAS LEFT CCA DIST EDV: 15.7 CM/S
VAS LEFT CCA DIST PSV: 67.6 CM/S
VAS LEFT CCA PROX EDV: 19.3 CM/S
VAS LEFT CCA PROX PSV: 83.1 CM/S
VAS LEFT ECA EDV: 11.8 CM/S
VAS LEFT ECA PSV: 73.4 CM/S
VAS LEFT ICA DIST EDV: 29 CM/S
VAS LEFT ICA DIST PSV: 80.6 CM/S
VAS LEFT ICA MID EDV: 31.4 CM/S
VAS LEFT ICA MID PSV: 111.3 CM/S
VAS LEFT ICA PROX EDV: 33.9 CM/S
VAS LEFT ICA PROX PSV: 105.1 CM/S
VAS LEFT ICA/CCA PSV: 1.6 NO UNITS
VAS LEFT VERTEBRAL EDV: 24.2 CM/S
VAS LEFT VERTEBRAL PSV: 70.5 CM/S
VAS RIGHT CCA DIST EDV: 17.1 CM/S
VAS RIGHT CCA DIST PSV: 66.6 CM/S
VAS RIGHT CCA PROX EDV: 17.1 CM/S
VAS RIGHT CCA PROX PSV: 66.6 CM/S
VAS RIGHT ECA EDV: 7.4 CM/S
VAS RIGHT ECA PSV: 53.8 CM/S
VAS RIGHT ICA DIST EDV: 21.5 CM/S
VAS RIGHT ICA DIST PSV: 67.7 CM/S
VAS RIGHT ICA MID EDV: 20.4 CM/S
VAS RIGHT ICA MID PSV: 68.8 CM/S
VAS RIGHT ICA PROX EDV: 27 CM/S
VAS RIGHT ICA PROX PSV: 76.5 CM/S
VAS RIGHT ICA/CCA PSV: 1.1 NO UNITS
VAS RIGHT VERTEBRAL EDV: 9.5 CM/S
VAS RIGHT VERTEBRAL PSV: 45.7 CM/S
WBC # BLD AUTO: 8.9 K/UL (ref 4.1–11.1)
WBC URNS QL MICRO: ABNORMAL /HPF (ref 0–4)

## 2025-05-13 PROCEDURE — 82272 OCCULT BLD FECES 1-3 TESTS: CPT

## 2025-05-13 PROCEDURE — 6370000000 HC RX 637 (ALT 250 FOR IP): Performed by: PHYSICIAN ASSISTANT

## 2025-05-13 PROCEDURE — 2500000003 HC RX 250 WO HCPCS: Performed by: FAMILY MEDICINE

## 2025-05-13 PROCEDURE — 6360000002 HC RX W HCPCS: Performed by: STUDENT IN AN ORGANIZED HEALTH CARE EDUCATION/TRAINING PROGRAM

## 2025-05-13 PROCEDURE — 80053 COMPREHEN METABOLIC PANEL: CPT

## 2025-05-13 PROCEDURE — 6370000000 HC RX 637 (ALT 250 FOR IP): Performed by: STUDENT IN AN ORGANIZED HEALTH CARE EDUCATION/TRAINING PROGRAM

## 2025-05-13 PROCEDURE — 94729 DIFFUSING CAPACITY: CPT

## 2025-05-13 PROCEDURE — 86850 RBC ANTIBODY SCREEN: CPT

## 2025-05-13 PROCEDURE — 81001 URINALYSIS AUTO W/SCOPE: CPT

## 2025-05-13 PROCEDURE — 2500000003 HC RX 250 WO HCPCS: Performed by: STUDENT IN AN ORGANIZED HEALTH CARE EDUCATION/TRAINING PROGRAM

## 2025-05-13 PROCEDURE — 84443 ASSAY THYROID STIM HORMONE: CPT

## 2025-05-13 PROCEDURE — 86901 BLOOD TYPING SEROLOGIC RH(D): CPT

## 2025-05-13 PROCEDURE — 85027 COMPLETE CBC AUTOMATED: CPT

## 2025-05-13 PROCEDURE — 6370000000 HC RX 637 (ALT 250 FOR IP): Performed by: FAMILY MEDICINE

## 2025-05-13 PROCEDURE — 86900 BLOOD TYPING SEROLOGIC ABO: CPT

## 2025-05-13 PROCEDURE — 97165 OT EVAL LOW COMPLEX 30 MIN: CPT

## 2025-05-13 PROCEDURE — 93970 EXTREMITY STUDY: CPT

## 2025-05-13 PROCEDURE — 85384 FIBRINOGEN ACTIVITY: CPT

## 2025-05-13 PROCEDURE — 85730 THROMBOPLASTIN TIME PARTIAL: CPT

## 2025-05-13 PROCEDURE — 94060 EVALUATION OF WHEEZING: CPT

## 2025-05-13 PROCEDURE — 83036 HEMOGLOBIN GLYCOSYLATED A1C: CPT

## 2025-05-13 PROCEDURE — 85610 PROTHROMBIN TIME: CPT

## 2025-05-13 PROCEDURE — 94726 PLETHYSMOGRAPHY LUNG VOLUMES: CPT

## 2025-05-13 PROCEDURE — 86923 COMPATIBILITY TEST ELECTRIC: CPT

## 2025-05-13 PROCEDURE — 83880 ASSAY OF NATRIURETIC PEPTIDE: CPT

## 2025-05-13 PROCEDURE — 82306 VITAMIN D 25 HYDROXY: CPT

## 2025-05-13 PROCEDURE — 97535 SELF CARE MNGMENT TRAINING: CPT

## 2025-05-13 PROCEDURE — 97116 GAIT TRAINING THERAPY: CPT

## 2025-05-13 PROCEDURE — 99223 1ST HOSP IP/OBS HIGH 75: CPT | Performed by: THORACIC SURGERY (CARDIOTHORACIC VASCULAR SURGERY)

## 2025-05-13 PROCEDURE — 85520 HEPARIN ASSAY: CPT

## 2025-05-13 PROCEDURE — 83735 ASSAY OF MAGNESIUM: CPT

## 2025-05-13 PROCEDURE — 93922 UPR/L XTREMITY ART 2 LEVELS: CPT

## 2025-05-13 PROCEDURE — 94010 BREATHING CAPACITY TEST: CPT

## 2025-05-13 PROCEDURE — 97161 PT EVAL LOW COMPLEX 20 MIN: CPT

## 2025-05-13 PROCEDURE — 93880 EXTRACRANIAL BILAT STUDY: CPT

## 2025-05-13 PROCEDURE — 2060000000 HC ICU INTERMEDIATE R&B

## 2025-05-13 RX ORDER — HEPARIN SODIUM 1000 [USP'U]/ML
60 INJECTION, SOLUTION INTRAVENOUS; SUBCUTANEOUS PRN
Status: DISCONTINUED | OUTPATIENT
Start: 2025-05-13 | End: 2025-05-13 | Stop reason: SDUPTHER

## 2025-05-13 RX ORDER — ONDANSETRON 4 MG/1
4 TABLET, ORALLY DISINTEGRATING ORAL EVERY 8 HOURS PRN
Status: DISCONTINUED | OUTPATIENT
Start: 2025-05-13 | End: 2025-05-14

## 2025-05-13 RX ORDER — ATORVASTATIN CALCIUM 40 MG/1
80 TABLET, FILM COATED ORAL NIGHTLY
Status: DISCONTINUED | OUTPATIENT
Start: 2025-05-13 | End: 2025-05-13 | Stop reason: SDUPTHER

## 2025-05-13 RX ORDER — ACETAMINOPHEN 325 MG/1
650 TABLET ORAL EVERY 6 HOURS PRN
Status: DISCONTINUED | OUTPATIENT
Start: 2025-05-13 | End: 2025-05-14

## 2025-05-13 RX ORDER — DEXTROSE MONOHYDRATE 100 MG/ML
INJECTION, SOLUTION INTRAVENOUS CONTINUOUS PRN
Status: DISCONTINUED | OUTPATIENT
Start: 2025-05-13 | End: 2025-05-14

## 2025-05-13 RX ORDER — SODIUM CHLORIDE 0.9 % (FLUSH) 0.9 %
5-40 SYRINGE (ML) INJECTION EVERY 8 HOURS
Status: DISCONTINUED | OUTPATIENT
Start: 2025-05-13 | End: 2025-05-14

## 2025-05-13 RX ORDER — ONDANSETRON 2 MG/ML
4 INJECTION INTRAMUSCULAR; INTRAVENOUS EVERY 6 HOURS PRN
Status: DISCONTINUED | OUTPATIENT
Start: 2025-05-13 | End: 2025-05-14

## 2025-05-13 RX ORDER — ATORVASTATIN CALCIUM 40 MG/1
80 TABLET, FILM COATED ORAL NIGHTLY
Status: DISCONTINUED | OUTPATIENT
Start: 2025-05-13 | End: 2025-05-14

## 2025-05-13 RX ORDER — MAGNESIUM SULFATE IN WATER 40 MG/ML
2000 INJECTION, SOLUTION INTRAVENOUS PRN
Status: DISCONTINUED | OUTPATIENT
Start: 2025-05-13 | End: 2025-05-14

## 2025-05-13 RX ORDER — SODIUM CHLORIDE 9 MG/ML
INJECTION, SOLUTION INTRAVENOUS PRN
Status: DISCONTINUED | OUTPATIENT
Start: 2025-05-13 | End: 2025-05-14

## 2025-05-13 RX ORDER — NITROGLYCERIN 0.4 MG/1
0.4 TABLET SUBLINGUAL EVERY 5 MIN PRN
Status: DISCONTINUED | OUTPATIENT
Start: 2025-05-13 | End: 2025-05-14

## 2025-05-13 RX ORDER — CHLORHEXIDINE GLUCONATE ORAL RINSE 1.2 MG/ML
15 SOLUTION DENTAL 2 TIMES DAILY
Status: DISCONTINUED | OUTPATIENT
Start: 2025-05-13 | End: 2025-05-14

## 2025-05-13 RX ORDER — ACETAMINOPHEN 650 MG/1
650 SUPPOSITORY RECTAL EVERY 6 HOURS PRN
Status: DISCONTINUED | OUTPATIENT
Start: 2025-05-13 | End: 2025-05-14

## 2025-05-13 RX ORDER — METOPROLOL SUCCINATE 50 MG/1
50 TABLET, EXTENDED RELEASE ORAL DAILY
Status: DISCONTINUED | OUTPATIENT
Start: 2025-05-13 | End: 2025-05-13 | Stop reason: SDUPTHER

## 2025-05-13 RX ORDER — POTASSIUM CHLORIDE 750 MG/1
40 TABLET, EXTENDED RELEASE ORAL PRN
Status: DISCONTINUED | OUTPATIENT
Start: 2025-05-13 | End: 2025-05-14

## 2025-05-13 RX ORDER — HEPARIN SODIUM 1000 [USP'U]/ML
30 INJECTION, SOLUTION INTRAVENOUS; SUBCUTANEOUS PRN
Status: DISCONTINUED | OUTPATIENT
Start: 2025-05-13 | End: 2025-05-13 | Stop reason: SDUPTHER

## 2025-05-13 RX ORDER — TRAMADOL HYDROCHLORIDE 50 MG/1
50 TABLET ORAL EVERY 8 HOURS PRN
Status: DISCONTINUED | OUTPATIENT
Start: 2025-05-13 | End: 2025-05-14

## 2025-05-13 RX ORDER — SODIUM CHLORIDE 9 MG/ML
INJECTION, SOLUTION INTRAVENOUS CONTINUOUS
Status: DISPENSED | OUTPATIENT
Start: 2025-05-13 | End: 2025-05-13

## 2025-05-13 RX ORDER — HEPARIN SODIUM 1000 [USP'U]/ML
30 INJECTION, SOLUTION INTRAVENOUS; SUBCUTANEOUS ONCE
Status: CANCELLED | OUTPATIENT
Start: 2025-05-13 | End: 2025-05-13

## 2025-05-13 RX ORDER — MORPHINE SULFATE 4 MG/ML
4 INJECTION, SOLUTION INTRAMUSCULAR; INTRAVENOUS EVERY 4 HOURS PRN
Refills: 0 | Status: DISCONTINUED | OUTPATIENT
Start: 2025-05-13 | End: 2025-05-14

## 2025-05-13 RX ORDER — HEPARIN SODIUM 10000 [USP'U]/100ML
5-30 INJECTION, SOLUTION INTRAVENOUS CONTINUOUS
Status: DISCONTINUED | OUTPATIENT
Start: 2025-05-13 | End: 2025-05-13 | Stop reason: SDUPTHER

## 2025-05-13 RX ORDER — BISACODYL 5 MG/1
5 TABLET, DELAYED RELEASE ORAL DAILY PRN
Status: DISCONTINUED | OUTPATIENT
Start: 2025-05-13 | End: 2025-05-14

## 2025-05-13 RX ORDER — PROCHLORPERAZINE EDISYLATE 5 MG/ML
10 INJECTION INTRAMUSCULAR; INTRAVENOUS EVERY 6 HOURS PRN
Status: DISCONTINUED | OUTPATIENT
Start: 2025-05-13 | End: 2025-05-14

## 2025-05-13 RX ORDER — SODIUM CHLORIDE 0.9 % (FLUSH) 0.9 %
5-40 SYRINGE (ML) INJECTION PRN
Status: DISCONTINUED | OUTPATIENT
Start: 2025-05-13 | End: 2025-05-14

## 2025-05-13 RX ORDER — ASPIRIN 81 MG/1
81 TABLET ORAL DAILY
Status: DISCONTINUED | OUTPATIENT
Start: 2025-05-13 | End: 2025-05-14

## 2025-05-13 RX ORDER — METOPROLOL SUCCINATE 25 MG/1
50 TABLET, EXTENDED RELEASE ORAL DAILY
Status: DISCONTINUED | OUTPATIENT
Start: 2025-05-13 | End: 2025-05-14

## 2025-05-13 RX ORDER — MUPIROCIN 20 MG/G
OINTMENT TOPICAL 2 TIMES DAILY
Status: DISCONTINUED | OUTPATIENT
Start: 2025-05-13 | End: 2025-05-14

## 2025-05-13 RX ORDER — CALCITONIN SALMON 200 [IU]/.09ML
1 SPRAY, METERED NASAL DAILY
Status: DISCONTINUED | OUTPATIENT
Start: 2025-05-13 | End: 2025-05-14

## 2025-05-13 RX ORDER — ACETAMINOPHEN 325 MG/1
650 TABLET ORAL EVERY 4 HOURS PRN
Status: DISCONTINUED | OUTPATIENT
Start: 2025-05-13 | End: 2025-05-13 | Stop reason: SDUPTHER

## 2025-05-13 RX ORDER — SODIUM CHLORIDE 0.9 % (FLUSH) 0.9 %
5-40 SYRINGE (ML) INJECTION EVERY 12 HOURS SCHEDULED
Status: DISCONTINUED | OUTPATIENT
Start: 2025-05-13 | End: 2025-05-14

## 2025-05-13 RX ORDER — POTASSIUM CHLORIDE 7.45 MG/ML
10 INJECTION INTRAVENOUS PRN
Status: DISCONTINUED | OUTPATIENT
Start: 2025-05-13 | End: 2025-05-14

## 2025-05-13 RX ADMIN — HEPARIN SODIUM 14 UNITS/KG/HR: 10000 INJECTION, SOLUTION INTRAVENOUS at 03:21

## 2025-05-13 RX ADMIN — SODIUM CHLORIDE, PRESERVATIVE FREE 10 ML: 5 INJECTION INTRAVENOUS at 22:52

## 2025-05-13 RX ADMIN — CHLORHEXIDINE GLUCONATE 15 ML: 1.2 RINSE ORAL at 22:52

## 2025-05-13 RX ADMIN — ASPIRIN 81 MG: 81 TABLET, COATED ORAL at 08:47

## 2025-05-13 RX ADMIN — ATORVASTATIN CALCIUM 80 MG: 40 TABLET, FILM COATED ORAL at 22:52

## 2025-05-13 RX ADMIN — MUPIROCIN: 20 OINTMENT TOPICAL at 12:01

## 2025-05-13 RX ADMIN — ACETAMINOPHEN 650 MG: 325 TABLET ORAL at 06:19

## 2025-05-13 RX ADMIN — MUPIROCIN: 20 OINTMENT TOPICAL at 22:52

## 2025-05-13 RX ADMIN — ATORVASTATIN CALCIUM 80 MG: 40 TABLET, FILM COATED ORAL at 01:50

## 2025-05-13 RX ADMIN — HEPARIN SODIUM 1800 UNITS: 1000 INJECTION INTRAVENOUS; SUBCUTANEOUS at 03:19

## 2025-05-13 RX ADMIN — CHLORHEXIDINE GLUCONATE 15 ML: 1.2 RINSE ORAL at 15:10

## 2025-05-13 ASSESSMENT — PAIN DESCRIPTION - DESCRIPTORS: DESCRIPTORS: ACHING

## 2025-05-13 ASSESSMENT — PAIN DESCRIPTION - ORIENTATION: ORIENTATION: LEFT

## 2025-05-13 ASSESSMENT — PAIN SCALES - GENERAL
PAINLEVEL_OUTOF10: 1
PAINLEVEL_OUTOF10: 4

## 2025-05-13 ASSESSMENT — PAIN DESCRIPTION - LOCATION: LOCATION: SHOULDER

## 2025-05-13 NOTE — CARE COORDINATION
05/13/25 1341   Readmission Assessment   Number of Days since last admission? 1-7 days   Previous Disposition Other (comment)  (Patient transferred from [place] for a CABG work up.)   Who is being Interviewed Patient  (Chart review)   What was the patient's/caregiver's perception as to why they think they needed to return back to the hospital? Other (Comment)  (Patient transferred from [place] for a CABG work up.)   Did you visit your Primary Care Physician after you left the hospital, before you returned this time? No  (Patient transferred from [place] for a CABG work up.)   Why weren't you able to visit your PCP? Other (Comment)  (Patient transferred from [place] for a CABG work up.)   Did you see a specialist, such as Cardiac, Pulmonary, Orthopedic Physician, etc. after you left the hospital? Other (Comment)  (Patient transferred from [place] for a CABG work up.)   Who advised the patient to return to the hospital? Physician   Does the patient report anything that got in the way of taking their medications? No   In our efforts to provide the best possible care to you and others like you, can you think of anything that we could have done to help you after you left the hospital the first time, so that you might not have needed to return so soon? Teach back instructions regarding management of illness;Teaching during hospitalization regarding your illness        05/13/25 1341   Readmission Assessment   Number of Days since last admission? 1-7 days   Previous Disposition Other (comment)  (Patient transferred from [place] for a CABG work up.)   Who is being Interviewed Patient  (Chart review)   What was the patient's/caregiver's perception as to why they think they needed to return back to the hospital? Other (Comment)  (Patient transferred from [place] for a CABG work up.)   Did you visit your Primary Care Physician after you left the hospital, before you returned this time?

## 2025-05-13 NOTE — CONSENT
Informed Consent for Blood Component Transfusion Note    I have discussed with the patient the rationale for blood component transfusion; its benefits in treating or preventing fatigue, organ damage, or death; and its risk which includes mild transfusion reactions, rare risk of blood borne infection, or more serious but rare reactions. I have discussed the alternatives to transfusion, including the risk and consequences of not receiving transfusion. The patient had an opportunity to ask questions and had agreed to proceed with transfusion of blood components.    Electronically signed by Susu Brandon PA-C on 5/13/25 at 1:35 PM EDT

## 2025-05-13 NOTE — CONSULTS
Consult received for Diabetes mgmt for pre-op CABG s/p ACS with NSTEMI.  Transferred from [facility] to [facility] for CABG work up and evaluation.  S/p cardiac cath 5/12 which showed multi vessel disease.     Past Medical History:   Diagnosis Date    CAD (coronary artery disease)     Colon polyp     Environmental and seasonal allergies     Essential hypertension     High cholesterol     Stroke (HCC)     Sun-damaged skin         Noted current A1C 5.4% indicating no diabetes.  Will discuss need for insulin infusion in the post op setting with patient and my role in his care. CABG surgery scheduling in progress.  Will peripherally follow along until surgery.         EAN CARDONA - CNS   [facility]

## 2025-05-13 NOTE — CARE COORDINATION
Transition of Care Plan:    Care Management Initial Assessment       RUR: 9% Low  Readmission? Yes - Patient transferred from [place] for CABG work up  1st IM letter given? Yes - 5/13/25  1st  letter given: No    The patient transferred from [place] to [place] for CABG work up. Noted: surgery planned for Wednesday 5/15/25. The patient is from home, lives with spouse and adopted daughter, drives a vehicle, was independent, does not own or use any dme, no history of home health or rehab and plans to discharge home with family to transport. No CM orders at this time. CM following for discharge needs.     05/13/25 1343   Service Assessment   Patient Orientation Alert and Oriented   Cognition Alert   History Provided By Other (see comment);Medical Record  (Chart review)   Primary Caregiver Spouse   Support Systems Spouse/Significant Other   PCP Verified by CM Yes   Last Visit to PCP Within last 3 months   Prior Functional Level Independent in ADLs/IADLs   Current Functional Level Other (see comment)  (Patient transferred from [place] for a CABG work up.)   Can patient return to prior living arrangement Unknown at present   Ability to make needs known: Good   Family able to assist with home care needs: Yes   Would you like for me to discuss the discharge plan with any other family members/significant others, and if so, who? Yes  (Spouse)   Financial Resources Medicare   Social/Functional History   Lives With Spouse   Home Layout Two level;Able to Live on Main level with bedroom/bathroom   Home Access Stairs to enter with rails   Entrance Stairs - Number of Steps 4   Bathroom Equipment None   Home Equipment None   Receives Help From Family   Prior Level of Assist for ADLs Independent   Prior Level of Assist for Homemaking Independent   Ambulation Assistance Independent   Prior Level of Assist for Transfers Independent   Active  Yes   Occupation Retired   Discharge Planning   Type of Residence

## 2025-05-13 NOTE — CONSULTS
[facility]   History and Physical    Subjective:      Hector Jensen Jr is a 74 y.o. male with PMH of HTN, HLD, CVA (s/p R CEA 1996), T7 fracture and osteoporosis who was seen in consultation for multivessel CAD and possible CABG.      Mr. Jensen presented to [place] ED on 5/11 with worsening CP/discomfort, diagnosed with NSTEMI and subsequently underwent LHC which revealed multivessel CAD. TTE with LVEF 55% and no significant valvular abnormalities.  He was then transferred for further CABG workup/evaluation.  At the time of his presentation he also noted some trouble catching his breath with the pain, but denies any new palpitations, orthopnea/PND, edema or claudication.  He had been recommended to have an outpatient LHC but his chest discomfort progressed prompting his visit to the ED.  Currently Mr. Jensen is without CP, hemodynamically stable on a heparin infusion.    He lives independently with his wife and is able to complete his ADLs without issue.  His daughter and grand-daughter are at bedside today.    Remote tobacco history (~26 pk yrs), no significant ETOH history or drug use.  No known family history of heart disease.    Outpatient cardiologist: Dr. Golden.    Cardiac Testing    [facility] 5/12/2025:   Left Anterior Descending   Prox LAD to Mid LAD lesion, 80% stenosed.      Left Circumflex   Ost Cx to Prox Cx lesion, 90% stenosed.      First Obtuse Marginal Branch   1st Mrg lesion, 80% stenosed.      Right Coronary Artery   Prox RCA to Mid RCA lesion, 90% stenosed.   Mid RCA to Dist RCA lesion, 80% stenosed.        ECHO:  ECHO (TTE) COMPLETE (PRN CONTRAST/BUBBLE/STRAIN/3D) 05/12/2025  1:55 PM (Final)    Interpretation Summary    Left Ventricle: Normal left ventricular systolic function with a visually estimated EF of 55 - 60%. EF by 2D Simpsons Biplane is 53%. Left ventricle size is normal. Normal wall thickness. Normal wall motion. Normal diastolic function.    Aortic Valve: Trileaflet valve. Mildly calcified

## 2025-05-13 NOTE — CONSULTS
Thoracic Surgery at [place] Note    Thoracic Surgery has been consulted for CABG. Please note that thoracic surgery at [facility] is a general thoracic surgery team and does not operate on the heart or the aorta. Please consult cardiovascular surgery [cardiac] surgery in regards to this patient.     Odilia Castillo MD  Thoracic Surgeon  [facility] Thoracic Surgery at [address], [address]  Phone: [phone]  Fax: [phone]

## 2025-05-13 NOTE — H&P
ESV Index A2C 17 mL/m2    LV EDV Index A2C 32 mL/m2    LVIDd Index 2.89 cm/m2    LVIDs Index 1.99 cm/m2    LV RWT Ratio 0.25     LV Mass 2D 88.3 88 - 224 g    LV Mass 2D Index 53.2 49 - 115 g/m2    MV E/A 0.76     E/E' Ratio (Averaged) 8.64     E/E' Lateral 7.38     E/E' Septal 9.89     LA Volume Index BP 28 16 - 34 ml/m2    LA Volume Index A/L 30 16 - 34 mL/m2    LVOT Stroke Volume Index 67.5 mL/m2    LVOT Area 3.1 cm2    LA Volume Index A-L A2C 30 16 - 34 mL/m2    LA Volume Index A-L A4C 30 16 - 34 mL/m2    LA Volume Index MOD A2C 28 16 - 34 ml/m2    LA Volume Index MOD A4C 27 16 - 34 ml/m2    LA Size Index 1.57 cm/m2    Ascending Aorta Index 1.81 cm/m2    AV Velocity Ratio 0.65     LVOT:AV VTI Index 0.72     NOEL/BSA VTI 1.4 cm2/m2    NOEL/BSA Peak Velocity 1.3 cm2/m2    Est. RA Pressure 3 mmHg    EF Physician 55 %   Anti-Xa, Unfractionated Heparin    Collection Time: 05/12/25  1:04 PM   Result Value Ref Range    Heparin Xa,LMWH and Unfrac 0.36 IU/mL   Troponin    Collection Time: 05/12/25  1:04 PM   Result Value Ref Range    Troponin, High Sensitivity 6,937 (HH) 0 - 76 ng/L   Cardiac procedure    Collection Time: 05/12/25  3:21 PM   Result Value Ref Range    Body Surface Area 1.66 m2         Imaging:     Assessment:     Hector Jensen Jr is a 74 y.o. male with past medical history significant for hypertension, dyslipidemia chronic T7 compression fracture, history of osteoporosis who is admitted for CAD       Plan:       Three-vessel CAD  NSTE-ACS  - Continue heparin GTT  - Continue aspirin, statin and beta-blocker  - Hold ACE  - Keep n.p.o. at midnight  -Morphine and nitro as needed for pain control  - CT surgery evaluation in a.m.    Hypertension  - Continue metoprolol  - Hold Norvasc    Hypokalemia  - Resolved.   Daily BMP    History of chronic T7 compression fracture  - Morphine as needed    Dyslipidemia  - Continue Lipitor        FEN/GI -  ns@75ml/hr  Activity - as tolerated  DVT prophylaxis - HEP  GI

## 2025-05-14 ENCOUNTER — HOSPITAL ENCOUNTER (OUTPATIENT)
Facility: HOSPITAL | Age: 75
Discharge: HOME | End: 2025-05-16
Attending: THORACIC SURGERY (CARDIOTHORACIC VASCULAR SURGERY)

## 2025-05-14 ENCOUNTER — APPOINTMENT (OUTPATIENT)
Facility: HOSPITAL | Age: 75
DRG: 235 | End: 2025-05-14
Attending: INTERNAL MEDICINE
Payer: MEDICARE

## 2025-05-14 ENCOUNTER — ANESTHESIA (OUTPATIENT)
Facility: HOSPITAL | Age: 75
End: 2025-05-14
Payer: MEDICARE

## 2025-05-14 DIAGNOSIS — Z95.1 POSTSURGICAL AORTOCORONARY BYPASS STATUS: Primary | ICD-10-CM

## 2025-05-14 LAB
ABO + RH BLD: NORMAL
ACUTE KIDNEY INJURY RISK NEPHROCHECK: 0.19 (ref 0–0.3)
ALBUMIN SERPL-MCNC: 3.3 G/DL (ref 3.5–5)
ALBUMIN SERPL-MCNC: 3.6 G/DL (ref 3.5–5)
ALBUMIN/GLOB SERPL: 1.9 (ref 1.1–2.2)
ALBUMIN/GLOB SERPL: 2.1 (ref 1.1–2.2)
ALP SERPL-CCNC: 31 U/L (ref 45–117)
ALP SERPL-CCNC: 32 U/L (ref 45–117)
ALT SERPL-CCNC: 16 U/L (ref 12–78)
ALT SERPL-CCNC: 18 U/L (ref 12–78)
ANION GAP BLD CALC-SCNC: 10 (ref 10–20)
ANION GAP BLD CALC-SCNC: 13 (ref 10–20)
ANION GAP BLD CALC-SCNC: 14 (ref 10–20)
ANION GAP BLD CALC-SCNC: 14 (ref 10–20)
ANION GAP BLD CALC-SCNC: 15 (ref 10–20)
ANION GAP BLD CALC-SCNC: 8 (ref 10–20)
ANION GAP SERPL CALC-SCNC: 0 MMOL/L (ref 2–12)
ANION GAP SERPL CALC-SCNC: 2 MMOL/L (ref 2–12)
APTT PPP: 32.5 SEC (ref 22.1–31)
AST SERPL-CCNC: 26 U/L (ref 15–37)
AST SERPL-CCNC: 35 U/L (ref 15–37)
BASE DEFICIT BLD-SCNC: 1.1 MMOL/L
BASE DEFICIT BLD-SCNC: 1.2 MMOL/L
BASE DEFICIT BLD-SCNC: 3.1 MMOL/L
BASE DEFICIT BLD-SCNC: 4.2 MMOL/L
BASE DEFICIT BLD-SCNC: 4.5 MMOL/L
BASE DEFICIT BLD-SCNC: 4.8 MMOL/L
BASE DEFICIT BLD-SCNC: 5.3 MMOL/L
BASE EXCESS BLD CALC-SCNC: 0.1 MMOL/L
BILIRUB SERPL-MCNC: 0.5 MG/DL (ref 0.2–1)
BILIRUB SERPL-MCNC: 0.7 MG/DL (ref 0.2–1)
BLD PROD TYP BPU: NORMAL
BLD PROD TYP BPU: NORMAL
BLOOD BANK DISPENSE STATUS: NORMAL
BLOOD BANK DISPENSE STATUS: NORMAL
BLOOD GROUP ANTIBODIES SERPL: NORMAL
BPU ID: NORMAL
BPU ID: NORMAL
BUN SERPL-MCNC: 5 MG/DL (ref 6–20)
BUN SERPL-MCNC: 7 MG/DL (ref 6–20)
BUN/CREAT SERPL: 10 (ref 12–20)
BUN/CREAT SERPL: 6 (ref 12–20)
CA-I BLD-MCNC: 0.98 MMOL/L (ref 1.15–1.33)
CA-I BLD-MCNC: 1.03 MMOL/L (ref 1.15–1.33)
CA-I BLD-MCNC: 1.13 MMOL/L (ref 1.15–1.33)
CA-I BLD-MCNC: 1.15 MMOL/L (ref 1.15–1.33)
CA-I BLD-MCNC: 1.15 MMOL/L (ref 1.15–1.33)
CA-I BLD-MCNC: 1.22 MMOL/L (ref 1.15–1.33)
CA-I BLD-SCNC: 1.17 MMOL/L (ref 1.12–1.32)
CALCIUM SERPL-MCNC: 7.6 MG/DL (ref 8.5–10.1)
CALCIUM SERPL-MCNC: 7.7 MG/DL (ref 8.5–10.1)
CHLORIDE BLD-SCNC: 103 MMOL/L (ref 100–111)
CHLORIDE BLD-SCNC: 103 MMOL/L (ref 100–111)
CHLORIDE BLD-SCNC: 104 MMOL/L (ref 100–111)
CHLORIDE BLD-SCNC: 104 MMOL/L (ref 100–111)
CHLORIDE BLD-SCNC: 107 MMOL/L (ref 100–111)
CHLORIDE BLD-SCNC: 108 MMOL/L (ref 100–111)
CHLORIDE SERPL-SCNC: 110 MMOL/L (ref 97–108)
CHLORIDE SERPL-SCNC: 112 MMOL/L (ref 97–108)
CO2 BLD-SCNC: 20 MMOL/L (ref 22–29)
CO2 BLD-SCNC: 21 MMOL/L (ref 22–29)
CO2 BLD-SCNC: 23 MMOL/L (ref 22–29)
CO2 SERPL-SCNC: 25 MMOL/L (ref 21–32)
CO2 SERPL-SCNC: 26 MMOL/L (ref 21–32)
CREAT SERPL-MCNC: 0.72 MG/DL (ref 0.7–1.3)
CREAT SERPL-MCNC: 0.84 MG/DL (ref 0.7–1.3)
CREAT UR-MCNC: 0.7 MG/DL (ref 0.6–1.3)
CREAT UR-MCNC: 0.8 MG/DL (ref 0.6–1.3)
CREAT UR-MCNC: 0.9 MG/DL (ref 0.6–1.3)
CROSSMATCH RESULT: NORMAL
CROSSMATCH RESULT: NORMAL
ECHO BSA: 1.63 M2
ECHO BSA: 1.66 M2
EKG ATRIAL RATE: 72 BPM
EKG DIAGNOSIS: NORMAL
EKG P AXIS: 50 DEGREES
EKG P-R INTERVAL: 182 MS
EKG Q-T INTERVAL: 378 MS
EKG QRS DURATION: 90 MS
EKG QTC CALCULATION (BAZETT): 413 MS
EKG R AXIS: 30 DEGREES
EKG T AXIS: 34 DEGREES
EKG VENTRICULAR RATE: 72 BPM
ERYTHROCYTE [DISTWIDTH] IN BLOOD BY AUTOMATED COUNT: 12 % (ref 11.5–14.5)
ERYTHROCYTE [DISTWIDTH] IN BLOOD BY AUTOMATED COUNT: 12.1 % (ref 11.5–14.5)
ERYTHROCYTE [DISTWIDTH] IN BLOOD BY AUTOMATED COUNT: 12.1 % (ref 11.5–14.5)
GLOBULIN SER CALC-MCNC: 1.7 G/DL (ref 2–4)
GLOBULIN SER CALC-MCNC: 1.7 G/DL (ref 2–4)
GLUCOSE BLD STRIP.AUTO-MCNC: 105 MG/DL (ref 65–117)
GLUCOSE BLD STRIP.AUTO-MCNC: 113 MG/DL (ref 65–117)
GLUCOSE BLD STRIP.AUTO-MCNC: 114 MG/DL (ref 65–117)
GLUCOSE BLD STRIP.AUTO-MCNC: 114 MG/DL (ref 74–99)
GLUCOSE BLD STRIP.AUTO-MCNC: 120 MG/DL (ref 65–117)
GLUCOSE BLD STRIP.AUTO-MCNC: 121 MG/DL (ref 74–99)
GLUCOSE BLD STRIP.AUTO-MCNC: 125 MG/DL (ref 74–99)
GLUCOSE BLD STRIP.AUTO-MCNC: 139 MG/DL (ref 74–99)
GLUCOSE BLD STRIP.AUTO-MCNC: 140 MG/DL (ref 65–117)
GLUCOSE BLD STRIP.AUTO-MCNC: 141 MG/DL (ref 65–117)
GLUCOSE BLD STRIP.AUTO-MCNC: 148 MG/DL (ref 65–117)
GLUCOSE BLD STRIP.AUTO-MCNC: 160 MG/DL (ref 74–99)
GLUCOSE BLD STRIP.AUTO-MCNC: 165 MG/DL (ref 74–99)
GLUCOSE BLD STRIP.AUTO-MCNC: 95 MG/DL (ref 65–117)
GLUCOSE BLD STRIP.AUTO-MCNC: 98 MG/DL (ref 65–117)
GLUCOSE SERPL-MCNC: 115 MG/DL (ref 65–100)
GLUCOSE SERPL-MCNC: 144 MG/DL (ref 65–100)
HCO3 BLD-SCNC: 25.1 MMOL/L (ref 21–28)
HCO3 BLD-SCNC: 25.6 MMOL/L (ref 21–28)
HCO3 BLDA-SCNC: 20 MMOL/L
HCO3 BLDA-SCNC: 22 MMOL/L
HCO3 BLDA-SCNC: 24 MMOL/L
HCT VFR BLD AUTO: 26.6 % (ref 36.6–50.3)
HCT VFR BLD AUTO: 28 % (ref 36.6–50.3)
HCT VFR BLD AUTO: 38.6 % (ref 36.6–50.3)
HGB BLD-MCNC: 11.7 G/DL (ref 12.1–17)
HGB BLD-MCNC: 12.1 G/DL (ref 12.1–17)
HGB BLD-MCNC: 12.9 G/DL (ref 12.1–17)
HGB BLD-MCNC: 8.3 G/DL (ref 12.1–17)
HGB BLD-MCNC: 8.9 G/DL (ref 12.1–17)
HGB BLD-MCNC: 9.1 G/DL (ref 12.1–17)
HGB BLD-MCNC: 9.4 G/DL (ref 12.1–17)
HGB BLD-MCNC: 9.5 G/DL (ref 12.1–17)
HGB BLD-MCNC: 9.7 G/DL (ref 12.1–17)
HISTORY CHECK: NORMAL
INR PPP: 1.3 (ref 0.9–1.1)
LACTATE BLD-SCNC: 0.59 MMOL/L (ref 0.4–2)
LACTATE BLD-SCNC: 0.69 MMOL/L (ref 0.4–2)
LACTATE BLD-SCNC: 0.73 MMOL/L (ref 0.4–2)
LACTATE BLD-SCNC: 1.5 MMOL/L (ref 0.4–2)
LACTATE BLD-SCNC: 1.65 MMOL/L (ref 0.4–2)
LACTATE BLD-SCNC: <0.4 MMOL/L (ref 0.4–2)
MAGNESIUM SERPL-MCNC: 2.3 MG/DL (ref 1.6–2.4)
MCH RBC QN AUTO: 31 PG (ref 26–34)
MCH RBC QN AUTO: 31.2 PG (ref 26–34)
MCH RBC QN AUTO: 31.8 PG (ref 26–34)
MCHC RBC AUTO-ENTMCNC: 33.4 G/DL (ref 30–36.5)
MCHC RBC AUTO-ENTMCNC: 33.6 G/DL (ref 30–36.5)
MCHC RBC AUTO-ENTMCNC: 34.2 G/DL (ref 30–36.5)
MCV RBC AUTO: 92.4 FL (ref 80–99)
MCV RBC AUTO: 93 FL (ref 80–99)
MCV RBC AUTO: 93.5 FL (ref 80–99)
NRBC # BLD: 0 K/UL (ref 0–0.01)
NRBC BLD-RTO: 0 PER 100 WBC
PCO2 BLD: 37.4 MMHG (ref 35–48)
PCO2 BLD: 37.7 MMHG (ref 35–48)
PCO2 BLD: 38.8 MMHG (ref 35–48)
PCO2 BLD: 42.2 MMHG (ref 35–48)
PCO2 BLD: 44.5 MMHG (ref 35–48)
PCO2 BLD: 44.7 MMHG (ref 35–48)
PCO2 BLD: 48 MMHG (ref 35–48)
PCO2 BLD: 48.1 MMHG (ref 35–48)
PH BLD: 7.26 (ref 7.35–7.45)
PH BLD: 7.3 (ref 7.35–7.45)
PH BLD: 7.32 (ref 7.35–7.45)
PH BLD: 7.33 (ref 7.35–7.45)
PH BLD: 7.35 (ref 7.35–7.45)
PH BLD: 7.37 (ref 7.35–7.45)
PH BLD: 7.37 (ref 7.35–7.45)
PH BLD: 7.39 (ref 7.35–7.45)
PLATELET # BLD AUTO: 148 K/UL (ref 150–400)
PLATELET # BLD AUTO: 71 K/UL (ref 150–400)
PLATELET # BLD AUTO: 81 K/UL (ref 150–400)
PMV BLD AUTO: 10.5 FL (ref 8.9–12.9)
PMV BLD AUTO: 10.5 FL (ref 8.9–12.9)
PMV BLD AUTO: 10.7 FL (ref 8.9–12.9)
PO2 BLD: 149 MMHG (ref 83–108)
PO2 BLD: 238 MMHG (ref 83–108)
PO2 BLD: 348 MMHG (ref 83–108)
PO2 BLD: 403 MMHG (ref 83–108)
PO2 BLD: 451 MMHG (ref 83–108)
PO2 BLD: 468 MMHG (ref 83–108)
PO2 BLD: 484 MMHG (ref 83–108)
PO2 BLD: 511 MMHG (ref 83–108)
POTASSIUM BLD-SCNC: 3.7 MMOL/L (ref 3.5–5.5)
POTASSIUM BLD-SCNC: 3.9 MMOL/L (ref 3.5–5.5)
POTASSIUM BLD-SCNC: 4 MMOL/L (ref 3.5–5.5)
POTASSIUM BLD-SCNC: 4.1 MMOL/L (ref 3.5–5.5)
POTASSIUM BLD-SCNC: 4.1 MMOL/L (ref 3.5–5.5)
POTASSIUM BLD-SCNC: 4.4 MMOL/L (ref 3.5–5.5)
POTASSIUM SERPL-SCNC: 4 MMOL/L (ref 3.5–5.1)
POTASSIUM SERPL-SCNC: 4.1 MMOL/L (ref 3.5–5.1)
PROT SERPL-MCNC: 5 G/DL (ref 6.4–8.2)
PROT SERPL-MCNC: 5.3 G/DL (ref 6.4–8.2)
PROTHROMBIN TIME: 13.8 SEC (ref 9.2–11.2)
RBC # BLD AUTO: 2.86 M/UL (ref 4.1–5.7)
RBC # BLD AUTO: 3.03 M/UL (ref 4.1–5.7)
RBC # BLD AUTO: 4.13 M/UL (ref 4.1–5.7)
SAO2 % BLD: 100 % (ref 94–98)
SAO2 % BLD: 99.2 % (ref 92–97)
SAO2 % BLD: 99.9 % (ref 92–97)
SERVICE CMNT-IMP: ABNORMAL
SERVICE CMNT-IMP: NORMAL
SODIUM BLD-SCNC: 137 MMOL/L (ref 136–145)
SODIUM BLD-SCNC: 138 MMOL/L (ref 136–145)
SODIUM BLD-SCNC: 139 MMOL/L (ref 136–145)
SODIUM BLD-SCNC: 140 MMOL/L (ref 136–145)
SODIUM SERPL-SCNC: 137 MMOL/L (ref 136–145)
SODIUM SERPL-SCNC: 138 MMOL/L (ref 136–145)
SPECIMEN EXP DATE BLD: NORMAL
SPECIMEN SITE: ABNORMAL
SPECIMEN TYPE: ABNORMAL
SPECIMEN TYPE: ABNORMAL
THERAPEUTIC RANGE: ABNORMAL SECS (ref 58–77)
TSH SERPL DL<=0.05 MIU/L-ACNC: 1.5 UIU/ML (ref 0.45–4.5)
UFH PPP CHRO-ACNC: 0.33 IU/ML
UNIT DIVISION: 0
UNIT DIVISION: 0
WBC # BLD AUTO: 11.6 K/UL (ref 4.1–11.1)
WBC # BLD AUTO: 12.3 K/UL (ref 4.1–11.1)
WBC # BLD AUTO: 8.5 K/UL (ref 4.1–11.1)

## 2025-05-14 PROCEDURE — 2500000003 HC RX 250 WO HCPCS

## 2025-05-14 PROCEDURE — 2580000003 HC RX 258: Performed by: THORACIC SURGERY (CARDIOTHORACIC VASCULAR SURGERY)

## 2025-05-14 PROCEDURE — 33508 ENDOSCOPIC VEIN HARVEST: CPT

## 2025-05-14 PROCEDURE — 2580000003 HC RX 258: Performed by: STUDENT IN AN ORGANIZED HEALTH CARE EDUCATION/TRAINING PROGRAM

## 2025-05-14 PROCEDURE — 85018 HEMOGLOBIN: CPT

## 2025-05-14 PROCEDURE — 3600000018 HC SURGERY OHS ADDTL 15MIN: Performed by: THORACIC SURGERY (CARDIOTHORACIC VASCULAR SURGERY)

## 2025-05-14 PROCEDURE — 2720000010 HC SURG SUPPLY STERILE: Performed by: THORACIC SURGERY (CARDIOTHORACIC VASCULAR SURGERY)

## 2025-05-14 PROCEDURE — 80053 COMPREHEN METABOLIC PANEL: CPT

## 2025-05-14 PROCEDURE — 06BQ4ZZ EXCISION OF LEFT SAPHENOUS VEIN, PERCUTANEOUS ENDOSCOPIC APPROACH: ICD-10-PCS | Performed by: THORACIC SURGERY (CARDIOTHORACIC VASCULAR SURGERY)

## 2025-05-14 PROCEDURE — 6360000002 HC RX W HCPCS

## 2025-05-14 PROCEDURE — 94002 VENT MGMT INPAT INIT DAY: CPT

## 2025-05-14 PROCEDURE — 02HV33Z INSERTION OF INFUSION DEVICE INTO SUPERIOR VENA CAVA, PERCUTANEOUS APPROACH: ICD-10-PCS | Performed by: ANESTHESIOLOGY

## 2025-05-14 PROCEDURE — B24BZZ4 ULTRASONOGRAPHY OF HEART WITH AORTA, TRANSESOPHAGEAL: ICD-10-PCS | Performed by: ANESTHESIOLOGY

## 2025-05-14 PROCEDURE — 33533 CABG ARTERIAL SINGLE: CPT | Performed by: THORACIC SURGERY (CARDIOTHORACIC VASCULAR SURGERY)

## 2025-05-14 PROCEDURE — C1713 ANCHOR/SCREW BN/BN,TIS/BN: HCPCS | Performed by: THORACIC SURGERY (CARDIOTHORACIC VASCULAR SURGERY)

## 2025-05-14 PROCEDURE — 71045 X-RAY EXAM CHEST 1 VIEW: CPT

## 2025-05-14 PROCEDURE — 02100Z9 BYPASS CORONARY ARTERY, ONE ARTERY FROM LEFT INTERNAL MAMMARY, OPEN APPROACH: ICD-10-PCS | Performed by: THORACIC SURGERY (CARDIOTHORACIC VASCULAR SURGERY)

## 2025-05-14 PROCEDURE — 33533 CABG ARTERIAL SINGLE: CPT

## 2025-05-14 PROCEDURE — C1729 CATH, DRAINAGE: HCPCS | Performed by: THORACIC SURGERY (CARDIOTHORACIC VASCULAR SURGERY)

## 2025-05-14 PROCEDURE — 06BP4ZZ EXCISION OF RIGHT SAPHENOUS VEIN, PERCUTANEOUS ENDOSCOPIC APPROACH: ICD-10-PCS | Performed by: THORACIC SURGERY (CARDIOTHORACIC VASCULAR SURGERY)

## 2025-05-14 PROCEDURE — 82803 BLOOD GASES ANY COMBINATION: CPT

## 2025-05-14 PROCEDURE — 36415 COLL VENOUS BLD VENIPUNCTURE: CPT

## 2025-05-14 PROCEDURE — 85027 COMPLETE CBC AUTOMATED: CPT

## 2025-05-14 PROCEDURE — 3700000000 HC ANESTHESIA ATTENDED CARE: Performed by: THORACIC SURGERY (CARDIOTHORACIC VASCULAR SURGERY)

## 2025-05-14 PROCEDURE — 85730 THROMBOPLASTIN TIME PARTIAL: CPT

## 2025-05-14 PROCEDURE — 6370000000 HC RX 637 (ALT 250 FOR IP): Performed by: PHYSICIAN ASSISTANT

## 2025-05-14 PROCEDURE — 2000000000 HC ICU R&B

## 2025-05-14 PROCEDURE — 6360000002 HC RX W HCPCS: Performed by: THORACIC SURGERY (CARDIOTHORACIC VASCULAR SURGERY)

## 2025-05-14 PROCEDURE — 82330 ASSAY OF CALCIUM: CPT

## 2025-05-14 PROCEDURE — 33518 CABG ARTERY-VEIN TWO: CPT | Performed by: THORACIC SURGERY (CARDIOTHORACIC VASCULAR SURGERY)

## 2025-05-14 PROCEDURE — P9045 ALBUMIN (HUMAN), 5%, 250 ML: HCPCS

## 2025-05-14 PROCEDURE — 2500000003 HC RX 250 WO HCPCS: Performed by: PHYSICIAN ASSISTANT

## 2025-05-14 PROCEDURE — 3600000008 HC SURGERY OHS BASE: Performed by: THORACIC SURGERY (CARDIOTHORACIC VASCULAR SURGERY)

## 2025-05-14 PROCEDURE — 84295 ASSAY OF SERUM SODIUM: CPT

## 2025-05-14 PROCEDURE — 33518 CABG ARTERY-VEIN TWO: CPT

## 2025-05-14 PROCEDURE — 85610 PROTHROMBIN TIME: CPT

## 2025-05-14 PROCEDURE — 82947 ASSAY GLUCOSE BLOOD QUANT: CPT

## 2025-05-14 PROCEDURE — 2580000003 HC RX 258

## 2025-05-14 PROCEDURE — 3700000001 HC ADD 15 MINUTES (ANESTHESIA): Performed by: THORACIC SURGERY (CARDIOTHORACIC VASCULAR SURGERY)

## 2025-05-14 PROCEDURE — 5A1221Z PERFORMANCE OF CARDIAC OUTPUT, CONTINUOUS: ICD-10-PCS | Performed by: THORACIC SURGERY (CARDIOTHORACIC VASCULAR SURGERY)

## 2025-05-14 PROCEDURE — 021109W BYPASS CORONARY ARTERY, TWO ARTERIES FROM AORTA WITH AUTOLOGOUS VENOUS TISSUE, OPEN APPROACH: ICD-10-PCS | Performed by: THORACIC SURGERY (CARDIOTHORACIC VASCULAR SURGERY)

## 2025-05-14 PROCEDURE — 6370000000 HC RX 637 (ALT 250 FOR IP)

## 2025-05-14 PROCEDURE — 93010 ELECTROCARDIOGRAM REPORT: CPT | Performed by: INTERNAL MEDICINE

## 2025-05-14 PROCEDURE — 03HY32Z INSERTION OF MONITORING DEVICE INTO UPPER ARTERY, PERCUTANEOUS APPROACH: ICD-10-PCS | Performed by: ANESTHESIOLOGY

## 2025-05-14 PROCEDURE — 83735 ASSAY OF MAGNESIUM: CPT

## 2025-05-14 PROCEDURE — 2580000003 HC RX 258: Performed by: ANESTHESIOLOGY

## 2025-05-14 PROCEDURE — 33508 ENDOSCOPIC VEIN HARVEST: CPT | Performed by: THORACIC SURGERY (CARDIOTHORACIC VASCULAR SURGERY)

## 2025-05-14 PROCEDURE — 93005 ELECTROCARDIOGRAM TRACING: CPT

## 2025-05-14 PROCEDURE — 6360000002 HC RX W HCPCS: Performed by: ANESTHESIOLOGY

## 2025-05-14 PROCEDURE — 2709999900 HC NON-CHARGEABLE SUPPLY: Performed by: THORACIC SURGERY (CARDIOTHORACIC VASCULAR SURGERY)

## 2025-05-14 PROCEDURE — 82962 GLUCOSE BLOOD TEST: CPT

## 2025-05-14 PROCEDURE — 84132 ASSAY OF SERUM POTASSIUM: CPT

## 2025-05-14 PROCEDURE — 76998 US GUIDE INTRAOP: CPT | Performed by: THORACIC SURGERY (CARDIOTHORACIC VASCULAR SURGERY)

## 2025-05-14 RX ORDER — MIDAZOLAM HYDROCHLORIDE 2 MG/2ML
2 INJECTION, SOLUTION INTRAMUSCULAR; INTRAVENOUS PRN
Status: DISCONTINUED | OUTPATIENT
Start: 2025-05-14 | End: 2025-05-14 | Stop reason: HOSPADM

## 2025-05-14 RX ORDER — SODIUM CHLORIDE, SODIUM LACTATE, POTASSIUM CHLORIDE, AND CALCIUM CHLORIDE .6; .31; .03; .02 G/100ML; G/100ML; G/100ML; G/100ML
1000 INJECTION, SOLUTION INTRAVENOUS ONCE
Status: COMPLETED | OUTPATIENT
Start: 2025-05-14 | End: 2025-05-14

## 2025-05-14 RX ORDER — MIDAZOLAM HYDROCHLORIDE 2 MG/2ML
1 INJECTION, SOLUTION INTRAMUSCULAR; INTRAVENOUS
Status: DISCONTINUED | OUTPATIENT
Start: 2025-05-14 | End: 2025-05-17

## 2025-05-14 RX ORDER — GABAPENTIN 100 MG/1
200 CAPSULE ORAL 3 TIMES DAILY
Status: DISCONTINUED | OUTPATIENT
Start: 2025-05-14 | End: 2025-05-18

## 2025-05-14 RX ORDER — LANOLIN ALCOHOL/MO/W.PET/CERES
400 CREAM (GRAM) TOPICAL 2 TIMES DAILY
Status: DISCONTINUED | OUTPATIENT
Start: 2025-05-15 | End: 2025-05-20 | Stop reason: HOSPADM

## 2025-05-14 RX ORDER — SENNA AND DOCUSATE SODIUM 50; 8.6 MG/1; MG/1
1 TABLET, FILM COATED ORAL 2 TIMES DAILY
Status: DISCONTINUED | OUTPATIENT
Start: 2025-05-14 | End: 2025-05-20 | Stop reason: HOSPADM

## 2025-05-14 RX ORDER — OXYCODONE HYDROCHLORIDE 5 MG/1
10 TABLET ORAL EVERY 4 HOURS PRN
Status: DISCONTINUED | OUTPATIENT
Start: 2025-05-14 | End: 2025-05-20 | Stop reason: HOSPADM

## 2025-05-14 RX ORDER — FENTANYL CITRATE 50 UG/ML
50 INJECTION, SOLUTION INTRAMUSCULAR; INTRAVENOUS PRN
Status: DISCONTINUED | OUTPATIENT
Start: 2025-05-14 | End: 2025-05-14 | Stop reason: HOSPADM

## 2025-05-14 RX ORDER — SODIUM CHLORIDE 0.9 % (FLUSH) 0.9 %
5-40 SYRINGE (ML) INJECTION PRN
Status: DISCONTINUED | OUTPATIENT
Start: 2025-05-14 | End: 2025-05-20 | Stop reason: HOSPADM

## 2025-05-14 RX ORDER — METOPROLOL TARTRATE 1 MG/ML
INJECTION, SOLUTION INTRAVENOUS
Status: DISCONTINUED | OUTPATIENT
Start: 2025-05-14 | End: 2025-05-14 | Stop reason: SDUPTHER

## 2025-05-14 RX ORDER — ASPIRIN 81 MG/1
81 TABLET ORAL DAILY
Status: DISCONTINUED | OUTPATIENT
Start: 2025-05-15 | End: 2025-05-20 | Stop reason: HOSPADM

## 2025-05-14 RX ORDER — ACETAMINOPHEN 500 MG
1000 TABLET ORAL ONCE
Status: COMPLETED | OUTPATIENT
Start: 2025-05-14 | End: 2025-05-14

## 2025-05-14 RX ORDER — GABAPENTIN 300 MG/1
300 CAPSULE ORAL ONCE
Status: COMPLETED | OUTPATIENT
Start: 2025-05-14 | End: 2025-05-14

## 2025-05-14 RX ORDER — LIDOCAINE 4 G/G
2 PATCH TOPICAL DAILY
Status: DISCONTINUED | OUTPATIENT
Start: 2025-05-14 | End: 2025-05-20 | Stop reason: HOSPADM

## 2025-05-14 RX ORDER — IPRATROPIUM BROMIDE AND ALBUTEROL SULFATE 2.5; .5 MG/3ML; MG/3ML
1 SOLUTION RESPIRATORY (INHALATION) EVERY 4 HOURS PRN
Status: DISCONTINUED | OUTPATIENT
Start: 2025-05-14 | End: 2025-05-20 | Stop reason: HOSPADM

## 2025-05-14 RX ORDER — SODIUM CHLORIDE, SODIUM LACTATE, POTASSIUM CHLORIDE, CALCIUM CHLORIDE 600; 310; 30; 20 MG/100ML; MG/100ML; MG/100ML; MG/100ML
INJECTION, SOLUTION INTRAVENOUS CONTINUOUS
Status: DISCONTINUED | OUTPATIENT
Start: 2025-05-14 | End: 2025-05-14 | Stop reason: HOSPADM

## 2025-05-14 RX ORDER — CEFAZOLIN SODIUM 1 G/3ML
INJECTION, POWDER, FOR SOLUTION INTRAMUSCULAR; INTRAVENOUS
Status: DISCONTINUED | OUTPATIENT
Start: 2025-05-14 | End: 2025-05-14 | Stop reason: SDUPTHER

## 2025-05-14 RX ORDER — FAMOTIDINE 20 MG/1
20 TABLET, FILM COATED ORAL 2 TIMES DAILY
Status: COMPLETED | OUTPATIENT
Start: 2025-05-14 | End: 2025-05-18

## 2025-05-14 RX ORDER — PHENYLEPHRINE HCL IN 0.9% NACL 0.4MG/10ML
SYRINGE (ML) INTRAVENOUS
Status: DISCONTINUED | OUTPATIENT
Start: 2025-05-14 | End: 2025-05-14 | Stop reason: SDUPTHER

## 2025-05-14 RX ORDER — NOREPINEPHRINE BITARTRATE 0.06 MG/ML
1-100 INJECTION, SOLUTION INTRAVENOUS CONTINUOUS
Status: DISCONTINUED | OUTPATIENT
Start: 2025-05-14 | End: 2025-05-18

## 2025-05-14 RX ORDER — INSULIN GLARGINE 100 [IU]/ML
1-50 INJECTION, SOLUTION SUBCUTANEOUS
Status: COMPLETED | OUTPATIENT
Start: 2025-05-16 | End: 2025-05-16

## 2025-05-14 RX ORDER — SODIUM CHLORIDE 9 MG/ML
INJECTION, SOLUTION INTRAVENOUS CONTINUOUS
Status: DISCONTINUED | OUTPATIENT
Start: 2025-05-14 | End: 2025-05-14 | Stop reason: HOSPADM

## 2025-05-14 RX ORDER — ALBUMIN HUMAN 50 G/1000ML
SOLUTION INTRAVENOUS
Status: DISCONTINUED | OUTPATIENT
Start: 2025-05-14 | End: 2025-05-14 | Stop reason: SDUPTHER

## 2025-05-14 RX ORDER — LIDOCAINE HYDROCHLORIDE 10 MG/ML
1 INJECTION, SOLUTION EPIDURAL; INFILTRATION; INTRACAUDAL; PERINEURAL
Status: COMPLETED | OUTPATIENT
Start: 2025-05-14 | End: 2025-05-14

## 2025-05-14 RX ORDER — INSULIN LISPRO 100 [IU]/ML
0-12 INJECTION, SOLUTION INTRAVENOUS; SUBCUTANEOUS
Status: DISCONTINUED | OUTPATIENT
Start: 2025-05-16 | End: 2025-05-17

## 2025-05-14 RX ORDER — ALBUMIN HUMAN 50 G/1000ML
12.5 SOLUTION INTRAVENOUS PRN
Status: COMPLETED | OUTPATIENT
Start: 2025-05-14 | End: 2025-05-15

## 2025-05-14 RX ORDER — ROPIVACAINE HYDROCHLORIDE 5 MG/ML
INJECTION, SOLUTION EPIDURAL; INFILTRATION; PERINEURAL PRN
Status: DISCONTINUED | OUTPATIENT
Start: 2025-05-14 | End: 2025-05-14 | Stop reason: ALTCHOICE

## 2025-05-14 RX ORDER — CHLORHEXIDINE GLUCONATE ORAL RINSE 1.2 MG/ML
15 SOLUTION DENTAL 2 TIMES DAILY
Status: DISCONTINUED | OUTPATIENT
Start: 2025-05-14 | End: 2025-05-20 | Stop reason: HOSPADM

## 2025-05-14 RX ORDER — MUPIROCIN 20 MG/G
OINTMENT TOPICAL 2 TIMES DAILY
Status: COMPLETED | OUTPATIENT
Start: 2025-05-14 | End: 2025-05-18

## 2025-05-14 RX ORDER — SODIUM CHLORIDE 0.9 % (FLUSH) 0.9 %
5-40 SYRINGE (ML) INJECTION EVERY 12 HOURS SCHEDULED
Status: DISCONTINUED | OUTPATIENT
Start: 2025-05-14 | End: 2025-05-14 | Stop reason: HOSPADM

## 2025-05-14 RX ORDER — ONDANSETRON 2 MG/ML
4 INJECTION INTRAMUSCULAR; INTRAVENOUS ONCE
Status: COMPLETED | OUTPATIENT
Start: 2025-05-14 | End: 2025-05-14

## 2025-05-14 RX ORDER — CEFAZOLIN SODIUM 1 G/3ML
INJECTION, POWDER, FOR SOLUTION INTRAMUSCULAR; INTRAVENOUS PRN
Status: DISCONTINUED | OUTPATIENT
Start: 2025-05-14 | End: 2025-05-14 | Stop reason: ALTCHOICE

## 2025-05-14 RX ORDER — INSULIN LISPRO 100 [IU]/ML
1-20 INJECTION, SOLUTION INTRAVENOUS; SUBCUTANEOUS
Status: DISCONTINUED | OUTPATIENT
Start: 2025-05-14 | End: 2025-05-16

## 2025-05-14 RX ORDER — DESMOPRESSIN ACETATE 4 UG/ML
INJECTION, SOLUTION INTRAVENOUS; SUBCUTANEOUS
Status: DISCONTINUED | OUTPATIENT
Start: 2025-05-14 | End: 2025-05-14 | Stop reason: SDUPTHER

## 2025-05-14 RX ORDER — ACETAMINOPHEN 325 MG/1
650 TABLET ORAL ONCE
Status: DISCONTINUED | OUTPATIENT
Start: 2025-05-14 | End: 2025-05-14 | Stop reason: HOSPADM

## 2025-05-14 RX ORDER — ACETAMINOPHEN 500 MG
1000 TABLET ORAL EVERY 6 HOURS
Status: DISCONTINUED | OUTPATIENT
Start: 2025-05-14 | End: 2025-05-20 | Stop reason: HOSPADM

## 2025-05-14 RX ORDER — POLYETHYLENE GLYCOL 3350 17 G/17G
17 POWDER, FOR SOLUTION ORAL DAILY
Status: DISCONTINUED | OUTPATIENT
Start: 2025-05-14 | End: 2025-05-20 | Stop reason: HOSPADM

## 2025-05-14 RX ORDER — SODIUM CHLORIDE 9 MG/ML
INJECTION, SOLUTION INTRAVENOUS CONTINUOUS
Status: DISPENSED | OUTPATIENT
Start: 2025-05-14 | End: 2025-05-14

## 2025-05-14 RX ORDER — ONDANSETRON 2 MG/ML
4 INJECTION INTRAMUSCULAR; INTRAVENOUS EVERY 4 HOURS PRN
Status: DISCONTINUED | OUTPATIENT
Start: 2025-05-14 | End: 2025-05-20 | Stop reason: HOSPADM

## 2025-05-14 RX ORDER — HYDRALAZINE HYDROCHLORIDE 20 MG/ML
10 INJECTION INTRAMUSCULAR; INTRAVENOUS EVERY 6 HOURS PRN
Status: DISCONTINUED | OUTPATIENT
Start: 2025-05-14 | End: 2025-05-20 | Stop reason: HOSPADM

## 2025-05-14 RX ORDER — PAPAVERINE HYDROCHLORIDE 30 MG/ML
INJECTION INTRAMUSCULAR; INTRAVENOUS PRN
Status: DISCONTINUED | OUTPATIENT
Start: 2025-05-14 | End: 2025-05-14 | Stop reason: ALTCHOICE

## 2025-05-14 RX ORDER — MAGNESIUM SULFATE IN WATER 40 MG/ML
2000 INJECTION, SOLUTION INTRAVENOUS PRN
Status: DISCONTINUED | OUTPATIENT
Start: 2025-05-14 | End: 2025-05-20 | Stop reason: HOSPADM

## 2025-05-14 RX ORDER — BISACODYL 10 MG
10 SUPPOSITORY, RECTAL RECTAL DAILY PRN
Status: DISCONTINUED | OUTPATIENT
Start: 2025-05-14 | End: 2025-05-20 | Stop reason: HOSPADM

## 2025-05-14 RX ORDER — SODIUM CHLORIDE 0.9 % (FLUSH) 0.9 %
5-40 SYRINGE (ML) INJECTION PRN
Status: DISCONTINUED | OUTPATIENT
Start: 2025-05-14 | End: 2025-05-14 | Stop reason: HOSPADM

## 2025-05-14 RX ORDER — HEPARIN SODIUM 1000 [USP'U]/ML
INJECTION, SOLUTION INTRAVENOUS; SUBCUTANEOUS
Status: DISCONTINUED | OUTPATIENT
Start: 2025-05-14 | End: 2025-05-14 | Stop reason: SDUPTHER

## 2025-05-14 RX ORDER — SODIUM CHLORIDE, SODIUM LACTATE, POTASSIUM CHLORIDE, CALCIUM CHLORIDE 600; 310; 30; 20 MG/100ML; MG/100ML; MG/100ML; MG/100ML
INJECTION, SOLUTION INTRAVENOUS CONTINUOUS
Status: DISCONTINUED | OUTPATIENT
Start: 2025-05-14 | End: 2025-05-17

## 2025-05-14 RX ORDER — FENTANYL CITRATE 50 UG/ML
25 INJECTION, SOLUTION INTRAMUSCULAR; INTRAVENOUS PRN
Status: DISCONTINUED | OUTPATIENT
Start: 2025-05-14 | End: 2025-05-14 | Stop reason: HOSPADM

## 2025-05-14 RX ORDER — ROCURONIUM BROMIDE 10 MG/ML
INJECTION, SOLUTION INTRAVENOUS
Status: DISCONTINUED | OUTPATIENT
Start: 2025-05-14 | End: 2025-05-14 | Stop reason: SDUPTHER

## 2025-05-14 RX ORDER — ATORVASTATIN CALCIUM 40 MG/1
40 TABLET, FILM COATED ORAL NIGHTLY
Status: DISCONTINUED | OUTPATIENT
Start: 2025-05-14 | End: 2025-05-20 | Stop reason: HOSPADM

## 2025-05-14 RX ORDER — INDOMETHACIN 25 MG/1
CAPSULE ORAL
Status: DISCONTINUED | OUTPATIENT
Start: 2025-05-14 | End: 2025-05-14 | Stop reason: SDUPTHER

## 2025-05-14 RX ORDER — SODIUM CHLORIDE 9 MG/ML
INJECTION, SOLUTION INTRAVENOUS PRN
Status: DISCONTINUED | OUTPATIENT
Start: 2025-05-14 | End: 2025-05-14 | Stop reason: HOSPADM

## 2025-05-14 RX ORDER — HEPARIN SODIUM 10000 [USP'U]/ML
INJECTION, SOLUTION INTRAVENOUS; SUBCUTANEOUS PRN
Status: DISCONTINUED | OUTPATIENT
Start: 2025-05-14 | End: 2025-05-14 | Stop reason: ALTCHOICE

## 2025-05-14 RX ORDER — INSULIN LISPRO 100 [IU]/ML
0-6 INJECTION, SOLUTION INTRAVENOUS; SUBCUTANEOUS NIGHTLY
Status: DISCONTINUED | OUTPATIENT
Start: 2025-05-16 | End: 2025-05-17

## 2025-05-14 RX ORDER — DEXTROSE MONOHYDRATE 100 MG/ML
INJECTION, SOLUTION INTRAVENOUS CONTINUOUS PRN
Status: DISCONTINUED | OUTPATIENT
Start: 2025-05-14 | End: 2025-05-20 | Stop reason: HOSPADM

## 2025-05-14 RX ORDER — OXYCODONE HYDROCHLORIDE 5 MG/1
5 TABLET ORAL EVERY 4 HOURS PRN
Status: DISCONTINUED | OUTPATIENT
Start: 2025-05-14 | End: 2025-05-20 | Stop reason: HOSPADM

## 2025-05-14 RX ORDER — DIPHENHYDRAMINE HCL 25 MG
25 CAPSULE ORAL NIGHTLY PRN
Status: DISCONTINUED | OUTPATIENT
Start: 2025-05-15 | End: 2025-05-20 | Stop reason: HOSPADM

## 2025-05-14 RX ORDER — DEXMEDETOMIDINE HYDROCHLORIDE 4 UG/ML
.1-1.5 INJECTION, SOLUTION INTRAVENOUS CONTINUOUS
Status: DISCONTINUED | OUTPATIENT
Start: 2025-05-14 | End: 2025-05-17

## 2025-05-14 RX ORDER — SODIUM CHLORIDE 450 MG/100ML
INJECTION, SOLUTION INTRAVENOUS CONTINUOUS
Status: DISCONTINUED | OUTPATIENT
Start: 2025-05-14 | End: 2025-05-18

## 2025-05-14 RX ORDER — PROTAMINE SULFATE 10 MG/ML
INJECTION, SOLUTION INTRAVENOUS
Status: DISCONTINUED | OUTPATIENT
Start: 2025-05-14 | End: 2025-05-14 | Stop reason: SDUPTHER

## 2025-05-14 RX ORDER — SODIUM CHLORIDE 0.9 % (FLUSH) 0.9 %
5-40 SYRINGE (ML) INJECTION EVERY 12 HOURS SCHEDULED
Status: DISCONTINUED | OUTPATIENT
Start: 2025-05-14 | End: 2025-05-20 | Stop reason: HOSPADM

## 2025-05-14 RX ORDER — POTASSIUM CHLORIDE 29.8 MG/ML
20 INJECTION INTRAVENOUS PRN
Status: DISCONTINUED | OUTPATIENT
Start: 2025-05-14 | End: 2025-05-18

## 2025-05-14 RX ORDER — FENTANYL CITRATE 50 UG/ML
INJECTION, SOLUTION INTRAMUSCULAR; INTRAVENOUS
Status: DISCONTINUED | OUTPATIENT
Start: 2025-05-14 | End: 2025-05-14 | Stop reason: SDUPTHER

## 2025-05-14 RX ORDER — SODIUM CHLORIDE 9 MG/ML
INJECTION, SOLUTION INTRAVENOUS CONTINUOUS
Status: DISCONTINUED | OUTPATIENT
Start: 2025-05-14 | End: 2025-05-17

## 2025-05-14 RX ADMIN — ONDANSETRON 4 MG: 2 INJECTION, SOLUTION INTRAMUSCULAR; INTRAVENOUS at 15:07

## 2025-05-14 RX ADMIN — ALBUMIN (HUMAN) 12.5 G: 12.5 INJECTION, SOLUTION INTRAVENOUS at 10:35

## 2025-05-14 RX ADMIN — PHENYLEPHRINE HYDROCHLORIDE 20 MCG/MIN: 10 INJECTION INTRAVENOUS at 07:40

## 2025-05-14 RX ADMIN — MUPIROCIN: 20 OINTMENT TOPICAL at 21:22

## 2025-05-14 RX ADMIN — SODIUM CHLORIDE: 9 INJECTION, SOLUTION INTRAVENOUS at 07:30

## 2025-05-14 RX ADMIN — Medication 80 MCG: at 09:19

## 2025-05-14 RX ADMIN — ACETAMINOPHEN 1000 MG: 500 TABLET ORAL at 18:41

## 2025-05-14 RX ADMIN — ALBUMIN (HUMAN) 12.5 G: 12.5 INJECTION, SOLUTION INTRAVENOUS at 15:09

## 2025-05-14 RX ADMIN — ROCURONIUM BROMIDE 50 MG: 10 SOLUTION INTRAVENOUS at 09:18

## 2025-05-14 RX ADMIN — FAMOTIDINE 20 MG: 10 INJECTION, SOLUTION INTRAVENOUS at 15:06

## 2025-05-14 RX ADMIN — CEFAZOLIN 2 G: 1 INJECTION, POWDER, FOR SOLUTION INTRAMUSCULAR; INTRAVENOUS at 08:26

## 2025-05-14 RX ADMIN — NOREPINEPHRINE BITARTRATE 8 MCG: 1 INJECTION, SOLUTION, CONCENTRATE INTRAVENOUS at 11:02

## 2025-05-14 RX ADMIN — OXYCODONE 10 MG: 5 TABLET ORAL at 21:02

## 2025-05-14 RX ADMIN — DEXMEDETOMIDINE 0.3 MCG/KG/HR: 100 INJECTION, SOLUTION, CONCENTRATE INTRAVENOUS at 08:00

## 2025-05-14 RX ADMIN — ALBUMIN (HUMAN) 12.5 G: 12.5 INJECTION, SOLUTION INTRAVENOUS at 20:41

## 2025-05-14 RX ADMIN — Medication 80 MCG: at 08:53

## 2025-05-14 RX ADMIN — HEPARIN SODIUM 1000 UNITS: 1000 INJECTION, SOLUTION INTRAVENOUS; SUBCUTANEOUS at 08:35

## 2025-05-14 RX ADMIN — FENTANYL CITRATE 100 MCG: 50 INJECTION, SOLUTION INTRAMUSCULAR; INTRAVENOUS at 07:35

## 2025-05-14 RX ADMIN — PROPOFOL 90 MG: 10 INJECTION, EMULSION INTRAVENOUS at 07:35

## 2025-05-14 RX ADMIN — Medication 120 MCG: at 08:06

## 2025-05-14 RX ADMIN — GABAPENTIN 300 MG: 300 CAPSULE ORAL at 06:35

## 2025-05-14 RX ADMIN — FENTANYL CITRATE 100 MCG: 50 INJECTION, SOLUTION INTRAMUSCULAR; INTRAVENOUS at 08:33

## 2025-05-14 RX ADMIN — Medication 10 MCG: at 10:24

## 2025-05-14 RX ADMIN — PROPOFOL 20 MG: 10 INJECTION, EMULSION INTRAVENOUS at 10:17

## 2025-05-14 RX ADMIN — ACETAMINOPHEN 1000 MG: 500 TABLET ORAL at 22:16

## 2025-05-14 RX ADMIN — GABAPENTIN 200 MG: 100 CAPSULE ORAL at 21:03

## 2025-05-14 RX ADMIN — DESMOPRESSIN ACETATE 18 MCG: 4 INJECTION, SOLUTION INTRAVENOUS; SUBCUTANEOUS at 10:24

## 2025-05-14 RX ADMIN — SODIUM CHLORIDE 0.65 UNITS/HR: 9 INJECTION, SOLUTION INTRAVENOUS at 09:42

## 2025-05-14 RX ADMIN — Medication 80 MCG: at 09:27

## 2025-05-14 RX ADMIN — SODIUM CHLORIDE, POTASSIUM CHLORIDE, SODIUM LACTATE AND CALCIUM CHLORIDE: 600; 310; 30; 20 INJECTION, SOLUTION INTRAVENOUS at 07:42

## 2025-05-14 RX ADMIN — CEFAZOLIN 2000 MG: 1 INJECTION, POWDER, FOR SOLUTION INTRAMUSCULAR; INTRAVENOUS at 18:41

## 2025-05-14 RX ADMIN — Medication 80 MCG: at 10:24

## 2025-05-14 RX ADMIN — NOREPINEPHRINE BITARTRATE 8 MCG: 1 INJECTION, SOLUTION, CONCENTRATE INTRAVENOUS at 10:52

## 2025-05-14 RX ADMIN — SODIUM CHLORIDE: 0.9 INJECTION, SOLUTION INTRAVENOUS at 12:00

## 2025-05-14 RX ADMIN — ACETAMINOPHEN 1000 MG: 500 TABLET ORAL at 15:08

## 2025-05-14 RX ADMIN — Medication 80 MCG: at 07:45

## 2025-05-14 RX ADMIN — SODIUM BICARBONATE 50 MEQ: 84 INJECTION, SOLUTION INTRAVENOUS at 10:54

## 2025-05-14 RX ADMIN — FENTANYL CITRATE 50 MCG: 50 INJECTION INTRAMUSCULAR; INTRAVENOUS at 06:50

## 2025-05-14 RX ADMIN — SODIUM CHLORIDE, SODIUM LACTATE, POTASSIUM CHLORIDE, AND CALCIUM CHLORIDE 1000 ML: .6; .31; .03; .02 INJECTION, SOLUTION INTRAVENOUS at 12:12

## 2025-05-14 RX ADMIN — ATORVASTATIN CALCIUM 40 MG: 40 TABLET, FILM COATED ORAL at 21:03

## 2025-05-14 RX ADMIN — CHLORHEXIDINE GLUCONATE 15 ML: 1.2 RINSE ORAL at 21:24

## 2025-05-14 RX ADMIN — OXYCODONE 5 MG: 5 TABLET ORAL at 14:57

## 2025-05-14 RX ADMIN — HYDROMORPHONE HYDROCHLORIDE 0.5 MG: 1 INJECTION, SOLUTION INTRAMUSCULAR; INTRAVENOUS; SUBCUTANEOUS at 16:10

## 2025-05-14 RX ADMIN — LIDOCAINE HYDROCHLORIDE 100 MG: 10 INJECTION, SOLUTION EPIDURAL; INFILTRATION; INTRACAUDAL; PERINEURAL at 07:35

## 2025-05-14 RX ADMIN — FENTANYL CITRATE 50 MCG: 50 INJECTION, SOLUTION INTRAMUSCULAR; INTRAVENOUS at 08:56

## 2025-05-14 RX ADMIN — MUPIROCIN: 20 OINTMENT TOPICAL at 15:06

## 2025-05-14 RX ADMIN — FENTANYL CITRATE 100 MCG: 50 INJECTION, SOLUTION INTRAMUSCULAR; INTRAVENOUS at 10:16

## 2025-05-14 RX ADMIN — ALBUMIN (HUMAN) 12.5 G: 12.5 INJECTION, SOLUTION INTRAVENOUS at 10:15

## 2025-05-14 RX ADMIN — ROCURONIUM BROMIDE 100 MG: 10 SOLUTION INTRAVENOUS at 07:35

## 2025-05-14 RX ADMIN — Medication 20 MCG: at 10:37

## 2025-05-14 RX ADMIN — SODIUM CHLORIDE, PRESERVATIVE FREE 10 ML: 5 INJECTION INTRAVENOUS at 05:51

## 2025-05-14 RX ADMIN — SENNOSIDES AND DOCUSATE SODIUM 1 TABLET: 50; 8.6 TABLET ORAL at 21:03

## 2025-05-14 RX ADMIN — SODIUM CHLORIDE, PRESERVATIVE FREE 10 ML: 5 INJECTION INTRAVENOUS at 15:09

## 2025-05-14 RX ADMIN — HEPARIN SODIUM 25000 UNITS: 1000 INJECTION, SOLUTION INTRAVENOUS; SUBCUTANEOUS at 09:03

## 2025-05-14 RX ADMIN — CHLORHEXIDINE GLUCONATE 15 ML: 1.2 RINSE ORAL at 15:06

## 2025-05-14 RX ADMIN — GABAPENTIN 200 MG: 100 CAPSULE ORAL at 13:51

## 2025-05-14 RX ADMIN — Medication 120 MCG: at 07:41

## 2025-05-14 RX ADMIN — Medication 80 MCG: at 07:49

## 2025-05-14 RX ADMIN — SODIUM CHLORIDE, PRESERVATIVE FREE 10 ML: 5 INJECTION INTRAVENOUS at 21:13

## 2025-05-14 RX ADMIN — FAMOTIDINE 20 MG: 20 TABLET, FILM COATED ORAL at 21:03

## 2025-05-14 RX ADMIN — HYDROMORPHONE HYDROCHLORIDE 0.5 MG: 1 INJECTION, SOLUTION INTRAMUSCULAR; INTRAVENOUS; SUBCUTANEOUS at 22:14

## 2025-05-14 RX ADMIN — SODIUM CHLORIDE, SODIUM LACTATE, POTASSIUM CHLORIDE, AND CALCIUM CHLORIDE: .6; .31; .03; .02 INJECTION, SOLUTION INTRAVENOUS at 19:36

## 2025-05-14 RX ADMIN — FENTANYL CITRATE 100 MCG: 50 INJECTION, SOLUTION INTRAMUSCULAR; INTRAVENOUS at 10:20

## 2025-05-14 RX ADMIN — ACETAMINOPHEN 1000 MG: 500 TABLET ORAL at 06:35

## 2025-05-14 RX ADMIN — METOPROLOL TARTRATE 1 MG: 1 INJECTION, SOLUTION INTRAVENOUS at 07:45

## 2025-05-14 RX ADMIN — Medication 80 MCG: at 07:55

## 2025-05-14 RX ADMIN — TRANEXAMIC ACID 600 G: 100 INJECTION, SOLUTION INTRAVENOUS at 07:35

## 2025-05-14 RX ADMIN — MIDAZOLAM 2 MG: 1 INJECTION INTRAMUSCULAR; INTRAVENOUS at 06:50

## 2025-05-14 RX ADMIN — PROPOFOL 20 MG: 10 INJECTION, EMULSION INTRAVENOUS at 10:37

## 2025-05-14 RX ADMIN — PROTAMINE SULFATE 300 MG: 10 INJECTION, SOLUTION INTRAVENOUS at 10:15

## 2025-05-14 RX ADMIN — FENTANYL CITRATE 50 MCG: 50 INJECTION, SOLUTION INTRAMUSCULAR; INTRAVENOUS at 09:05

## 2025-05-14 RX ADMIN — TRANEXAMIC ACID 1 MG/KG/HR: 100 INJECTION, SOLUTION INTRAVENOUS at 08:20

## 2025-05-14 RX ADMIN — CEFAZOLIN 2 G: 1 INJECTION, POWDER, FOR SOLUTION INTRAMUSCULAR; INTRAVENOUS at 11:00

## 2025-05-14 RX ADMIN — NOREPINEPHRINE BITARTRATE 8 MCG: 1 INJECTION, SOLUTION, CONCENTRATE INTRAVENOUS at 10:36

## 2025-05-14 ASSESSMENT — PAIN DESCRIPTION - ORIENTATION
ORIENTATION: MID;LEFT;ANTERIOR
ORIENTATION: ANTERIOR;LEFT
ORIENTATION: ANTERIOR
ORIENTATION: ANTERIOR

## 2025-05-14 ASSESSMENT — PAIN DESCRIPTION - LOCATION
LOCATION: CHEST;BACK
LOCATION: CHEST

## 2025-05-14 ASSESSMENT — PAIN DESCRIPTION - DESCRIPTORS
DESCRIPTORS: ACHING

## 2025-05-14 ASSESSMENT — PAIN SCALES - GENERAL
PAINLEVEL_OUTOF10: 3
PAINLEVEL_OUTOF10: 6
PAINLEVEL_OUTOF10: 8
PAINLEVEL_OUTOF10: 3
PAINLEVEL_OUTOF10: 7
PAINLEVEL_OUTOF10: 0
PAINLEVEL_OUTOF10: 8
PAINLEVEL_OUTOF10: 3
PAINLEVEL_OUTOF10: 8

## 2025-05-14 ASSESSMENT — PULMONARY FUNCTION TESTS
PIF_VALUE: 18
PIF_VALUE: 11

## 2025-05-14 NOTE — BRIEF OP NOTE
BRIEF OP NOTE  Pre-Op Diagnosis: Coronary artery disease, unspecified vessel or lesion type, unspecified whether angina present, unspecified whether native or transplanted heart [I25.10]    Post-Op Diagnosis: Coronary artery disease, unspecified vessel or lesion type, unspecified whether angina present, unspecified whether native or transplanted heart [I25.10]      Procedure: Procedure(s):  CORONARY ARTERY BYPASS GRAFTING X 3 (LIMA-LAD, SVG-OM, SVG-RCA) WITH LIMA, BESVGH, ECC, NEHEMIAH AND EPIAORTIC U/S BY DR KERR    Surgeon:  Dr. Melyssa MD    Assistant(s): Venkat Banda PA-C    Anesthesia: General      Infusions:  Levo, Precedex, Insulin     Estimated Blood Loss: 480 ml     Cell Saver: 300 ml     Bypass Time: 52 min     Cross Clamp Time: 39 min    Specimens: * No specimens in log *    Drains and pacing wires: 1 Bipolar Ventricular Wire 3 Jackson Drains (2 Mediastinal, 1 L Pleural)     Wires completed by: Venkat Banda PA-C    Sternal incision closed by: Venkat Banda PA-C    Leg incisions closed by: Venkat Banda PA-C    Complications: None    Findings: See full operative note.    Implants: * No implants in log *

## 2025-05-14 NOTE — OP NOTE
[address]  [address]                            OPERATIVE REPORT      PATIENT NAME: RIGO KERR JR            : 1950  MED REC NO: [id number]                       ROOM: 4334  ACCOUNT NO: [id number]                       ADMIT DATE: 2025  PROVIDER: Shola Ragsdale MD    DATE OF SERVICE:  2025    PREOPERATIVE DIAGNOSES:       1. Severe multivessel coronary artery disease.     2. Non-ST elevation myocardial infarction.     3. Osteoporosis.     4. Hypertension.     5. Hyperlipidemia.     6. Cerebrovascular accident, status post right carotid endarterectomy in .     7. Mild aortic stenosis.    POSTOPERATIVE DIAGNOSES:       1. Severe multivessel coronary artery disease.     2. Non-ST elevation myocardial infarction.     3. Osteoporosis.     4. Hypertension.     5. Hyperlipidemia.     6. Cerebrovascular accident, status post right carotid endarterectomy in .     7. Mild aortic stenosis.    PROCEDURES PERFORMED:       1. Coronary artery bypass grafting x3:        a. Reverse saphenous vein graft to PDA.        b. Reverse saphenous vein graft to OM.        c. Left internal mammary artery to LAD.     2. Endoscopic harvest of saphenous vein.     3. Epiaortic ultrasound.    SURGEON:  Shola Ragsdale MD    ASSISTANT:  Venkat Banda PA-C    ANESTHESIA:  General endotracheal.    ESTIMATED BLOOD LOSS:  500 mL.    SPECIMENS REMOVED:  None.    INTRAOPERATIVE FINDINGS:      COMPLICATIONS:  None.    IMPLANTS:  None.    INDICATIONS:  The patient is a 74-year-old gentleman who had an NSTEMI, who was referred by Dr. Sorto for coronary artery bypass grafting.    DESCRIPTION OF PROCEDURE:  He was prepped and draped in a sterile fashion.  Time-out was performed.  Incision was made over the sternum and median sternotomy was performed.  The left hemisternum was elevated.  The left internal mammary artery was dissected free from the left

## 2025-05-14 NOTE — ANESTHESIA PROCEDURE NOTES
Arterial Line:    An arterial line was placed using ultrasound guidance, in the pre-op for the following indication(s): continuous blood pressure monitoring and blood sampling needed.    A 20 gauge (size), 1 and 3/8 inch (length), Arrow (type) catheter was placed, Seldinger technique used, into the right radial artery, secured by tape and Tegaderm.  Anesthesia type: Local  Local infiltration: Injection    Events:  patient tolerated procedure well with no complications.5/14/2025 6:57 AM5/14/2025 7:09 AM  Performed: Anesthesiologist   Preanesthetic Checklist  Completed: patient identified, IV checked, site marked, risks and benefits discussed, surgical/procedural consents, equipment checked, pre-op evaluation, timeout performed, anesthesia consent given, oxygen available and monitors applied/VS acknowledged        
Central Venous Line:    A central venous line was placed using ultrasound guidance, in the pre-op for the following indication(s): central venous access and CVP monitoring.5/14/2025 7:11 AM5/14/2025 7:27 AM    Sterility preparation included the following: provider used sterile gloves, gown, hat and mask, hand hygiene performed prior to central venous catheter insertion, sterile gel and probe cover used in ultrasound-guided central venous catheter insertion and maximum sterile barriers used during central venous catheter insertion.    The patient was placed in Trendelenburg position.The right internal jugular vein was prepped.    The site was prepped with Chloraprep.  A 9 Fr (size), 12 (length), introducer double lumen was placed.    During the procedure, the following specific steps were taken: target vein identified, needle advanced into vein and blood aspirated and guidewire advanced into vein.    Intravenous verification was obtained by ultrasound and venous blood return.    Post insertion care included: all ports aspirated, all ports flushed easily, guidewire removed intact, line sutured in place and dressing applied.    During the procedure the patient experienced: patient tolerated procedure well with no complications.      Outcomes: uncomplicated  Real-time US image taken/store: Yes  Anesthesia type: local..No  Staffing  Performed: Anesthesiologist and Resident/CRNA   Performed by: Gamaliel Jensen MD  Authorized by: Gamaliel Jensen MD    Preanesthetic Checklist  Completed: patient identified, IV checked, site marked, risks and benefits discussed, surgical/procedural consents, equipment checked, pre-op evaluation, timeout performed, anesthesia consent given, oxygen available, monitors applied/VS acknowledged, fire risk safety assessment completed and verbalized and blood product R/B/A discussed and consented          
normal   not present         none              Aorta     Aorta  Size  Diam(cm)  Dissection Marquez Classification    Ascending normal     II    Arch normal     II   Descending normal    Dissection not present.   III             Atria     Size  SEC (smoke)  Thrombus  Tumor  Device    Rt Atrium normal No.   No.   No.   No.        Lt Atrium normal No.   No No No.         Left Atrial Appendage: normal        Septa    Interatrial Septal Morphology: normal          Interventricular Septal Morphology: normal          Diastolic Function Measurements:  Diastolic Dysfunction Grade= I (Delayed relaxation)  E=  ms  A=  ms  E/A Ratio=  0.8  DT=  ms  S/D=   IVRT=    Other Findings  Pericardium:  normal  Pleural Effusion:  none  Pulmonary Arteries:  normal  Pulmonary Venous Flow:  normal  Post Intervention Follow-up Study         Valve  Function  Regurgitation  Area Prosthetic?    Aortic  unchanged       Mitral  unchanged       Tricuspid  unchanged       Prosthetic         None  Comments: CABG x 3V: 2-5 mcg/min NE, Vpaced, mild hypokinesis of the RV, no change in LV function LVEF=60%. No significant valvular changes.

## 2025-05-14 NOTE — CONSULTS
CRITICAL CARE ADMISSION NOTE      Name: Hector Jensen Jr   : 1950   MRN: [id number]   Date: 2025      Reason for ICU Admission: s/p CABG    ICU PROBLEM LIST   Multivessel CAD s/p CABG x3  Mild AS  HTN  HLD  Hx distant CVA  Chronic T7 compression fx    HISTORY OF PRESENT ILLNESS:   Pt is a 75yo F w/ PMH as noted above who presented to [facility] for CTS eval for multivessel disease found during w/u for NSTEMI at OSH. Pt now s/p CABG x3 (LIMA-LAD, SVG-OM, SVG-RCA). Pt w/ intermittent 2nd degree type 1 AVB.     NEHEMIAH Mild AS, otherwise preserved biventricular funstion     CPB 52 min  XC 39 min      ml, w/ 300 ml Cell saver     CXR w/ expected post sternotomy changes, supportive lines in place      2 atrial wires, bipolar ventricular wire, 4 jackson drains       24 HOUR EVENTS:   Pt arrives to CVICU in NSR, intubated, sedated on precedex, insulin gtt     NEUROLOGICAL:    On Precedex, wean  Monitor mentation as awakens off sedation  As needed pain regimen     PULMONOLOGY:   Lung protective mechanical ventilation  Goal extubation postop day 0  As needed nebs  IS, PFV, out of bed to chair as tolerated once extubated     CARDIOVASCULAR:   IVFs to euvolemia  PRN vasopressors for goal MAP greater than 65  Goal SBP less than 130, CI >2, CO >3.5  Pacer wires and Jackson drain management per CT surgery   Telemetry     GASTROINTESTINAL:   Pepcid  ADAT once extubated     RENAL/ELECTROLYTE/FLUIDS:   Strict ins and outs  Daily renal panel  Monitor and replace electrolytes     ENDOCRINE:   Insulin drip per protocol  Glucose goal 120-180     HEMATOLOGY/ONCOLOGY:   SCDs  Chemical prophylaxis as cleared by surgery     ID/MICRO:      Perioperative Ancef      ICU DAILY CHECKLIST      Code Status: Full  DVT Prophylaxis: SCDs  T/L/D: ETT, CVC, A-line, Jackson x4, Masters   SUP: Pepcid  Diet: ADA T  Activity Level: Ad frances.  ABCDEF Bundle/Checklist Completed:Yes  Disposition: Stay in ICU  Multidisciplinary Rounds Completed:

## 2025-05-14 NOTE — ANESTHESIA POSTPROCEDURE EVALUATION
Department of Anesthesiology  Postprocedure Note    Patient: Hector Kerr Jr  MRN: [id number]  YOB: 1950  Date of evaluation: 5/14/2025    Procedure Summary       Date: 05/14/25 Room / Location: [facility] HEART OR 07 / [facility] OPEN HEART    Anesthesia Start: 0700 Anesthesia Stop: 1152    Procedure: CORONARY ARTERY BYPASS GRAFTING X 3 WITH LIMA, BESVGH, ECC, NEHEMIAH AND EPIAORTIC U/S BY DR KERR (Chest) Diagnosis:       Coronary artery disease, unspecified vessel or lesion type, unspecified whether angina present, unspecified whether native or transplanted heart      (Coronary artery disease, unspecified vessel or lesion type, unspecified whether angina present, unspecified whether native or transplanted heart [I25.10])    Providers: Shola Ragsdale MD Responsible Provider: Gamaliel Kerr MD    Anesthesia Type: general ASA Status: 4            Anesthesia Type: No value filed.    Matthew Phase I:      Matthew Phase II:      Anesthesia Post Evaluation    Patient location during evaluation: ICU  Patient participation: complete - patient cannot participate  Level of consciousness: sedated and ventilated  Pain score: 0  Airway patency: patent  Nausea & Vomiting: no nausea and no vomiting  Cardiovascular status: hemodynamically stable  Respiratory status: acceptable and ventilator  Hydration status: euvolemic  Multimodal analgesia pain management approach  Pain management: adequate    No notable events documented.

## 2025-05-14 NOTE — ANESTHESIA PRE PROCEDURE
Department of Anesthesiology  Preprocedure Note       Name:  Hector Jensen Jr   Age:  74 y.o.  :  1950                                          MRN:  [id number]         Date:  2025      Surgeon: Surgeon(s):  Shola Ragsdale MD    Procedure: Procedure(s):  ON PUMP CORONARY ARTERY BYPASS GRAFT WITH ENDOSCOPIC VEIN HARVEST (NO PAC) (ERAS)    Medications prior to admission:   Prior to Admission medications    Medication Sig Start Date End Date Taking? Authorizing Provider   bisoprolol (ZEBETA) 5 MG tablet Take 1 tablet by mouth daily    Kelsey Pennington MD   Calcium Carb-Cholecalciferol (CALCIUM 500 + D PO) Take by mouth    Kelsey Pennington MD   vitamin D (VITAMIN D3) 23198 UNIT CAPS Take 2,000 Units by mouth daily    Kelsey Pennington MD   alendronate (FOSAMAX) 70 MG tablet Take 1 tablet by mouth every 7 days    Kelsey Pennington MD   celecoxib (CELEBREX) 100 MG capsule Take 1 capsule by mouth 2 times daily  Patient not taking: Reported on 2024    Kelsey Pennington MD   Omega-3 Fatty Acids (FISH OIL PO) Take 1 capsule by mouth daily    Kelsey Pennington MD   Triamcinolone Acetonide (NASACORT ALLERGY 24HR NA) 1 spray by Nasal route as needed    Kelsey Pennington MD   amLODIPine-benazepril (LOTREL) 5-40 MG per capsule Take 1 capsule by mouth daily    Automatic Reconciliation, Ar   aspirin 81 MG EC tablet Take by mouth daily    Automatic Reconciliation, Ar   atorvastatin (LIPITOR) 20 MG tablet Take by mouth daily    Automatic Reconciliation, Ar   azelastine (ASTELIN) 0.1 % nasal spray ceived the following from [facility]: Outside name: azelastine (ASTELIN) 137 mcg (0.1 %) nasal spray  Patient not taking: Reported on 2025 10/29/17   Automatic Reconciliation, Ar   benzonatate (TESSALON) 100 MG capsule Take 1 capsule by mouth as needed  Patient not taking: Reported on 2025   Automatic Reconciliation, Ar   loratadine (CLARITIN) 10 MG tablet

## 2025-05-15 ENCOUNTER — APPOINTMENT (OUTPATIENT)
Facility: HOSPITAL | Age: 75
DRG: 235 | End: 2025-05-15
Attending: INTERNAL MEDICINE
Payer: MEDICARE

## 2025-05-15 LAB
ACUTE KIDNEY INJURY RISK NEPHROCHECK: 0.52 (ref 0–0.3)
ACUTE KIDNEY INJURY RISK NEPHROCHECK: 0.89 (ref 0–0.3)
ANION GAP SERPL CALC-SCNC: 6 MMOL/L (ref 2–12)
BUN SERPL-MCNC: 7 MG/DL (ref 6–20)
BUN/CREAT SERPL: 10 (ref 12–20)
CALCIUM SERPL-MCNC: 7.7 MG/DL (ref 8.5–10.1)
CHLORIDE SERPL-SCNC: 107 MMOL/L (ref 97–108)
CO2 SERPL-SCNC: 24 MMOL/L (ref 21–32)
CREAT SERPL-MCNC: 0.68 MG/DL (ref 0.7–1.3)
CREAT SERPL-MCNC: 0.8 MG/DL (ref 0.7–1.3)
EKG ATRIAL RATE: 81 BPM
EKG DIAGNOSIS: NORMAL
EKG P AXIS: 57 DEGREES
EKG P-R INTERVAL: 154 MS
EKG Q-T INTERVAL: 368 MS
EKG QRS DURATION: 86 MS
EKG QTC CALCULATION (BAZETT): 427 MS
EKG R AXIS: -8 DEGREES
EKG T AXIS: 24 DEGREES
EKG VENTRICULAR RATE: 81 BPM
ERYTHROCYTE [DISTWIDTH] IN BLOOD BY AUTOMATED COUNT: 12.3 % (ref 11.5–14.5)
GLUCOSE BLD STRIP.AUTO-MCNC: 100 MG/DL (ref 65–117)
GLUCOSE BLD STRIP.AUTO-MCNC: 101 MG/DL (ref 65–117)
GLUCOSE BLD STRIP.AUTO-MCNC: 102 MG/DL (ref 65–117)
GLUCOSE BLD STRIP.AUTO-MCNC: 108 MG/DL (ref 65–117)
GLUCOSE BLD STRIP.AUTO-MCNC: 115 MG/DL (ref 65–117)
GLUCOSE BLD STRIP.AUTO-MCNC: 118 MG/DL (ref 65–117)
GLUCOSE BLD STRIP.AUTO-MCNC: 119 MG/DL (ref 65–117)
GLUCOSE BLD STRIP.AUTO-MCNC: 123 MG/DL (ref 65–117)
GLUCOSE BLD STRIP.AUTO-MCNC: 124 MG/DL (ref 65–117)
GLUCOSE BLD STRIP.AUTO-MCNC: 126 MG/DL (ref 65–117)
GLUCOSE BLD STRIP.AUTO-MCNC: 145 MG/DL (ref 65–117)
GLUCOSE BLD STRIP.AUTO-MCNC: 146 MG/DL (ref 65–117)
GLUCOSE BLD STRIP.AUTO-MCNC: 146 MG/DL (ref 65–117)
GLUCOSE BLD STRIP.AUTO-MCNC: 148 MG/DL (ref 65–117)
GLUCOSE BLD STRIP.AUTO-MCNC: 157 MG/DL (ref 65–117)
GLUCOSE BLD STRIP.AUTO-MCNC: 94 MG/DL (ref 65–117)
GLUCOSE BLD STRIP.AUTO-MCNC: 95 MG/DL (ref 65–117)
GLUCOSE SERPL-MCNC: 115 MG/DL (ref 65–100)
HCT VFR BLD AUTO: 25.7 % (ref 36.6–50.3)
HGB BLD-MCNC: 8.6 G/DL (ref 12.1–17)
LACTATE SERPL-SCNC: 1.8 MMOL/L (ref 0.4–2)
MAGNESIUM SERPL-MCNC: 2.1 MG/DL (ref 1.6–2.4)
MCH RBC QN AUTO: 31.5 PG (ref 26–34)
MCHC RBC AUTO-ENTMCNC: 33.5 G/DL (ref 30–36.5)
MCV RBC AUTO: 94.1 FL (ref 80–99)
NRBC # BLD: 0 K/UL (ref 0–0.01)
NRBC BLD-RTO: 0 PER 100 WBC
PLATELET # BLD AUTO: 95 K/UL (ref 150–400)
PMV BLD AUTO: 10.3 FL (ref 8.9–12.9)
POTASSIUM SERPL-SCNC: 4 MMOL/L (ref 3.5–5.1)
RBC # BLD AUTO: 2.73 M/UL (ref 4.1–5.7)
SERVICE CMNT-IMP: ABNORMAL
SERVICE CMNT-IMP: NORMAL
SODIUM SERPL-SCNC: 137 MMOL/L (ref 136–145)
WBC # BLD AUTO: 10.4 K/UL (ref 4.1–11.1)

## 2025-05-15 PROCEDURE — 85027 COMPLETE CBC AUTOMATED: CPT

## 2025-05-15 PROCEDURE — 6360000002 HC RX W HCPCS

## 2025-05-15 PROCEDURE — 6360000002 HC RX W HCPCS: Performed by: STUDENT IN AN ORGANIZED HEALTH CARE EDUCATION/TRAINING PROGRAM

## 2025-05-15 PROCEDURE — 71045 X-RAY EXAM CHEST 1 VIEW: CPT

## 2025-05-15 PROCEDURE — 6360000002 HC RX W HCPCS: Performed by: THORACIC SURGERY (CARDIOTHORACIC VASCULAR SURGERY)

## 2025-05-15 PROCEDURE — 6370000000 HC RX 637 (ALT 250 FOR IP): Performed by: PHYSICIAN ASSISTANT

## 2025-05-15 PROCEDURE — 97530 THERAPEUTIC ACTIVITIES: CPT

## 2025-05-15 PROCEDURE — 93010 ELECTROCARDIOGRAM REPORT: CPT | Performed by: SPECIALIST

## 2025-05-15 PROCEDURE — 2580000003 HC RX 258: Performed by: STUDENT IN AN ORGANIZED HEALTH CARE EDUCATION/TRAINING PROGRAM

## 2025-05-15 PROCEDURE — P9045 ALBUMIN (HUMAN), 5%, 250 ML: HCPCS

## 2025-05-15 PROCEDURE — 2000000000 HC ICU R&B

## 2025-05-15 PROCEDURE — 2700000000 HC OXYGEN THERAPY PER DAY

## 2025-05-15 PROCEDURE — 6370000000 HC RX 637 (ALT 250 FOR IP)

## 2025-05-15 PROCEDURE — 2580000003 HC RX 258: Performed by: THORACIC SURGERY (CARDIOTHORACIC VASCULAR SURGERY)

## 2025-05-15 PROCEDURE — 97164 PT RE-EVAL EST PLAN CARE: CPT

## 2025-05-15 PROCEDURE — 97116 GAIT TRAINING THERAPY: CPT

## 2025-05-15 PROCEDURE — 82962 GLUCOSE BLOOD TEST: CPT

## 2025-05-15 PROCEDURE — 80048 BASIC METABOLIC PNL TOTAL CA: CPT

## 2025-05-15 PROCEDURE — 97168 OT RE-EVAL EST PLAN CARE: CPT

## 2025-05-15 PROCEDURE — P9045 ALBUMIN (HUMAN), 5%, 250 ML: HCPCS | Performed by: STUDENT IN AN ORGANIZED HEALTH CARE EDUCATION/TRAINING PROGRAM

## 2025-05-15 PROCEDURE — 93005 ELECTROCARDIOGRAM TRACING: CPT

## 2025-05-15 PROCEDURE — 83735 ASSAY OF MAGNESIUM: CPT

## 2025-05-15 PROCEDURE — 2500000003 HC RX 250 WO HCPCS

## 2025-05-15 PROCEDURE — 94760 N-INVAS EAR/PLS OXIMETRY 1: CPT

## 2025-05-15 PROCEDURE — 83605 ASSAY OF LACTIC ACID: CPT

## 2025-05-15 RX ORDER — METHOCARBAMOL 750 MG/1
750 TABLET, FILM COATED ORAL 4 TIMES DAILY
Status: DISCONTINUED | OUTPATIENT
Start: 2025-05-15 | End: 2025-05-20 | Stop reason: HOSPADM

## 2025-05-15 RX ORDER — ALBUMIN HUMAN 50 G/1000ML
12.5 SOLUTION INTRAVENOUS ONCE
Status: COMPLETED | OUTPATIENT
Start: 2025-05-15 | End: 2025-05-15

## 2025-05-15 RX ORDER — SODIUM CHLORIDE, SODIUM LACTATE, POTASSIUM CHLORIDE, AND CALCIUM CHLORIDE .6; .31; .03; .02 G/100ML; G/100ML; G/100ML; G/100ML
500 INJECTION, SOLUTION INTRAVENOUS ONCE
Status: COMPLETED | OUTPATIENT
Start: 2025-05-15 | End: 2025-05-15

## 2025-05-15 RX ORDER — AMIODARONE HYDROCHLORIDE 200 MG/1
400 TABLET ORAL 2 TIMES DAILY
Status: DISCONTINUED | OUTPATIENT
Start: 2025-05-15 | End: 2025-05-20 | Stop reason: HOSPADM

## 2025-05-15 RX ORDER — ALBUMIN HUMAN 50 G/1000ML
SOLUTION INTRAVENOUS
Status: COMPLETED
Start: 2025-05-15 | End: 2025-05-15

## 2025-05-15 RX ADMIN — FAMOTIDINE 20 MG: 20 TABLET, FILM COATED ORAL at 08:51

## 2025-05-15 RX ADMIN — METHOCARBAMOL 750 MG: 750 TABLET ORAL at 17:02

## 2025-05-15 RX ADMIN — MUPIROCIN: 20 OINTMENT TOPICAL at 20:41

## 2025-05-15 RX ADMIN — GABAPENTIN 200 MG: 100 CAPSULE ORAL at 20:35

## 2025-05-15 RX ADMIN — ATORVASTATIN CALCIUM 40 MG: 40 TABLET, FILM COATED ORAL at 20:35

## 2025-05-15 RX ADMIN — CHLORHEXIDINE GLUCONATE 15 ML: 1.2 RINSE ORAL at 09:00

## 2025-05-15 RX ADMIN — ACETAMINOPHEN 1000 MG: 500 TABLET ORAL at 07:30

## 2025-05-15 RX ADMIN — SODIUM CHLORIDE, PRESERVATIVE FREE 10 ML: 5 INJECTION INTRAVENOUS at 22:00

## 2025-05-15 RX ADMIN — AMIODARONE HYDROCHLORIDE 0.5 MG/MIN: 50 INJECTION, SOLUTION INTRAVENOUS at 22:26

## 2025-05-15 RX ADMIN — CEFAZOLIN 2000 MG: 1 INJECTION, POWDER, FOR SOLUTION INTRAMUSCULAR; INTRAVENOUS at 03:22

## 2025-05-15 RX ADMIN — AMIODARONE HYDROCHLORIDE 400 MG: 200 TABLET ORAL at 20:34

## 2025-05-15 RX ADMIN — AMIODARONE HYDROCHLORIDE 0.5 MG/MIN: 50 INJECTION, SOLUTION INTRAVENOUS at 09:12

## 2025-05-15 RX ADMIN — SODIUM CHLORIDE, PRESERVATIVE FREE 10 ML: 5 INJECTION INTRAVENOUS at 09:00

## 2025-05-15 RX ADMIN — Medication 400 MG: at 08:51

## 2025-05-15 RX ADMIN — ACETAMINOPHEN 1000 MG: 500 TABLET ORAL at 22:27

## 2025-05-15 RX ADMIN — AMIODARONE HYDROCHLORIDE 150 MG: 50 INJECTION, SOLUTION INTRAVENOUS at 08:43

## 2025-05-15 RX ADMIN — GABAPENTIN 200 MG: 100 CAPSULE ORAL at 08:52

## 2025-05-15 RX ADMIN — ACETAMINOPHEN 1000 MG: 500 TABLET ORAL at 17:02

## 2025-05-15 RX ADMIN — OXYCODONE 10 MG: 5 TABLET ORAL at 08:51

## 2025-05-15 RX ADMIN — ALBUMIN (HUMAN) 12.5 G: 12.5 INJECTION, SOLUTION INTRAVENOUS at 02:35

## 2025-05-15 RX ADMIN — FAMOTIDINE 20 MG: 20 TABLET, FILM COATED ORAL at 20:35

## 2025-05-15 RX ADMIN — ASPIRIN 81 MG: 81 TABLET, COATED ORAL at 08:51

## 2025-05-15 RX ADMIN — METHOCARBAMOL 750 MG: 750 TABLET ORAL at 13:46

## 2025-05-15 RX ADMIN — ALBUMIN HUMAN 12.5 G: 50 SOLUTION INTRAVENOUS at 14:43

## 2025-05-15 RX ADMIN — SENNOSIDES AND DOCUSATE SODIUM 1 TABLET: 50; 8.6 TABLET ORAL at 08:52

## 2025-05-15 RX ADMIN — AMIODARONE HYDROCHLORIDE 400 MG: 200 TABLET ORAL at 09:17

## 2025-05-15 RX ADMIN — MUPIROCIN: 20 OINTMENT TOPICAL at 09:00

## 2025-05-15 RX ADMIN — ALBUMIN (HUMAN) 12.5 G: 12.5 INJECTION, SOLUTION INTRAVENOUS at 04:40

## 2025-05-15 RX ADMIN — OXYCODONE 10 MG: 5 TABLET ORAL at 21:49

## 2025-05-15 RX ADMIN — CEFAZOLIN 2000 MG: 1 INJECTION, POWDER, FOR SOLUTION INTRAMUSCULAR; INTRAVENOUS at 18:53

## 2025-05-15 RX ADMIN — CHLORHEXIDINE GLUCONATE 15 ML: 1.2 RINSE ORAL at 20:42

## 2025-05-15 RX ADMIN — SODIUM CHLORIDE, SODIUM LACTATE, POTASSIUM CHLORIDE, AND CALCIUM CHLORIDE: .6; .31; .03; .02 INJECTION, SOLUTION INTRAVENOUS at 21:00

## 2025-05-15 RX ADMIN — CEFAZOLIN 2000 MG: 1 INJECTION, POWDER, FOR SOLUTION INTRAMUSCULAR; INTRAVENOUS at 10:46

## 2025-05-15 RX ADMIN — SENNOSIDES AND DOCUSATE SODIUM 1 TABLET: 50; 8.6 TABLET ORAL at 20:35

## 2025-05-15 RX ADMIN — METHOCARBAMOL 750 MG: 750 TABLET ORAL at 20:35

## 2025-05-15 RX ADMIN — SODIUM CHLORIDE, SODIUM LACTATE, POTASSIUM CHLORIDE, AND CALCIUM CHLORIDE 500 ML: .6; .31; .03; .02 INJECTION, SOLUTION INTRAVENOUS at 08:09

## 2025-05-15 RX ADMIN — POLYETHYLENE GLYCOL 3350 17 G: 17 POWDER, FOR SOLUTION ORAL at 08:52

## 2025-05-15 RX ADMIN — Medication 400 MG: at 20:35

## 2025-05-15 RX ADMIN — GABAPENTIN 200 MG: 100 CAPSULE ORAL at 13:46

## 2025-05-15 RX ADMIN — ALBUMIN (HUMAN) 12.5 G: 12.5 INJECTION, SOLUTION INTRAVENOUS at 14:43

## 2025-05-15 ASSESSMENT — PAIN SCALES - GENERAL
PAINLEVEL_OUTOF10: 2
PAINLEVEL_OUTOF10: 7
PAINLEVEL_OUTOF10: 3
PAINLEVEL_OUTOF10: 3
PAINLEVEL_OUTOF10: 2
PAINLEVEL_OUTOF10: 2
PAINLEVEL_OUTOF10: 8

## 2025-05-15 ASSESSMENT — PAIN DESCRIPTION - ORIENTATION
ORIENTATION: ANTERIOR;LOWER
ORIENTATION: MID

## 2025-05-15 ASSESSMENT — PAIN DESCRIPTION - DESCRIPTORS
DESCRIPTORS: ACHING
DESCRIPTORS: ACHING

## 2025-05-15 ASSESSMENT — PAIN DESCRIPTION - LOCATION
LOCATION: CHEST
LOCATION: BACK

## 2025-05-16 ENCOUNTER — APPOINTMENT (OUTPATIENT)
Facility: HOSPITAL | Age: 75
DRG: 235 | End: 2025-05-16
Attending: INTERNAL MEDICINE
Payer: MEDICARE

## 2025-05-16 PROBLEM — R73.9 STRESS HYPERGLYCEMIA: Status: ACTIVE | Noted: 2025-05-16

## 2025-05-16 LAB
ACUTE KIDNEY INJURY RISK NEPHROCHECK: 0.45 (ref 0–0.3)
ANION GAP SERPL CALC-SCNC: 6 MMOL/L (ref 2–12)
ANION GAP SERPL CALC-SCNC: 6 MMOL/L (ref 2–12)
BUN SERPL-MCNC: 12 MG/DL (ref 6–20)
BUN SERPL-MCNC: 12 MG/DL (ref 6–20)
BUN/CREAT SERPL: 15 (ref 12–20)
BUN/CREAT SERPL: 16 (ref 12–20)
CALCIUM SERPL-MCNC: 7.4 MG/DL (ref 8.5–10.1)
CALCIUM SERPL-MCNC: 7.5 MG/DL (ref 8.5–10.1)
CHLORIDE SERPL-SCNC: 96 MMOL/L (ref 97–108)
CHLORIDE SERPL-SCNC: 96 MMOL/L (ref 97–108)
CO2 SERPL-SCNC: 26 MMOL/L (ref 21–32)
CO2 SERPL-SCNC: 26 MMOL/L (ref 21–32)
CREAT SERPL-MCNC: 0.77 MG/DL (ref 0.7–1.3)
CREAT SERPL-MCNC: 0.8 MG/DL (ref 0.7–1.3)
ECHO BSA: 1.63 M2
ECHO BSA: 1.63 M2
EKG ATRIAL RATE: 63 BPM
EKG DIAGNOSIS: NORMAL
EKG P AXIS: 71 DEGREES
EKG P-R INTERVAL: 194 MS
EKG Q-T INTERVAL: 406 MS
EKG QRS DURATION: 88 MS
EKG QTC CALCULATION (BAZETT): 415 MS
EKG R AXIS: 44 DEGREES
EKG T AXIS: 20 DEGREES
EKG VENTRICULAR RATE: 63 BPM
ERYTHROCYTE [DISTWIDTH] IN BLOOD BY AUTOMATED COUNT: 12.3 % (ref 11.5–14.5)
GLUCOSE BLD STRIP.AUTO-MCNC: 101 MG/DL (ref 65–117)
GLUCOSE BLD STRIP.AUTO-MCNC: 102 MG/DL (ref 65–117)
GLUCOSE BLD STRIP.AUTO-MCNC: 102 MG/DL (ref 65–117)
GLUCOSE BLD STRIP.AUTO-MCNC: 105 MG/DL (ref 65–117)
GLUCOSE BLD STRIP.AUTO-MCNC: 106 MG/DL (ref 65–117)
GLUCOSE BLD STRIP.AUTO-MCNC: 112 MG/DL (ref 65–117)
GLUCOSE BLD STRIP.AUTO-MCNC: 112 MG/DL (ref 65–117)
GLUCOSE BLD STRIP.AUTO-MCNC: 134 MG/DL (ref 65–117)
GLUCOSE BLD STRIP.AUTO-MCNC: 139 MG/DL (ref 65–117)
GLUCOSE BLD STRIP.AUTO-MCNC: 148 MG/DL (ref 65–117)
GLUCOSE BLD STRIP.AUTO-MCNC: 175 MG/DL (ref 65–117)
GLUCOSE BLD STRIP.AUTO-MCNC: 86 MG/DL (ref 65–117)
GLUCOSE SERPL-MCNC: 103 MG/DL (ref 65–100)
GLUCOSE SERPL-MCNC: 142 MG/DL (ref 65–100)
HCT VFR BLD AUTO: 25 % (ref 36.6–50.3)
HGB BLD-MCNC: 8.4 G/DL (ref 12.1–17)
LACTATE SERPL-SCNC: 1.2 MMOL/L (ref 0.4–2)
MAGNESIUM SERPL-MCNC: 2.1 MG/DL (ref 1.6–2.4)
MCH RBC QN AUTO: 31.8 PG (ref 26–34)
MCHC RBC AUTO-ENTMCNC: 33.6 G/DL (ref 30–36.5)
MCV RBC AUTO: 94.7 FL (ref 80–99)
NRBC # BLD: 0 K/UL (ref 0–0.01)
NRBC BLD-RTO: 0 PER 100 WBC
PLATELET # BLD AUTO: 73 K/UL (ref 150–400)
PMV BLD AUTO: 10.9 FL (ref 8.9–12.9)
POTASSIUM SERPL-SCNC: 3.8 MMOL/L (ref 3.5–5.1)
POTASSIUM SERPL-SCNC: 4 MMOL/L (ref 3.5–5.1)
RBC # BLD AUTO: 2.64 M/UL (ref 4.1–5.7)
SERVICE CMNT-IMP: ABNORMAL
SERVICE CMNT-IMP: NORMAL
SODIUM SERPL-SCNC: 128 MMOL/L (ref 136–145)
SODIUM SERPL-SCNC: 128 MMOL/L (ref 136–145)
VAS LEFT ABI: 1.1
VAS LEFT ARM BP: 158 MMHG
VAS LEFT DORSALIS PEDIS BP: 154 MMHG
VAS LEFT GSV ANKLE DIAM: 1.8 MM
VAS LEFT GSV AT KNEE DIAM: 1.7 MM
VAS LEFT GSV BK PROX DIAM: 1.6 MM
VAS LEFT GSV THIGH DIST DIAM: 2 MM
VAS LEFT GSV THIGH MID DIAM: 2 MM
VAS LEFT GSV THIGH PROX DIAM: 2.2 MM
VAS LEFT PTA BP: 182 MMHG
VAS LEFT SSV DIST DIAM: 1.5 MM
VAS LEFT SSV PROX DIAM: 1.3 MM
VAS RIGHT ABI: 0.96
VAS RIGHT ARM BP: 166 MMHG
VAS RIGHT DORSALIS PEDIS BP: 158 MMHG
VAS RIGHT GSV ANKLE DIAM: 1.4 MM
VAS RIGHT GSV AT KNEE DIAM: 1.9 MM
VAS RIGHT GSV BK PROX DIAM: 1 MM
VAS RIGHT GSV THIGH DIST DIAM: 1.5 MM
VAS RIGHT GSV THIGH MID DIAM: 1.3 MM
VAS RIGHT GSV THIGH PROX DIAM: 1.6 MM
VAS RIGHT PTA BP: 160 MMHG
VAS RIGHT SSV DIST DIAM: 1.3 MM
VAS RIGHT SSV PROX DIAM: 1.4 MM
WBC # BLD AUTO: 10.1 K/UL (ref 4.1–11.1)

## 2025-05-16 PROCEDURE — 2500000003 HC RX 250 WO HCPCS: Performed by: PHYSICIAN ASSISTANT

## 2025-05-16 PROCEDURE — 6370000000 HC RX 637 (ALT 250 FOR IP): Performed by: PHYSICIAN ASSISTANT

## 2025-05-16 PROCEDURE — 94760 N-INVAS EAR/PLS OXIMETRY 1: CPT

## 2025-05-16 PROCEDURE — 85027 COMPLETE CBC AUTOMATED: CPT

## 2025-05-16 PROCEDURE — 83605 ASSAY OF LACTIC ACID: CPT

## 2025-05-16 PROCEDURE — P9045 ALBUMIN (HUMAN), 5%, 250 ML: HCPCS | Performed by: PHYSICIAN ASSISTANT

## 2025-05-16 PROCEDURE — 2500000003 HC RX 250 WO HCPCS

## 2025-05-16 PROCEDURE — 2700000000 HC OXYGEN THERAPY PER DAY

## 2025-05-16 PROCEDURE — 83735 ASSAY OF MAGNESIUM: CPT

## 2025-05-16 PROCEDURE — 97530 THERAPEUTIC ACTIVITIES: CPT

## 2025-05-16 PROCEDURE — 93010 ELECTROCARDIOGRAM REPORT: CPT | Performed by: SPECIALIST

## 2025-05-16 PROCEDURE — 99221 1ST HOSP IP/OBS SF/LOW 40: CPT | Performed by: CLINICAL NURSE SPECIALIST

## 2025-05-16 PROCEDURE — 2580000003 HC RX 258: Performed by: THORACIC SURGERY (CARDIOTHORACIC VASCULAR SURGERY)

## 2025-05-16 PROCEDURE — 80048 BASIC METABOLIC PNL TOTAL CA: CPT

## 2025-05-16 PROCEDURE — 2500000003 HC RX 250 WO HCPCS: Performed by: THORACIC SURGERY (CARDIOTHORACIC VASCULAR SURGERY)

## 2025-05-16 PROCEDURE — 6360000002 HC RX W HCPCS: Performed by: PHYSICIAN ASSISTANT

## 2025-05-16 PROCEDURE — 82962 GLUCOSE BLOOD TEST: CPT

## 2025-05-16 PROCEDURE — 2000000000 HC ICU R&B

## 2025-05-16 PROCEDURE — 6370000000 HC RX 637 (ALT 250 FOR IP)

## 2025-05-16 PROCEDURE — 71045 X-RAY EXAM CHEST 1 VIEW: CPT

## 2025-05-16 PROCEDURE — 2580000003 HC RX 258: Performed by: STUDENT IN AN ORGANIZED HEALTH CARE EDUCATION/TRAINING PROGRAM

## 2025-05-16 PROCEDURE — 6360000002 HC RX W HCPCS

## 2025-05-16 PROCEDURE — 2580000003 HC RX 258: Performed by: PHYSICIAN ASSISTANT

## 2025-05-16 RX ORDER — POTASSIUM CHLORIDE 750 MG/1
40 TABLET, EXTENDED RELEASE ORAL DAILY
Status: DISCONTINUED | OUTPATIENT
Start: 2025-05-16 | End: 2025-05-16

## 2025-05-16 RX ORDER — ALBUMIN HUMAN 50 G/1000ML
12.5 SOLUTION INTRAVENOUS ONCE
Status: COMPLETED | OUTPATIENT
Start: 2025-05-16 | End: 2025-05-16

## 2025-05-16 RX ORDER — HEPARIN SODIUM 5000 [USP'U]/ML
5000 INJECTION, SOLUTION INTRAVENOUS; SUBCUTANEOUS EVERY 8 HOURS SCHEDULED
Status: DISCONTINUED | OUTPATIENT
Start: 2025-05-16 | End: 2025-05-17

## 2025-05-16 RX ORDER — 0.9 % SODIUM CHLORIDE 0.9 %
250 INTRAVENOUS SOLUTION INTRAVENOUS ONCE
Status: COMPLETED | OUTPATIENT
Start: 2025-05-16 | End: 2025-05-16

## 2025-05-16 RX ORDER — POTASSIUM CHLORIDE 750 MG/1
40 TABLET, EXTENDED RELEASE ORAL DAILY
Status: DISCONTINUED | OUTPATIENT
Start: 2025-05-17 | End: 2025-05-19

## 2025-05-16 RX ORDER — METOPROLOL TARTRATE 25 MG/1
12.5 TABLET, FILM COATED ORAL 2 TIMES DAILY
Status: DISCONTINUED | OUTPATIENT
Start: 2025-05-16 | End: 2025-05-20 | Stop reason: HOSPADM

## 2025-05-16 RX ORDER — FUROSEMIDE 10 MG/ML
40 INJECTION INTRAMUSCULAR; INTRAVENOUS 2 TIMES DAILY
Status: DISCONTINUED | OUTPATIENT
Start: 2025-05-16 | End: 2025-05-19

## 2025-05-16 RX ORDER — SODIUM CHLORIDE 9 MG/ML
INJECTION, SOLUTION INTRAVENOUS CONTINUOUS
Status: DISCONTINUED | OUTPATIENT
Start: 2025-05-16 | End: 2025-05-18

## 2025-05-16 RX ADMIN — CEFAZOLIN 2000 MG: 1 INJECTION, POWDER, FOR SOLUTION INTRAMUSCULAR; INTRAVENOUS at 03:30

## 2025-05-16 RX ADMIN — Medication 400 MG: at 20:33

## 2025-05-16 RX ADMIN — INSULIN GLARGINE 10 UNITS: 100 INJECTION, SOLUTION SUBCUTANEOUS at 10:53

## 2025-05-16 RX ADMIN — POTASSIUM CHLORIDE 20 MEQ: 29.8 INJECTION INTRAVENOUS at 07:12

## 2025-05-16 RX ADMIN — ACETAMINOPHEN 1000 MG: 500 TABLET ORAL at 06:06

## 2025-05-16 RX ADMIN — MUPIROCIN: 20 OINTMENT TOPICAL at 09:25

## 2025-05-16 RX ADMIN — SODIUM CHLORIDE, PRESERVATIVE FREE 10 ML: 5 INJECTION INTRAVENOUS at 09:25

## 2025-05-16 RX ADMIN — SODIUM CHLORIDE 250 ML: 0.9 INJECTION, SOLUTION INTRAVENOUS at 13:08

## 2025-05-16 RX ADMIN — CALCIUM CHLORIDE 1000 MG: 100 INJECTION, SOLUTION INTRAVENOUS at 16:45

## 2025-05-16 RX ADMIN — ONDANSETRON 4 MG: 2 INJECTION, SOLUTION INTRAMUSCULAR; INTRAVENOUS at 14:21

## 2025-05-16 RX ADMIN — AMIODARONE HYDROCHLORIDE 400 MG: 200 TABLET ORAL at 20:33

## 2025-05-16 RX ADMIN — INSULIN LISPRO 2 UNITS: 100 INJECTION, SOLUTION INTRAVENOUS; SUBCUTANEOUS at 10:32

## 2025-05-16 RX ADMIN — Medication 400 MG: at 09:25

## 2025-05-16 RX ADMIN — FAMOTIDINE 20 MG: 20 TABLET, FILM COATED ORAL at 20:34

## 2025-05-16 RX ADMIN — ACETAMINOPHEN 1000 MG: 500 TABLET ORAL at 18:40

## 2025-05-16 RX ADMIN — METHOCARBAMOL 750 MG: 750 TABLET ORAL at 12:09

## 2025-05-16 RX ADMIN — SENNOSIDES AND DOCUSATE SODIUM 1 TABLET: 50; 8.6 TABLET ORAL at 09:24

## 2025-05-16 RX ADMIN — METHOCARBAMOL 750 MG: 750 TABLET ORAL at 16:46

## 2025-05-16 RX ADMIN — AMIODARONE HYDROCHLORIDE 400 MG: 200 TABLET ORAL at 09:23

## 2025-05-16 RX ADMIN — ASPIRIN 81 MG: 81 TABLET, COATED ORAL at 09:23

## 2025-05-16 RX ADMIN — CHLORHEXIDINE GLUCONATE 15 ML: 1.2 RINSE ORAL at 20:35

## 2025-05-16 RX ADMIN — CHLORHEXIDINE GLUCONATE 15 ML: 1.2 RINSE ORAL at 09:25

## 2025-05-16 RX ADMIN — FAMOTIDINE 20 MG: 20 TABLET, FILM COATED ORAL at 09:23

## 2025-05-16 RX ADMIN — METHOCARBAMOL 750 MG: 750 TABLET ORAL at 20:34

## 2025-05-16 RX ADMIN — NOREPINEPHRINE BITARTRATE 5 MCG/MIN: 1 INJECTION, SOLUTION, CONCENTRATE INTRAVENOUS at 14:33

## 2025-05-16 RX ADMIN — HEPARIN SODIUM 5000 UNITS: 5000 INJECTION INTRAVENOUS; SUBCUTANEOUS at 14:20

## 2025-05-16 RX ADMIN — SODIUM CHLORIDE: 0.9 INJECTION, SOLUTION INTRAVENOUS at 14:45

## 2025-05-16 RX ADMIN — ALBUMIN (HUMAN) 12.5 G: 12.5 INJECTION, SOLUTION INTRAVENOUS at 12:09

## 2025-05-16 RX ADMIN — SODIUM CHLORIDE: 0.9 INJECTION, SOLUTION INTRAVENOUS at 04:43

## 2025-05-16 RX ADMIN — METHOCARBAMOL 750 MG: 750 TABLET ORAL at 09:24

## 2025-05-16 RX ADMIN — GABAPENTIN 200 MG: 100 CAPSULE ORAL at 20:34

## 2025-05-16 RX ADMIN — ATORVASTATIN CALCIUM 40 MG: 40 TABLET, FILM COATED ORAL at 20:34

## 2025-05-16 RX ADMIN — MAGNESIUM HYDROXIDE 30 ML: 400 SUSPENSION ORAL at 10:32

## 2025-05-16 RX ADMIN — MUPIROCIN: 20 OINTMENT TOPICAL at 20:35

## 2025-05-16 RX ADMIN — SODIUM CHLORIDE, PRESERVATIVE FREE 10 ML: 5 INJECTION INTRAVENOUS at 20:35

## 2025-05-16 RX ADMIN — ACETAMINOPHEN 1000 MG: 500 TABLET ORAL at 12:09

## 2025-05-16 RX ADMIN — POLYETHYLENE GLYCOL 3350 17 G: 17 POWDER, FOR SOLUTION ORAL at 09:24

## 2025-05-16 RX ADMIN — FUROSEMIDE 40 MG: 10 INJECTION, SOLUTION INTRAMUSCULAR; INTRAVENOUS at 09:24

## 2025-05-16 RX ADMIN — HEPARIN SODIUM 5000 UNITS: 5000 INJECTION INTRAVENOUS; SUBCUTANEOUS at 09:24

## 2025-05-16 RX ADMIN — METOPROLOL TARTRATE 12.5 MG: 25 TABLET, FILM COATED ORAL at 09:25

## 2025-05-16 RX ADMIN — HEPARIN SODIUM 5000 UNITS: 5000 INJECTION INTRAVENOUS; SUBCUTANEOUS at 21:40

## 2025-05-16 RX ADMIN — INSULIN LISPRO 2 UNITS: 100 INJECTION, SOLUTION INTRAVENOUS; SUBCUTANEOUS at 16:44

## 2025-05-16 ASSESSMENT — PAIN - FUNCTIONAL ASSESSMENT: PAIN_FUNCTIONAL_ASSESSMENT: ACTIVITIES ARE NOT PREVENTED

## 2025-05-16 ASSESSMENT — PAIN DESCRIPTION - LOCATION: LOCATION: CHEST

## 2025-05-16 ASSESSMENT — PAIN DESCRIPTION - PAIN TYPE: TYPE: SURGICAL PAIN

## 2025-05-16 ASSESSMENT — PAIN SCALES - GENERAL
PAINLEVEL_OUTOF10: 2

## 2025-05-16 ASSESSMENT — PAIN DESCRIPTION - DESCRIPTORS: DESCRIPTORS: ACHING

## 2025-05-16 ASSESSMENT — PAIN DESCRIPTION - ORIENTATION: ORIENTATION: ANTERIOR;MID

## 2025-05-16 ASSESSMENT — PAIN DESCRIPTION - ONSET: ONSET: ON-GOING

## 2025-05-16 ASSESSMENT — PAIN DESCRIPTION - FREQUENCY: FREQUENCY: INTERMITTENT

## 2025-05-16 NOTE — CARE COORDINATION
Transition of Care Plan:    RUR: 15%  Prior Level of Functioning: Lives w spouse and reg, 1st floor set up, ind and drives   Disposition: Pending AT Home Care HH  Follow up appointments: Defer to AVS recommendations  DME needed: TBD  Transportation at discharge: Family  IM/IMM Medicare/ letter given: 5/13  Is patient a  and connected with [facility]?    If yes, was [facility] transfer form completed and [facility] notified?   Caregiver Contact: Wife, Suzanna Jensen [phone]  Discharge Caregiver contacted prior to discharge?   Care Conference needed?   Barriers to discharge: Medical    Met w patient at bedside to discuss recommendation for HH and provide education on this level of care.  Patient requests I call his daughter, Giovanna and provided a number but this number was for another family member.  She was w Giovanna so we spoke and family agreeable to any HH agency that can accept.    HH referral in [facility].    REBECA Pryor CCM  Care Management   Available on Perfect Serve or [phone]

## 2025-05-16 NOTE — PROCEDURES
PROCEDURE NOTE    Patient was assisted in a recumbent position. Jackson drain dressing removed, site cleaned with CHG, sutures x 3 cut, Jackson drain x 3 removed without difficulty. New occlusive dressing placed. VS stable. RN updated.      Susu Brandon PA-C

## 2025-05-16 NOTE — DIABETES MGMT
[facility]ES HEALTH  DIABETES MANAGEMENT CONSULT    Consulted by  Susu Brandon PA-C  for advanced nursing evaluation and care for inpatient blood glucose management.    Evaluation and Action Plan   Hector Jensen Jr is a 74-year-old gentleman with hypertension, hyperlipidemia and chronic T7 compression fraction and history of osteoporosis who presented to [facility] on April 11, 2025 with an NSTEMI.  He underwent a cardiac cath which revealed severe multivessel coronary artery disease and underwent a CABG x 3 on May 14.  Diabetes [facility] was asked to assist in management this admission.    Mr. Jensen does not have any prior history of prediabetes or diabetes and A1c was normal on admission at 5.4%.  He did have very mild hyperglycemia in the setting of NSTEMI however this naturally resolved with clinical recovery.  He did transition to an insulin infusion postoperatively on May 14, 2025 for management of stress hyperglycemia.  He did require 20 to 43 units of insulin on the infusion and is now awake and eating.  It is reasonable to transition off the insulin infusion with improvement in glucose and insulin needs.  Do not suspect hyperglycemia moving forward.    Blood glucose pattern    Significant diabetes-related events over the past 24-72 hours  A1C 5.4%  On insulin and amiodarone (mixed in D5) infusions      Action Plan    Transition off the insulin infusion per protocol.  Point-of-care glucose ACHS with correctional Humalog  No diet restrictions at this time  If glucose remains normal after transitioning off the insulin infusion can DC scheduled glucose checks and correctional Humalog.            Initial Presentation   Hector Jensen Jr is a 74 y.o. male who presented to [facility] on May 11, 2025 with an NSTEMI.  LAB: Glucose 179, troponin initially 80 with a peak of 6937,     HX:   Past Medical History:   Diagnosis Date    CAD (coronary artery disease)

## 2025-05-17 ENCOUNTER — APPOINTMENT (OUTPATIENT)
Facility: HOSPITAL | Age: 75
DRG: 235 | End: 2025-05-17
Attending: INTERNAL MEDICINE
Payer: MEDICARE

## 2025-05-17 LAB
ANION GAP SERPL CALC-SCNC: 7 MMOL/L (ref 2–12)
BUN SERPL-MCNC: 13 MG/DL (ref 6–20)
BUN/CREAT SERPL: 22 (ref 12–20)
CALCIUM SERPL-MCNC: 7.7 MG/DL (ref 8.5–10.1)
CHLORIDE SERPL-SCNC: 98 MMOL/L (ref 97–108)
CO2 SERPL-SCNC: 22 MMOL/L (ref 21–32)
CREAT SERPL-MCNC: 0.6 MG/DL (ref 0.7–1.3)
ERYTHROCYTE [DISTWIDTH] IN BLOOD BY AUTOMATED COUNT: 12.3 % (ref 11.5–14.5)
GLUCOSE BLD STRIP.AUTO-MCNC: 101 MG/DL (ref 65–117)
GLUCOSE BLD STRIP.AUTO-MCNC: 137 MG/DL (ref 65–117)
GLUCOSE SERPL-MCNC: 106 MG/DL (ref 65–100)
HCT VFR BLD AUTO: 25.8 % (ref 36.6–50.3)
HGB BLD-MCNC: 8.6 G/DL (ref 12.1–17)
MAGNESIUM SERPL-MCNC: 2.3 MG/DL (ref 1.6–2.4)
MCH RBC QN AUTO: 30.5 PG (ref 26–34)
MCHC RBC AUTO-ENTMCNC: 33.3 G/DL (ref 30–36.5)
MCV RBC AUTO: 91.5 FL (ref 80–99)
NRBC # BLD: 0 K/UL (ref 0–0.01)
NRBC BLD-RTO: 0 PER 100 WBC
PLATELET # BLD AUTO: 93 K/UL (ref 150–400)
PMV BLD AUTO: 11.6 FL (ref 8.9–12.9)
POTASSIUM SERPL-SCNC: 4.3 MMOL/L (ref 3.5–5.1)
RBC # BLD AUTO: 2.82 M/UL (ref 4.1–5.7)
SERVICE CMNT-IMP: ABNORMAL
SERVICE CMNT-IMP: NORMAL
SODIUM SERPL-SCNC: 127 MMOL/L (ref 136–145)
WBC # BLD AUTO: 11.1 K/UL (ref 4.1–11.1)

## 2025-05-17 PROCEDURE — 85027 COMPLETE CBC AUTOMATED: CPT

## 2025-05-17 PROCEDURE — 2580000003 HC RX 258: Performed by: STUDENT IN AN ORGANIZED HEALTH CARE EDUCATION/TRAINING PROGRAM

## 2025-05-17 PROCEDURE — 80048 BASIC METABOLIC PNL TOTAL CA: CPT

## 2025-05-17 PROCEDURE — 6360000002 HC RX W HCPCS: Performed by: PHYSICIAN ASSISTANT

## 2025-05-17 PROCEDURE — 6370000000 HC RX 637 (ALT 250 FOR IP): Performed by: THORACIC SURGERY (CARDIOTHORACIC VASCULAR SURGERY)

## 2025-05-17 PROCEDURE — 6360000002 HC RX W HCPCS: Performed by: THORACIC SURGERY (CARDIOTHORACIC VASCULAR SURGERY)

## 2025-05-17 PROCEDURE — 6370000000 HC RX 637 (ALT 250 FOR IP)

## 2025-05-17 PROCEDURE — 6370000000 HC RX 637 (ALT 250 FOR IP): Performed by: INTERNAL MEDICINE

## 2025-05-17 PROCEDURE — 2580000003 HC RX 258

## 2025-05-17 PROCEDURE — 97116 GAIT TRAINING THERAPY: CPT

## 2025-05-17 PROCEDURE — 94760 N-INVAS EAR/PLS OXIMETRY 1: CPT

## 2025-05-17 PROCEDURE — 2700000000 HC OXYGEN THERAPY PER DAY

## 2025-05-17 PROCEDURE — 97530 THERAPEUTIC ACTIVITIES: CPT

## 2025-05-17 PROCEDURE — 82962 GLUCOSE BLOOD TEST: CPT

## 2025-05-17 PROCEDURE — 2500000003 HC RX 250 WO HCPCS

## 2025-05-17 PROCEDURE — 6370000000 HC RX 637 (ALT 250 FOR IP): Performed by: PHYSICIAN ASSISTANT

## 2025-05-17 PROCEDURE — 71045 X-RAY EXAM CHEST 1 VIEW: CPT

## 2025-05-17 PROCEDURE — 2000000000 HC ICU R&B

## 2025-05-17 PROCEDURE — 83735 ASSAY OF MAGNESIUM: CPT

## 2025-05-17 RX ORDER — GINSENG 100 MG
CAPSULE ORAL PRN
Status: DISCONTINUED | OUTPATIENT
Start: 2025-05-17 | End: 2025-05-20 | Stop reason: HOSPADM

## 2025-05-17 RX ORDER — FUROSEMIDE 10 MG/ML
20 INJECTION INTRAMUSCULAR; INTRAVENOUS 2 TIMES DAILY
Status: DISCONTINUED | OUTPATIENT
Start: 2025-05-17 | End: 2025-05-20 | Stop reason: HOSPADM

## 2025-05-17 RX ADMIN — AMIODARONE HYDROCHLORIDE 400 MG: 200 TABLET ORAL at 08:45

## 2025-05-17 RX ADMIN — FAMOTIDINE 20 MG: 20 TABLET, FILM COATED ORAL at 21:39

## 2025-05-17 RX ADMIN — MUPIROCIN: 20 OINTMENT TOPICAL at 21:40

## 2025-05-17 RX ADMIN — MUPIROCIN: 20 OINTMENT TOPICAL at 08:46

## 2025-05-17 RX ADMIN — ASPIRIN 81 MG: 81 TABLET, COATED ORAL at 08:45

## 2025-05-17 RX ADMIN — SODIUM CHLORIDE, PRESERVATIVE FREE 10 ML: 5 INJECTION INTRAVENOUS at 08:46

## 2025-05-17 RX ADMIN — APIXABAN 5 MG: 5 TABLET, FILM COATED ORAL at 09:45

## 2025-05-17 RX ADMIN — CHLORHEXIDINE GLUCONATE 15 ML: 1.2 RINSE ORAL at 08:46

## 2025-05-17 RX ADMIN — DIPHENHYDRAMINE HYDROCHLORIDE 25 MG: 25 CAPSULE ORAL at 21:39

## 2025-05-17 RX ADMIN — CHLORHEXIDINE GLUCONATE 15 ML: 1.2 RINSE ORAL at 21:40

## 2025-05-17 RX ADMIN — METHOCARBAMOL 750 MG: 750 TABLET ORAL at 16:46

## 2025-05-17 RX ADMIN — OXYCODONE 5 MG: 5 TABLET ORAL at 16:46

## 2025-05-17 RX ADMIN — HEPARIN SODIUM 5000 UNITS: 5000 INJECTION INTRAVENOUS; SUBCUTANEOUS at 06:26

## 2025-05-17 RX ADMIN — APIXABAN 5 MG: 5 TABLET, FILM COATED ORAL at 21:30

## 2025-05-17 RX ADMIN — GABAPENTIN 200 MG: 100 CAPSULE ORAL at 08:47

## 2025-05-17 RX ADMIN — FUROSEMIDE 20 MG: 10 INJECTION, SOLUTION INTRAMUSCULAR; INTRAVENOUS at 11:10

## 2025-05-17 RX ADMIN — FUROSEMIDE 20 MG: 10 INJECTION, SOLUTION INTRAMUSCULAR; INTRAVENOUS at 17:24

## 2025-05-17 RX ADMIN — ACETAMINOPHEN 1000 MG: 500 TABLET ORAL at 06:26

## 2025-05-17 RX ADMIN — SODIUM CHLORIDE: 0.45 INJECTION, SOLUTION INTRAVENOUS at 01:59

## 2025-05-17 RX ADMIN — SODIUM CHLORIDE: 0.9 INJECTION, SOLUTION INTRAVENOUS at 22:34

## 2025-05-17 RX ADMIN — METHOCARBAMOL 750 MG: 750 TABLET ORAL at 08:45

## 2025-05-17 RX ADMIN — ATORVASTATIN CALCIUM 40 MG: 40 TABLET, FILM COATED ORAL at 21:39

## 2025-05-17 RX ADMIN — OXYCODONE 5 MG: 5 TABLET ORAL at 07:35

## 2025-05-17 RX ADMIN — AMIODARONE HYDROCHLORIDE 400 MG: 200 TABLET ORAL at 21:38

## 2025-05-17 RX ADMIN — POLYETHYLENE GLYCOL 3350 17 G: 17 POWDER, FOR SOLUTION ORAL at 08:45

## 2025-05-17 RX ADMIN — Medication 400 MG: at 21:39

## 2025-05-17 RX ADMIN — SENNOSIDES AND DOCUSATE SODIUM 1 TABLET: 50; 8.6 TABLET ORAL at 08:45

## 2025-05-17 RX ADMIN — ACETAMINOPHEN 1000 MG: 500 TABLET ORAL at 12:30

## 2025-05-17 RX ADMIN — ACETAMINOPHEN 1000 MG: 500 TABLET ORAL at 17:24

## 2025-05-17 RX ADMIN — METHOCARBAMOL 750 MG: 750 TABLET ORAL at 21:39

## 2025-05-17 RX ADMIN — SODIUM CHLORIDE: 0.9 INJECTION, SOLUTION INTRAVENOUS at 12:29

## 2025-05-17 RX ADMIN — METHOCARBAMOL 750 MG: 750 TABLET ORAL at 12:30

## 2025-05-17 RX ADMIN — Medication 400 MG: at 08:45

## 2025-05-17 RX ADMIN — FAMOTIDINE 20 MG: 20 TABLET, FILM COATED ORAL at 08:45

## 2025-05-17 RX ADMIN — BACITRACIN 1 PACKET: 500 OINTMENT TOPICAL at 11:10

## 2025-05-17 RX ADMIN — SODIUM CHLORIDE, PRESERVATIVE FREE 10 ML: 5 INJECTION INTRAVENOUS at 21:42

## 2025-05-17 ASSESSMENT — PAIN SCALES - GENERAL
PAINLEVEL_OUTOF10: 4
PAINLEVEL_OUTOF10: 5
PAINLEVEL_OUTOF10: 2
PAINLEVEL_OUTOF10: 5

## 2025-05-17 ASSESSMENT — PAIN - FUNCTIONAL ASSESSMENT: PAIN_FUNCTIONAL_ASSESSMENT: ACTIVITIES ARE NOT PREVENTED

## 2025-05-17 ASSESSMENT — PAIN DESCRIPTION - LOCATION
LOCATION: BACK

## 2025-05-17 ASSESSMENT — PAIN DESCRIPTION - ORIENTATION
ORIENTATION: LOWER
ORIENTATION: MID

## 2025-05-17 ASSESSMENT — PAIN DESCRIPTION - DESCRIPTORS
DESCRIPTORS: ACHING

## 2025-05-17 ASSESSMENT — PAIN DESCRIPTION - PAIN TYPE: TYPE: CHRONIC PAIN

## 2025-05-17 ASSESSMENT — PAIN DESCRIPTION - ONSET: ONSET: ON-GOING

## 2025-05-17 ASSESSMENT — PAIN DESCRIPTION - FREQUENCY: FREQUENCY: INTERMITTENT

## 2025-05-18 ENCOUNTER — APPOINTMENT (OUTPATIENT)
Facility: HOSPITAL | Age: 75
DRG: 235 | End: 2025-05-18
Attending: INTERNAL MEDICINE
Payer: MEDICARE

## 2025-05-18 LAB
ANION GAP SERPL CALC-SCNC: 5 MMOL/L (ref 2–12)
BUN SERPL-MCNC: 13 MG/DL (ref 6–20)
BUN/CREAT SERPL: 19 (ref 12–20)
CALCIUM SERPL-MCNC: 7.7 MG/DL (ref 8.5–10.1)
CHLORIDE SERPL-SCNC: 102 MMOL/L (ref 97–108)
CO2 SERPL-SCNC: 25 MMOL/L (ref 21–32)
CREAT SERPL-MCNC: 0.69 MG/DL (ref 0.7–1.3)
ERYTHROCYTE [DISTWIDTH] IN BLOOD BY AUTOMATED COUNT: 12.5 % (ref 11.5–14.5)
GLUCOSE SERPL-MCNC: 110 MG/DL (ref 65–100)
HCT VFR BLD AUTO: 30.5 % (ref 36.6–50.3)
HGB BLD-MCNC: 10.4 G/DL (ref 12.1–17)
MAGNESIUM SERPL-MCNC: 2.5 MG/DL (ref 1.6–2.4)
MCH RBC QN AUTO: 31.6 PG (ref 26–34)
MCHC RBC AUTO-ENTMCNC: 34.1 G/DL (ref 30–36.5)
MCV RBC AUTO: 92.7 FL (ref 80–99)
NRBC # BLD: 0 K/UL (ref 0–0.01)
NRBC BLD-RTO: 0 PER 100 WBC
PLATELET # BLD AUTO: 145 K/UL (ref 150–400)
PMV BLD AUTO: 10.1 FL (ref 8.9–12.9)
POTASSIUM SERPL-SCNC: 3.9 MMOL/L (ref 3.5–5.1)
RBC # BLD AUTO: 3.29 M/UL (ref 4.1–5.7)
SODIUM SERPL-SCNC: 132 MMOL/L (ref 136–145)
WBC # BLD AUTO: 9.5 K/UL (ref 4.1–11.1)

## 2025-05-18 PROCEDURE — 6370000000 HC RX 637 (ALT 250 FOR IP): Performed by: THORACIC SURGERY (CARDIOTHORACIC VASCULAR SURGERY)

## 2025-05-18 PROCEDURE — 83735 ASSAY OF MAGNESIUM: CPT

## 2025-05-18 PROCEDURE — 6370000000 HC RX 637 (ALT 250 FOR IP)

## 2025-05-18 PROCEDURE — 80048 BASIC METABOLIC PNL TOTAL CA: CPT

## 2025-05-18 PROCEDURE — 2500000003 HC RX 250 WO HCPCS

## 2025-05-18 PROCEDURE — 6360000002 HC RX W HCPCS: Performed by: THORACIC SURGERY (CARDIOTHORACIC VASCULAR SURGERY)

## 2025-05-18 PROCEDURE — 94760 N-INVAS EAR/PLS OXIMETRY 1: CPT

## 2025-05-18 PROCEDURE — 6360000002 HC RX W HCPCS

## 2025-05-18 PROCEDURE — 97530 THERAPEUTIC ACTIVITIES: CPT

## 2025-05-18 PROCEDURE — 6370000000 HC RX 637 (ALT 250 FOR IP): Performed by: PHYSICIAN ASSISTANT

## 2025-05-18 PROCEDURE — 6370000000 HC RX 637 (ALT 250 FOR IP): Performed by: INTERNAL MEDICINE

## 2025-05-18 PROCEDURE — 2500000003 HC RX 250 WO HCPCS: Performed by: INTERNAL MEDICINE

## 2025-05-18 PROCEDURE — 71045 X-RAY EXAM CHEST 1 VIEW: CPT

## 2025-05-18 PROCEDURE — 2060000000 HC ICU INTERMEDIATE R&B

## 2025-05-18 PROCEDURE — 85027 COMPLETE CBC AUTOMATED: CPT

## 2025-05-18 RX ORDER — METOPROLOL TARTRATE 1 MG/ML
5 INJECTION, SOLUTION INTRAVENOUS ONCE
Status: COMPLETED | OUTPATIENT
Start: 2025-05-18 | End: 2025-05-18

## 2025-05-18 RX ADMIN — ASPIRIN 81 MG: 81 TABLET, COATED ORAL at 08:45

## 2025-05-18 RX ADMIN — METOPROLOL TARTRATE 5 MG: 5 INJECTION INTRAVENOUS at 05:13

## 2025-05-18 RX ADMIN — AMIODARONE HYDROCHLORIDE 400 MG: 200 TABLET ORAL at 20:16

## 2025-05-18 RX ADMIN — CHLORHEXIDINE GLUCONATE 15 ML: 1.2 RINSE ORAL at 08:47

## 2025-05-18 RX ADMIN — METOPROLOL TARTRATE 12.5 MG: 25 TABLET, FILM COATED ORAL at 20:16

## 2025-05-18 RX ADMIN — FUROSEMIDE 20 MG: 10 INJECTION, SOLUTION INTRAMUSCULAR; INTRAVENOUS at 13:40

## 2025-05-18 RX ADMIN — ATORVASTATIN CALCIUM 40 MG: 40 TABLET, FILM COATED ORAL at 20:16

## 2025-05-18 RX ADMIN — MUPIROCIN: 20 OINTMENT TOPICAL at 08:47

## 2025-05-18 RX ADMIN — CHLORHEXIDINE GLUCONATE 15 ML: 1.2 RINSE ORAL at 20:17

## 2025-05-18 RX ADMIN — POTASSIUM CHLORIDE 40 MEQ: 750 TABLET, FILM COATED, EXTENDED RELEASE ORAL at 08:45

## 2025-05-18 RX ADMIN — FAMOTIDINE 20 MG: 20 TABLET, FILM COATED ORAL at 08:45

## 2025-05-18 RX ADMIN — FUROSEMIDE 20 MG: 10 INJECTION, SOLUTION INTRAMUSCULAR; INTRAVENOUS at 17:40

## 2025-05-18 RX ADMIN — METHOCARBAMOL 750 MG: 750 TABLET ORAL at 08:45

## 2025-05-18 RX ADMIN — MUPIROCIN: 20 OINTMENT TOPICAL at 20:17

## 2025-05-18 RX ADMIN — Medication 400 MG: at 20:16

## 2025-05-18 RX ADMIN — ACETAMINOPHEN 1000 MG: 500 TABLET ORAL at 11:46

## 2025-05-18 RX ADMIN — METHOCARBAMOL 750 MG: 750 TABLET ORAL at 13:46

## 2025-05-18 RX ADMIN — APIXABAN 5 MG: 5 TABLET, FILM COATED ORAL at 08:45

## 2025-05-18 RX ADMIN — Medication 400 MG: at 08:45

## 2025-05-18 RX ADMIN — METOPROLOL TARTRATE 12.5 MG: 25 TABLET, FILM COATED ORAL at 13:41

## 2025-05-18 RX ADMIN — ACETAMINOPHEN 1000 MG: 500 TABLET ORAL at 05:13

## 2025-05-18 RX ADMIN — Medication 3 MG: at 23:14

## 2025-05-18 RX ADMIN — FAMOTIDINE 20 MG: 20 TABLET, FILM COATED ORAL at 20:16

## 2025-05-18 RX ADMIN — DIPHENHYDRAMINE HYDROCHLORIDE 25 MG: 25 CAPSULE ORAL at 20:15

## 2025-05-18 RX ADMIN — APIXABAN 5 MG: 5 TABLET, FILM COATED ORAL at 20:30

## 2025-05-18 RX ADMIN — ACETAMINOPHEN 1000 MG: 500 TABLET ORAL at 23:14

## 2025-05-18 RX ADMIN — METHOCARBAMOL 750 MG: 750 TABLET ORAL at 20:15

## 2025-05-18 RX ADMIN — METHOCARBAMOL 750 MG: 750 TABLET ORAL at 17:39

## 2025-05-18 RX ADMIN — SODIUM CHLORIDE, PRESERVATIVE FREE 10 ML: 5 INJECTION INTRAVENOUS at 08:46

## 2025-05-18 RX ADMIN — ACETAMINOPHEN 1000 MG: 500 TABLET ORAL at 17:40

## 2025-05-18 RX ADMIN — AMIODARONE HYDROCHLORIDE 400 MG: 200 TABLET ORAL at 08:45

## 2025-05-18 RX ADMIN — SODIUM CHLORIDE, PRESERVATIVE FREE 10 ML: 5 INJECTION INTRAVENOUS at 20:17

## 2025-05-18 ASSESSMENT — PAIN SCALES - GENERAL
PAINLEVEL_OUTOF10: 2
PAINLEVEL_OUTOF10: 1
PAINLEVEL_OUTOF10: 1
PAINLEVEL_OUTOF10: 0

## 2025-05-18 ASSESSMENT — PAIN DESCRIPTION - ORIENTATION: ORIENTATION: LOWER

## 2025-05-18 ASSESSMENT — PAIN DESCRIPTION - LOCATION: LOCATION: BACK

## 2025-05-18 ASSESSMENT — PAIN DESCRIPTION - DESCRIPTORS: DESCRIPTORS: ACHING

## 2025-05-19 ENCOUNTER — APPOINTMENT (OUTPATIENT)
Facility: HOSPITAL | Age: 75
DRG: 235 | End: 2025-05-19
Attending: INTERNAL MEDICINE
Payer: MEDICARE

## 2025-05-19 LAB
ANION GAP SERPL CALC-SCNC: 4 MMOL/L (ref 2–12)
BUN SERPL-MCNC: 18 MG/DL (ref 6–20)
BUN/CREAT SERPL: 21 (ref 12–20)
CALCIUM SERPL-MCNC: 8.1 MG/DL (ref 8.5–10.1)
CHLORIDE SERPL-SCNC: 99 MMOL/L (ref 97–108)
CO2 SERPL-SCNC: 27 MMOL/L (ref 21–32)
CREAT SERPL-MCNC: 0.84 MG/DL (ref 0.7–1.3)
ERYTHROCYTE [DISTWIDTH] IN BLOOD BY AUTOMATED COUNT: 12.9 % (ref 11.5–14.5)
GLUCOSE SERPL-MCNC: 101 MG/DL (ref 65–100)
HCT VFR BLD AUTO: 29.4 % (ref 36.6–50.3)
HGB BLD-MCNC: 9.9 G/DL (ref 12.1–17)
MAGNESIUM SERPL-MCNC: 2.5 MG/DL (ref 1.6–2.4)
MCH RBC QN AUTO: 31.4 PG (ref 26–34)
MCHC RBC AUTO-ENTMCNC: 33.7 G/DL (ref 30–36.5)
MCV RBC AUTO: 93.3 FL (ref 80–99)
NRBC # BLD: 0.03 K/UL (ref 0–0.01)
NRBC BLD-RTO: 0.3 PER 100 WBC
PLATELET # BLD AUTO: 178 K/UL (ref 150–400)
PMV BLD AUTO: 9.9 FL (ref 8.9–12.9)
POTASSIUM SERPL-SCNC: 4.5 MMOL/L (ref 3.5–5.1)
RBC # BLD AUTO: 3.15 M/UL (ref 4.1–5.7)
SODIUM SERPL-SCNC: 130 MMOL/L (ref 136–145)
WBC # BLD AUTO: 9.2 K/UL (ref 4.1–11.1)

## 2025-05-19 PROCEDURE — 97116 GAIT TRAINING THERAPY: CPT

## 2025-05-19 PROCEDURE — 6370000000 HC RX 637 (ALT 250 FOR IP)

## 2025-05-19 PROCEDURE — 97535 SELF CARE MNGMENT TRAINING: CPT

## 2025-05-19 PROCEDURE — 83735 ASSAY OF MAGNESIUM: CPT

## 2025-05-19 PROCEDURE — 2500000003 HC RX 250 WO HCPCS

## 2025-05-19 PROCEDURE — 6370000000 HC RX 637 (ALT 250 FOR IP): Performed by: NURSE PRACTITIONER

## 2025-05-19 PROCEDURE — 71045 X-RAY EXAM CHEST 1 VIEW: CPT

## 2025-05-19 PROCEDURE — 2500000003 HC RX 250 WO HCPCS: Performed by: NURSE PRACTITIONER

## 2025-05-19 PROCEDURE — 6360000002 HC RX W HCPCS

## 2025-05-19 PROCEDURE — 85027 COMPLETE CBC AUTOMATED: CPT

## 2025-05-19 PROCEDURE — 97110 THERAPEUTIC EXERCISES: CPT

## 2025-05-19 PROCEDURE — 2060000000 HC ICU INTERMEDIATE R&B

## 2025-05-19 PROCEDURE — 80048 BASIC METABOLIC PNL TOTAL CA: CPT

## 2025-05-19 RX ADMIN — AMIODARONE HYDROCHLORIDE 400 MG: 200 TABLET ORAL at 21:19

## 2025-05-19 RX ADMIN — ASPIRIN 81 MG: 81 TABLET, COATED ORAL at 08:55

## 2025-05-19 RX ADMIN — APIXABAN 5 MG: 5 TABLET, FILM COATED ORAL at 21:21

## 2025-05-19 RX ADMIN — ACETAMINOPHEN 1000 MG: 500 TABLET ORAL at 11:51

## 2025-05-19 RX ADMIN — SODIUM CHLORIDE, PRESERVATIVE FREE 10 ML: 5 INJECTION INTRAVENOUS at 21:30

## 2025-05-19 RX ADMIN — METHOCARBAMOL 750 MG: 750 TABLET ORAL at 08:54

## 2025-05-19 RX ADMIN — ACETAMINOPHEN 1000 MG: 500 TABLET ORAL at 06:15

## 2025-05-19 RX ADMIN — CHLORHEXIDINE GLUCONATE 15 ML: 1.2 RINSE ORAL at 08:54

## 2025-05-19 RX ADMIN — APIXABAN 5 MG: 5 TABLET, FILM COATED ORAL at 08:55

## 2025-05-19 RX ADMIN — METHOCARBAMOL 750 MG: 750 TABLET ORAL at 21:19

## 2025-05-19 RX ADMIN — FUROSEMIDE 20 MG: 10 INJECTION, SOLUTION INTRAMUSCULAR; INTRAVENOUS at 17:56

## 2025-05-19 RX ADMIN — SODIUM CHLORIDE, PRESERVATIVE FREE 10 ML: 5 INJECTION INTRAVENOUS at 08:55

## 2025-05-19 RX ADMIN — Medication 400 MG: at 21:19

## 2025-05-19 RX ADMIN — METHOCARBAMOL 750 MG: 750 TABLET ORAL at 17:56

## 2025-05-19 RX ADMIN — METOPROLOL TARTRATE 12.5 MG: 25 TABLET, FILM COATED ORAL at 08:55

## 2025-05-19 RX ADMIN — ACETAMINOPHEN 1000 MG: 500 TABLET ORAL at 17:55

## 2025-05-19 RX ADMIN — FUROSEMIDE 20 MG: 10 INJECTION, SOLUTION INTRAMUSCULAR; INTRAVENOUS at 08:54

## 2025-05-19 RX ADMIN — AMIODARONE HYDROCHLORIDE 400 MG: 200 TABLET ORAL at 08:54

## 2025-05-19 RX ADMIN — Medication 400 MG: at 08:55

## 2025-05-19 RX ADMIN — SENNOSIDES AND DOCUSATE SODIUM 1 TABLET: 50; 8.6 TABLET ORAL at 08:55

## 2025-05-19 RX ADMIN — METOPROLOL TARTRATE 12.5 MG: 25 TABLET, FILM COATED ORAL at 21:22

## 2025-05-19 RX ADMIN — ATORVASTATIN CALCIUM 40 MG: 40 TABLET, FILM COATED ORAL at 21:23

## 2025-05-19 RX ADMIN — METHOCARBAMOL 750 MG: 750 TABLET ORAL at 11:51

## 2025-05-19 RX ADMIN — CHLORHEXIDINE GLUCONATE 15 ML: 1.2 RINSE ORAL at 21:30

## 2025-05-19 ASSESSMENT — PAIN SCALES - GENERAL
PAINLEVEL_OUTOF10: 1
PAINLEVEL_OUTOF10: 1
PAINLEVEL_OUTOF10: 0
PAINLEVEL_OUTOF10: 1
PAINLEVEL_OUTOF10: 2

## 2025-05-19 ASSESSMENT — PAIN DESCRIPTION - PAIN TYPE
TYPE: CHRONIC PAIN
TYPE: CHRONIC PAIN

## 2025-05-19 ASSESSMENT — PAIN DESCRIPTION - LOCATION
LOCATION: BACK
LOCATION: BACK

## 2025-05-19 ASSESSMENT — PAIN DESCRIPTION - FREQUENCY
FREQUENCY: INTERMITTENT
FREQUENCY: INTERMITTENT

## 2025-05-19 ASSESSMENT — PAIN DESCRIPTION - DESCRIPTORS
DESCRIPTORS: ACHING
DESCRIPTORS: ACHING

## 2025-05-19 ASSESSMENT — PAIN DESCRIPTION - ORIENTATION
ORIENTATION: UPPER;LOWER
ORIENTATION: UPPER;LOWER

## 2025-05-19 ASSESSMENT — PAIN DESCRIPTION - ONSET: ONSET: ON-GOING

## 2025-05-19 ASSESSMENT — PAIN - FUNCTIONAL ASSESSMENT: PAIN_FUNCTIONAL_ASSESSMENT: ACTIVITIES ARE NOT PREVENTED

## 2025-05-19 NOTE — CARDIO/PULMONARY
Chart reviewed: Patient is 74 y.o. male admitted with Multiple vessel coronary artery disease [I25.10]  CAD in native artery [I25.10]    Education: ERAS education folder at bedside.  Met with Hector Jensen Jr to begin cardiac surgery post discharge instructions and to discuss participation in the Cardiac Rehab Program.     Educated using teach back method. Reviewed the use of bear for sternal support, daily weight and temperature monitoring, and use of incentive spirometer.   Hector Jensen Jr was able to demonstrate proper use of incentive spirometer, achieving 5201-8114 ml. .     Discussed Cardiac Rehab Program format, benefits, and encouraged participation.A referral was placed for the OP program at [facility] and contact information is on his AVS. General questions answered. Hector Jensen Jr verbalized understanding.         Ashley Oneal RN

## 2025-05-19 NOTE — CARE COORDINATION
Transition of Care Plan:     RUR: 12%  Prior Level of Functioning: Lives w spouse and reg, 1st floor set up, ind and drives   Disposition: AT Home Care HH and AVS updated  CASSANDRA: Today?  Follow up appointments: Defer to AVS recommendations  DME needed: PT-assessing and OT-shower chair and not covered under Medicare.   Transportation at discharge: Family  IM/IMM Medicare/ letter given: 5/13  Is patient a [facility] and connected with [facility]?               If yes, was [facility] transfer form completed and [facility] notified?   Caregiver Contact: Wife, Suzanna Jensen [phone]  Discharge Caregiver contacted prior to discharge?   Care Conference needed?   Barriers to discharge: Medical     AT Home Care HH accepted and AVS updated.     REBECA Pryor CCM  Care Management   Available on Perfect Serve or [phone]

## 2025-05-20 ENCOUNTER — APPOINTMENT (OUTPATIENT)
Facility: HOSPITAL | Age: 75
DRG: 235 | End: 2025-05-20
Attending: INTERNAL MEDICINE
Payer: MEDICARE

## 2025-05-20 VITALS
WEIGHT: 143.74 LBS | BODY MASS INDEX: 24.54 KG/M2 | RESPIRATION RATE: 25 BRPM | HEART RATE: 84 BPM | SYSTOLIC BLOOD PRESSURE: 147 MMHG | DIASTOLIC BLOOD PRESSURE: 74 MMHG | OXYGEN SATURATION: 92 % | TEMPERATURE: 98 F | HEIGHT: 64 IN

## 2025-05-20 PROBLEM — R73.9 STRESS HYPERGLYCEMIA: Status: RESOLVED | Noted: 2025-05-16 | Resolved: 2025-05-20

## 2025-05-20 LAB
ANION GAP SERPL CALC-SCNC: 6 MMOL/L (ref 2–12)
APTT PPP: 30.4 SEC (ref 22.1–31)
BUN SERPL-MCNC: 19 MG/DL (ref 6–20)
BUN/CREAT SERPL: 27 (ref 12–20)
CALCIUM SERPL-MCNC: 7.9 MG/DL (ref 8.5–10.1)
CHLORIDE SERPL-SCNC: 96 MMOL/L (ref 97–108)
CO2 SERPL-SCNC: 26 MMOL/L (ref 21–32)
CREAT SERPL-MCNC: 0.71 MG/DL (ref 0.7–1.3)
ERYTHROCYTE [DISTWIDTH] IN BLOOD BY AUTOMATED COUNT: 12.8 % (ref 11.5–14.5)
GLUCOSE SERPL-MCNC: 108 MG/DL (ref 65–100)
HCT VFR BLD AUTO: 28.8 % (ref 36.6–50.3)
HGB BLD-MCNC: 9.8 G/DL (ref 12.1–17)
INR PPP: 1.1 (ref 0.9–1.1)
MAGNESIUM SERPL-MCNC: 2.3 MG/DL (ref 1.6–2.4)
MCH RBC QN AUTO: 31.6 PG (ref 26–34)
MCHC RBC AUTO-ENTMCNC: 34 G/DL (ref 30–36.5)
MCV RBC AUTO: 92.9 FL (ref 80–99)
NRBC # BLD: 0 K/UL (ref 0–0.01)
NRBC BLD-RTO: 0 PER 100 WBC
PLATELET # BLD AUTO: 207 K/UL (ref 150–400)
PMV BLD AUTO: 9.3 FL (ref 8.9–12.9)
POTASSIUM SERPL-SCNC: 3.9 MMOL/L (ref 3.5–5.1)
PROTHROMBIN TIME: 11.7 SEC (ref 9.2–11.2)
RBC # BLD AUTO: 3.1 M/UL (ref 4.1–5.7)
SODIUM SERPL-SCNC: 128 MMOL/L (ref 136–145)
THERAPEUTIC RANGE: NORMAL SECS (ref 58–77)
WBC # BLD AUTO: 12 K/UL (ref 4.1–11.1)

## 2025-05-20 PROCEDURE — 71046 X-RAY EXAM CHEST 2 VIEWS: CPT

## 2025-05-20 PROCEDURE — 97535 SELF CARE MNGMENT TRAINING: CPT

## 2025-05-20 PROCEDURE — 80048 BASIC METABOLIC PNL TOTAL CA: CPT

## 2025-05-20 PROCEDURE — 85730 THROMBOPLASTIN TIME PARTIAL: CPT

## 2025-05-20 PROCEDURE — 6360000002 HC RX W HCPCS: Performed by: NURSE PRACTITIONER

## 2025-05-20 PROCEDURE — 6370000000 HC RX 637 (ALT 250 FOR IP): Performed by: NURSE PRACTITIONER

## 2025-05-20 PROCEDURE — 83735 ASSAY OF MAGNESIUM: CPT

## 2025-05-20 PROCEDURE — 2500000003 HC RX 250 WO HCPCS: Performed by: NURSE PRACTITIONER

## 2025-05-20 PROCEDURE — 85027 COMPLETE CBC AUTOMATED: CPT

## 2025-05-20 PROCEDURE — 85610 PROTHROMBIN TIME: CPT

## 2025-05-20 RX ORDER — ACETAMINOPHEN 500 MG
1000 TABLET ORAL EVERY 6 HOURS PRN
Qty: 120 TABLET | Refills: 0 | Status: SHIPPED
Start: 2025-05-20 | End: 2025-06-04

## 2025-05-20 RX ORDER — FUROSEMIDE 40 MG/1
40 TABLET ORAL DAILY
Qty: 7 TABLET | Refills: 0 | Status: SHIPPED | OUTPATIENT
Start: 2025-05-20 | End: 2025-05-27

## 2025-05-20 RX ORDER — SENNA AND DOCUSATE SODIUM 50; 8.6 MG/1; MG/1
1 TABLET, FILM COATED ORAL 2 TIMES DAILY
Qty: 30 TABLET | Refills: 0 | Status: SHIPPED | OUTPATIENT
Start: 2025-05-20 | End: 2025-06-04

## 2025-05-20 RX ORDER — LANOLIN ALCOHOL/MO/W.PET/CERES
400 CREAM (GRAM) TOPICAL 2 TIMES DAILY
Qty: 30 TABLET | Refills: 0 | Status: SHIPPED | OUTPATIENT
Start: 2025-05-20 | End: 2025-06-09

## 2025-05-20 RX ORDER — METHOCARBAMOL 750 MG/1
750 TABLET, FILM COATED ORAL 4 TIMES DAILY
Qty: 120 TABLET | Refills: 0 | Status: SHIPPED | OUTPATIENT
Start: 2025-05-20 | End: 2025-06-19

## 2025-05-20 RX ORDER — LIDOCAINE 4 G/G
2 PATCH TOPICAL DAILY
Qty: 10 PATCH | Refills: 0 | Status: SHIPPED | OUTPATIENT
Start: 2025-05-21

## 2025-05-20 RX ORDER — AMIODARONE HYDROCHLORIDE 200 MG/1
TABLET ORAL
Qty: 80 TABLET | Refills: 0 | Status: SHIPPED | OUTPATIENT
Start: 2025-05-20 | End: 2025-06-14

## 2025-05-20 RX ORDER — POTASSIUM CHLORIDE 1500 MG/1
20 TABLET, EXTENDED RELEASE ORAL DAILY
Qty: 7 TABLET | Refills: 0 | Status: SHIPPED | OUTPATIENT
Start: 2025-05-20 | End: 2025-05-27

## 2025-05-20 RX ORDER — POLYETHYLENE GLYCOL 3350 17 G/17G
17 POWDER, FOR SOLUTION ORAL DAILY
Qty: 15 PACKET | Refills: 0 | Status: SHIPPED | OUTPATIENT
Start: 2025-05-21 | End: 2025-06-05

## 2025-05-20 RX ORDER — OXYCODONE HYDROCHLORIDE 5 MG/1
5 TABLET ORAL EVERY 6 HOURS PRN
Qty: 10 TABLET | Refills: 0 | Status: SHIPPED | OUTPATIENT
Start: 2025-05-20 | End: 2025-05-23

## 2025-05-20 RX ORDER — METOPROLOL TARTRATE 25 MG/1
12.5 TABLET, FILM COATED ORAL 2 TIMES DAILY
Qty: 60 TABLET | Refills: 3 | Status: SHIPPED | OUTPATIENT
Start: 2025-05-20

## 2025-05-20 RX ORDER — POTASSIUM CHLORIDE 1500 MG/1
20 TABLET, EXTENDED RELEASE ORAL
Status: SHIPPED | OUTPATIENT
Start: 2025-05-20 | End: 2025-05-27

## 2025-05-20 RX ORDER — ATORVASTATIN CALCIUM 40 MG/1
40 TABLET, FILM COATED ORAL NIGHTLY
Qty: 30 TABLET | Refills: 3 | Status: SHIPPED | OUTPATIENT
Start: 2025-05-20

## 2025-05-20 RX ADMIN — METHOCARBAMOL 750 MG: 750 TABLET ORAL at 11:58

## 2025-05-20 RX ADMIN — SODIUM CHLORIDE, PRESERVATIVE FREE 10 ML: 5 INJECTION INTRAVENOUS at 09:00

## 2025-05-20 RX ADMIN — AMIODARONE HYDROCHLORIDE 400 MG: 200 TABLET ORAL at 08:59

## 2025-05-20 RX ADMIN — ACETAMINOPHEN 1000 MG: 500 TABLET ORAL at 06:48

## 2025-05-20 RX ADMIN — METHOCARBAMOL 750 MG: 750 TABLET ORAL at 08:59

## 2025-05-20 RX ADMIN — SENNOSIDES AND DOCUSATE SODIUM 1 TABLET: 50; 8.6 TABLET ORAL at 08:59

## 2025-05-20 RX ADMIN — ACETAMINOPHEN 1000 MG: 500 TABLET ORAL at 11:58

## 2025-05-20 RX ADMIN — FUROSEMIDE 20 MG: 10 INJECTION, SOLUTION INTRAMUSCULAR; INTRAVENOUS at 08:59

## 2025-05-20 RX ADMIN — METOPROLOL TARTRATE 12.5 MG: 25 TABLET, FILM COATED ORAL at 08:59

## 2025-05-20 RX ADMIN — APIXABAN 5 MG: 5 TABLET, FILM COATED ORAL at 08:59

## 2025-05-20 RX ADMIN — ASPIRIN 81 MG: 81 TABLET, COATED ORAL at 08:59

## 2025-05-20 RX ADMIN — Medication 400 MG: at 08:59

## 2025-05-20 RX ADMIN — CHLORHEXIDINE GLUCONATE 15 ML: 1.2 RINSE ORAL at 09:00

## 2025-05-20 RX ADMIN — ACETAMINOPHEN 1000 MG: 500 TABLET ORAL at 00:00

## 2025-05-20 ASSESSMENT — PAIN SCALES - GENERAL
PAINLEVEL_OUTOF10: 3
PAINLEVEL_OUTOF10: 0
PAINLEVEL_OUTOF10: 0

## 2025-05-20 ASSESSMENT — PAIN DESCRIPTION - LOCATION: LOCATION: BACK

## 2025-05-20 NOTE — DISCHARGE INSTRUCTIONS
either one of these appointments.   You will be receiving a call before your 5 day appointment to begin cardiac rehab. They are programs [facility].  The contact information is located in your Cardiac Surgery booklet.  Please call if you have not been contacted 2-3 weeks after discharge from the hospital.  We will make an appointment with your cardiologist at your last appointment.  Consult you primary care physician regarding your influenza &   pneumovax vaccines.        5.   Please bring all medications with you to your appointment.        Signature:___________________________________________________      Sternal Dressing Instructions:    Your sternal dressing is a lightweight mesh that’s applied over your incision, then coated with a skin adhesive to create a strong, flexible seal that protects against water and bacteria.    Your healthcare team chose to use Prineo system on your sternal incision. Your Home Health provider can remove the Prineo dressing on the sternum 2 weeks after your surgical date.     Prineo stays on for 10-14 days. If you notice the edge of the dressing peeling up, trim the edge but do not remove the dressing.  Prineo may be removed on 5/28 by home health.  Don’t scratch, rub or pick at it.    Do not apply topical lotions, ointments, or liquids  You may shower and let soap and water water run on the dressing, but do not scrub it.  Be sure to lightly pat it dry when you exit the shower.

## 2025-05-20 NOTE — PROCEDURES
PROCEDURE NOTE  Patient has not used pacing wires in > 24 hours, HR stable after initiation of metoprolol. V-wires discontinued via cutting of wires. Skin cleaned with chlorohexidine swab, pressure applied to pull wires taut and skin pushed down using scissors. Wires cut, skin released back over remaining wire ends. Patient tolerated well, hemodynamically stable at end of procedure.     Susu Brandon PA-C

## 2025-05-20 NOTE — PLAN OF CARE
Problem: Discharge Planning  Goal: Discharge to home or other facility with appropriate resources  Outcome: Progressing     Problem: Safety - Adult  Goal: Free from fall injury  Outcome: Progressing     
  Problem: Occupational Therapy - Adult  Goal: By Discharge: Performs self-care activities at highest level of function for planned discharge setting.  See evaluation for individualized goals.  Description: FUNCTIONAL STATUS PRIOR TO ADMISSION:  Patient lives with wife and was independent with all ADLs/IADLs prior to admission. Patient lives next to supportive daughter who can assist as needed.     HOME SUPPORT: Patient lived with wife but didn't require assistance.    Occupational Therapy Goals:    Re-evaluation 5/15/2025 (s/p CABG)  1.  Patient will perform ADLs standing 5 mins without fatigue or LOB with Contact Guard Assist within 7 days.  2.  Patient will perform upper body ADLs with Minimal Assist while adhering to precautions within 7 days.  3.  Patient will perform lower body ADLs with Moderate Assist using tailor sit technique vs AE prn within 7 days.    4.  Patient will perform gathering ADL items high and low 2/2 with Contact Guard Assist within 7 days.  5.  Patient will perform toilet transfers with Contact Guard Assist within 7 days.  6.  Patient will perform all aspects of toileting with Moderate Assist within 7 days.  7.  Patient will participate in cardiac/sternal upper extremity therapeutic exercise/activities to increase independence with ADLs with supervision/set-up for 5 minutes within 7 days.   8.  Patient will adhere to strict sternal precautions during functional tasks with min verbal cues within 7 days.    Initiated 5/13/2025  1.  Patient will perform grooming standing with [place] within 7 day(s).  2.  Patient will perform upper body dressing seated with [place] within 7 day(s).  3.  Patient will perform lower body bathing seated with [place] within 7 day(s).  4.  Patient will perform toilet transfers with [place]  within 7 day(s).  5.  Patient will perform all aspects of toileting with [place] within 7 day(s).  6.  Patient will participate in upper extremity 
  Problem: Occupational Therapy - Adult  Goal: By Discharge: Performs self-care activities at highest level of function for planned discharge setting.  See evaluation for individualized goals.  Description: FUNCTIONAL STATUS PRIOR TO ADMISSION:  Patient lives with wife and was independent with all ADLs/IADLs prior to admission. Patient lives next to supportive daughter who can assist as needed.     HOME SUPPORT: Patient lived with wife but didn't require assistance.    Occupational Therapy Goals:    Re-evaluation 5/15/2025 (s/p CABG)  1.  Patient will perform ADLs standing 5 mins without fatigue or LOB with Contact Guard Assist within 7 days.  2.  Patient will perform upper body ADLs with Minimal Assist while adhering to precautions within 7 days.  3.  Patient will perform lower body ADLs with Moderate Assist using tailor sit technique vs AE prn within 7 days.    4.  Patient will perform gathering ADL items high and low 2/2 with Contact Guard Assist within 7 days.  5.  Patient will perform toilet transfers with Contact Guard Assist within 7 days.  6.  Patient will perform all aspects of toileting with Moderate Assist within 7 days.  7.  Patient will participate in cardiac/sternal upper extremity therapeutic exercise/activities to increase independence with ADLs with supervision/set-up for 5 minutes within 7 days.   8.  Patient will adhere to strict sternal precautions during functional tasks with min verbal cues within 7 days.    Initiated 5/13/2025  1.  Patient will perform grooming standing with [place] within 7 day(s).  2.  Patient will perform upper body dressing seated with [place] within 7 day(s).  3.  Patient will perform lower body bathing seated with [place] within 7 day(s).  4.  Patient will perform toilet transfers with [place]  within 7 day(s).  5.  Patient will perform all aspects of toileting with [place] within 7 day(s).  6.  Patient will participate in upper extremity 
  Problem: Physical Therapy - Adult  Goal: By Discharge: Performs mobility at highest level of function for planned discharge setting.  See evaluation for individualized goals.  Description: FUNCTIONAL STATUS PRIOR TO ADMISSION: Patient was independent and active without use of DME. Pt does all the cooking, cleaning, driving    HOME SUPPORT PRIOR TO ADMISSION: The patient lived with spouse but did not require assistance.    Physical Therapy Goals  Initiated 5/13/2025  1.  Patient will move from supine to sit and sit to supine and roll side to side in bed with modified independence within 7 day(s).    2.  Patient will perform sit to stand with modified independence within 7 day(s).  3.  Patient will transfer from bed to chair and chair to bed with modified independence using the least restrictive device within 7 day(s).  4.  Patient will ambulate with modified independence for 100 feet with the least restrictive device within 7 day(s).   5.  Patient will ascend/descend 5 stairs with single handrail(s) with supervision/set-up within 7 day(s).    Outcome: Progressing    PHYSICAL THERAPY TREATMENT    Patient: Hector Kerr  (74 y.o. 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 Patient able to progress his gait tolerance to 85 ft with overall contact guard/minimal assistance. BP continues to remain soft, however, no indication of orthostatic hypotension today.     Reviewed mindful-based movement 
  Problem: Physical Therapy - Adult  Goal: By Discharge: Performs mobility at highest level of function for planned discharge setting.  See evaluation for individualized goals.  Description: FUNCTIONAL STATUS PRIOR TO ADMISSION: Patient was independent and active without use of DME. Pt does all the cooking, cleaning, driving    HOME SUPPORT PRIOR TO ADMISSION: The patient lived with spouse but did not require assistance.    Physical Therapy Goals  Initiated 5/13/2025  1.  Patient will move from supine to sit and sit to supine and roll side to side in bed with modified independence within 7 day(s).    2.  Patient will perform sit to stand with modified independence within 7 day(s).  3.  Patient will transfer from bed to chair and chair to bed with modified independence using the least restrictive device within 7 day(s).  4.  Patient will ambulate with modified independence for 100 feet with the least restrictive device within 7 day(s).   5.  Patient will ascend/descend 5 stairs with single handrail(s) with supervision/set-up within 7 day(s).    Outcome: Progressing   PHYSICAL THERAPY TREATMENT    Patient: Hector Kerr  (74 y.o. 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 Patient received supine in bed, agreeable to therapy. Resting on 4L/min, off pressors, but had received 12.5 mg of metoprolol this AM. Instructed in log roll and implications for strict sternal precautions on bed mobility. BP low at rest 
  Problem: Physical Therapy - Adult  Goal: By Discharge: Performs mobility at highest level of function for planned discharge setting.  See evaluation for individualized goals.  Description: FUNCTIONAL STATUS PRIOR TO ADMISSION: Patient was independent and active without use of DME. Pt does all the cooking, cleaning, driving    HOME SUPPORT PRIOR TO ADMISSION: The patient lived with spouse but did not require assistance.    Physical Therapy Goals  Initiated 5/13/2025  1.  Patient will move from supine to sit and sit to supine and roll side to side in bed with modified independence within 7 day(s).    2.  Patient will perform sit to stand with modified independence within 7 day(s).  3.  Patient will transfer from bed to chair and chair to bed with modified independence using the least restrictive device within 7 day(s).  4.  Patient will ambulate with modified independence for 100 feet with the least restrictive device within 7 day(s).   5.  Patient will ascend/descend 5 stairs with single handrail(s) with supervision/set-up within 7 day(s).    Outcome: Progressing   PHYSICAL THERAPY TREATMENT    Patient: Hector Kerr  (74 y.o. 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 Patient received in recliner, agreeable to therapy, stable on room air. Participated in ambulation around the unit and to stairs with SpO2 stable on room air and BP stable. HR resting 80's in NSR and brief 
   Essential hypertension     High cholesterol     Stroke (HCC)     Sun-damaged skin      Past Surgical History:   Procedure Laterality Date    CARDIAC PROCEDURE N/A 5/12/2025    Left heart cath / coronary angiography performed by Braydon Sorto MD at [facility] CARDIAC CATH LAB    CAROTID ENDARTERECTOMY Right     1996    COLONOSCOPY      COLONOSCOPY N/A 12/29/2023    COLONOSCOPY performed by Finesse Cruz MD at [facility] ENDOSCOPY    COLONOSCOPY N/A 12/29/2023    COLONOSCOPY POLYPECTOMY SNARE/COLD BIOPSY performed by Finesse Cruz MD at [facility] ENDOSCOPY    CORONARY ARTERY BYPASS GRAFT N/A 5/14/2025    CORONARY ARTERY BYPASS GRAFTING X 3 WITH LIMA, BESVGH, ECC, NEHEMIAH AND EPIAORTIC U/S BY DR KERR performed by Shola Ragsdale MD at [facility] OPEN HEART    ELBOW SURGERY Left     INVASIVE VASCULAR N/A 5/12/2025    Ultrasound guided vascular access performed by Braydon Sorto MD at [facility] CARDIAC CATH LAB    OH UNLISTED PROCEDURE CARDIAC SURGERY         Home Situation:  Social/Functional History  Lives With: Spouse  Type of Home: House  Home Layout: Two level, Able to Live on Main level with bedroom/bathroom  Home Access: Stairs to enter with rails  Entrance Stairs - Number of Steps: 4  Entrance Stairs - Rails: Left  Bathroom Shower/Tub: Tub/Shower unit  Bathroom Equipment: None  Home Equipment: None  Has the patient had two or more falls in the past year or any fall with injury in the past year?: No  Receives Help From: Family  Prior Level of Assist for ADLs: Independent  Prior Level of Assist for Homemaking: Independent  Prior Level of Assist for Transfers: Independent  Active : Yes  Mode of Transportation: Car  Occupation: Retired  Type of Occupation: [employer]  Critical Behavior:  Orientation  Overall Orientation Status: Within Normal Limits  Orientation Level: Oriented X4  Cognition  Overall Cognitive Status: WNL    Hearing:   [place], wears hearing aids     Strength:    Strength: Within functional limits    Tone & Sensation: 
CARDIAC CATH LAB    CAROTID ENDARTERECTOMY Right     1996    COLONOSCOPY      COLONOSCOPY N/A 12/29/2023    COLONOSCOPY performed by Finesse Cruz MD at [facility] ENDOSCOPY    COLONOSCOPY N/A 12/29/2023    COLONOSCOPY POLYPECTOMY SNARE/COLD BIOPSY performed by Finesse Cruz MD at [facility] ENDOSCOPY    ELBOW SURGERY Left     NE UNLISTED PROCEDURE CARDIAC SURGERY            Expanded or extensive additional review of patient history:   Social/Functional History  Lives With: Spouse, Other (Comment) (Grandchild)  Type of Home: House  Home Layout: Multi-level, Able to Live on Main level with bedroom/bathroom (Basement and upstairs but he can live on main floor)  Home Access: Stairs to enter with rails  Entrance Stairs - Number of Steps: 6  Entrance Stairs - Rails: Left  Bathroom Shower/Tub: Tub/Shower unit  Bathroom Equipment: None (Walk in shower he can use with bench and rails)  Home Equipment: Cane, Rollator (Wife uses SPC/rollator)  Has the patient had two or more falls in the past year or any fall with injury in the past year?: No  Prior Level of Assist for ADLs: Independent  Prior Level of Assist for Homemaking: Independent  Prior Level of Assist for Transfers: Independent  Active : Yes  Mode of Transportation: Car  Occupation: Retired  Type of Occupation: [employer]      Hand Dominance: right     EXAMINATION OF PERFORMANCE DEFICITS:    Cognitive/Behavioral Status:  Orientation  Overall Orientation Status: Within Normal Limits  Orientation Level: Oriented X4  Cognition  Overall Cognitive Status: WNL    Hearing:   Hearing  Hearing: Exceptions to WFL  Hearing Exceptions: Hard of hearing/hearing concerns, Bilateral hearing aid    Vision/Perceptual:             Perception  Overall Perceptual Status: WFL  Vision  Vision: Impaired  Vision Exceptions: Wears glasses at all times         Range of Motion:   AROM: Within functional limits  PROM: Within functional limits      Strength:  Strength: Within functional 
Intact  Standing: Impaired  Standing - Static: Fair;Unsupported  Standing - Dynamic: Fair;Unsupported   Ambulation/Gait Training:     Gait  Gait Training: Yes  Overall Level of Assistance: Contact guard assistance  Distance (ft): 180 Feet  Assistive Device: None  Interventions: Verbal cues  Base of Support: Narrowed  Speed/Philomena: Slow;Shuffled  Step Length: Right shortened;Left shortened  Gait Abnormalities: Decreased step clearance        Neuro Re-Education:                                                                                                                                                                                                                                         Intervention/Education specific to: \"Move in the tube\"  Patient mobilized on continuous portable monitor/telemetry.    The patient is verbalizing and is demonstrating understanding of mindful-based movements (\"move in the tube\") principles of keeping UEs proximal to ribcage to prevent lateral pull on the sternum during load-bearing activities with verbal and manual cues required for compliance.    Cardiac diagnosis intervention:  Patient instructed and educated on mindful movement principles based on “Move in The Tube” concept to include maintaining bilateral elbows close to rib cage when performing any load-bearing activity such as getting in/out of bed, pushing up from a chair, opening a door, or lifting a box.  Patient was given a handout with diagrams of each correct/incorrect method of performing each of the above tasks.    Therapeutic Exercises:   Patient instructed on the benefits and demonstrated cardiac exercises while seated with Supervision. Instructed and indicated understanding on how to progress reps, sets against gravity, pacing through progressive muscle strengthening standing based on surgeon clearance for more weight in prep for functional activity. Instruction on the use of household items in place of weights as 
chair, shower kit issued 5/19       SUBJECTIVE:   Patient stated “I feel like I did okay with it this morning.” re: bowel hygiene    OBJECTIVE DATA SUMMARY:   Cognitive/Behavioral Status:  Orientation  Overall Orientation Status: Within Normal Limits  Orientation Level: Oriented X4  Cognition  Overall Cognitive Status: WFL    Functional Mobility and Transfers for ADLs:  Bed Mobility:  Bed Mobility Training  Bed Mobility Training: No (Pt rec'd sitting up in chair and returned to chair at end of session)  Scooting: Stand by assistance (fwd/back in chair)     Transfers:   Transfer Training  Transfer Training: Yes  Interventions: Verbal cues;Safety awareness training  Sit to Stand: Stand by assistance  Stand to Sit: Stand by assistance           Balance:     Balance  Sitting: Intact  Standing: Intact  Standing - Static: Good  Standing - Dynamic: Not tested      ADL Intervention:    Lower Extremity Bathing: .  - Lower Extremity Bathing : Stand-by Assistance  using tailor sit technique    Patient instructed and educated on mindful movement principles based on strict sternal precautions maintaining bilateral elbows close to rib cage when performing any load-bearing activity.  Educated on applying this concept when getting in/out of bed, pushing up from a chair, opening a door, or lifting a box.  Patient has been provided handouts with diagrams of each correct/incorrect method of performing each of the above tasks.     Patient instructed on the ability to utilize upper extremities  up to shoulder height when doing any non-load bearing activity such as washing hair/body, brushing teeth, retrieving clothing items, or scratching your back. Patient encouraged to also perform upper extremity exercises \"outside of the tube\" to prevent scar tissue formation around sternal incision site.    Upper Body Dressing Education:    Pullover Shirt:     -place pull over shirt face down, thread bilateral UE through sleeves and pull up to mid 
Good  Ambulation/Gait Training:                       Gait  Gait Training: Yes  Overall Level of Assistance: Stand by assistance  Distance (ft): 40 Feet  Assistive Device: Gait belt  Speed/Philomena: Pace decreased (< 100 feet/min)  Step Length: Right shortened;Left shortened  Gait Abnormalities: Decreased step clearance                                                                                                                                                                                                                                         [facility]®      Basic Mobility Inpatient Short Form (6-Clicks) Version 2    How much help is needed turning from your back to your side while in a flat bed without using bedrails?: None  How much help is needed moving from lying on your back to sitting on the side of a flat bed without using bedrails?: None  How much help is needed moving to and from a bed to a chair?: None  How much help is needed standing up from a chair using your arms?: None  How much help is needed walking in hospital room?: None  How much help is needed climbing 3-5 steps with a railing?: A Little    [facility] Inpatient Mobility Raw Score : 23  -PAC Inpatient T-Scale Score : 56.93     Cutoff score <=171,2,3 had higher odds of discharging home with home health or need of SNF/IPR.    1. Harriet Casas, Dimas Estevez, Miriam Padgett, Morgan Schmitt, Aroldo Casas.  Validity of the -PAC “6-Clicks” Inpatient Daily Activity and Basic Mobility Short Forms. Physical Therapy Mar 2014, [phone] (3) 379-391; DOI: 10.2522/ptj.[email or web address]  2. Lee LEOS, Edelmira J, Barbara J, Wilmer KOHLER. Association of AM-PAC \"6-Clicks\" Basic Mobility and Daily Activity Scores With Discharge Destination. Phys Ther. 2021 Apr 4;101(4):nysg862. doi: 10.1093/ptj/[email or web address]. PMID: 78945737.  3. Omega KOHLER, Jeremiah TADEO, Carla S, Chato K, Ham S. Activity Measure for Post-Acute Care \"6-Clicks\" Basic 
(19) correlates to a good likelihood of discharging home versus a facility  Harriet Casas, Nighat Kearns, Dimas Palacios, Miriam Padgett, Morgna Schmitt, Aroldo Casas, AM-PAC “6-Clicks” Functional Assessment Scores Predict Acute Care Hospital Discharge Destination, Physical Therapy, Volume 94, Issue 9, 2014, Pages 1638-9611, [email or web address]       Pain Ratin/10   Pain Intervention(s):       Activity Tolerance:   Good  Please refer to the flowsheet for vital signs taken during this treatment.    After treatment:   Patient left in no apparent distress in bed, Call bell within reach, Side rails x3, and Heels elevated for pressure relief    COMMUNICATION/EDUCATION:   The patient's plan of care was discussed with: physical therapist and registered nurse    Patient Education  Education Given To: Patient  Education Provided: Role of Therapy;Plan of Care;IADL Safety;Energy Conservation;Transfer Training;Fall Prevention Strategies;Mobility Training;Precautions;ADL Adaptive Strategies;Family Education  Education Provided Comments: Strict sternal precautions  Education Method: Verbal;Demonstration;Teach Back  Barriers to Learning: None  Education Outcome: Verbalized understanding;Demonstrated understanding;Continued education needed    Thank you for this referral.  Blanca Virgen OT  Minutes: 22

## 2025-05-20 NOTE — DISCHARGE SUMMARY
yet available    Ask your nurse or doctor about these medications  acetaminophen 500 MG tablet       HPI:  \"Hector Jensen Jr is a 74 y.o. male with PMH of HTN, HLD, CVA (s/p R CEA 1996), T7 fracture and osteoporosis who was seen in consultation for multivessel CAD and possible CABG.       Mr. Jensen presented to [facility] ED on 5/11 with worsening CP/discomfort, diagnosed with NSTEMI and subsequently underwent LHC which revealed multivessel CAD. TTE with LVEF 55% and no significant valvular abnormalities.  He was then transferred for further CABG workup/evaluation.  At the time of his presentation he also noted some trouble catching his breath with the pain, but denies any new palpitations, orthopnea/PND, edema or claudication.  He had been recommended to have an outpatient LHC but his chest discomfort progressed prompting his visit to the ED.  Currently Mr. Jensen is without CP, hemodynamically stable on a heparin infusion.     He lives independently with his wife and is able to complete his ADLs without issue.  His daughter and grand-daughter are at bedside today.    Remote tobacco history (~26 pk yrs), no significant ETOH history or drug use.  No known family history of heart disease.\"  Taken from my consult note on 5/12.    Hospital Course:  Procedure:  5/14 with Dr. Ragsdale  PROCEDURES PERFORMED:       1. Coronary artery bypass grafting x3:        a. Reverse saphenous vein graft to PDA.        b. Reverse saphenous vein graft to OM.        c. Left internal mammary artery to LAD.     2. Endoscopic harvest of saphenous vein.     3. Epiaortic ultrasound.     Hospital Course:  Initially presented to [facility] with worsening CP and diagnosed with NSTEMI.  Subsequent LHC with multi-vessel CAD.  Placed on heparin drip and transferred to [facility] for CABG.  5/14 POD 0: Admitted to CVICU s/p CABG on Precedex and insulin infusions.  5/15 POD 1: Continued fluid resuscitation and weaning Levophed.  Went into Afib with RVR

## 2025-05-20 NOTE — PROGRESS NOTES
Incorrectly ordered potassium chloride as \"clinic administered\" at discharge.  Sending 20 mEq potassium chloride x 7 days to patient's home pharmacy.

## 2025-05-20 NOTE — CARE COORDINATION
CRISTINA    Patient discussed in Cardiac Surgery IDRs and he will be ready for d/c today.  CM met w patient and his visitor at bedside to discuss the d/c plan w  [facility] and to review an important message from Medicare. Patient verbalized understanding and gave permission for possible discharge within 4 hours of receiving IMM.  All questions have been answered and CM wished patient well.     Notified [facility] of d/c so they can follow up w start of care.    REBECA Pryor CCM  Care Management   Available on [employer] or [phone]

## 2025-05-20 NOTE — PROGRESS NOTES
CRITICAL CARE NOTE      Name: Hector Jensen Jr   : 1950   MRN: [id number]   Date: 5/15/2025      Reason for ICU Admission: s/p CABG    ICU PROBLEM LIST   Multivessel CAD s/p CABG x3  Mild AS  HTN  HLD  Hx distant CVA  Chronic T7 compression fx    HISTORY OF PRESENT ILLNESS:   Pt is a 75yo F w/ PMH as noted above who presented to [facility] for CTS eval for multivessel disease found during w/u for NSTEMI at OSH. Pt now s/p CABG x3 (LIMA-LAD, SVG-OM, SVG-RCA). Pt w/ intermittent 2nd degree type 1 AVB.     Pt arrives to CVICU in NSR, intubated, sedated on precedex, insulin gtt    24 HOUR EVENTS:   Extubated, on low dose levophed this AM. Sitting in bedside chair.      NEUROLOGICAL:    Delirium precautions  Monitor mentation   As needed pain regimen     PULMONOLOGY:   O2 per NC for spO2 90-98%  As needed nebs  IS, PFV, out of bed to chair as tolerated      CARDIOVASCULAR:   IVFs to euvolemia  Levophed, wean   PRN vasopressors for goal MAP greater than 65  Goal SBP less than 130, CI >2, CO >3.5  Pacer wires and Jackson drain management per CT surgery   Telemetry     GASTROINTESTINAL:   Pepcid  ADAT     RENAL/ELECTROLYTE/FLUIDS:   Strict ins and outs  Daily renal panel  Monitor and replace electrolytes     ENDOCRINE:   Insulin drip per protocol  Glucose goal 120-180     HEMATOLOGY/ONCOLOGY:   SCDs  Chemical prophylaxis as cleared by surgery     ID/MICRO:      Perioperative Ancef      ICU DAILY CHECKLIST      Code Status: Full  DVT Prophylaxis: SCDs  T/L/D: CVC, A-line, Jackson x4, Masters   SUP: Pepcid  Diet: ADA T  Activity Level: Ad frances.  ABCDEF Bundle/Checklist Completed:Yes  Disposition: Stay in ICU  Multidisciplinary Rounds Completed:  Yes  Patient/Family Updated: Yes       Review of Systems:   Negative except as noted above    OBJECTIVE:     Labs and Data: Reviewed 05/15/25  Medications: Reviewed 05/15/25  Imaging: Reviewed 05/15/25    /61   Pulse 76   Temp 99 °F (37.2 °C) (Oral)   Resp 14   Ht 1.626 m (5' 
0745: Bedside and Verbal shift change report given to Lor RN (oncoming nurse) by Jude RN (offgoing nurse). Report included the following information Nurse Handoff Report.     0855: Pt receiving Amio bolus - converted to V Pacing @ 50, underlying 30 CHB, MAP 40. Amio held. Pt returned to A Fib RVR, MAPs increased back to 90s. T Stump PA at bedside - restarted Amio bolus. Pt maintaining A Fib 80s-110s. Verbal order from T Stump PA to place pt on Amio gtt at 0.5 instead of ordered 1.    1130: Pt ambulating in hallway with PT/OT.    1450: T Stump PA at bedside - 1 Albumin given.    1845: Pt ambulating in hallway with this RN & other RN    2000: Bedside and Verbal shift change report given to Jude RN (oncoming nurse) by Lor RN (offgoing nurse). Report included the following information Nurse Handoff Report.     
0800 Bedside shift change report given to Rachael RN (oncoming nurse) by Jude RN (offgoing nurse). Report included the following information Nurse Handoff Report.     Cardiac Surgery rounding- plan to manan guardado-line this afternoon; initiate BB.    1200 PT/OT working with patient- SBP dropped into 60-70s- patient asymptomatic. SHARON Cristobal informed- IV albumin ordered- see MAR; Amio stopped. Keep lines.    1300 MAP remains < 65- PA informed- 250cc NS bolus ordered.     1400 BP 80/69 (75). PA informed- OK with MAP goal greater than 65. If pressor needed- RN to utilize levophed.     1430 Intensivist ordered levophed.    1545 CT removed by PA; tolerated well.     1930 Bedside shift change report given to Tracy STRICKLAND (oncoming nurse) by Rachael RN (offgoing nurse). Report included the following information Nurse Handoff Report.     
0800 Bedside shift change report given to Rachael RN (oncoming nurse) by Tracy RN (offgoing nurse). Report included the following information Nurse Handoff Report.     1000 Cardiac Surgery rounding- plan to landry, initiate eliquis; transfer to CVSU.    1130 CVC removed; patient tolerated well.    1425 PT working with patient; able to walk around CVICU with minimal assist.     1930 Bedside shift change report given to Tracy STRICKLAND (oncoming nurse) by Rachael RN (offgoing nurse). Report included the following information Nurse Handoff Report.     
0800 Bedside shift change report given to Rachael RN (oncoming nurse) by Tracy RN (offgoing nurse). Report included the following information Nurse Handoff Report.     PT working with patient; able to walk around CVICU.    1320 Cardiac Surgery rounding- RN to continue lasix and initiate low-dose BB.    1445 Masters removed- DTV by 2045.    1720 Patient able to walk around CVSU independently, VSS.    1930 Bedside shift change report given to Halie STRICKLAND (oncoming nurse) by Rachael RN (offgoing nurse). Report included the following information Nurse Handoff Report.     
0800: Bedside and Verbal shift change report given to Elaine RN (oncoming nurse) by Matthew STRICKLAND (offgoing nurse). Report included the following information Nurse Handoff Report, Adult Overview, Surgery Report, Intake/Output, MAR, Recent Results, and Cardiac Rhythm NSR .     0845: Wires cut by Roma HERRERA    1023: Discharge orders received    1045: Discharge education given by Yumi STRICKLAND    1145: Discharge education reviewed again by this RN. All questions answered, medications picked up by family    1215: Pt discharged with all belongings via wheelchair to discharge loading  
0800: Bedside shift change report given to Ifrah STRICKLAND and Esther RN (oncoming nurse) by Halie STRICKLAND (offgoing nurse). Report included the following information Nurse Handoff Report, Intake/Output, MAR, Recent Results, and Cardiac Rhythm NSR .     1030: Pt ambulated and did steps with PT/OT; tolerated well.     1500: Bedside shift change report given to Sandra STRICKLAND (oncoming nurse) by Ifrah STRICKLAND and Esther RN (offgoing nurse). Report included the following information Nurse Handoff Report, Intake/Output, MAR, Recent Results, and Cardiac Rhythm NSR .     
1147 Patient arrived from OR. Report received from CRNA and CTS NP.   Ok to wean to extubate. Strict sternal precautions.  SBP goal < 130, MAP > 65.  All care assumed.    1200 CVP <5, labile BP.  Order for 1000ml LR per intensivist.  OG tube not in place on CXR. Removed per intensivist.    1203 ABGs pH 7.326 / pCO2 48.0 / pO2 348.4 / HCO3 25.1.  Intensivist notified.  Ok to reduce FiO2 from 80% to 60%.    1221 FiO2 to 40%.    1345 Pt responded to commands, moved all extremities.    1355 RT at bedside, pt placed on SBT.    1420 ABGs pH 7.367 / pCO2 44.7 / pO2 149.4 / HCO3 25.6    1425 RT at bedside. Extubated to 4L NC.  Moving all extremities, responding to commands, A&O x 4.    1515  Albumin x 1.CVP < 4. On/off levo.     1600 F2F. Pt tolerated well.  No up-titration of levo.    1755 Bedside and Verbal shift change report given to Jude STRICKLAND (oncoming nurse) by Dee STRICKLAND (offgoing nurse). Report included the following information Nurse Handoff Report, Surgery Report, Intake/Output, MAR, Recent Results, Med Rec Status, and Cardiac Rhythm NSR .     
1500: Report received from EM Rg. Love dual verified. Patient care assumed.     2000: Bedside and Verbal shift change report given to EM Castro  (oncoming nurse) by EM Flores (offgoing nurse). Report included the following information SBAR, Kardex, OR Summary, Intake/Output, MAR, Recent Results, Cardiac Rhythm- NSR , and Alarm Parameters .         
2000 Received report from Rachael and [facility] lizy.  0315 Labs drawn.  0415 Portable chest xray completed.  0645 Assisted out of bed to chair.  0730 Bedside and Verbal shift change report given to Esther (oncoming nurse) by Halie (offgoing nurse). Report included the following information Nurse Handoff Report, Adult Overview, Intake/Output, MAR, and Recent Results.     
2000- Received report on patient from Dee/EM Hurtado.     2040- Albumin given for borderline CVP/MAP. Total of 2 albumins given since arrival to unit.     2130- AVI result at 0.19, will send repeat AVI at ~midnight.     0000- Repeat AVI sent.    0235- Albumin given for borderline CVP and decreased UO. Total of 3 albumins given since arrival to unit.     0430- AVI result at 0.52. Verbal order for albumin per intensivist. Total of 4 albumins given since arrival to unit.     0700- Patient assisted from bed to chair. Tolerated well. 2x assist.     0800- Bedside shift change report given to EM Horowitz (oncoming nurse) by EM Roque (offgoing nurse). Report included the following information Nurse Handoff Report, Adult Overview, Surgery Report, Intake/Output, MAR, Recent Results, and Cardiac Rhythm SR .     
2000- Received report on patient from EM Horowitz.     0235- Repeat AVI sent with labs, including lactic.     0400- Arterial line no longer returning blood. Removed.     0445- AVI 0.45. NA result at 128. Verbal order to switch from LR to NS and change rate to 100ml/hr.     0645- Patient assisted from bed to chair. X2 assist, tolerated well.     0710- K result at 3.8. 20 meq IV K started.     0800- Bedside shift change report given to EM Cano (oncoming nurse) by EM Roque (offgoing nurse). Report included the following information Nurse Handoff Report, Adult Overview, Surgery Report, Intake/Output, MAR, Recent Results, and Cardiac Rhythm SR .     
2000- Report from Rachael, patient vitals stable; care assumed.    0305- Patient in Afib with RVR, Intensivist notified.    0515- 5 mg IV metoprolol given, patient in to NSR 70s/80s for a few minutes then back into Afib in the 110s.    0800- Bedside and Verbal shift change report given to Rachael (oncoming nurse) by Tracy (offgoing nurse). Report included the following information Nurse Handoff Report, Intake/Output, MAR, Recent Results, Cardiac Rhythm Afib, and Alarm Parameters.    
2000- Report from Rachael, vitals stable; care assumed.    Uneventful shift, patient did not sleep much.    0800- Bedside and Verbal shift change report given to Rachael STRICKLAND (oncoming nurse) by Tracy (offgoing nurse). Report included the following information Nurse Handoff Report, Intake/Output, MAR, Recent Results, Cardiac Rhythm Afib, and Alarm Parameters.    
2000: Bedside and Verbal shift change report given to crystal (oncoming nurse) by fermin (offgoing nurse). Report included the following information Nurse Handoff Report.       0600: pt taken down for PA         0750: Bedside and Verbal shift change report given to theresa (oncoming nurse) by crystal (offgoing nurse). Report included the following information Nurse Handoff Report.       
4 Eyes Skin Assessment     NAME:  Hector Jensen Jr  YOB: 1950  MEDICAL RECORD NUMBER:  [id number]    The patient is being assessed for  Post-Op Surgical    I agree that at least one RN has performed a thorough Head to Toe Skin Assessment on the patient. ALL assessment sites listed below have been assessed.      Areas assessed by both nurses:    Head, Face, Ears, Shoulders, Back, Chest, Arms, Elbows, Hands, Sacrum. Buttock, Coccyx, Ischium, Legs. Feet and Heels, and Under Medical Devices         Does the Patient have a Wound? No noted wound(s)       Carlos A Prevention initiated by RN: No  Wound Care Orders initiated by RN: No    Pressure Injury (Stage 3,4, Unstageable, DTI, NWPT, and Complex wounds) if present, place Wound referral order by RN under : No    New Ostomies, if present place, Ostomy referral order under : No     Nurse 1 eSignature: Electronically signed by Dee Hernandez RN on 5/14/25 at 4:13 PM EDT    **SHARE this note so that the co-signing nurse can place an eSignature**    Nurse 2 eSignature: Electronically signed by Bryant Salinas RN on 5/14/25 at 4:14 PM EDT    
Cardiac Surgery Care Coordinator-  Met with Hector Jensen Jr. Reviewed plan of care and discussed goals for the day. Hector Jensen Jr has a good understanding of his plan for the day. Reinforced sternal precautions and encouraged continued use of the incentive spirometer. Hector Jensen Jr can pull 2500ml with good effort. Discussed possible discharge date and encouraged Hector Jensen Jr to verbalize. Will continue to follow for educational and emotional needs.  
Cardiac Surgery Care Coordinator- Met with Hector Jensen Jr and his daughter.  Introduced role of the Cardiac Surgery Care Coordinator. Reviewed plan of care and began pre-op education.  Discussed day of surgery expectations for the pt and family. Instructed pt on the proper use of the incentive spirometer, he is able to pull 2500cc with good effort. Reviewed material in the ERAS educational binder including Cardiac Surgery Pathway. Reinforced sternal precautions and keeping your move in the tube. Encouraged Hector Jensen Jr and his family to verbalize and offered emotional support. Will continue to follow.   
Cardiac Surgery Care Coordinator- Met with Hector Jensen Jr, and his family, reviewed plan of care and discharge instructions. Reinforced move in the tube, sternal precautions and continued use of the incentive spirometer. Reviewed the importance of daily temp and weight monitoring, discussed incisional care and reviewed signs and symptoms of infection.  Red reminder bracelet on right wrist, reviewed purpose of the bracelet and when to call the MD. Using the teach back method reviewed new medications to include the name, purpose and possible side effects of acetaminophen, amiodarone, apixaban, furosemide, lidocaine patches, magnesium oxide, metoprolol, oxycodone, glycolax and senokot. Reminded pt of appts and encouraged participation in the Cardiac Wellness and rehab program after discharge. Encouraged Hector Jensen Jr to verbalize and emotional support given. Hector Jensen Jr is without questions or concerns at this time. Will follow up with a phone call after discharge  
Cardiac Surgery Care Coordinator- Met with Hector Jensen Jr,reviewed plan of care and discussed potential discharge plan.Reinforced sternal precautions and encouraged continued use of the incentive spirometer. Reviewed goals for the day and emphasized the importance of increased activity to meet discharge goals. Will continue to follow for educational and emotional needs.  
Cardiac Surgery Coordinator- Unable to locate family at this time.     0930- Spoke to the family, provided instructions for waiting in the surgical waiting room. Provided update from the OR. Will continue to follow.     1100-  Met with Hector WHITAKER Mikey Dunne's family and Dr. Ragsdale. Update given, Encouraged family to verbalize and offered emotional support.  Reinforced Surgical waiting room instructions, family to wait in the main surgical waiting room until contacted by the nursing staff.  
Cardiac Surgery ICU Progress Note    Admit Date: 2025  POD:  1 Day Post-Op    Procedure:   with Dr. Ragsdale  PROCEDURES PERFORMED:       1. Coronary artery bypass grafting x3:        a. Reverse saphenous vein graft to PDA.        b. Reverse saphenous vein graft to OM.        c. Left internal mammary artery to LAD.     2. Endoscopic harvest of saphenous vein.     3. Epiaortic ultrasound.    Hospital Course:  Initially presented to [place] with worsening CP and diagnosed with NSTEMI.  Subsequent [facility] with multi-vessel CAD.  Placed on heparin drip and transferred to [place] for CABG.   POD 0: Admitted to CVICU s/p CABG on Precedex and insulin infusions.  5/15 POD 1: Continued fluid resuscitation and weaning Levophed.  Went into Afib with RVR this AM; 150 mg amiodarone bolus and started on amiodarone drip.  Will start PO amiodarone load today.     Subjective:   Patient seen with Dr. Ragsdale.  OOB to chair.  NSR 80s.  /66.  2L nasal cannula.  Reports that he is tired this morning.     Objective:   Vitals:  Blood pressure 123/61, pulse 88, temperature 99 °F (37.2 °C), temperature source Oral, resp. rate 13, height 1.626 m (5' 4\"), weight 64.5 kg (142 lb 1.6 oz), SpO2 93%.  Temp (24hrs), Av.3 °F (36.8 °C), Min:96.6 °F (35.9 °C), Max:100 °F (37.8 °C)    Infusions:   Norepi 5 mcg/min  Insulin  - starting amio at 1 mg/min    EKG/Rhythm:        Extubation Date / Time:   at 1425    CT Output: 440 mL days/ 390 mL overnight (830 total)     CXR:  Xray Result (most recent):  XR CHEST PORTABLE 05/15/2025    Narrative  EXAM:  XR CHEST PORTABLE    INDICATION: Post op open heart surgery    COMPARISON: 2025    TECHNIQUE: 0413 hours portable chest AP view    FINDINGS: No change cardiomegaly status post median sternotomy.    ET tube and NG tube have been removed. Right IJ catheter overlies the SVC.  Mediastinal and pleural drains remain in place. Lungs demonstrate bibasilar  atelectasis left greater 
Cardiac Surgery ICU Progress Note    Admit Date: 2025  POD:  2 Days Post-Op    Procedure:   with Dr. Ragsdale  PROCEDURES PERFORMED:       1. Coronary artery bypass grafting x3:        a. Reverse saphenous vein graft to PDA.        b. Reverse saphenous vein graft to OM.        c. Left internal mammary artery to LAD.     2. Endoscopic harvest of saphenous vein.     3. Epiaortic ultrasound.    Hospital Course:  Initially presented to [place] with worsening CP and diagnosed with NSTEMI.  Subsequent [facility] with multi-vessel CAD.  Placed on heparin drip and transferred to [place] for CABG.   POD 0: Admitted to CVICU s/p CABG on Precedex and insulin infusions.  5/15 POD 1: Continued fluid resuscitation and weaning Levophed.  Went into Afib with RVR this AM; 150 mg amiodarone bolus and started on amiodarone drip.  Will start PO amiodarone load today.  : Start diuresis today - Lasix 40 mg BID; start 12.5 metoprolol BID today.  De-line.  Transition to step-down.     Subjective:   Patient seen with Dr. Ragsdale.  OOB to chair.  NSR 80-90s.  /66.  4L nasal cannula.       Objective:   Vitals:  Blood pressure (!) 114/58, pulse 71, temperature 98.4 °F (36.9 °C), temperature source Oral, resp. rate 26, height 1.626 m (5' 4\"), weight 67.7 kg (149 lb 3.2 oz), SpO2 98%.  Temp (24hrs), Av.4 °F (36.9 °C), Min:98.1 °F (36.7 °C), Max:98.8 °F (37.1 °C)    Infusions:   - Amio at 0.5 mg/min  - Insulin    EKG/Rhythm:   NSR 80s on telemetry this AM      Extubation Date / Time:   at 1425    CT Output: 410 mL days/ 110 mL overnight    CXR:  Xray Result (most recent):  XR CHEST PORTABLE 2025    Narrative  EXAM:  XR CHEST PORTABLE    INDICATION: Post op open heart surgery    COMPARISON: 5/15/2025    TECHNIQUE: portable chest AP view    FINDINGS: Right internal jugular temporary dialysis catheter, left chest tubes,  and mediastinal drain are stable in position. The patient is status post median  sternotomy. 
Cardiac Surgery ICU Progress Note    Admit Date: 2025  POD:  5 Days Post-Op    Procedure:   with Dr. Ragsdale  PROCEDURES PERFORMED:       1. Coronary artery bypass grafting x3:        a. Reverse saphenous vein graft to PDA.        b. Reverse saphenous vein graft to OM.        c. Left internal mammary artery to LAD.     2. Endoscopic harvest of saphenous vein.     3. Epiaortic ultrasound.    Hospital Course:  Initially presented to [place] with worsening CP and diagnosed with NSTEMI.  Subsequent [facility] with multi-vessel CAD.  Placed on heparin drip and transferred to [place] for CABG.   POD 0: Admitted to CVICU s/p CABG on Precedex and insulin infusions.  5/15 POD 1: Continued fluid resuscitation and weaning Levophed.  Went into Afib with RVR this AM; 150 mg amiodarone bolus and started on amiodarone drip.  Will start PO amiodarone load today.  : Start diuresis today - Lasix 40 mg BID; start 12.5 metoprolol BID today.  De-line.  Transition to step-down.  : Still in ICU due to bed availability. Eliquis started  for ongoing Afib. Continue Lasix 20mg IV BID, weight 10lbs up with RLL effusion. Likely DC tomorrow.      Subjective:   Patient seen with Dr. Ragsdale, up in chair, RA, afebrile, SR in the 70s.      Objective:   Vitals:  Blood pressure (!) 114/58, pulse 71, temperature 98.4 °F (36.9 °C), temperature source Oral, resp. rate 26, height 1.626 m (5' 4\"), weight 67.7 kg (149 lb 3.2 oz), SpO2 98%.  Temp (24hrs), Av.1 °F (36.7 °C), Min:97.7 °F (36.5 °C), Max:98.5 °F (36.9 °C)      EKG/Rhythm:   NSR 80s on telemetry this AM      Extubation Date / Time:   at 1425    CT Output: pulled   Wires still in place    CXR:  Xray Result (most recent):  XR CHEST PORTABLE 2025    Narrative  INDICATION: Post op open heart surgery    COMPARISON: 2025    FINDINGS: Single AP portable view of the chest demonstrates prior median  sternotomy. Cardiomegaly is unchanged. Slightly improved 
Critical Care Medicine Plan of Care    Pt without ongoing critical care needs at this time. Final dispo per primary team. Critical Care will follow peripherally at this time while they remain in CVICU. Please do not hesitate to contact Intensivist service if there is a clinical question, or if pt with acute change in clinical status.     Landon Rubi MD  Staff Intensivist  [facility]    
I have read and agree with  RN documentation and care. I have reviewed the MAR and all flow sheets associated with Patient's care today.    
I participated in Interdisciplinary Rounds on the CVICU unit where patient care was discussed.    The Interdisciplinary team is aware of  availability and were encouraged to request Spiritual Health support as needed.      The  on-call can be reached at [phone]).     Rev. Munir Chavez MDiv  Chaplain Resident  
MD states no need for troponin repeat  
Occupational Therapy:     Chart reviewed in prep for OT tx session. Noted, pt currently MICHA for CABG. Will hold and follow up POD1 as able and medically appropriate.     Thank you,   Yuridia Loomis, ADRIENNE, OTR/L    
Physical Therapy  Patient in surgery today for CABG.  Will follow up post op day 1.  REGINO EPSTEIN, PT    
Postop day 3    Much better today    Continue Lasix    Transfer to floor  
Postop day 3    Status post CABG x 3    In and out of A-fib    Will start Eliquis    Chest x-ray looks wet    Start Lasix 20 twice daily    Transfer to stepdown when bed available      
Pt tolerated  Gatorade as per ordered. CHG bath done. Consent from seen and signed. Family in attendance, glasses and dentures removed same secured by family. Transported to OR bedside handoff done.  
Spiritual Health History and Assessment/Progress Note  [facility]    Post-Procedural,  ,  ,      Name: Hector Jnesen Jr MRN: [id number]    Age: 74 y.o.     Sex: male   Language: English   Confucianism: Orthodox   CAD in native artery     Date: 5/20/2025            Total Time Calculated: 15 min              Spiritual Assessment began in [facility] 4 CV INTNSV CARE        Referral/Consult From: Rounding   Encounter Overview/Reason: Post-Procedural  Service Provided For: Patient    [place], Belief, Meaning:   Patient has beliefs or practices that help with coping during difficult times  Family/Friends No family/friends present      Importance and Influence:  Patient has no beliefs influential to healthcare decision-making identified during this visit  Family/Friends No family/friends present    Community:  Patient feels well-supported. Support system includes: Spouse/Partner, Children, and Other: Pets: Dog, Spot; Cats, Wilfrid and Pepper  Family/Friends No family/friends present    Assessment and Plan of Care:     Patient Interventions include: Facilitated expression of thoughts and feelings, Explored spiritual coping/struggle/distress, and Affirmed coping skills/support systems  Family/Friends Interventions include: No family/friends present    Patient Plan of Care: Spiritual Care available upon further referral  Family/Friends Plan of Care: No family/friends present    Hector is expectantly looking forward to his daughter, Debby, picking him up today at discharge, to go home to where his spouse, Suzanna Jordan, and pets, Wilfrid, Pepper and Spot are awaiting his return. He says that he's feeling optimistic and very happy with the care he's received at [facility]. He's thinking of naming his bear, Jude.     Electronically signed by Munir Chavez, Chaplain Resident, M.Div., on 5/20/2025 at 10:39 AM   
Spiritual Health History and Assessment/Progress Note  [facility] [facility]    Attempted Encounter,  ,  ,      Name: Hector Jensen Jr MRN: [id number]    Age: 74 y.o.     Sex: male   Language: English   Yazdanism: Yarsanism   CAD in native artery     Date: 5/14/2025            Total Time Calculated: 24 min              Spiritual Assessment began in [facility] 4 CV INTNSV CARE        Referral/Consult From: Rounding   Encounter Overview/Reason: Attempted Encounter  Service Provided For: Patient    [place]adise, Belief, Meaning:   Patient unable to assess at this time  Family/Friends No family/friends present      Importance and Influence:  Patient unable to assess at this time  Family/Friends No family/friends present    Community:  Patient unable to assess at this time  Family/Friends No family/friends present    Assessment and Plan of Care:   I reviewed the medical record prior to this encounter. Patient is intubated, sedated and unable to communicate communicate at this time..   Patient Interventions include: unable to assess at this time  Family/Friends Interventions include: No family/friends present    Patient Plan of Care: unable to assess at this time  Family/Friends Plan of Care: No family/friends present    Electronically signed by REV. HILDA CHOWDARY M.Div, RENEA on 5/14/2025 at 2:12 PM    
Labs     05/11/25  2158 05/13/25  0925   ALT 26 23   GLOB 3.5 2.7     Recent Labs     05/12/25  0037 05/13/25  0925   INR 1.0 1.0   APTT 25.7 50.9*      No results for input(s): \"TIBC\" in the last 72 hours.    Invalid input(s): \"FE\", \"PSAT\", \"FERR\"   No results found for: \"RBCF\"   No results for input(s): \"PH\", \"PCO2\", \"PO2\" in the last 72 hours.  No results for input(s): \"CPK\" in the last 72 hours.    Invalid input(s): \"CPKMB\", \"CKNDX\", \"TROIQ\"  Lab Results   Component Value Date/Time    CHOL 117 05/12/2025 02:03 AM    HDL 48 05/12/2025 02:03 AM    LDL 61 05/12/2025 02:03 AM     No results found for: \"GLUCPOC\"  [unfilled]    Notes reviewed from all clinical/nonclinical/nursing services involved in patient's clinical care. Care coordination discussions were held with appropriate clinical/nonclinical/ nursing providers based on care coordination needs.         Patients current active Medications were reviewed, considered, added and adjusted based on the clinical condition today.      Home Medications were reconciled to the best of my ability given all available resources at the time of admission. Route is PO if not otherwise noted.      Admission Status:[id number]:::1}      Medications Reviewed:     Current Facility-Administered Medications   Medication Dose Route Frequency    atorvastatin (LIPITOR) tablet 80 mg  80 mg Oral Nightly    metoprolol succinate (TOPROL XL) extended release tablet 50 mg  50 mg Oral Daily    0.9 % sodium chloride infusion   IntraVENous PRN    acetaminophen (TYLENOL) tablet 650 mg  650 mg Oral Q6H PRN    Or    acetaminophen (TYLENOL) suppository 650 mg  650 mg Rectal Q6H PRN    aspirin EC tablet 81 mg  81 mg Oral Daily    bisacodyl (DULCOLAX) EC tablet 5 mg  5 mg Oral Daily PRN    calcitonin (MIACALCIN) nasal spray 1 spray  1 spray Alternating Nares Daily    dextrose 10 % infusion   IntraVENous Continuous PRN    glucagon injection 1 mg  1 mg SubCUTAneous PRN    dextrose bolus 10% 125 mL  125 mL

## 2025-05-21 ENCOUNTER — TELEPHONE (OUTPATIENT)
Age: 75
End: 2025-05-21

## 2025-05-21 NOTE — TELEPHONE ENCOUNTER
Cardiac Surgery Discharge - Follow up call placed to Hector Jensen Jr. Spoke to his daughter. Reviewed plan of care after discharge and encouraged them to verbalize. They had questions regarding medication schedule and what medications can be given together. Reviewed medications and discussed schedule for pain control. Discussed precautions and reviewed medications, patient without questions regarding medications. Encouraged continued use of the incentive spirometer. Confirmed follow up appts and reinforced importance of wearing red reminder bracelet. Hector Jensen Jr is without questions or concerns.

## 2025-05-26 ENCOUNTER — HOSPITAL ENCOUNTER (INPATIENT)
Facility: HOSPITAL | Age: 75
LOS: 18 days | Discharge: HOSPICE | End: 2025-06-13
Attending: STUDENT IN AN ORGANIZED HEALTH CARE EDUCATION/TRAINING PROGRAM | Admitting: ANESTHESIOLOGY
Payer: MEDICARE

## 2025-05-26 ENCOUNTER — APPOINTMENT (OUTPATIENT)
Facility: HOSPITAL | Age: 75
End: 2025-05-26
Payer: MEDICARE

## 2025-05-26 DIAGNOSIS — E87.1 HYPONATREMIA: ICD-10-CM

## 2025-05-26 DIAGNOSIS — S22.000A COMPRESSION FRACTURE OF BODY OF THORACIC VERTEBRA (HCC): ICD-10-CM

## 2025-05-26 DIAGNOSIS — I25.10 CAD IN NATIVE ARTERY: ICD-10-CM

## 2025-05-26 DIAGNOSIS — R06.00 DYSPNEA, UNSPECIFIED TYPE: Primary | ICD-10-CM

## 2025-05-26 DIAGNOSIS — I42.9 CARDIOMYOPATHY, UNSPECIFIED TYPE (HCC): ICD-10-CM

## 2025-05-26 DIAGNOSIS — J96.01 ACUTE RESPIRATORY FAILURE WITH HYPOXIA (HCC): ICD-10-CM

## 2025-05-26 DIAGNOSIS — Z95.1 S/P CABG X 3: ICD-10-CM

## 2025-05-26 DIAGNOSIS — J81.0 ACUTE PULMONARY EDEMA (HCC): ICD-10-CM

## 2025-05-26 LAB
ALBUMIN SERPL-MCNC: 2.8 G/DL (ref 3.5–5)
ALBUMIN/GLOB SERPL: 0.7 (ref 1.1–2.2)
ALP SERPL-CCNC: 76 U/L (ref 45–117)
ALT SERPL-CCNC: 25 U/L (ref 12–78)
ANION GAP SERPL CALC-SCNC: 8 MMOL/L (ref 2–12)
ANION GAP SERPL CALC-SCNC: 8 MMOL/L (ref 2–12)
AST SERPL-CCNC: 33 U/L (ref 15–37)
BASOPHILS # BLD: 0.05 K/UL (ref 0–0.1)
BASOPHILS NFR BLD: 0.2 % (ref 0–1)
BILIRUB SERPL-MCNC: 0.7 MG/DL (ref 0.2–1)
BUN SERPL-MCNC: 11 MG/DL (ref 6–20)
BUN SERPL-MCNC: 24 MG/DL (ref 6–20)
BUN/CREAT SERPL: 14 (ref 12–20)
BUN/CREAT SERPL: 32 (ref 12–20)
CALCIUM SERPL-MCNC: 8.1 MG/DL (ref 8.5–10.1)
CALCIUM SERPL-MCNC: 8.2 MG/DL (ref 8.5–10.1)
CHLORIDE SERPL-SCNC: 80 MMOL/L (ref 97–108)
CHLORIDE SERPL-SCNC: 81 MMOL/L (ref 97–108)
CHLORIDE UR-SCNC: 75 MMOL/L
CO2 SERPL-SCNC: 24 MMOL/L (ref 21–32)
CO2 SERPL-SCNC: 26 MMOL/L (ref 21–32)
COMMENT:: NORMAL
CREAT SERPL-MCNC: 0.75 MG/DL (ref 0.7–1.3)
CREAT SERPL-MCNC: 0.79 MG/DL (ref 0.7–1.3)
CREAT UR-MCNC: 42.3 MG/DL
DIFFERENTIAL METHOD BLD: ABNORMAL
EKG ATRIAL RATE: 78 BPM
EKG DIAGNOSIS: NORMAL
EKG P AXIS: 17 DEGREES
EKG P-R INTERVAL: 216 MS
EKG Q-T INTERVAL: 376 MS
EKG QRS DURATION: 96 MS
EKG QTC CALCULATION (BAZETT): 428 MS
EKG R AXIS: 3 DEGREES
EKG T AXIS: 82 DEGREES
EKG VENTRICULAR RATE: 78 BPM
EOSINOPHIL # BLD: 0.05 K/UL (ref 0–0.4)
EOSINOPHIL NFR BLD: 0.2 % (ref 0–7)
ERYTHROCYTE [DISTWIDTH] IN BLOOD BY AUTOMATED COUNT: 12.3 % (ref 11.5–14.5)
GLOBULIN SER CALC-MCNC: 4 G/DL (ref 2–4)
GLUCOSE SERPL-MCNC: 127 MG/DL (ref 65–100)
GLUCOSE SERPL-MCNC: 149 MG/DL (ref 65–100)
HCT VFR BLD AUTO: 29.8 % (ref 36.6–50.3)
HGB BLD-MCNC: 10.3 G/DL (ref 12.1–17)
IMM GRANULOCYTES # BLD AUTO: 0.31 K/UL (ref 0–0.04)
IMM GRANULOCYTES NFR BLD AUTO: 1.3 % (ref 0–0.5)
LACTATE SERPL-SCNC: 1.5 MMOL/L (ref 0.4–2)
LYMPHOCYTES # BLD: 0.48 K/UL (ref 0.8–3.5)
LYMPHOCYTES NFR BLD: 2 % (ref 12–49)
MAGNESIUM SERPL-MCNC: 1.8 MG/DL (ref 1.6–2.4)
MCH RBC QN AUTO: 31 PG (ref 26–34)
MCHC RBC AUTO-ENTMCNC: 34.6 G/DL (ref 30–36.5)
MCV RBC AUTO: 89.8 FL (ref 80–99)
MONOCYTES # BLD: 0.69 K/UL (ref 0–1)
MONOCYTES NFR BLD: 2.9 % (ref 5–13)
NEUTS SEG # BLD: 22.32 K/UL (ref 1.8–8)
NEUTS SEG NFR BLD: 93.4 % (ref 32–75)
NRBC # BLD: 0 K/UL (ref 0–0.01)
NRBC BLD-RTO: 0 PER 100 WBC
NT PRO BNP: 6758 PG/ML
NT PRO BNP: 7434 PG/ML
OSMOLALITY SERPL: 243 MOSM/KG H2O
OSMOLALITY UR: 370 MOSM/KG H2O
PHOSPHATE SERPL-MCNC: 2.8 MG/DL (ref 2.6–4.7)
PLATELET # BLD AUTO: 502 K/UL (ref 150–400)
PMV BLD AUTO: 9 FL (ref 8.9–12.9)
POTASSIUM SERPL-SCNC: 4.3 MMOL/L (ref 3.5–5.1)
POTASSIUM SERPL-SCNC: 4.7 MMOL/L (ref 3.5–5.1)
PROCALCITONIN SERPL-MCNC: 0.16 NG/ML
PROT SERPL-MCNC: 6.8 G/DL (ref 6.4–8.2)
PROT UR-MCNC: 25 MG/DL (ref 0–11.9)
RBC # BLD AUTO: 3.32 M/UL (ref 4.1–5.7)
RBC MORPH BLD: ABNORMAL
RBC MORPH BLD: ABNORMAL
SODIUM SERPL-SCNC: 112 MMOL/L (ref 136–145)
SODIUM SERPL-SCNC: 115 MMOL/L (ref 136–145)
SODIUM UR-SCNC: 33 MMOL/L
SPECIMEN HOLD: NORMAL
TROPONIN I SERPL HS-MCNC: 103 NG/L (ref 0–76)
TSH SERPL DL<=0.05 MIU/L-ACNC: 1.44 UIU/ML (ref 0.36–3.74)
WBC # BLD AUTO: 23.9 K/UL (ref 4.1–11.1)

## 2025-05-26 PROCEDURE — 6370000000 HC RX 637 (ALT 250 FOR IP): Performed by: INTERNAL MEDICINE

## 2025-05-26 PROCEDURE — 71045 X-RAY EXAM CHEST 1 VIEW: CPT

## 2025-05-26 PROCEDURE — 2500000003 HC RX 250 WO HCPCS: Performed by: NURSE PRACTITIONER

## 2025-05-26 PROCEDURE — 6370000000 HC RX 637 (ALT 250 FOR IP): Performed by: NURSE PRACTITIONER

## 2025-05-26 PROCEDURE — 6360000002 HC RX W HCPCS: Performed by: NURSE PRACTITIONER

## 2025-05-26 PROCEDURE — 84443 ASSAY THYROID STIM HORMONE: CPT

## 2025-05-26 PROCEDURE — 84100 ASSAY OF PHOSPHORUS: CPT

## 2025-05-26 PROCEDURE — 93010 ELECTROCARDIOGRAM REPORT: CPT | Performed by: SPECIALIST

## 2025-05-26 PROCEDURE — 84300 ASSAY OF URINE SODIUM: CPT

## 2025-05-26 PROCEDURE — 6370000000 HC RX 637 (ALT 250 FOR IP): Performed by: STUDENT IN AN ORGANIZED HEALTH CARE EDUCATION/TRAINING PROGRAM

## 2025-05-26 PROCEDURE — 85025 COMPLETE CBC W/AUTO DIFF WBC: CPT

## 2025-05-26 PROCEDURE — 2580000003 HC RX 258: Performed by: NURSE PRACTITIONER

## 2025-05-26 PROCEDURE — 84156 ASSAY OF PROTEIN URINE: CPT

## 2025-05-26 PROCEDURE — 82570 ASSAY OF URINE CREATININE: CPT

## 2025-05-26 PROCEDURE — 93005 ELECTROCARDIOGRAM TRACING: CPT

## 2025-05-26 PROCEDURE — 83930 ASSAY OF BLOOD OSMOLALITY: CPT

## 2025-05-26 PROCEDURE — 6360000002 HC RX W HCPCS: Performed by: STUDENT IN AN ORGANIZED HEALTH CARE EDUCATION/TRAINING PROGRAM

## 2025-05-26 PROCEDURE — 2000000000 HC ICU R&B

## 2025-05-26 PROCEDURE — 84145 PROCALCITONIN (PCT): CPT

## 2025-05-26 PROCEDURE — 84484 ASSAY OF TROPONIN QUANT: CPT

## 2025-05-26 PROCEDURE — 80069 RENAL FUNCTION PANEL: CPT

## 2025-05-26 PROCEDURE — 6360000004 HC RX CONTRAST MEDICATION: Performed by: EMERGENCY MEDICINE

## 2025-05-26 PROCEDURE — 80053 COMPREHEN METABOLIC PANEL: CPT

## 2025-05-26 PROCEDURE — 82436 ASSAY OF URINE CHLORIDE: CPT

## 2025-05-26 PROCEDURE — 96374 THER/PROPH/DIAG INJ IV PUSH: CPT

## 2025-05-26 PROCEDURE — 83605 ASSAY OF LACTIC ACID: CPT

## 2025-05-26 PROCEDURE — 83880 ASSAY OF NATRIURETIC PEPTIDE: CPT

## 2025-05-26 PROCEDURE — 71275 CT ANGIOGRAPHY CHEST: CPT

## 2025-05-26 PROCEDURE — 83935 ASSAY OF URINE OSMOLALITY: CPT

## 2025-05-26 PROCEDURE — 83735 ASSAY OF MAGNESIUM: CPT

## 2025-05-26 PROCEDURE — 99285 EMERGENCY DEPT VISIT HI MDM: CPT

## 2025-05-26 RX ORDER — SODIUM CHLORIDE 0.9 % (FLUSH) 0.9 %
5-40 SYRINGE (ML) INJECTION PRN
Status: DISCONTINUED | OUTPATIENT
Start: 2025-05-26 | End: 2025-06-13 | Stop reason: HOSPADM

## 2025-05-26 RX ORDER — ATORVASTATIN CALCIUM 40 MG/1
40 TABLET, FILM COATED ORAL NIGHTLY
Status: DISCONTINUED | OUTPATIENT
Start: 2025-05-26 | End: 2025-06-13 | Stop reason: HOSPADM

## 2025-05-26 RX ORDER — ACETAMINOPHEN 500 MG
500 TABLET ORAL
Status: COMPLETED | OUTPATIENT
Start: 2025-05-26 | End: 2025-05-26

## 2025-05-26 RX ORDER — ACETAMINOPHEN 325 MG/1
650 TABLET ORAL EVERY 6 HOURS PRN
Status: DISCONTINUED | OUTPATIENT
Start: 2025-05-26 | End: 2025-06-13 | Stop reason: HOSPADM

## 2025-05-26 RX ORDER — OXYCODONE AND ACETAMINOPHEN 5; 325 MG/1; MG/1
1 TABLET ORAL
Refills: 0 | Status: COMPLETED | OUTPATIENT
Start: 2025-05-26 | End: 2025-05-26

## 2025-05-26 RX ORDER — ACETAMINOPHEN 650 MG/1
650 SUPPOSITORY RECTAL EVERY 6 HOURS PRN
Status: DISCONTINUED | OUTPATIENT
Start: 2025-05-26 | End: 2025-06-13 | Stop reason: HOSPADM

## 2025-05-26 RX ORDER — ALBUTEROL SULFATE 0.83 MG/ML
2.5 SOLUTION RESPIRATORY (INHALATION) EVERY 6 HOURS PRN
Status: DISCONTINUED | OUTPATIENT
Start: 2025-05-26 | End: 2025-06-13 | Stop reason: HOSPADM

## 2025-05-26 RX ORDER — ONDANSETRON 2 MG/ML
4 INJECTION INTRAMUSCULAR; INTRAVENOUS EVERY 6 HOURS PRN
Status: DISCONTINUED | OUTPATIENT
Start: 2025-05-26 | End: 2025-06-13 | Stop reason: HOSPADM

## 2025-05-26 RX ORDER — ASPIRIN 81 MG/1
81 TABLET ORAL DAILY
Status: DISCONTINUED | OUTPATIENT
Start: 2025-05-27 | End: 2025-06-13 | Stop reason: HOSPADM

## 2025-05-26 RX ORDER — AMIODARONE HYDROCHLORIDE 200 MG/1
400 TABLET ORAL 2 TIMES DAILY
Status: DISCONTINUED | OUTPATIENT
Start: 2025-05-26 | End: 2025-05-27

## 2025-05-26 RX ORDER — FUROSEMIDE 10 MG/ML
40 INJECTION INTRAMUSCULAR; INTRAVENOUS ONCE
Status: COMPLETED | OUTPATIENT
Start: 2025-05-26 | End: 2025-05-26

## 2025-05-26 RX ORDER — SODIUM CHLORIDE 0.9 % (FLUSH) 0.9 %
5-40 SYRINGE (ML) INJECTION EVERY 12 HOURS SCHEDULED
Status: DISCONTINUED | OUTPATIENT
Start: 2025-05-26 | End: 2025-05-31

## 2025-05-26 RX ORDER — MAGNESIUM SULFATE 1 G/100ML
1000 INJECTION INTRAVENOUS ONCE
Status: COMPLETED | OUTPATIENT
Start: 2025-05-26 | End: 2025-05-27

## 2025-05-26 RX ORDER — AMIODARONE HYDROCHLORIDE 200 MG/1
200 TABLET ORAL 2 TIMES DAILY
Status: DISCONTINUED | OUTPATIENT
Start: 2025-06-04 | End: 2025-05-27

## 2025-05-26 RX ORDER — SODIUM CHLORIDE 9 MG/ML
INJECTION, SOLUTION INTRAVENOUS PRN
Status: DISCONTINUED | OUTPATIENT
Start: 2025-05-26 | End: 2025-06-13 | Stop reason: HOSPADM

## 2025-05-26 RX ORDER — IOPAMIDOL 755 MG/ML
100 INJECTION, SOLUTION INTRAVASCULAR
Status: COMPLETED | OUTPATIENT
Start: 2025-05-26 | End: 2025-05-26

## 2025-05-26 RX ORDER — METOPROLOL TARTRATE 25 MG/1
12.5 TABLET, FILM COATED ORAL 2 TIMES DAILY
Status: DISCONTINUED | OUTPATIENT
Start: 2025-05-26 | End: 2025-06-04

## 2025-05-26 RX ORDER — MAGNESIUM HYDROXIDE/ALUMINUM HYDROXICE/SIMETHICONE 120; 1200; 1200 MG/30ML; MG/30ML; MG/30ML
30 SUSPENSION ORAL EVERY 6 HOURS PRN
Status: DISCONTINUED | OUTPATIENT
Start: 2025-05-26 | End: 2025-06-13 | Stop reason: HOSPADM

## 2025-05-26 RX ORDER — METHOCARBAMOL 500 MG/1
750 TABLET, FILM COATED ORAL 4 TIMES DAILY
Status: DISCONTINUED | OUTPATIENT
Start: 2025-05-26 | End: 2025-05-27

## 2025-05-26 RX ORDER — SENNOSIDES 8.8 MG/5ML
5 LIQUID ORAL 2 TIMES DAILY PRN
Status: DISCONTINUED | OUTPATIENT
Start: 2025-05-26 | End: 2025-06-13 | Stop reason: HOSPADM

## 2025-05-26 RX ORDER — LIDOCAINE 4 G/G
1 PATCH TOPICAL DAILY
Status: DISCONTINUED | OUTPATIENT
Start: 2025-05-26 | End: 2025-06-13 | Stop reason: HOSPADM

## 2025-05-26 RX ADMIN — IOPAMIDOL 75 ML: 755 INJECTION, SOLUTION INTRAVENOUS at 17:20

## 2025-05-26 RX ADMIN — FAMOTIDINE 20 MG: 10 INJECTION, SOLUTION INTRAVENOUS at 20:52

## 2025-05-26 RX ADMIN — Medication 15 G: at 19:15

## 2025-05-26 RX ADMIN — ATORVASTATIN CALCIUM 40 MG: 40 TABLET, FILM COATED ORAL at 20:46

## 2025-05-26 RX ADMIN — MAGNESIUM SULFATE HEPTAHYDRATE 1000 MG: 1 INJECTION, SOLUTION INTRAVENOUS at 23:21

## 2025-05-26 RX ADMIN — FUROSEMIDE 40 MG: 10 INJECTION, SOLUTION INTRAMUSCULAR; INTRAVENOUS at 19:16

## 2025-05-26 RX ADMIN — ACETAMINOPHEN 500 MG: 500 TABLET ORAL at 18:33

## 2025-05-26 RX ADMIN — Medication 10 ML: at 21:42

## 2025-05-26 RX ADMIN — AMIODARONE HYDROCHLORIDE 400 MG: 200 TABLET ORAL at 21:30

## 2025-05-26 RX ADMIN — APIXABAN 5 MG: 5 TABLET, FILM COATED ORAL at 20:46

## 2025-05-26 RX ADMIN — METOPROLOL TARTRATE 12.5 MG: 25 TABLET, FILM COATED ORAL at 20:46

## 2025-05-26 RX ADMIN — OXYCODONE HYDROCHLORIDE AND ACETAMINOPHEN 1 TABLET: 5; 325 TABLET ORAL at 18:33

## 2025-05-26 ASSESSMENT — PAIN DESCRIPTION - FREQUENCY: FREQUENCY: CONTINUOUS

## 2025-05-26 ASSESSMENT — PAIN DESCRIPTION - DESCRIPTORS
DESCRIPTORS: ACHING
DESCRIPTORS: ACHING;STABBING

## 2025-05-26 ASSESSMENT — PAIN DESCRIPTION - ORIENTATION
ORIENTATION: UPPER
ORIENTATION: UPPER

## 2025-05-26 ASSESSMENT — PAIN DESCRIPTION - ONSET: ONSET: ON-GOING

## 2025-05-26 ASSESSMENT — PAIN DESCRIPTION - PAIN TYPE: TYPE: ACUTE PAIN

## 2025-05-26 ASSESSMENT — PAIN - FUNCTIONAL ASSESSMENT
PAIN_FUNCTIONAL_ASSESSMENT: ACTIVITIES ARE NOT PREVENTED
PAIN_FUNCTIONAL_ASSESSMENT: NONE - DENIES PAIN
PAIN_FUNCTIONAL_ASSESSMENT: ACTIVITIES ARE NOT PREVENTED

## 2025-05-26 ASSESSMENT — PAIN DESCRIPTION - LOCATION
LOCATION: BACK
LOCATION: BACK

## 2025-05-26 ASSESSMENT — PAIN SCALES - GENERAL
PAINLEVEL_OUTOF10: 6
PAINLEVEL_OUTOF10: 5

## 2025-05-26 NOTE — PROGRESS NOTES
Mr. Pringle' course was discussed with the ER Attending Physician Dr. Meneses via telephone this afternoon ([phone]).  There is a history of CAD [s/p CABG], hypertension, hyperlipidemia, CVA and osteoporosis.  He was discharged home on 5/20/2025.    He presented to the ER with a history of dyspnea.  Otherwise, it seems there were no neurological symptoms.      Lab review:      5/17/2025:    TSH-1.500      5/18/2025:      , BUN 13, creatinine 0.69, K 3.9        5/19/2025:      , K 4.5, BUN 18, creatinine 0.84      5/20/2025:      Na 128, K 3.9, BUN 19, creatinine 0.71        5/26/2025:      Na 112, K 4.7, Cl  80, BUN 11, creatinine 0.79      NT-proBNP - 6,758      Will start Ure NA this evening (15 grams orally twice daily) in an effort to increase free water clearance.    Given the absence of neurological manifestations, there is no indication at this time for initiation of 3% saline    Start fluid restriction [1.25 L per 24 hours]    Check urine lites/check serum osmole/urine osmole/a.m. cortisol    Check renal panel/serum sodium q 6 hourly    I would aim for rate of correction of hyponatremia of 4 to 6 meq/litre/day.    [facility] will follow.

## 2025-05-26 NOTE — ED PROVIDER NOTES
[facility] EMERGENCY DEPARTMENT  EMERGENCY DEPARTMENT ENCOUNTER      Pt Name: Hector Jensen Jr  MRN: [id number]  Birthdate 1950  Date of evaluation: 5/26/2025  Provider: Mira Meneses DO    CHIEF COMPLAINT       Chief Complaint   Patient presents with    Shortness of Breath                HISTORY OF PRESENT ILLNESS    HPI    Hector Jensen Jr is a 74 y.o. male with a history of hypertension, hyperlipidemia, CAD s/p CABG on 5/14/25 who presents to the emergency department for shortness of breath.  Patient reports having some shortness of breath yesterday but significantly worsened today.  Found to be hypoxic in triage at 72% on room air.  States he has some resting shortness of breath but significantly worsens with exertion.  He has had some mild lower extremity swelling but minimal.  Denies any chest pain, cough, fevers, no other complaints or concerns at this time.    Nursing Notes were reviewed.    REVIEW OF SYSTEMS       Review of Systems   Constitutional:  Negative for fever.   Respiratory:  Positive for shortness of breath. Negative for cough.    Cardiovascular:  Positive for leg swelling. Negative for chest pain.           PAST MEDICAL HISTORY     Past Medical History:   Diagnosis Date    CAD (coronary artery disease)     Colon polyp     Environmental and seasonal allergies     Essential hypertension     High cholesterol     Stroke (HCC)     Sun-damaged skin          SURGICAL HISTORY       Past Surgical History:   Procedure Laterality Date    CARDIAC PROCEDURE N/A 5/12/2025    Left heart cath / coronary angiography performed by Braydon Sorto MD at [facility] CARDIAC CATH LAB    CAROTID ENDARTERECTOMY Right     1996    COLONOSCOPY      COLONOSCOPY N/A 12/29/2023    COLONOSCOPY performed by Finesse Cruz MD at [facility] ENDOSCOPY    COLONOSCOPY N/A 12/29/2023    COLONOSCOPY POLYPECTOMY SNARE/COLD BIOPSY performed by Finesse Cruz MD at [facility] ENDOSCOPY    CORONARY ARTERY BYPASS GRAFT N/A 5/14/2025     pneumonia is felt less likely.   No PE         Electronically signed by DEE EM      XR CHEST PORTABLE   Final Result   Diffusely increased airspace disease in the right lung and left apex. This could   represent diffuse asymmetric pulmonary edema or infection.      Electronically signed by Gamal Smalls           LABS:  Labs Reviewed   CBC WITH AUTO DIFFERENTIAL - Abnormal; Notable for the following components:       Result Value    WBC 23.9 (*)     RBC 3.32 (*)     Hemoglobin 10.3 (*)     Hematocrit 29.8 (*)     Platelets 502 (*)     Neutrophils % 93.4 (*)     Lymphocytes % 2.0 (*)     Monocytes % 2.9 (*)     Immature Granulocytes % 1.3 (*)     Neutrophils Absolute 22.32 (*)     Lymphocytes Absolute 0.48 (*)     Immature Granulocytes Absolute 0.31 (*)     All other components within normal limits   COMPREHENSIVE METABOLIC PANEL - Abnormal; Notable for the following components:    Sodium 112 (*)     Chloride 80 (*)     Glucose 149 (*)     Calcium 8.2 (*)     Albumin 2.8 (*)     Albumin/Globulin Ratio 0.7 (*)     All other components within normal limits   TROPONIN - Abnormal; Notable for the following components:    Troponin, High Sensitivity 103 (*)     All other components within normal limits   BRAIN NATRIURETIC PEPTIDE - Abnormal; Notable for the following components:    NT Pro-BNP 6,758 (*)     All other components within normal limits   URINE CULTURE HOLD SAMPLE   EXTRA TUBES HOLD   EXTRA TUBES HOLD       All other labs were within normal range or not returned as of this dictation.    EMERGENCY DEPARTMENT COURSE and DIFFERENTIAL DIAGNOSIS/MDM:   Vitals:    Vitals:    05/26/25 1444   BP: (!) 150/78   Pulse: 90   Resp: 20   Temp: 98.1 °F (36.7 °C)   TempSrc: Oral   SpO2: (!) 72%   Weight: 63.6 kg (140 lb 3.4 oz)   Height: 1.626 m (5' 4\")           Medical Decision Making      DECISION MAKING:  Hector Jensen Jr is a 74 y.o. male who comes in as above.  Vital signs reviewed, patient is afebrile.  Pulse

## 2025-05-26 NOTE — ED NOTES
Bedside and Verbal shift change report given to EM Hanna (oncoming nurse) by EM Turner and EM Olivarez (offgoing nurse). Report included the following information Nurse Handoff Report, ED Encounter Summary, ED SBAR, MAR, Recent Results, and Cardiac Rhythm NSR .

## 2025-05-26 NOTE — ED NOTES
2:46 PM  I have evaluated the patient as the Provider in Rapid Medical Evaluation (RME). I have reviewed his vital signs and the triage nurse assessment. I have talked with the patient and any available family and advised that I am the provider in triage and have ordered the appropriate study to initiate their work up based on the clinical presentation during my assessment. I have advised that the patient will be accommodated in the Main ED as soon as possible. I have also requested to contact the triage nurse or myself immediately if the patient experiences any changes in their condition during this brief waiting period.    74-year-old male with a past medical history of CVA, hypertension, hyperlipidemia, CAD presents with complaint of shortness of breath.  Patient with open heart surgery done on 5/14 by Dr. Ragsdale.  Developed shortness of breath of the past 2 days.  Satting 77% on room air in triage.  Denies chest pain.  No history of blood clots.  Patient does take Eliquis daily.    JANNETH Rodriguez Andrea K, PA-C  05/26/25 1445

## 2025-05-27 ENCOUNTER — APPOINTMENT (OUTPATIENT)
Facility: HOSPITAL | Age: 75
End: 2025-05-27
Payer: MEDICARE

## 2025-05-27 LAB
ALBUMIN SERPL-MCNC: 2.3 G/DL (ref 3.5–5)
ALBUMIN SERPL-MCNC: 2.7 G/DL (ref 3.5–5)
ANION GAP SERPL CALC-SCNC: 10 MMOL/L (ref 2–12)
ANION GAP SERPL CALC-SCNC: 8 MMOL/L (ref 2–12)
ANION GAP SERPL CALC-SCNC: 9 MMOL/L (ref 2–12)
APPEARANCE UR: CLEAR
BACTERIA URNS QL MICRO: NEGATIVE /HPF
BASOPHILS # BLD: 0.02 K/UL (ref 0–0.1)
BASOPHILS NFR BLD: 0.1 % (ref 0–1)
BILIRUB UR QL: NEGATIVE
BUN SERPL-MCNC: 16 MG/DL (ref 6–20)
BUN SERPL-MCNC: 22 MG/DL (ref 6–20)
BUN SERPL-MCNC: 24 MG/DL (ref 6–20)
BUN/CREAT SERPL: 25 (ref 12–20)
BUN/CREAT SERPL: 29 (ref 12–20)
BUN/CREAT SERPL: 36 (ref 12–20)
CALCIUM SERPL-MCNC: 7.5 MG/DL (ref 8.5–10.1)
CALCIUM SERPL-MCNC: 8.3 MG/DL (ref 8.5–10.1)
CALCIUM SERPL-MCNC: 8.3 MG/DL (ref 8.5–10.1)
CHLORIDE SERPL-SCNC: 80 MMOL/L (ref 97–108)
CHLORIDE SERPL-SCNC: 80 MMOL/L (ref 97–108)
CHLORIDE SERPL-SCNC: 84 MMOL/L (ref 97–108)
CO2 SERPL-SCNC: 25 MMOL/L (ref 21–32)
CO2 SERPL-SCNC: 25 MMOL/L (ref 21–32)
CO2 SERPL-SCNC: 26 MMOL/L (ref 21–32)
COLOR UR: ABNORMAL
CORTIS AM PEAK SERPL-MCNC: 45.6 UG/DL (ref 4.3–22.45)
CREAT SERPL-MCNC: 0.64 MG/DL (ref 0.7–1.3)
CREAT SERPL-MCNC: 0.66 MG/DL (ref 0.7–1.3)
CREAT SERPL-MCNC: 0.76 MG/DL (ref 0.7–1.3)
DIFFERENTIAL METHOD BLD: ABNORMAL
EOSINOPHIL # BLD: 0.07 K/UL (ref 0–0.4)
EOSINOPHIL NFR BLD: 0.4 % (ref 0–7)
EPITH CASTS URNS QL MICRO: ABNORMAL /LPF
ERYTHROCYTE [DISTWIDTH] IN BLOOD BY AUTOMATED COUNT: 12.4 % (ref 11.5–14.5)
GLUCOSE SERPL-MCNC: 100 MG/DL (ref 65–100)
GLUCOSE SERPL-MCNC: 111 MG/DL (ref 65–100)
GLUCOSE SERPL-MCNC: 121 MG/DL (ref 65–100)
GLUCOSE UR STRIP.AUTO-MCNC: NEGATIVE MG/DL
HCT VFR BLD AUTO: 30.6 % (ref 36.6–50.3)
HGB BLD-MCNC: 10.2 G/DL (ref 12.1–17)
HGB UR QL STRIP: NEGATIVE
HYALINE CASTS URNS QL MICRO: ABNORMAL /LPF (ref 0–5)
IMM GRANULOCYTES # BLD AUTO: 0.21 K/UL (ref 0–0.04)
IMM GRANULOCYTES NFR BLD AUTO: 1.1 % (ref 0–0.5)
INR PPP: 1.2 (ref 0.9–1.1)
KETONES UR QL STRIP.AUTO: 15 MG/DL
LEUKOCYTE ESTERASE UR QL STRIP.AUTO: ABNORMAL
LYMPHOCYTES # BLD: 0.45 K/UL (ref 0.8–3.5)
LYMPHOCYTES NFR BLD: 2.3 % (ref 12–49)
MCH RBC QN AUTO: 30.4 PG (ref 26–34)
MCHC RBC AUTO-ENTMCNC: 33.3 G/DL (ref 30–36.5)
MCV RBC AUTO: 91.3 FL (ref 80–99)
MONOCYTES # BLD: 0.47 K/UL (ref 0–1)
MONOCYTES NFR BLD: 2.5 % (ref 5–13)
NEUTS SEG # BLD: 17.93 K/UL (ref 1.8–8)
NEUTS SEG NFR BLD: 93.6 % (ref 32–75)
NITRITE UR QL STRIP.AUTO: NEGATIVE
NRBC # BLD: 0 K/UL (ref 0–0.01)
NRBC BLD-RTO: 0 PER 100 WBC
PH UR STRIP: 6.5 (ref 5–8)
PHOSPHATE SERPL-MCNC: 2.4 MG/DL (ref 2.6–4.7)
PHOSPHATE SERPL-MCNC: 3.2 MG/DL (ref 2.6–4.7)
PLATELET # BLD AUTO: 456 K/UL (ref 150–400)
PMV BLD AUTO: 9 FL (ref 8.9–12.9)
POTASSIUM SERPL-SCNC: 3.9 MMOL/L (ref 3.5–5.1)
POTASSIUM SERPL-SCNC: 4.5 MMOL/L (ref 3.5–5.1)
POTASSIUM SERPL-SCNC: 4.6 MMOL/L (ref 3.5–5.1)
PROT UR STRIP-MCNC: 30 MG/DL
PROTHROMBIN TIME: 13 SEC (ref 9.2–11.2)
RBC # BLD AUTO: 3.35 M/UL (ref 4.1–5.7)
RBC #/AREA URNS HPF: ABNORMAL /HPF (ref 0–5)
SODIUM SERPL-SCNC: 114 MMOL/L (ref 136–145)
SODIUM SERPL-SCNC: 114 MMOL/L (ref 136–145)
SODIUM SERPL-SCNC: 115 MMOL/L (ref 136–145)
SODIUM SERPL-SCNC: 118 MMOL/L (ref 136–145)
SP GR UR REFRACTOMETRY: 1.02 (ref 1–1.03)
URINE CULTURE IF INDICATED: ABNORMAL
UROBILINOGEN UR QL STRIP.AUTO: 0.2 EU/DL (ref 0.2–1)
WBC # BLD AUTO: 19.2 K/UL (ref 4.1–11.1)
WBC URNS QL MICRO: ABNORMAL /HPF (ref 0–4)

## 2025-05-27 PROCEDURE — 6370000000 HC RX 637 (ALT 250 FOR IP): Performed by: NURSE PRACTITIONER

## 2025-05-27 PROCEDURE — 2000000000 HC ICU R&B

## 2025-05-27 PROCEDURE — 94760 N-INVAS EAR/PLS OXIMETRY 1: CPT

## 2025-05-27 PROCEDURE — 71045 X-RAY EXAM CHEST 1 VIEW: CPT

## 2025-05-27 PROCEDURE — 5A0955A ASSISTANCE WITH RESPIRATORY VENTILATION, GREATER THAN 96 CONSECUTIVE HOURS, HIGH NASAL FLOW/VELOCITY: ICD-10-PCS | Performed by: INTERNAL MEDICINE

## 2025-05-27 PROCEDURE — 6360000002 HC RX W HCPCS: Performed by: INTERNAL MEDICINE

## 2025-05-27 PROCEDURE — 80069 RENAL FUNCTION PANEL: CPT

## 2025-05-27 PROCEDURE — 81001 URINALYSIS AUTO W/SCOPE: CPT

## 2025-05-27 PROCEDURE — 82533 TOTAL CORTISOL: CPT

## 2025-05-27 PROCEDURE — 6370000000 HC RX 637 (ALT 250 FOR IP): Performed by: INTERNAL MEDICINE

## 2025-05-27 PROCEDURE — 85025 COMPLETE CBC W/AUTO DIFF WBC: CPT

## 2025-05-27 PROCEDURE — 6360000002 HC RX W HCPCS: Performed by: NURSE PRACTITIONER

## 2025-05-27 PROCEDURE — 70450 CT HEAD/BRAIN W/O DYE: CPT

## 2025-05-27 PROCEDURE — 2500000003 HC RX 250 WO HCPCS: Performed by: NURSE PRACTITIONER

## 2025-05-27 PROCEDURE — 82962 GLUCOSE BLOOD TEST: CPT

## 2025-05-27 PROCEDURE — 2580000003 HC RX 258: Performed by: NURSE PRACTITIONER

## 2025-05-27 PROCEDURE — 85610 PROTHROMBIN TIME: CPT

## 2025-05-27 PROCEDURE — 2700000000 HC OXYGEN THERAPY PER DAY

## 2025-05-27 PROCEDURE — 2500000003 HC RX 250 WO HCPCS: Performed by: INTERNAL MEDICINE

## 2025-05-27 PROCEDURE — 84295 ASSAY OF SERUM SODIUM: CPT

## 2025-05-27 PROCEDURE — 87040 BLOOD CULTURE FOR BACTERIA: CPT

## 2025-05-27 PROCEDURE — 0202U NFCT DS 22 TRGT SARS-COV-2: CPT

## 2025-05-27 PROCEDURE — 80048 BASIC METABOLIC PNL TOTAL CA: CPT

## 2025-05-27 RX ORDER — INSULIN LISPRO 100 [IU]/ML
0-4 INJECTION, SOLUTION INTRAVENOUS; SUBCUTANEOUS
Status: DISCONTINUED | OUTPATIENT
Start: 2025-05-27 | End: 2025-06-09

## 2025-05-27 RX ORDER — FUROSEMIDE 10 MG/ML
40 INJECTION INTRAMUSCULAR; INTRAVENOUS ONCE
Status: COMPLETED | OUTPATIENT
Start: 2025-05-27 | End: 2025-05-27

## 2025-05-27 RX ORDER — ONDANSETRON 2 MG/ML
4 INJECTION INTRAMUSCULAR; INTRAVENOUS ONCE
Status: DISCONTINUED | OUTPATIENT
Start: 2025-05-27 | End: 2025-05-28

## 2025-05-27 RX ORDER — OXYCODONE HYDROCHLORIDE 5 MG/1
5 TABLET ORAL EVERY 6 HOURS PRN
Refills: 0 | Status: DISCONTINUED | OUTPATIENT
Start: 2025-05-27 | End: 2025-06-03

## 2025-05-27 RX ORDER — METHOCARBAMOL 500 MG/1
500 TABLET, FILM COATED ORAL 4 TIMES DAILY
Status: DISCONTINUED | OUTPATIENT
Start: 2025-05-27 | End: 2025-06-06

## 2025-05-27 RX ORDER — SODIUM CHLORIDE 1 G/1
1 TABLET ORAL
Status: DISCONTINUED | OUTPATIENT
Start: 2025-05-27 | End: 2025-06-02

## 2025-05-27 RX ORDER — PREDNISONE 20 MG/1
40 TABLET ORAL DAILY
Status: DISCONTINUED | OUTPATIENT
Start: 2025-05-27 | End: 2025-05-28

## 2025-05-27 RX ORDER — DEXTROSE MONOHYDRATE 100 MG/ML
INJECTION, SOLUTION INTRAVENOUS CONTINUOUS PRN
Status: DISCONTINUED | OUTPATIENT
Start: 2025-05-27 | End: 2025-06-13 | Stop reason: HOSPADM

## 2025-05-27 RX ADMIN — FUROSEMIDE 40 MG: 10 INJECTION, SOLUTION INTRAMUSCULAR; INTRAVENOUS at 09:10

## 2025-05-27 RX ADMIN — ACETAMINOPHEN 650 MG: 325 TABLET ORAL at 23:11

## 2025-05-27 RX ADMIN — METOPROLOL TARTRATE 12.5 MG: 25 TABLET, FILM COATED ORAL at 08:16

## 2025-05-27 RX ADMIN — METHOCARBAMOL 500 MG: 500 TABLET ORAL at 17:40

## 2025-05-27 RX ADMIN — METHOCARBAMOL 500 MG: 500 TABLET ORAL at 20:29

## 2025-05-27 RX ADMIN — Medication 15 G: at 17:40

## 2025-05-27 RX ADMIN — ATORVASTATIN CALCIUM 40 MG: 40 TABLET, FILM COATED ORAL at 20:29

## 2025-05-27 RX ADMIN — Medication 15 G: at 08:17

## 2025-05-27 RX ADMIN — PREDNISONE 40 MG: 20 TABLET ORAL at 23:11

## 2025-05-27 RX ADMIN — SODIUM CHLORIDE 1 G: 1 TABLET ORAL at 19:34

## 2025-05-27 RX ADMIN — ACETAMINOPHEN 650 MG: 325 TABLET ORAL at 01:38

## 2025-05-27 RX ADMIN — METOPROLOL TARTRATE 12.5 MG: 25 TABLET, FILM COATED ORAL at 20:29

## 2025-05-27 RX ADMIN — OXYCODONE 5 MG: 5 TABLET ORAL at 15:52

## 2025-05-27 RX ADMIN — AMIODARONE HYDROCHLORIDE 400 MG: 200 TABLET ORAL at 08:17

## 2025-05-27 RX ADMIN — Medication 10 ML: at 08:17

## 2025-05-27 RX ADMIN — ASPIRIN 81 MG: 81 TABLET, COATED ORAL at 08:17

## 2025-05-27 RX ADMIN — METHOCARBAMOL 500 MG: 500 TABLET ORAL at 14:21

## 2025-05-27 RX ADMIN — FAMOTIDINE 20 MG: 10 INJECTION, SOLUTION INTRAVENOUS at 08:17

## 2025-05-27 RX ADMIN — ACETAMINOPHEN 650 MG: 325 TABLET ORAL at 05:35

## 2025-05-27 RX ADMIN — ACETAMINOPHEN 650 MG: 325 TABLET ORAL at 11:15

## 2025-05-27 RX ADMIN — APIXABAN 5 MG: 5 TABLET, FILM COATED ORAL at 08:16

## 2025-05-27 RX ADMIN — Medication 10 ML: at 20:30

## 2025-05-27 RX ADMIN — APIXABAN 5 MG: 5 TABLET, FILM COATED ORAL at 20:30

## 2025-05-27 RX ADMIN — WATER 2000 MG: 1 INJECTION INTRAMUSCULAR; INTRAVENOUS; SUBCUTANEOUS at 11:12

## 2025-05-27 ASSESSMENT — PAIN DESCRIPTION - ORIENTATION
ORIENTATION: MID
ORIENTATION: UPPER;MID
ORIENTATION: MID;UPPER;POSTERIOR

## 2025-05-27 ASSESSMENT — PAIN SCALES - GENERAL
PAINLEVEL_OUTOF10: 2
PAINLEVEL_OUTOF10: 3
PAINLEVEL_OUTOF10: 0
PAINLEVEL_OUTOF10: 4
PAINLEVEL_OUTOF10: 4
PAINLEVEL_OUTOF10: 0
PAINLEVEL_OUTOF10: 4
PAINLEVEL_OUTOF10: 8
PAINLEVEL_OUTOF10: 0

## 2025-05-27 ASSESSMENT — PAIN DESCRIPTION - LOCATION
LOCATION: BACK

## 2025-05-27 ASSESSMENT — PAIN DESCRIPTION - ONSET: ONSET: GRADUAL

## 2025-05-27 ASSESSMENT — PAIN DESCRIPTION - DESCRIPTORS
DESCRIPTORS: ACHING;DULL
DESCRIPTORS: ACHING
DESCRIPTORS: ACHING

## 2025-05-27 ASSESSMENT — PAIN DESCRIPTION - FREQUENCY
FREQUENCY: INTERMITTENT
FREQUENCY: INTERMITTENT

## 2025-05-27 ASSESSMENT — PAIN DESCRIPTION - PAIN TYPE
TYPE: CHRONIC PAIN
TYPE: CHRONIC PAIN

## 2025-05-27 ASSESSMENT — PAIN - FUNCTIONAL ASSESSMENT: PAIN_FUNCTIONAL_ASSESSMENT: ACTIVITIES ARE NOT PREVENTED

## 2025-05-27 NOTE — CONSULTS
Cardiac Surgery   History and Physical    Subjective:      Hector Jensen Jr is a 74 y.o. male who was referred by Dr. Carrillo for post-cardiac surgical evaluation. Mr. Jensen has a PMHx that includes recent CABG with Dr. Ragsdale on 5/14/25, HTN, HLD, CVA (s/p R CEA 1996), T7 fracture and osteoporosis. Mr. Jensen was discharged on POD4, his postop course was remarkable for postoperative atrial fibrillation and was subsequently initiated on Eliquis. Mr. Jensen had been doing ok at home when he began to notice some increased SOB on Sunday 5/25 which acutely worsened on Monday prompting his presentation to the ED. He was found to be hypoxic in the ED 72% on room air sats. CT of the chest showed diffuse groundglass consolidation involving the lung parenchyma as well as small to moderate bilateral pleural effusions most likely secondary to pulmonary edema less likely a multi focal pneumonia.  The labs came back and was significant for a sodium level of 112, chloride 80.  BUN was 11 creatinine was 0.79.  Glucose 149 calcium 8.2. serum osmolality was 243.  Nephrology was consulted recommended starting patient on urea oral supplements and fluid restriction. Critical care was called for admission.     Patient states that he feels thirsty all the time and has been drinking a lot of water. States he drinks about four large bottles. Pt reports his daughter had put him on a severe sodium-restricted diet when he came home from the hospital. Mr. Jensen and his family denies any confusion at home but reports he seemed a little confused early this morning but he is now intact.    He lives independently with his wife and is able to complete his ADLs without issue. Remote tobacco history (~26 pk yrs), no significant ETOH history or drug use.  No known family history of heart disease.\"      Past Medical History:   Diagnosis Date    CAD (coronary artery disease)     Colon polyp     Environmental and seasonal allergies     Essential  hypertension     High cholesterol     Stroke (HCC)     Sun-damaged skin      Past Surgical History:   Procedure Laterality Date    CARDIAC PROCEDURE N/A 5/12/2025    Left heart cath / coronary angiography performed by Braydon Sorto MD at [facility] CARDIAC CATH LAB    CAROTID ENDARTERECTOMY Right     1996    COLONOSCOPY      COLONOSCOPY N/A 12/29/2023    COLONOSCOPY performed by Finesse Cruz MD at [facility] ENDOSCOPY    COLONOSCOPY N/A 12/29/2023    COLONOSCOPY POLYPECTOMY SNARE/COLD BIOPSY performed by Finesse Cruz MD at [facility] ENDOSCOPY    CORONARY ARTERY BYPASS GRAFT N/A 5/14/2025    CORONARY ARTERY BYPASS GRAFTING X 3 WITH LIMA, BESVGH, ECC, NEHEMIAH AND EPIAORTIC U/S BY DR KERR performed by Shola Ragsdale MD at [facility] OPEN HEART    ELBOW SURGERY Left     INVASIVE VASCULAR N/A 5/12/2025    Ultrasound guided vascular access performed by Braydon Sorto MD at [facility] CARDIAC CATH LAB    IA UNLISTED PROCEDURE CARDIAC SURGERY        Social History     Tobacco Use    Smoking status: Former     Current packs/day: 1.00     Average packs/day: 1 pack/day for 26.1 years (26.1 ttl pk-yrs)     Types: Cigarettes     Start date: 05/1999     Quit date: 05/1970     Passive exposure: Never    Smokeless tobacco: Never   Substance Use Topics    Alcohol use: No     Alcohol/week: 0.0 standard drinks of alcohol      Family History   Problem Relation Age of Onset    No Known Problems Father     No Known Problems Mother      Prior to Admission medications    Medication Sig Start Date End Date Taking? Authorizing Provider   acetaminophen (TYLENOL) 500 MG tablet Take 2 tablets by mouth every 6 hours as needed for Pain 5/20/25 6/4/25  Susu Brandon PA-C   amiodarone (CORDARONE) 200 MG tablet Take 2 tablets by mouth 2 times daily for 15 days, THEN 1 tablet 2 times daily for 10 days. 5/20/25 6/14/25  Susu Brandon PA-C   apixaban (ELIQUIS) 5 MG TABS tablet Take 1 tablet by mouth 2 times daily 5/20/25   Susu Brandon PA-C   atorvastatin (LIPITOR) 40

## 2025-05-27 NOTE — PROGRESS NOTES
Spiritual Health History and Assessment/Progress Note  [facility]    Initial Encounter,  , Adjustment to illness,      Name: Hector Jensen Jr MRN: [id number]    Age: 74 y.o.     Sex: male   Language: English   Temple: Taoist   Hyponatremia     Date: 5/27/2025            Total Time Calculated: 15 min              Spiritual Assessment began in [facility] 7S2 INTENSIVE CARE        Referral/Consult From: Rounding   Encounter Overview/Reason: Initial Encounter  Service Provided For: Patient and family together    [place], Belief, Meaning:   Patient identifies as spiritual and has beliefs or practices that help with coping during difficult times  Family/Friends identify as spiritual and have beliefs or practices that help with coping during difficult times      Importance and Influence:  Patient has spiritual/personal beliefs that influence decisions regarding their health  Family/Friends have spiritual/personal beliefs that influence decisions regarding the patient's health    Community:  Patient feels well-supported. Support system includes: Children  Family/Friends feel well-supported. Support system includes: Parent/s    Assessment and Plan of Care:     Patient Interventions include: Facilitated expression of thoughts and feelings, Explored spiritual coping/struggle/distress, Affirmed coping skills/support systems, and Other: Provided spiritual presence and active listening as patient shared that he was feeling pretty good. Acknowledged his feelings and offered words of support. His daughter and granddaughter were at his bedside as were being very supportive. At request of patient and family, had prayer on their behalf and assured them of ongoing  availability for support.  Family/Friends Interventions include: Facilitated expression of thoughts and feelings, Explored spiritual coping/struggle/distress, Affirmed coping skills/support systems, and Other: Patient's daughter and granddaughter were at his

## 2025-05-27 NOTE — PROGRESS NOTES
05/27/25 1032   Oxygen Therapy/Pulse Ox   O2 Device Heated high flow cannula   O2 Flow Rate (L/min) 45 L/min   FiO2  50 %   SpO2 95 %   Humidification Source Heated wire   Humidification Temp 34

## 2025-05-27 NOTE — CONSULTS
[facility]             Yu Alvarez MD             [phone]        NEPHROLOGY CONSULT NOTE     Patient: Hector Jensen Jr MRN: [id number]  PCP: Dada Goss Jr., APRN - NP   :     1950  Age:   74 y.o.  Sex:  male      Referring physician: Harmony Carrillo MD  Reason for consultation: 74 y.o. male with Hyponatremia [E87.1]  Acute pulmonary edema (HCC) [J81.0]  Acute respiratory failure with hypoxia (HCC) [J96.01]  Dyspnea, unspecified type [R06.00] complicated by AVI   Admission Date: 2025  2:46 PM  LOS: 1 day     DISCUSSION / PLAN :      Assessment:  Acute Hyponatremia due to fluid overload/high fluid intake  Baseline Na on low side, has some chronic hyponatremia too  Pulmonary edema  H/o CVA        Plan:  Will repeat Lasix 40 mill a gram IV x 1 today  Repeat serum sodium this afternoon  Check UA for UTI  C/w ure-Na for now  Fluid restriction  Monitor electrolytes           Active Problems / Assessment AAActive  :   Principal Problem:    Hyponatremia  Resolved Problems:    * No resolved hospital problems. *       Subjective:   HPI: Hector Jensen Jr is a 74 y.o. male who has been admitted to the hospital for SOB.  He has history of hypertension, hyperlipidemia, CAD, s/p CABG on 2025 who came to ER with complaint of shortness of breath.  He was hypoxic and CTA chest showed diffuse groundglass consolidation and small to moderate bilateral pleural effusion most likely due to pulmonary edema.  Labs show sodium of 112, BUN 11 creatinine 0.8.  Serum osmolality 243, urine sodium 33, urine creatinine 42, urine osmolality 370.  Patient on Lasix as outpatient but also has been drinking plenty of fluids.Was given 1 dose of Lasix last night and started Urena.  Sodium 115 today  He has had some urinary frequency and polyuria.      Past Medical Hx:   Past Medical History:   Diagnosis Date    CAD (coronary artery disease)     Colon polyp     Environmental and seasonal allergies      4.7 05/26/2025    K 3.9 05/20/2025       Lab Results   Component Value Date    WBC 19.2 (H) 05/27/2025    RBC 3.35 (L) 05/27/2025    HGB 10.2 (L) 05/27/2025    HCT 30.6 (L) 05/27/2025    MCV 91.3 05/27/2025    MCH 30.4 05/27/2025    RDW 12.4 05/27/2025     (H) 05/27/2025       Lab Results   Component Value Date    CALCIUM 8.3 (L) 05/27/2025    CAION 1.17 05/14/2025    PHOS 3.2 05/26/2025       Urine dipstick:   No results found for: \"COLOR\", \"CASTS\"    I have reviewed the following:   All pertinent labs, microbiology data, radiology imaging for my assessment     ECG- Rev: Yes / No  Xray/CT/US/MRI REV: Yes/ No    Care Plan discussed with: Patient and family,    Dr Aktins  Chart reviewed.  Total time spent with patient including MALIHA:  45 min  Medications list Personally Reviewed   [x]      Yes     []               No      Thank you for allowing us to participate in the care of this patient.   We will follow patient. Please don’t hesitate to call with any questions    CHRIS MENDES MD  5/27/2025    A total time of 45 minutes was spent on today's encounter.  Greater than 50% of the time was spent on the following:  Preparing for visit and chart review.  Obtaining and/or reviewing separately obtained history  Performing a medically appropriate exam and/or evaluation  Counseling and educating a patient/family/caregiver as noted above  Placing relevant orders  Referring and communicating with other professionals (not separately reported)  Independently interpreting results (not separately reported) and communicating results to the patient/family/caregiver  Care coordination (not separately reported) as noted above  Documenting clinical information in the electronic health records (e.g. problem list, visit note) on the day of the encounter

## 2025-05-27 NOTE — PROGRESS NOTES
4 eyes skin assessment correction: Vein grafts removed from BLE endoscopically not laparoscopically

## 2025-05-27 NOTE — PLAN OF CARE
Problem: Safety - Adult  Goal: Free from fall injury  Outcome: Progressing     Problem: Chronic Conditions and Co-morbidities  Goal: Patient's chronic conditions and co-morbidity symptoms are monitored and maintained or improved  Outcome: Progressing     Problem: Discharge Planning  Goal: Discharge to home or other facility with appropriate resources  Outcome: Progressing     Problem: Pain  Goal: Verbalizes/displays adequate comfort level or baseline comfort level  Outcome: Progressing  Flowsheets (Taken 5/27/2025 0012)  Verbalizes/displays adequate comfort level or baseline comfort level:   Encourage patient to monitor pain and request assistance   Assess pain using appropriate pain scale   Administer analgesics based on type and severity of pain and evaluate response   Implement non-pharmacological measures as appropriate and evaluate response   Consider cultural and social influences on pain and pain management   Notify Licensed Independent Practitioner if interventions unsuccessful or patient reports new pain

## 2025-05-27 NOTE — CARE COORDINATION
05/27/25 1225   Readmission Assessment   Number of Days since last admission? 8-30 days  (Previous DOS 5/12-5/20)   Previous Disposition Home with Home Health  (At Home Care)   Who is being Interviewed Patient   What was the patient's/caregiver's perception as to why they think they needed to return back to the hospital? Other (Comment)  (Patient is s/p CABG admitted with SOB, electrolyte imbalance)   Did you visit your Primary Care Physician after you left the hospital, before you returned this time? No   Why weren't you able to visit your PCP? Did not have an appointment   Did you see a specialist, such as Cardiac, Pulmonary, Orthopedic Physician, etc. after you left the hospital? No   Who advised the patient to return to the hospital? Self-referral   Does the patient report anything that got in the way of taking their medications? No   In our efforts to provide the best possible care to you and others like you, can you think of anything that we could have done to help you after you left the hospital the first time, so that you might not have needed to return so soon? Other (Comment)  (Patient admitted with hyponatremia, acute pulmonary edema. Patient is s/p CABG x 2 weeks ago.)

## 2025-05-27 NOTE — CARE COORDINATION
Care Management Initial Assessment       RUR: 21 %   Readmission? Yes -   1st IM letter given? No     05/27/25 1211   Service Assessment   Patient Orientation Alert and Oriented;Person;Place;Situation;Self   Cognition Alert   History Provided By Patient   Primary Caregiver Self   Support Systems Spouse/Significant Other;Children  (Wife Suzanna Jensen [phone], Daughter Giovanna Hall [phone])   Patient's Healthcare Decision Maker is: Legal Next of Kin  (Wife)   PCP Verified by CM Yes  (Dr Cornelio Newman)   Last Visit to PCP Within last year   Prior Functional Level Independent in ADLs/IADLs   Current Functional Level Assistance with the following:;Mobility;Bathing;Dressing;Toileting   Can patient return to prior living arrangement Unknown at present   Ability to make needs known: Good   Family able to assist with home care needs: Yes   Would you like for me to discuss the discharge plan with any other family members/significant others, and if so, who? Yes  (Wife / Daughter)   Financial Resources Medicare  (Medicare A,B with [employer] secondary)   Community Resources None   Social/Functional History   Lives With Spouse   Type of Home House   Home Layout Multi-level;Able to Live on Main level with bedroom/bathroom   Home Access Stairs to enter with rails   Entrance Stairs - Number of Steps 5   Entrance Stairs - Rails Both   Bathroom Shower/Tub Walk-in shower   Bathroom Toilet Standard   Bathroom Equipment Grab bars in shower;Built-in shower seat   Home Equipment None   Receives Help From Family   Prior Level of Assist for ADLs Independent   Prior Level of Assist for Homemaking Independent   Prior Level of Assist for Transfers Independent   Active  Yes   Mode of Transportation Car   Occupation Retired   Discharge Planning   Living Arrangements Spouse/Significant Other   Current Services Prior To Admission None   Potential Assistance Needed N/A   Potential Assistance Purchasing Medications No   Patient expects

## 2025-05-27 NOTE — PROGRESS NOTES
0945-patient assisted back to bed from [facility]. Patient had a brief moment of decreased LOC, unable to find words, forward gaze, o2 saturations noted to be mid 70s. Patient placed on non-rebreather. Intensivist in to see patient. New orders noted.  0955-respiratory called. Pt placed on midflow.   1025-Patient placed on high flow by respiratory.

## 2025-05-27 NOTE — PROGRESS NOTES
4 Eyes Skin Assessment     NAME:  Hector Jensen Jr  YOB: 1950  MEDICAL RECORD NUMBER:  [id number]    The patient is being assessed for  Admission    I agree that at least one RN has performed a thorough Head to Toe Skin Assessment on the patient. ALL assessment sites listed below have been assessed.      Areas assessed by both nurses:    Head, Face, Ears, Shoulders, Back, Chest, Arms, Elbows, Hands, Sacrum. Buttock, Coccyx, Ischium, and Legs. Feet and Heels        Does the Patient have a Wound? Yes wound(s) were present on assessment. LDA wound assessment was Initiated and completed by RN  Pt has healing sternotomy incision/bilat vein graft sites(removed laparoscopically).       Carlos A Prevention initiated by RN: Yes  Wound Care Orders initiated by RN: No    Pressure Injury (Stage 3,4, Unstageable, DTI, NWPT, and Complex wounds) if present, place Wound referral order by RN under : No    New Ostomies, if present place, Ostomy referral order under : No     Nurse 1 eSignature: Electronically signed by LORENA CRYSTAL RN on 5/27/25 at 12:05 AM EDT    **SHARE this note so that the co-signing nurse can place an eSignature**    Nurse 2 eSignature: Electronically signed by FABIANO THURSTON RN on 5/27/25 at 12:06 AM EDT

## 2025-05-27 NOTE — PROGRESS NOTES
Cardiac Surgery Care Coordinator- Met with Hector Jensen  and his son. Reviewed plan of care and encouraged them to verbalize. Reinforced sternal precautions and encouraged continued use of the incentive spirometer.  Will continue to follow for educational and emotional needs.

## 2025-05-27 NOTE — PROGRESS NOTES
[facility]     ICU TEAM Progress Note           Name: Hector Jensen Jr   : 1950   MRN: [id number]   Date: 2025          ICU PROBLEM LIST   -Hyponatremia  - Acute hypoxic respiratory failure  -CAD status post CABG     HISTORY OF PRESENT ILLNESS:     Hector Jensen Jr is a 74 y.o. male with a history of hypertension, hyperlipidemia, CAD status post CABG on 2025 who presented to the ED for shortness of breath. Patient reported having some shortness of breath that started yesterday, but significantly worsened today. He was found to be hypoxic in the ED 72% on room air sats. He has some resting shortness of breath but worsens with exertion.  He also has some mild lower bilateral extremity swelling. Patient states that he feels thirsty all the time and has been drinking a lot of water. States he drinks about four large bottles. Has been using his breathing device (?ICS) as instructed at home, and recently when home health visited they recommended him starting or drinking electrolytes or gatorade. Denies chest pain, cough, fever, chills, abdominal or back pain.     In the ED labs were drawn and patient was sent to CT for CTA of his chest with and without contrast.  CT of the chest showed diffuse groundglass consolidation involving the lung parenchyma as well as small to moderate bilateral pleural effusions most likely secondary to pulmonary edema less likely a multi focal pneumonia.  The labs came back and was significant for a sodium level of 112, chloride 80.  BUN was 11 creatinine was 0.79.  Glucose 149 calcium 8.2. serum osmolality was 243.  Nephrology was consulted recommended starting patient on urea oral supplements and fluid restriction. Critical care was called for admission.     SUBJECTIVE:     -no acute events, this a.m. on nasal cannula, noted to have increasing hypoxia requiring significant increase in oxygen requirement.     NEUROLOGICAL:      Chronic

## 2025-05-27 NOTE — ED NOTES
TRANSFER - OUT REPORT:    Verbal report given to EM Meyers on Hector Jensen Jr  being transferred to [facility] for routine progression of patient care       Report consisted of patient's Situation, Background, Assessment and   Recommendations(SBAR).     Information from the following report(s) Nurse Handoff Report, Index, ED Encounter Summary, ED SBAR, and Adult Overview was reviewed with the receiving nurse.    [facility] Fall Assessment:    Presents to emergency department  because of falls (Syncope, seizure, or loss of consciousness): No  Age > 70: Yes  Altered Mental Status, Intoxication with alcohol or substance confusion (Disorientation, impaired judgment, poor safety awaremess, or inability to follow instructions): No  Impaired Mobility: Ambulates or transfers with assistive devices or assistance; Unable to ambulate or transer.: Yes  Nursing Judgement: Yes          Lines:   Peripheral IV 05/26/25 Right Antecubital (Active)   Site Assessment Clean, dry & intact 05/26/25 1506   Line Status Blood return noted;Flushed;Normal saline locked;Specimen collected 05/26/25 1506   Line Care Connections checked and tightened 05/26/25 1506   Phlebitis Assessment No symptoms 05/26/25 1506   Infiltration Assessment 0 05/26/25 1506   Alcohol Cap Used No 05/26/25 1506   Dressing Status Clean, dry & intact 05/26/25 1506   Dressing Type Transparent 05/26/25 1506   Dressing Intervention New 05/26/25 1506        Opportunity for questions and clarification was provided.      Patient transported with:  O2 @ 5L NC lpm and Registered Nurse

## 2025-05-28 ENCOUNTER — APPOINTMENT (OUTPATIENT)
Facility: HOSPITAL | Age: 75
End: 2025-05-28
Payer: MEDICARE

## 2025-05-28 LAB
ALBUMIN SERPL-MCNC: 2.3 G/DL (ref 3.5–5)
ALBUMIN SERPL-MCNC: 2.3 G/DL (ref 3.5–5)
ALBUMIN SERPL-MCNC: 2.4 G/DL (ref 3.5–5)
ALBUMIN SERPL-MCNC: 2.4 G/DL (ref 3.5–5)
ALBUMIN SERPL-MCNC: 2.5 G/DL (ref 3.5–5)
ANION GAP SERPL CALC-SCNC: 11 MMOL/L (ref 2–12)
ANION GAP SERPL CALC-SCNC: 3 MMOL/L (ref 2–12)
ANION GAP SERPL CALC-SCNC: 7 MMOL/L (ref 2–12)
ANION GAP SERPL CALC-SCNC: 7 MMOL/L (ref 2–12)
ANION GAP SERPL CALC-SCNC: 8 MMOL/L (ref 2–12)
ARTERIAL PATENCY WRIST A: YES
B PERT DNA SPEC QL NAA+PROBE: NOT DETECTED
BASE EXCESS BLDA CALC-SCNC: 1.2 MMOL/L
BASE EXCESS BLDA CALC-SCNC: 1.7 MMOL/L
BASE EXCESS BLDA CALC-SCNC: 1.7 MMOL/L
BASOPHILS # BLD: 0.03 K/UL (ref 0–0.1)
BASOPHILS NFR BLD: 0.1 % (ref 0–1)
BDY SITE: ABNORMAL
BORDETELLA PARAPERTUSSIS BY PCR: NOT DETECTED
BUN SERPL-MCNC: 21 MG/DL (ref 6–20)
BUN SERPL-MCNC: 27 MG/DL (ref 6–20)
BUN SERPL-MCNC: 27 MG/DL (ref 6–20)
BUN SERPL-MCNC: 29 MG/DL (ref 6–20)
BUN SERPL-MCNC: 33 MG/DL (ref 6–20)
BUN/CREAT SERPL: 30 (ref 12–20)
BUN/CREAT SERPL: 33 (ref 12–20)
BUN/CREAT SERPL: 36 (ref 12–20)
BUN/CREAT SERPL: 38 (ref 12–20)
BUN/CREAT SERPL: 43 (ref 12–20)
C PNEUM DNA SPEC QL NAA+PROBE: NOT DETECTED
CALCIUM SERPL-MCNC: 8 MG/DL (ref 8.5–10.1)
CALCIUM SERPL-MCNC: 8 MG/DL (ref 8.5–10.1)
CALCIUM SERPL-MCNC: 8.3 MG/DL (ref 8.5–10.1)
CALCIUM SERPL-MCNC: 8.6 MG/DL (ref 8.5–10.1)
CALCIUM SERPL-MCNC: 8.8 MG/DL (ref 8.5–10.1)
CHLORIDE SERPL-SCNC: 82 MMOL/L (ref 97–108)
CHLORIDE SERPL-SCNC: 84 MMOL/L (ref 97–108)
CO2 SERPL-SCNC: 23 MMOL/L (ref 21–32)
CO2 SERPL-SCNC: 24 MMOL/L (ref 21–32)
CO2 SERPL-SCNC: 27 MMOL/L (ref 21–32)
CO2 SERPL-SCNC: 28 MMOL/L (ref 21–32)
CO2 SERPL-SCNC: 28 MMOL/L (ref 21–32)
CREAT SERPL-MCNC: 0.69 MG/DL (ref 0.7–1.3)
CREAT SERPL-MCNC: 0.71 MG/DL (ref 0.7–1.3)
CREAT SERPL-MCNC: 0.75 MG/DL (ref 0.7–1.3)
CREAT SERPL-MCNC: 0.77 MG/DL (ref 0.7–1.3)
CREAT SERPL-MCNC: 0.87 MG/DL (ref 0.7–1.3)
DIFFERENTIAL METHOD BLD: ABNORMAL
EOSINOPHIL # BLD: 0.01 K/UL (ref 0–0.4)
EOSINOPHIL NFR BLD: 0 % (ref 0–7)
ERYTHROCYTE [DISTWIDTH] IN BLOOD BY AUTOMATED COUNT: 12.5 % (ref 11.5–14.5)
FIO2 ON VENT: 65 %
FIO2 ON VENT: 80 %
FIO2 ON VENT: 80 %
FLUAV SUBTYP SPEC NAA+PROBE: NOT DETECTED
FLUBV RNA SPEC QL NAA+PROBE: NOT DETECTED
GLUCOSE BLD STRIP.AUTO-MCNC: 124 MG/DL (ref 65–117)
GLUCOSE BLD STRIP.AUTO-MCNC: 126 MG/DL (ref 65–117)
GLUCOSE BLD STRIP.AUTO-MCNC: 128 MG/DL (ref 65–117)
GLUCOSE BLD STRIP.AUTO-MCNC: 130 MG/DL (ref 65–117)
GLUCOSE BLD STRIP.AUTO-MCNC: 179 MG/DL (ref 65–117)
GLUCOSE SERPL-MCNC: 123 MG/DL (ref 65–100)
GLUCOSE SERPL-MCNC: 127 MG/DL (ref 65–100)
GLUCOSE SERPL-MCNC: 132 MG/DL (ref 65–100)
GLUCOSE SERPL-MCNC: 133 MG/DL (ref 65–100)
GLUCOSE SERPL-MCNC: 133 MG/DL (ref 65–100)
HADV DNA SPEC QL NAA+PROBE: NOT DETECTED
HCO3 BLDA-SCNC: 24 MMOL/L (ref 22–26)
HCO3 BLDA-SCNC: 25 MMOL/L (ref 22–26)
HCO3 BLDA-SCNC: 25 MMOL/L (ref 22–26)
HCOV 229E RNA SPEC QL NAA+PROBE: NOT DETECTED
HCOV HKU1 RNA SPEC QL NAA+PROBE: NOT DETECTED
HCOV NL63 RNA SPEC QL NAA+PROBE: NOT DETECTED
HCOV OC43 RNA SPEC QL NAA+PROBE: NOT DETECTED
HCT VFR BLD AUTO: 28.7 % (ref 36.6–50.3)
HGB BLD-MCNC: 9.6 G/DL (ref 12.1–17)
HMPV RNA SPEC QL NAA+PROBE: NOT DETECTED
HPIV1 RNA SPEC QL NAA+PROBE: NOT DETECTED
HPIV2 RNA SPEC QL NAA+PROBE: NOT DETECTED
HPIV3 RNA SPEC QL NAA+PROBE: NOT DETECTED
HPIV4 RNA SPEC QL NAA+PROBE: NOT DETECTED
IMM GRANULOCYTES # BLD AUTO: 0.23 K/UL (ref 0–0.04)
IMM GRANULOCYTES NFR BLD AUTO: 1.1 % (ref 0–0.5)
LYMPHOCYTES # BLD: 0.17 K/UL (ref 0.8–3.5)
LYMPHOCYTES NFR BLD: 0.8 % (ref 12–49)
M PNEUMO DNA SPEC QL NAA+PROBE: NOT DETECTED
MCH RBC QN AUTO: 30.2 PG (ref 26–34)
MCHC RBC AUTO-ENTMCNC: 33.4 G/DL (ref 30–36.5)
MCV RBC AUTO: 90.3 FL (ref 80–99)
MONOCYTES # BLD: 0.3 K/UL (ref 0–1)
MONOCYTES NFR BLD: 1.4 % (ref 5–13)
NEUTS SEG # BLD: 20.8 K/UL (ref 1.8–8)
NEUTS SEG NFR BLD: 96.6 % (ref 32–75)
NRBC # BLD: 0 K/UL (ref 0–0.01)
NRBC BLD-RTO: 0 PER 100 WBC
NT PRO BNP: 5450 PG/ML
PCO2 BLDA: 34 MMHG (ref 35–45)
PCO2 BLDA: 35 MMHG (ref 35–45)
PCO2 BLDA: 37 MMHG (ref 35–45)
PEEP RESPIRATORY: 10
PEEP RESPIRATORY: 10
PEEP RESPIRATORY: 8
PH BLDA: 7.46 (ref 7.35–7.45)
PH BLDA: 7.47 (ref 7.35–7.45)
PH BLDA: 7.47 (ref 7.35–7.45)
PHOSPHATE SERPL-MCNC: 2.6 MG/DL (ref 2.6–4.7)
PHOSPHATE SERPL-MCNC: 2.9 MG/DL (ref 2.6–4.7)
PHOSPHATE SERPL-MCNC: 3 MG/DL (ref 2.6–4.7)
PHOSPHATE SERPL-MCNC: 3.4 MG/DL (ref 2.6–4.7)
PHOSPHATE SERPL-MCNC: ABNORMAL MG/DL (ref 2.6–4.7)
PLATELET # BLD AUTO: 425 K/UL (ref 150–400)
PMV BLD AUTO: 9.1 FL (ref 8.9–12.9)
PO2 BLDA: 162 MMHG (ref 80–100)
PO2 BLDA: 61 MMHG (ref 80–100)
PO2 BLDA: 90 MMHG (ref 80–100)
POTASSIUM SERPL-SCNC: 4.3 MMOL/L (ref 3.5–5.1)
POTASSIUM SERPL-SCNC: 4.5 MMOL/L (ref 3.5–5.1)
POTASSIUM SERPL-SCNC: ABNORMAL MMOL/L (ref 3.5–5.1)
RBC # BLD AUTO: 3.18 M/UL (ref 4.1–5.7)
RSV RNA SPEC QL NAA+PROBE: NOT DETECTED
RV+EV RNA SPEC QL NAA+PROBE: NOT DETECTED
SAO2 % BLD: 93 % (ref 92–97)
SAO2 % BLD: 97 % (ref 92–97)
SAO2 % BLD: 99 % (ref 92–97)
SAO2% DEVICE SAO2% SENSOR NAME: ABNORMAL
SARS-COV-2 RNA RESP QL NAA+PROBE: NOT DETECTED
SERVICE CMNT-IMP: ABNORMAL
SODIUM SERPL-SCNC: 114 MMOL/L (ref 136–145)
SODIUM SERPL-SCNC: 115 MMOL/L (ref 136–145)
SODIUM SERPL-SCNC: 118 MMOL/L (ref 136–145)
SODIUM SERPL-SCNC: 118 MMOL/L (ref 136–145)
SODIUM SERPL-SCNC: 119 MMOL/L (ref 136–145)
SPECIMEN SITE: ABNORMAL
WBC # BLD AUTO: 21.5 K/UL (ref 4.1–11.1)

## 2025-05-28 PROCEDURE — 2500000003 HC RX 250 WO HCPCS: Performed by: NURSE PRACTITIONER

## 2025-05-28 PROCEDURE — 94760 N-INVAS EAR/PLS OXIMETRY 1: CPT

## 2025-05-28 PROCEDURE — 80069 RENAL FUNCTION PANEL: CPT

## 2025-05-28 PROCEDURE — 6370000000 HC RX 637 (ALT 250 FOR IP): Performed by: NURSE PRACTITIONER

## 2025-05-28 PROCEDURE — 82803 BLOOD GASES ANY COMBINATION: CPT

## 2025-05-28 PROCEDURE — 83880 ASSAY OF NATRIURETIC PEPTIDE: CPT

## 2025-05-28 PROCEDURE — 2580000003 HC RX 258: Performed by: INTERNAL MEDICINE

## 2025-05-28 PROCEDURE — 6360000002 HC RX W HCPCS: Performed by: NURSE PRACTITIONER

## 2025-05-28 PROCEDURE — 2000000000 HC ICU R&B

## 2025-05-28 PROCEDURE — 94660 CPAP INITIATION&MGMT: CPT

## 2025-05-28 PROCEDURE — 2700000000 HC OXYGEN THERAPY PER DAY

## 2025-05-28 PROCEDURE — 5A09357 ASSISTANCE WITH RESPIRATORY VENTILATION, LESS THAN 24 CONSECUTIVE HOURS, CONTINUOUS POSITIVE AIRWAY PRESSURE: ICD-10-PCS | Performed by: NURSE PRACTITIONER

## 2025-05-28 PROCEDURE — 6370000000 HC RX 637 (ALT 250 FOR IP): Performed by: INTERNAL MEDICINE

## 2025-05-28 PROCEDURE — 6360000002 HC RX W HCPCS: Performed by: INTERNAL MEDICINE

## 2025-05-28 PROCEDURE — 2500000003 HC RX 250 WO HCPCS: Performed by: INTERNAL MEDICINE

## 2025-05-28 PROCEDURE — 71045 X-RAY EXAM CHEST 1 VIEW: CPT

## 2025-05-28 PROCEDURE — 36600 WITHDRAWAL OF ARTERIAL BLOOD: CPT

## 2025-05-28 PROCEDURE — 85025 COMPLETE CBC W/AUTO DIFF WBC: CPT

## 2025-05-28 RX ORDER — SODIUM CHLORIDE 9 MG/ML
INJECTION, SOLUTION INTRAVENOUS PRN
Status: DISCONTINUED | OUTPATIENT
Start: 2025-05-28 | End: 2025-05-31

## 2025-05-28 RX ORDER — SODIUM CHLORIDE 1 G/1
1 TABLET ORAL ONCE
Status: DISCONTINUED | OUTPATIENT
Start: 2025-05-28 | End: 2025-05-28

## 2025-05-28 RX ORDER — SODIUM CHLORIDE 0.9 % (FLUSH) 0.9 %
5-40 SYRINGE (ML) INJECTION PRN
Status: DISCONTINUED | OUTPATIENT
Start: 2025-05-28 | End: 2025-06-13 | Stop reason: HOSPADM

## 2025-05-28 RX ORDER — SODIUM CHLORIDE 0.9 % (FLUSH) 0.9 %
5-40 SYRINGE (ML) INJECTION EVERY 12 HOURS SCHEDULED
Status: DISCONTINUED | OUTPATIENT
Start: 2025-05-28 | End: 2025-06-13 | Stop reason: HOSPADM

## 2025-05-28 RX ORDER — NOREPINEPHRINE BITARTRATE 0.06 MG/ML
.5-2 INJECTION, SOLUTION INTRAVENOUS CONTINUOUS
Status: DISCONTINUED | OUTPATIENT
Start: 2025-05-28 | End: 2025-05-28

## 2025-05-28 RX ORDER — DEXMEDETOMIDINE HYDROCHLORIDE 4 UG/ML
.1-1.5 INJECTION, SOLUTION INTRAVENOUS CONTINUOUS
Status: DISCONTINUED | OUTPATIENT
Start: 2025-05-28 | End: 2025-05-28

## 2025-05-28 RX ORDER — LIDOCAINE HYDROCHLORIDE 10 MG/ML
50 INJECTION, SOLUTION EPIDURAL; INFILTRATION; INTRACAUDAL; PERINEURAL ONCE
Status: DISCONTINUED | OUTPATIENT
Start: 2025-05-28 | End: 2025-06-09 | Stop reason: ALTCHOICE

## 2025-05-28 RX ORDER — TRAZODONE HYDROCHLORIDE 50 MG/1
50 TABLET ORAL NIGHTLY
Status: DISCONTINUED | OUTPATIENT
Start: 2025-05-28 | End: 2025-05-31

## 2025-05-28 RX ORDER — FUROSEMIDE 10 MG/ML
20 INJECTION INTRAMUSCULAR; INTRAVENOUS ONCE
Status: COMPLETED | OUTPATIENT
Start: 2025-05-28 | End: 2025-05-28

## 2025-05-28 RX ORDER — NOREPINEPHRINE BITARTRATE 0.06 MG/ML
1-100 INJECTION, SOLUTION INTRAVENOUS CONTINUOUS
Status: DISCONTINUED | OUTPATIENT
Start: 2025-05-28 | End: 2025-05-28

## 2025-05-28 RX ORDER — FUROSEMIDE 10 MG/ML
40 INJECTION INTRAMUSCULAR; INTRAVENOUS ONCE
Status: COMPLETED | OUTPATIENT
Start: 2025-05-28 | End: 2025-05-28

## 2025-05-28 RX ADMIN — AZITHROMYCIN MONOHYDRATE 500 MG: 500 INJECTION, POWDER, LYOPHILIZED, FOR SOLUTION INTRAVENOUS at 11:53

## 2025-05-28 RX ADMIN — FUROSEMIDE 40 MG: 10 INJECTION, SOLUTION INTRAMUSCULAR; INTRAVENOUS at 07:30

## 2025-05-28 RX ADMIN — METOPROLOL TARTRATE 12.5 MG: 25 TABLET, FILM COATED ORAL at 08:13

## 2025-05-28 RX ADMIN — METOPROLOL TARTRATE 12.5 MG: 25 TABLET, FILM COATED ORAL at 20:44

## 2025-05-28 RX ADMIN — NOREPINEPHRINE BITARTRATE 4 MCG/MIN: 1 INJECTION, SOLUTION, CONCENTRATE INTRAVENOUS at 14:29

## 2025-05-28 RX ADMIN — METHYLPREDNISOLONE SODIUM SUCCINATE 40 MG: 40 INJECTION, POWDER, LYOPHILIZED, FOR SOLUTION INTRAMUSCULAR; INTRAVENOUS at 09:27

## 2025-05-28 RX ADMIN — METHOCARBAMOL 500 MG: 500 TABLET ORAL at 20:44

## 2025-05-28 RX ADMIN — SODIUM CHLORIDE 1 G: 1 TABLET ORAL at 08:13

## 2025-05-28 RX ADMIN — ATORVASTATIN CALCIUM 40 MG: 40 TABLET, FILM COATED ORAL at 20:44

## 2025-05-28 RX ADMIN — DEXMEDETOMIDINE HYDROCHLORIDE 0.2 MCG/KG/HR: 400 INJECTION, SOLUTION INTRAVENOUS at 09:30

## 2025-05-28 RX ADMIN — ONDANSETRON 4 MG: 2 INJECTION, SOLUTION INTRAMUSCULAR; INTRAVENOUS at 02:24

## 2025-05-28 RX ADMIN — FUROSEMIDE 20 MG: 10 INJECTION, SOLUTION INTRAMUSCULAR; INTRAVENOUS at 20:44

## 2025-05-28 RX ADMIN — ASPIRIN 81 MG: 81 TABLET, COATED ORAL at 08:13

## 2025-05-28 RX ADMIN — Medication 10 ML: at 20:45

## 2025-05-28 RX ADMIN — WATER 2000 MG: 1 INJECTION INTRAMUSCULAR; INTRAVENOUS; SUBCUTANEOUS at 08:12

## 2025-05-28 RX ADMIN — METHYLPREDNISOLONE SODIUM SUCCINATE 40 MG: 40 INJECTION, POWDER, LYOPHILIZED, FOR SOLUTION INTRAMUSCULAR; INTRAVENOUS at 17:30

## 2025-05-28 RX ADMIN — METHOCARBAMOL 500 MG: 500 TABLET ORAL at 08:13

## 2025-05-28 RX ADMIN — SODIUM CHLORIDE, PRESERVATIVE FREE 10 ML: 5 INJECTION INTRAVENOUS at 20:44

## 2025-05-28 RX ADMIN — ACETAMINOPHEN 650 MG: 325 TABLET ORAL at 08:13

## 2025-05-28 RX ADMIN — PREDNISONE 40 MG: 20 TABLET ORAL at 08:13

## 2025-05-28 RX ADMIN — TRAZODONE HYDROCHLORIDE 50 MG: 50 TABLET ORAL at 20:57

## 2025-05-28 RX ADMIN — APIXABAN 5 MG: 5 TABLET, FILM COATED ORAL at 08:13

## 2025-05-28 RX ADMIN — APIXABAN 5 MG: 5 TABLET, FILM COATED ORAL at 20:44

## 2025-05-28 RX ADMIN — Medication 15 G: at 08:13

## 2025-05-28 ASSESSMENT — PAIN SCALES - GENERAL
PAINLEVEL_OUTOF10: 0

## 2025-05-28 NOTE — PLAN OF CARE
comfort level:   Encourage patient to monitor pain and request assistance   Assess pain using appropriate pain scale   Administer analgesics based on type and severity of pain and evaluate response   Implement non-pharmacological measures as appropriate and evaluate response   Consider cultural and social influences on pain and pain management   Notify Licensed Independent Practitioner if interventions unsuccessful or patient reports new pain  5/27/2025 0853 by Elisa Turcios RN  Outcome: Progressing     Problem: Respiratory - Adult  Goal: Achieves optimal ventilation and oxygenation  Outcome: Progressing  Flowsheets (Taken 5/27/2025 2200)  Achieves optimal ventilation and oxygenation:   Assess for changes in respiratory status   Position to facilitate oxygenation and minimize respiratory effort   Assess the need for suctioning and aspirate as needed   Respiratory therapy support as indicated   Assess for changes in mentation and behavior   Encourage broncho-pulmonary hygiene including cough, deep breathe, incentive spirometry   Oxygen supplementation based on oxygen saturation or arterial blood gases   Assess and instruct to report shortness of breath or any respiratory difficulty     Problem: Skin/Tissue Integrity - Adult  Goal: Incisions, wounds, or drain sites healing without S/S of infection  Outcome: Progressing  Flowsheets (Taken 5/27/2025 2200)  Incisions, Wounds, or Drain Sites Healing Without Sign and Symptoms of Infection:   ADMISSION and DAILY: Assess and document risk factors for pressure ulcer development   TWICE DAILY: Assess and document skin integrity   TWICE DAILY: Assess and document dressing/incision, wound bed, drain sites and surrounding tissue   Initiate pressure ulcer prevention bundle as indicated   Implement wound care per orders     Problem: Metabolic/Fluid and Electrolytes - Adult  Goal: Electrolytes maintained within normal limits  Outcome: Progressing  Flowsheets (Taken  5/27/2025 2200)  Electrolytes maintained within normal limits:   Monitor labs and assess patient for signs and symptoms of electrolyte imbalances   Administer electrolyte replacement as ordered   Monitor response to electrolyte replacements, including repeat lab results as appropriate   Fluid restriction as ordered   Instruct patient on fluid and nutrition restrictions as appropriate

## 2025-05-28 NOTE — PROGRESS NOTES
2130: RT attempted to place patient on bipap. After only a minute, patient became fidgety, complaining of nausea. RN placed patient back on heated high flow. RT and intensivist made aware.    0245: RN administered Zofran and attempted to place patient on bipap. Patient would not allow RN to place mask on face, stating \"I can't wear that.\" Patient is anxious and fidgety. Son is at bedside, attempting to redirect patient. HAILEE Haynes made aware of patient refusal.    0500: Son called RN to the room. Per son, patient had awoken anxious and confused, pulling off his high flow and attempting to get out of bed. RN and son were able to replace high flow, but patient then had what appeared to be a syncopal episode, staring ahead and not responding for about a minute. O2 saturations were in the 70's at the time. Mentation improved with O2 saturation. HAILEE Haynes made aware.  ordered.

## 2025-05-28 NOTE — PROGRESS NOTES
Cardiac Surgery ICU Progress Note    Admit Date: 2025  POD:  * No surgery found *    Procedure:  * No surgery found *     CABG 25 during prior admission.    Subjective:   SR  CPAP FiO2 80%.  Flow rate 55.   Infusions: dexmedetomidine 0.2 mc/k/hr  Prineo removed.    Objective:   Vitals:  Blood pressure 101/64, pulse 72, temperature 97.9 °F (36.6 °C), temperature source Axillary, resp. rate 26, height 1.626 m (5' 4\"), weight 64.6 kg (142 lb 6.7 oz), SpO2 98%.  Temp (24hrs), Av.3 °F (36.8 °C), Min:97.9 °F (36.6 °C), Max:98.6 °F (37 °C)      Ventilator Settings:      Mode Rate TV Press PEEP FiO2 PIP Min. Vent               (S) 80 %              Oxygen Flow Rate: O2 Flow Rate (L/min): 55 L/min    Oxygen Delivery Method: O2 Device: PAP (positive airway pressure)            EKG/Rhythm:  SR    CXR:  The cardiomediastinal contours are stable.     There are increased diffuse severe interstitial and patchy airspace opacities.  There is stable trace pleural effusions. There is no pneumothorax. The bones and  upper abdomen are stable.     IMPRESSION:     Increased diffuse severe interstitial and patchy airspace opacities. Stable  trace pleural effusions.       Admission Weight: Last Weight   Weight - Scale: 63.6 kg (140 lb 3.4 oz) Weight - Scale: 64.6 kg (142 lb 6.7 oz)     Intake / Output / Drain:  Current Shift:  0701 -  1900  In: -   Out: 500 [Urine:500]  Last 24 hrs.:   Intake/Output Summary (Last 24 hours) at 2025 1242  Last data filed at 2025 0930  Gross per 24 hour   Intake 690 ml   Output 1925 ml   Net -1235 ml       EXAM:  General:  Elderly man lying in bed, uncomfortable on CPAP mask.              Lungs:   Coarse and crackly throughout all anterior lung fields. Faint expiratory wheeze.    Incision:  No erythema, drainage or swelling. Prineo removed.    Heart:  Regular rate and rhythm, S1, S2 normal, no murmur, click, rub or gallop.  Noisy chest.    Abdomen:   Soft, non-tender. Bowel    Osteoporosis:  PTA calcium/vitamin D supplementation; alendronate  - Strict sternal precautions for 3 months  - Can resume alendronate      Seasonal allergies: PTA Astelin, Nasacort, Claritin  - Can resume as needed     Cardiac surgery will follow pt peripherally. We appreciate ICU team and care. Please do not hesitate to reach out to us with questions or concerns.     DVT ppx: SCDs   Dispo: keep in ICU for pulmonary care and hyponatremia    Signed By: EAN Villarreal - NP

## 2025-05-28 NOTE — PROGRESS NOTES
Name: Hector Jensen Jr MRN: [id number]   : 1950 Hospital: [facility]   Date: 2025        IMPRESSION:   Hypervolemic hyponatremia of advanced CHF. Na is gradually improving rather slowly. Patient is chronically hyponatremic  Normal renal function  CHF      PLAN:   Continue current management  Oral fluid restriction, diuretics  No plans for hypertonic saline     Subjective/Interval History:   I have reviewed the flowsheet and previous day’s notes.    ROS:Pertinent items are noted in HPI.    Objective:   Vital Signs:    /80   Pulse 72   Temp 98.3 °F (36.8 °C) (Oral)   Resp 27   Ht 1.626 m (5' 4\")   Wt 64.6 kg (142 lb 6.7 oz)   SpO2 100%   BMI 24.45 kg/m²             Temp (24hrs), Av.4 °F (36.9 °C), Min:98.1 °F (36.7 °C), Max:98.6 °F (37 °C)       Intake/Output:   Last shift:       07 -  190  In: -   Out: 500 [Urine:500]  Last 3 shifts: 1901 -  0700  In: 1823.7 [P.O.:1730; I.V.:93.7]  Out: 4375 [Urine:4375]    Intake/Output Summary (Last 24 hours) at 2025 1039  Last data filed at 2025 0930  Gross per 24 hour   Intake 750 ml   Output 1925 ml   Net -1175 ml        Current Facility-Administered Medications   Medication Dose Route Frequency    sodium chloride flush 0.9 % injection 5-40 mL  5-40 mL IntraVENous 2 times per day    sodium chloride flush 0.9 % injection 5-40 mL  5-40 mL IntraVENous PRN    0.9 % sodium chloride infusion   IntraVENous PRN    lidocaine PF 1 % injection 50 mg  50 mg IntraDERmal Once    methylPREDNISolone sodium succ (SOLU-MEDROL) 40 mg in sterile water 1 mL injection  40 mg IntraVENous Q8H    dexmedeTOMIDine (PRECEDEX) 400 mcg in sodium chloride 0.9 % 100 mL infusion  0.1-1.5 mcg/kg/hr IntraVENous Continuous    azithromycin (ZITHROMAX) 500 mg in sodium chloride 0.9 % 250 mL IVPB ([id number])  500 mg IntraVENous Q24H    cefTRIAXone (ROCEPHIN) 2,000 mg in sterile water 20 mL IV syringe  2,000 mg IntraVENous Q24H    oxyCODONE

## 2025-05-28 NOTE — PROGRESS NOTES
[facility]     ICU TEAM Progress Note           Name: Hector Jensen Jr   : 1950   MRN: [id number]   Date: 2025          ICU PROBLEM LIST   -Hyponatremia  - Acute hypoxic respiratory failure  -CAD status post CABG     HISTORY OF PRESENT ILLNESS:     Hector Jensen Jr is a 74 y.o. male with a history of hypertension, hyperlipidemia, CAD status post CABG on 2025 who presented to the ED for shortness of breath. Patient reported having some shortness of breath that started yesterday, but significantly worsened today. He was found to be hypoxic in the ED 72% on room air sats. He has some resting shortness of breath but worsens with exertion.  He also has some mild lower bilateral extremity swelling. Patient states that he feels thirsty all the time and has been drinking a lot of water. States he drinks about four large bottles. Has been using his breathing device (?ICS) as instructed at home, and recently when home health visited they recommended him starting or drinking electrolytes or gatorade. Denies chest pain, cough, fever, chills, abdominal or back pain.     In the ED labs were drawn and patient was sent to CT for CTA of his chest with and without contrast.  CT of the chest showed diffuse groundglass consolidation involving the lung parenchyma as well as small to moderate bilateral pleural effusions most likely secondary to pulmonary edema less likely a multi focal pneumonia.  The labs came back and was significant for a sodium level of 112, chloride 80.  BUN was 11 creatinine was 0.79.  Glucose 149 calcium 8.2. serum osmolality was 243.  Nephrology was consulted recommended starting patient on urea oral supplements and fluid restriction. Critical care was called for admission.     SUBJECTIVE:     -no acute events, this a.m. on nasal cannula, noted to have increasing hypoxia requiring significant increase in oxygen requirement.    -  Increased oxygen req.   care with the consulting services, the bedside nurses and the respiratory therapist.      NOTE OF PERSONAL INVOLVEMENT IN CARE   This patient has a high probability of imminent, clinically significant deterioration, which requires the highest level of preparedness to intervene urgently. I participated in the decision-making and personally managed or directed the management of the following life and organ supporting interventions that required my frequent assessment to treat or prevent imminent deterioration.    I personally spent 55 minutes of critical care time.  This is time spent at this critically ill patient's bedside actively involved in patient care as well as the coordination of care.  This does not include any procedural time which has been billed separately.    Harmony Carrillo   Staff Intensivist/Infectious Disease  [facility]  5/28/2025

## 2025-05-29 ENCOUNTER — APPOINTMENT (OUTPATIENT)
Facility: HOSPITAL | Age: 75
End: 2025-05-29
Payer: MEDICARE

## 2025-05-29 LAB
ALBUMIN SERPL-MCNC: 2 G/DL (ref 3.5–5)
ANION GAP SERPL CALC-SCNC: 7 MMOL/L (ref 2–12)
ARTERIAL PATENCY WRIST A: YES
BASE EXCESS BLDA CALC-SCNC: 3.7 MMOL/L
BASOPHILS # BLD: 0 K/UL (ref 0–0.1)
BASOPHILS NFR BLD: 0 % (ref 0–1)
BDY SITE: ABNORMAL
BUN SERPL-MCNC: 23 MG/DL (ref 6–20)
BUN SERPL-MCNC: 25 MG/DL (ref 6–20)
BUN SERPL-MCNC: 37 MG/DL (ref 6–20)
BUN/CREAT SERPL: 31 (ref 12–20)
BUN/CREAT SERPL: 35 (ref 12–20)
BUN/CREAT SERPL: 44 (ref 12–20)
CALCIUM SERPL-MCNC: 7.4 MG/DL (ref 8.5–10.1)
CALCIUM SERPL-MCNC: 7.6 MG/DL (ref 8.5–10.1)
CALCIUM SERPL-MCNC: 8.3 MG/DL (ref 8.5–10.1)
CHLORIDE SERPL-SCNC: 93 MMOL/L (ref 97–108)
CO2 SERPL-SCNC: 25 MMOL/L (ref 21–32)
CO2 SERPL-SCNC: 27 MMOL/L (ref 21–32)
CO2 SERPL-SCNC: 28 MMOL/L (ref 21–32)
CREAT SERPL-MCNC: 0.71 MG/DL (ref 0.7–1.3)
CREAT SERPL-MCNC: 0.74 MG/DL (ref 0.7–1.3)
CREAT SERPL-MCNC: 0.84 MG/DL (ref 0.7–1.3)
DIFFERENTIAL METHOD BLD: ABNORMAL
ECHO BSA: 1.69 M2
ECHO LV EF PHYSICIAN: 45 %
EOSINOPHIL # BLD: 0 K/UL (ref 0–0.4)
EOSINOPHIL NFR BLD: 0 % (ref 0–7)
ERYTHROCYTE [DISTWIDTH] IN BLOOD BY AUTOMATED COUNT: 12.8 % (ref 11.5–14.5)
FIO2 ON VENT: 80 %
GLUCOSE BLD STRIP.AUTO-MCNC: 134 MG/DL (ref 65–117)
GLUCOSE BLD STRIP.AUTO-MCNC: 135 MG/DL (ref 65–117)
GLUCOSE BLD STRIP.AUTO-MCNC: 136 MG/DL (ref 65–117)
GLUCOSE SERPL-MCNC: 122 MG/DL (ref 65–100)
GLUCOSE SERPL-MCNC: 132 MG/DL (ref 65–100)
GLUCOSE SERPL-MCNC: 133 MG/DL (ref 65–100)
HCO3 BLDA-SCNC: 27 MMOL/L (ref 22–26)
HCT VFR BLD AUTO: 24.5 % (ref 36.6–50.3)
HGB BLD-MCNC: 8.4 G/DL (ref 12.1–17)
IMM GRANULOCYTES # BLD AUTO: 0.11 K/UL (ref 0–0.04)
IMM GRANULOCYTES NFR BLD AUTO: 0.9 % (ref 0–0.5)
LYMPHOCYTES # BLD: 0.27 K/UL (ref 0.8–3.5)
LYMPHOCYTES NFR BLD: 2.2 % (ref 12–49)
MCH RBC QN AUTO: 30 PG (ref 26–34)
MCHC RBC AUTO-ENTMCNC: 34.3 G/DL (ref 30–36.5)
MCV RBC AUTO: 87.5 FL (ref 80–99)
MONOCYTES # BLD: 0.37 K/UL (ref 0–1)
MONOCYTES NFR BLD: 3 % (ref 5–13)
NEUTS SEG # BLD: 11.55 K/UL (ref 1.8–8)
NEUTS SEG NFR BLD: 93.9 % (ref 32–75)
NRBC # BLD: 0 K/UL (ref 0–0.01)
NRBC BLD-RTO: 0 PER 100 WBC
PCO2 BLDA: 36 MMHG (ref 35–45)
PH BLDA: 7.49 (ref 7.35–7.45)
PHOSPHATE SERPL-MCNC: 2.8 MG/DL (ref 2.6–4.7)
PLATELET # BLD AUTO: 434 K/UL (ref 150–400)
PMV BLD AUTO: 9.3 FL (ref 8.9–12.9)
PO2 BLDA: 209 MMHG (ref 80–100)
POTASSIUM SERPL-SCNC: 4.1 MMOL/L (ref 3.5–5.1)
POTASSIUM SERPL-SCNC: 4.2 MMOL/L (ref 3.5–5.1)
POTASSIUM SERPL-SCNC: 4.2 MMOL/L (ref 3.5–5.1)
RBC # BLD AUTO: 2.8 M/UL (ref 4.1–5.7)
RBC MORPH BLD: ABNORMAL
SAO2 % BLD: 100 % (ref 92–97)
SAO2% DEVICE SAO2% SENSOR NAME: ABNORMAL
SERVICE CMNT-IMP: ABNORMAL
SODIUM SERPL-SCNC: 125 MMOL/L (ref 136–145)
SODIUM SERPL-SCNC: 127 MMOL/L (ref 136–145)
SODIUM SERPL-SCNC: 128 MMOL/L (ref 136–145)
SPECIMEN SITE: ABNORMAL
WBC # BLD AUTO: 12.3 K/UL (ref 4.1–11.1)

## 2025-05-29 PROCEDURE — 2500000003 HC RX 250 WO HCPCS: Performed by: NURSE PRACTITIONER

## 2025-05-29 PROCEDURE — 6370000000 HC RX 637 (ALT 250 FOR IP): Performed by: NURSE PRACTITIONER

## 2025-05-29 PROCEDURE — 6370000000 HC RX 637 (ALT 250 FOR IP): Performed by: INTERNAL MEDICINE

## 2025-05-29 PROCEDURE — 36600 WITHDRAWAL OF ARTERIAL BLOOD: CPT

## 2025-05-29 PROCEDURE — 94660 CPAP INITIATION&MGMT: CPT

## 2025-05-29 PROCEDURE — 82803 BLOOD GASES ANY COMBINATION: CPT

## 2025-05-29 PROCEDURE — 2500000003 HC RX 250 WO HCPCS: Performed by: INTERNAL MEDICINE

## 2025-05-29 PROCEDURE — 6360000002 HC RX W HCPCS: Performed by: INTERNAL MEDICINE

## 2025-05-29 PROCEDURE — 2000000000 HC ICU R&B

## 2025-05-29 PROCEDURE — 71045 X-RAY EXAM CHEST 1 VIEW: CPT

## 2025-05-29 PROCEDURE — 80048 BASIC METABOLIC PNL TOTAL CA: CPT

## 2025-05-29 PROCEDURE — 93308 TTE F-UP OR LMTD: CPT

## 2025-05-29 PROCEDURE — 2580000003 HC RX 258: Performed by: NURSE PRACTITIONER

## 2025-05-29 PROCEDURE — 2580000003 HC RX 258: Performed by: INTERNAL MEDICINE

## 2025-05-29 PROCEDURE — 93308 TTE F-UP OR LMTD: CPT | Performed by: SPECIALIST

## 2025-05-29 PROCEDURE — 80069 RENAL FUNCTION PANEL: CPT

## 2025-05-29 PROCEDURE — 82962 GLUCOSE BLOOD TEST: CPT

## 2025-05-29 PROCEDURE — 85025 COMPLETE CBC W/AUTO DIFF WBC: CPT

## 2025-05-29 RX ORDER — FUROSEMIDE 10 MG/ML
20 INJECTION INTRAMUSCULAR; INTRAVENOUS 2 TIMES DAILY
Status: COMPLETED | OUTPATIENT
Start: 2025-05-29 | End: 2025-05-29

## 2025-05-29 RX ADMIN — ATORVASTATIN CALCIUM 40 MG: 40 TABLET, FILM COATED ORAL at 20:09

## 2025-05-29 RX ADMIN — ACETAMINOPHEN 650 MG: 325 TABLET ORAL at 06:39

## 2025-05-29 RX ADMIN — METHYLPREDNISOLONE SODIUM SUCCINATE 40 MG: 40 INJECTION, POWDER, LYOPHILIZED, FOR SOLUTION INTRAMUSCULAR; INTRAVENOUS at 19:16

## 2025-05-29 RX ADMIN — SODIUM CHLORIDE 1 G: 1 TABLET ORAL at 09:32

## 2025-05-29 RX ADMIN — METHOCARBAMOL 500 MG: 500 TABLET ORAL at 09:32

## 2025-05-29 RX ADMIN — METHOCARBAMOL 500 MG: 500 TABLET ORAL at 13:59

## 2025-05-29 RX ADMIN — SODIUM CHLORIDE 1 G: 1 TABLET ORAL at 13:59

## 2025-05-29 RX ADMIN — TRAZODONE HYDROCHLORIDE 50 MG: 50 TABLET ORAL at 22:28

## 2025-05-29 RX ADMIN — SODIUM CHLORIDE: 0.9 INJECTION, SOLUTION INTRAVENOUS at 11:42

## 2025-05-29 RX ADMIN — FUROSEMIDE 20 MG: 10 INJECTION, SOLUTION INTRAMUSCULAR; INTRAVENOUS at 09:32

## 2025-05-29 RX ADMIN — SODIUM CHLORIDE, PRESERVATIVE FREE 10 ML: 5 INJECTION INTRAVENOUS at 09:55

## 2025-05-29 RX ADMIN — SODIUM CHLORIDE, PRESERVATIVE FREE 10 ML: 5 INJECTION INTRAVENOUS at 20:10

## 2025-05-29 RX ADMIN — AZITHROMYCIN MONOHYDRATE 500 MG: 500 INJECTION, POWDER, LYOPHILIZED, FOR SOLUTION INTRAVENOUS at 11:44

## 2025-05-29 RX ADMIN — Medication 10 ML: at 09:55

## 2025-05-29 RX ADMIN — METHYLPREDNISOLONE SODIUM SUCCINATE 40 MG: 40 INJECTION, POWDER, LYOPHILIZED, FOR SOLUTION INTRAMUSCULAR; INTRAVENOUS at 09:30

## 2025-05-29 RX ADMIN — Medication 15 G: at 09:30

## 2025-05-29 RX ADMIN — APIXABAN 5 MG: 5 TABLET, FILM COATED ORAL at 20:30

## 2025-05-29 RX ADMIN — METHOCARBAMOL 500 MG: 500 TABLET ORAL at 19:00

## 2025-05-29 RX ADMIN — SODIUM CHLORIDE 1 G: 1 TABLET ORAL at 19:00

## 2025-05-29 RX ADMIN — METHYLPREDNISOLONE SODIUM SUCCINATE 40 MG: 40 INJECTION, POWDER, LYOPHILIZED, FOR SOLUTION INTRAMUSCULAR; INTRAVENOUS at 00:31

## 2025-05-29 RX ADMIN — APIXABAN 5 MG: 5 TABLET, FILM COATED ORAL at 09:32

## 2025-05-29 RX ADMIN — METHOCARBAMOL 500 MG: 500 TABLET ORAL at 22:28

## 2025-05-29 RX ADMIN — Medication 10 ML: at 20:10

## 2025-05-29 RX ADMIN — ASPIRIN 81 MG: 81 TABLET, COATED ORAL at 09:32

## 2025-05-29 RX ADMIN — FUROSEMIDE 20 MG: 10 INJECTION, SOLUTION INTRAMUSCULAR; INTRAVENOUS at 19:16

## 2025-05-29 RX ADMIN — METOPROLOL TARTRATE 12.5 MG: 25 TABLET, FILM COATED ORAL at 20:09

## 2025-05-29 RX ADMIN — WATER 2000 MG: 1 INJECTION INTRAMUSCULAR; INTRAVENOUS; SUBCUTANEOUS at 09:31

## 2025-05-29 ASSESSMENT — PAIN DESCRIPTION - DESCRIPTORS: DESCRIPTORS: ACHING;SORE

## 2025-05-29 ASSESSMENT — PAIN DESCRIPTION - LOCATION: LOCATION: BACK

## 2025-05-29 ASSESSMENT — PAIN DESCRIPTION - ORIENTATION: ORIENTATION: MID

## 2025-05-29 ASSESSMENT — PAIN SCALES - GENERAL
PAINLEVEL_OUTOF10: 0
PAINLEVEL_OUTOF10: 3

## 2025-05-29 NOTE — PROGRESS NOTES
Physical Therapy 5/29/2025         Consult received and appreciated. Discussed with RN who requested to defer until tomorrow secondary to increased O2 needs. Reviewed sternal precautions with nursing who was already aware.   Will continue to follow    Vivek Khan, PT

## 2025-05-29 NOTE — PLAN OF CARE
Problem: Safety - Adult  Goal: Free from fall injury  5/28/2025 2214 by Jeanine Birch RN  Outcome: Progressing  Flowsheets (Taken 5/27/2025 2200)  Free From Fall Injury: Instruct family/caregiver on patient safety  5/28/2025 1932 by Elisa Turcios RN  Outcome: Progressing     Problem: Chronic Conditions and Co-morbidities  Goal: Patient's chronic conditions and co-morbidity symptoms are monitored and maintained or improved  5/28/2025 2214 by Jeanine Birch RN  Outcome: Progressing  Flowsheets (Taken 5/28/2025 2000)  Care Plan - Patient's Chronic Conditions and Co-Morbidity Symptoms are Monitored and Maintained or Improved:   Monitor and assess patient's chronic conditions and comorbid symptoms for stability, deterioration, or improvement   Collaborate with multidisciplinary team to address chronic and comorbid conditions and prevent exacerbation or deterioration   Update acute care plan with appropriate goals if chronic or comorbid symptoms are exacerbated and prevent overall improvement and discharge  5/28/2025 1932 by Elisa Turcios RN  Outcome: Progressing     Problem: Discharge Planning  Goal: Discharge to home or other facility with appropriate resources  Outcome: Progressing  Flowsheets (Taken 5/28/2025 2000)  Discharge to home or other facility with appropriate resources:   Identify barriers to discharge with patient and caregiver   Arrange for needed discharge resources and transportation as appropriate   Identify discharge learning needs (meds, wound care, etc)   Refer to discharge planning if patient needs post-hospital services based on physician order or complex needs related to functional status, cognitive ability or social support system     Problem: Pain  Goal: Verbalizes/displays adequate comfort level or baseline comfort level  5/28/2025 2214 by Jeanine Birch RN  Outcome: Progressing  Flowsheets (Taken 5/28/2025 2000)  Verbalizes/displays adequate comfort level or baseline

## 2025-05-29 NOTE — PROGRESS NOTES
Physician Progress Note      PATIENT:               RIGO KERR JR  CSN #:                  [id number]  :                       1950  ADMIT DATE:       2025 2:46 PM  DISCH DATE:  RESPONDING  PROVIDER #:        Harmony Carrillo MD          QUERY TEXT:    Pulmonary edema is documented in the medical record  Nephro pn (Dr. Alvarez). Please specify the type :    The clinical indicators include:  adm with sob, hx  hypertension, hyperlipidemia, CAD, s/p CABG on 2025 Dr. Alvarez-Acute pulmonary edema.  CTA chest showed diffuse groundglass   consolidation and small to moderate bilateral pleural effusion most likely due   to pulmonary edema. pbnp [id number]  Tx: IV Lasix, strict I&Os,O2  Options provided:  -- Acute pulmonary edema related to heart disease  -- Acute pulmonary edema related to heart failure  -- Noncardiogenic acute pulmonary edema related to ***  -- Other - I will add my own diagnosis  -- Disagree - Not applicable / Not valid  -- Disagree - Clinically unable to determine / Unknown  -- Refer to Clinical Documentation Reviewer    PROVIDER RESPONSE TEXT:    Acute pulm edema, unclear etiology    Query created by: Susu Davila on 2025 1:39 PM      QUERY TEXT:    CAP is documented in the medical record pn Dr. Carrillo.  Please clarify   any associated diagnoses:    The clinical indicators include:  adm with hyponatremia, Acute hypoxic respiratory failure-hx CAD s/p NSTEMI,   s/p CABG x3     wbc 23-21-12, acute resp failure, rr 26, hr 90  Tx: ID cx, ceftriaxone, Azithromycin, cx pending, solumedrol  Options provided:  -- Sepsis, present on admission  -- Sepsis, developed following admission  -- Localized infection without sepsis  -- Other - I will add my own diagnosis  -- Disagree - Not applicable / Not valid  -- Disagree - Clinically unable to determine / Unknown  -- Refer to Clinical Documentation Reviewer    PROVIDER RESPONSE TEXT:    Provider disagreed with this

## 2025-05-29 NOTE — PROGRESS NOTES
[facility]     ICU TEAM Progress Note           Name: Hector Jensen Jr   : 1950   MRN: [id number]   Date: 2025          ICU PROBLEM LIST   -Hyponatremia  - Acute hypoxic respiratory failure  -CAD status post CABG     HISTORY OF PRESENT ILLNESS:     Hector Jensen Jr is a 74 y.o. male with a history of hypertension, hyperlipidemia, CAD status post CABG on 2025 who presented to the ED for shortness of breath. Patient reported having some shortness of breath that started yesterday, but significantly worsened today. He was found to be hypoxic in the ED 72% on room air sats. He has some resting shortness of breath but worsens with exertion.  He also has some mild lower bilateral extremity swelling. Patient states that he feels thirsty all the time and has been drinking a lot of water. States he drinks about four large bottles. Has been using his breathing device (?ICS) as instructed at home, and recently when home health visited they recommended him starting or drinking electrolytes or gatorade. Denies chest pain, cough, fever, chills, abdominal or back pain.     In the ED labs were drawn and patient was sent to CT for CTA of his chest with and without contrast.  CT of the chest showed diffuse groundglass consolidation involving the lung parenchyma as well as small to moderate bilateral pleural effusions most likely secondary to pulmonary edema less likely a multi focal pneumonia.  The labs came back and was significant for a sodium level of 112, chloride 80.  BUN was 11 creatinine was 0.79.  Glucose 149 calcium 8.2. serum osmolality was 243.  Nephrology was consulted recommended starting patient on urea oral supplements and fluid restriction. Critical care was called for admission.     SUBJECTIVE:     -no acute events, this a.m. on nasal cannula, noted to have increasing hypoxia requiring significant increase in oxygen requirement.    -  Increased oxygen req.   therapist.      NOTE OF PERSONAL INVOLVEMENT IN CARE   This patient has a high probability of imminent, clinically significant deterioration, which requires the highest level of preparedness to intervene urgently. I participated in the decision-making and personally managed or directed the management of the following life and organ supporting interventions that required my frequent assessment to treat or prevent imminent deterioration.    I personally spent 55 minutes of critical care time.  This is time spent at this critically ill patient's bedside actively involved in patient care as well as the coordination of care.  This does not include any procedural time which has been billed separately.    Harmony Carrillo   Staff Intensivist/Infectious Disease  [facility]  5/29/2025

## 2025-05-29 NOTE — PROGRESS NOTES
Occupational Therapy  5/29/2025      Consult received and appreciated. Discussed with RN who requested to defer until tomorrow secondary to increased O2 needs. Reviewed sternal precautions with nursing who was already aware.   Will continue to follow    ADRIENNE Powers, OTR/L

## 2025-05-29 NOTE — PROGRESS NOTES
in sterile water 20 mL IV syringe  2,000 mg IntraVENous Q24H    oxyCODONE (ROXICODONE) immediate release tablet 5 mg  5 mg Oral Q6H PRN    methocarbamol (ROBAXIN) tablet 500 mg  500 mg Oral 4x Daily    sodium chloride tablet 1 g  1 g Oral TID WC    glucose chewable tablet 16 g  4 tablet Oral PRN    dextrose bolus 10% 125 mL  125 mL IntraVENous PRN    Or    dextrose bolus 10% 250 mL  250 mL IntraVENous PRN    glucagon injection 1 mg  1 mg SubCUTAneous PRN    dextrose 10 % infusion   IntraVENous Continuous PRN    insulin lispro (HUMALOG,ADMELOG) injection vial 0-4 Units  0-4 Units SubCUTAneous 4x Daily AC & HS    urea (URE-NA) packet 15 g  15 g Oral BID    apixaban (ELIQUIS) tablet 5 mg  5 mg Oral BID    metoprolol tartrate (LOPRESSOR) tablet 12.5 mg  12.5 mg Oral BID    atorvastatin (LIPITOR) tablet 40 mg  40 mg Oral Nightly    sodium chloride flush 0.9 % injection 5-40 mL  5-40 mL IntraVENous 2 times per day    sodium chloride flush 0.9 % injection 5-40 mL  5-40 mL IntraVENous PRN    0.9 % sodium chloride infusion   IntraVENous PRN    albuterol (PROVENTIL) (2.5 MG/3ML) 0.083% nebulizer solution 2.5 mg  2.5 mg Nebulization Q6H PRN    aluminum & magnesium hydroxide-simethicone (MAALOX PLUS) 200-200-20 MG/5ML suspension 30 mL  30 mL Oral Q6H PRN    senna (SENOKOT) 8.8 MG/5ML syrup 8.8 mg  5 mL Oral BID PRN    acetaminophen (TYLENOL) tablet 650 mg  650 mg Oral Q6H PRN    Or    acetaminophen (TYLENOL) suppository 650 mg  650 mg Rectal Q6H PRN    ondansetron (ZOFRAN) injection 4 mg  4 mg IntraVENous Q6H PRN    aspirin EC tablet 81 mg  81 mg Oral Daily    lidocaine 4 % external patch 1 patch  1 patch TransDERmal Daily          Physical Exam:  General:    Alert, cooperative, in moderate respiratory distress, appears stated age.   Family and RN at bedside  Head:   Normocephalic, without obvious abnormality, atraumatic.  Eyes:   Conjunctivae/corneas clear.    Nose:  Nares normal. No drainage or sinus tenderness.  CAROL LAIRD    XR CHEST PORTABLE  Result Date: 5/27/2025  Increased pulmonary edema. Electronically signed by Ángel Walker    CTA CHEST W WO CONTRAST PE Eval  Result Date: 5/26/2025  Diffuse groundglass consolidation involving the lung parenchyma as well as small to moderate bilateral pleural effusions, most likely secondary to pulmonary edema. Multifocal pneumonia is felt less likely. No PE Electronically signed by DEE EM    XR CHEST PORTABLE  Result Date: 5/26/2025  Diffusely increased airspace disease in the right lung and left apex. This could represent diffuse asymmetric pulmonary edema or infection. Electronically signed by Gamal Smalls         Total time spent with patient:  35 minutes    [] Critical Care Provided    Care Plan discussed with:   Family, Nursing, Medical Team    Gillian Meyer MD

## 2025-05-29 NOTE — PROGRESS NOTES
Cardiac Surgery ICU Progress Note    Admit Date: 2025  Readmission for hypoxia and hyponatremia    Procedure:       CABG 25 during prior admission with Dr. Ragsdale   Subjective:   Afebrile.  NSR 70-80s.  BP /50-60s.  Intermittent NIPPV/HFNC for hypoxia.  Good UOP overnight with diuresis.  WBC down to 12.3 this AM on abx.       Objective:   Vitals:  Blood pressure 93/64, pulse (!) 118, temperature 98.4 °F (36.9 °C), temperature source Axillary, resp. rate (!) 36, height 1.626 m (5' 4\"), weight 63.5 kg (140 lb), SpO2 94%.  Temp (24hrs), Av.3 °F (36.8 °C), Min:97.9 °F (36.6 °C), Max:98.5 °F (36.9 °C)    NIPPV and HFNC    EKG/Rhythm:  NSR on tele this AM.    CXR:  Xray Result (most recent):  XR CHEST PORTABLE 2025    Narrative  EXAM:  XR CHEST PORTABLE    INDICATION: Bilateral lung opacities    COMPARISON: 2025    TECHNIQUE: Portable AP semiupright chest view at 0432 hours    FINDINGS: The cardiomediastinal contours are stable.    There are overall decreased moderate diffuse interstitial and patchy airspace  opacities. There is stable trace pleural effusions. There is no pneumothorax.  The bones and upper abdomen are stable.    Impression  Overall decreased moderate diffuse interstitial and patchy airspace opacities.  Stable trace pleural effusions.      Electronically signed by David Peters       Admission Weight: Last Weight   Weight - Scale: 63.6 kg (140 lb 3.4 oz) Weight - Scale: 63.5 kg (140 lb)     Intake / Output / Drain:  Current Shift:  0701 -  1900  In: 251.1 [I.V.:1.1]  Out: -   Last 24 hrs.:   Intake/Output Summary (Last 24 hours) at 2025 1432  Last data filed at 2025 1300  Gross per 24 hour   Intake 492.87 ml   Output 1575 ml   Net -1082.13 ml     EXAM:  General:  Lying in bed, on CPAP mask.              Lungs:   Coarse and crackly throughout all anterior lung fields. Faint expiratory wheeze.    Incision:  No erythema, drainage or swelling. Prineo

## 2025-05-30 ENCOUNTER — APPOINTMENT (OUTPATIENT)
Facility: HOSPITAL | Age: 75
End: 2025-05-30
Payer: MEDICARE

## 2025-05-30 LAB
ANION GAP SERPL CALC-SCNC: 8 MMOL/L (ref 2–12)
BASOPHILS # BLD: 0.01 K/UL (ref 0–0.1)
BASOPHILS NFR BLD: 0.1 % (ref 0–1)
BUN SERPL-MCNC: 32 MG/DL (ref 6–20)
BUN/CREAT SERPL: 40 (ref 12–20)
CALCIUM SERPL-MCNC: 8.3 MG/DL (ref 8.5–10.1)
CHLORIDE SERPL-SCNC: 95 MMOL/L (ref 97–108)
CO2 SERPL-SCNC: 28 MMOL/L (ref 21–32)
CREAT SERPL-MCNC: 0.81 MG/DL (ref 0.7–1.3)
DIFFERENTIAL METHOD BLD: ABNORMAL
EOSINOPHIL # BLD: 0 K/UL (ref 0–0.4)
EOSINOPHIL NFR BLD: 0 % (ref 0–7)
ERYTHROCYTE [DISTWIDTH] IN BLOOD BY AUTOMATED COUNT: 13.2 % (ref 11.5–14.5)
GLUCOSE BLD STRIP.AUTO-MCNC: 133 MG/DL (ref 65–117)
GLUCOSE BLD STRIP.AUTO-MCNC: 184 MG/DL (ref 65–117)
GLUCOSE BLD STRIP.AUTO-MCNC: 212 MG/DL (ref 65–117)
GLUCOSE SERPL-MCNC: 130 MG/DL (ref 65–100)
HCT VFR BLD AUTO: 27.1 % (ref 36.6–50.3)
HGB BLD-MCNC: 9.1 G/DL (ref 12.1–17)
IMM GRANULOCYTES # BLD AUTO: 0.14 K/UL (ref 0–0.04)
IMM GRANULOCYTES NFR BLD AUTO: 1.1 % (ref 0–0.5)
LYMPHOCYTES # BLD: 0.3 K/UL (ref 0.8–3.5)
LYMPHOCYTES NFR BLD: 2.4 % (ref 12–49)
MCH RBC QN AUTO: 30.2 PG (ref 26–34)
MCHC RBC AUTO-ENTMCNC: 33.6 G/DL (ref 30–36.5)
MCV RBC AUTO: 90 FL (ref 80–99)
MONOCYTES # BLD: 0.38 K/UL (ref 0–1)
MONOCYTES NFR BLD: 3 % (ref 5–13)
NEUTS SEG # BLD: 11.87 K/UL (ref 1.8–8)
NEUTS SEG NFR BLD: 93.4 % (ref 32–75)
NRBC # BLD: 0 K/UL (ref 0–0.01)
NRBC BLD-RTO: 0 PER 100 WBC
PLATELET # BLD AUTO: 433 K/UL (ref 150–400)
PMV BLD AUTO: 8.9 FL (ref 8.9–12.9)
POTASSIUM SERPL-SCNC: 4.2 MMOL/L (ref 3.5–5.1)
RBC # BLD AUTO: 3.01 M/UL (ref 4.1–5.7)
RBC MORPH BLD: ABNORMAL
SERVICE CMNT-IMP: ABNORMAL
SODIUM SERPL-SCNC: 131 MMOL/L (ref 136–145)
WBC # BLD AUTO: 12.7 K/UL (ref 4.1–11.1)

## 2025-05-30 PROCEDURE — 2000000000 HC ICU R&B

## 2025-05-30 PROCEDURE — 2500000003 HC RX 250 WO HCPCS: Performed by: NURSE PRACTITIONER

## 2025-05-30 PROCEDURE — 6370000000 HC RX 637 (ALT 250 FOR IP): Performed by: INTERNAL MEDICINE

## 2025-05-30 PROCEDURE — 2700000000 HC OXYGEN THERAPY PER DAY

## 2025-05-30 PROCEDURE — 80048 BASIC METABOLIC PNL TOTAL CA: CPT

## 2025-05-30 PROCEDURE — 97530 THERAPEUTIC ACTIVITIES: CPT

## 2025-05-30 PROCEDURE — 71045 X-RAY EXAM CHEST 1 VIEW: CPT

## 2025-05-30 PROCEDURE — 6370000000 HC RX 637 (ALT 250 FOR IP): Performed by: NURSE PRACTITIONER

## 2025-05-30 PROCEDURE — 94640 AIRWAY INHALATION TREATMENT: CPT

## 2025-05-30 PROCEDURE — 6360000002 HC RX W HCPCS: Performed by: INTERNAL MEDICINE

## 2025-05-30 PROCEDURE — 36415 COLL VENOUS BLD VENIPUNCTURE: CPT

## 2025-05-30 PROCEDURE — 2580000003 HC RX 258: Performed by: INTERNAL MEDICINE

## 2025-05-30 PROCEDURE — 97161 PT EVAL LOW COMPLEX 20 MIN: CPT

## 2025-05-30 PROCEDURE — 94660 CPAP INITIATION&MGMT: CPT

## 2025-05-30 PROCEDURE — 2500000003 HC RX 250 WO HCPCS: Performed by: INTERNAL MEDICINE

## 2025-05-30 PROCEDURE — 82962 GLUCOSE BLOOD TEST: CPT

## 2025-05-30 PROCEDURE — 97535 SELF CARE MNGMENT TRAINING: CPT

## 2025-05-30 PROCEDURE — 85025 COMPLETE CBC W/AUTO DIFF WBC: CPT

## 2025-05-30 PROCEDURE — 97165 OT EVAL LOW COMPLEX 30 MIN: CPT

## 2025-05-30 PROCEDURE — 94760 N-INVAS EAR/PLS OXIMETRY 1: CPT

## 2025-05-30 RX ORDER — FLUTICASONE PROPIONATE 50 MCG
1 SPRAY, SUSPENSION (ML) NASAL DAILY PRN
Status: DISCONTINUED | OUTPATIENT
Start: 2025-05-30 | End: 2025-06-13 | Stop reason: HOSPADM

## 2025-05-30 RX ORDER — FUROSEMIDE 10 MG/ML
20 INJECTION INTRAMUSCULAR; INTRAVENOUS 2 TIMES DAILY
Status: COMPLETED | OUTPATIENT
Start: 2025-05-30 | End: 2025-05-30

## 2025-05-30 RX ORDER — BENZONATATE 100 MG/1
100 CAPSULE ORAL 3 TIMES DAILY PRN
Status: DISCONTINUED | OUTPATIENT
Start: 2025-05-30 | End: 2025-06-12

## 2025-05-30 RX ORDER — ALBUTEROL SULFATE 0.83 MG/ML
2.5 SOLUTION RESPIRATORY (INHALATION)
Status: DISCONTINUED | OUTPATIENT
Start: 2025-05-30 | End: 2025-06-02

## 2025-05-30 RX ADMIN — METHOCARBAMOL 500 MG: 500 TABLET ORAL at 12:54

## 2025-05-30 RX ADMIN — FLUTICASONE PROPIONATE 1 SPRAY: 50 SPRAY, METERED NASAL at 18:52

## 2025-05-30 RX ADMIN — INSULIN LISPRO 1 UNITS: 100 INJECTION, SOLUTION INTRAVENOUS; SUBCUTANEOUS at 21:01

## 2025-05-30 RX ADMIN — METHOCARBAMOL 500 MG: 500 TABLET ORAL at 08:47

## 2025-05-30 RX ADMIN — SODIUM CHLORIDE 1 G: 1 TABLET ORAL at 08:47

## 2025-05-30 RX ADMIN — APIXABAN 5 MG: 5 TABLET, FILM COATED ORAL at 20:38

## 2025-05-30 RX ADMIN — METOPROLOL TARTRATE 12.5 MG: 25 TABLET, FILM COATED ORAL at 20:38

## 2025-05-30 RX ADMIN — INSULIN LISPRO 1 UNITS: 100 INJECTION, SOLUTION INTRAVENOUS; SUBCUTANEOUS at 18:30

## 2025-05-30 RX ADMIN — METOPROLOL TARTRATE 12.5 MG: 25 TABLET, FILM COATED ORAL at 08:47

## 2025-05-30 RX ADMIN — Medication 10 ML: at 08:47

## 2025-05-30 RX ADMIN — ALBUTEROL SULFATE 2.5 MG: 2.5 SOLUTION RESPIRATORY (INHALATION) at 20:39

## 2025-05-30 RX ADMIN — AZITHROMYCIN MONOHYDRATE 500 MG: 500 INJECTION, POWDER, LYOPHILIZED, FOR SOLUTION INTRAVENOUS at 12:56

## 2025-05-30 RX ADMIN — APIXABAN 5 MG: 5 TABLET, FILM COATED ORAL at 08:46

## 2025-05-30 RX ADMIN — TRAZODONE HYDROCHLORIDE 50 MG: 50 TABLET ORAL at 20:38

## 2025-05-30 RX ADMIN — FUROSEMIDE 20 MG: 10 INJECTION, SOLUTION INTRAMUSCULAR; INTRAVENOUS at 12:53

## 2025-05-30 RX ADMIN — METHYLPREDNISOLONE SODIUM SUCCINATE 40 MG: 40 INJECTION, POWDER, LYOPHILIZED, FOR SOLUTION INTRAMUSCULAR; INTRAVENOUS at 09:00

## 2025-05-30 RX ADMIN — ALBUTEROL SULFATE 2.5 MG: 2.5 SOLUTION RESPIRATORY (INHALATION) at 14:45

## 2025-05-30 RX ADMIN — WATER 2000 MG: 1 INJECTION INTRAMUSCULAR; INTRAVENOUS; SUBCUTANEOUS at 08:46

## 2025-05-30 RX ADMIN — METHOCARBAMOL 500 MG: 500 TABLET ORAL at 20:39

## 2025-05-30 RX ADMIN — ATORVASTATIN CALCIUM 40 MG: 40 TABLET, FILM COATED ORAL at 20:38

## 2025-05-30 RX ADMIN — METHOCARBAMOL 500 MG: 500 TABLET ORAL at 17:26

## 2025-05-30 RX ADMIN — FLUTICASONE PROPIONATE 1 SPRAY: 50 SPRAY, METERED NASAL at 15:00

## 2025-05-30 RX ADMIN — METHYLPREDNISOLONE SODIUM SUCCINATE 40 MG: 40 INJECTION, POWDER, LYOPHILIZED, FOR SOLUTION INTRAMUSCULAR; INTRAVENOUS at 01:12

## 2025-05-30 RX ADMIN — SODIUM CHLORIDE, PRESERVATIVE FREE 10 ML: 5 INJECTION INTRAVENOUS at 20:40

## 2025-05-30 RX ADMIN — FUROSEMIDE 20 MG: 10 INJECTION, SOLUTION INTRAMUSCULAR; INTRAVENOUS at 20:38

## 2025-05-30 RX ADMIN — Medication 10 ML: at 20:40

## 2025-05-30 RX ADMIN — METHYLPREDNISOLONE SODIUM SUCCINATE 40 MG: 40 INJECTION, POWDER, LYOPHILIZED, FOR SOLUTION INTRAMUSCULAR; INTRAVENOUS at 17:26

## 2025-05-30 RX ADMIN — SODIUM CHLORIDE, PRESERVATIVE FREE 10 ML: 5 INJECTION INTRAVENOUS at 08:47

## 2025-05-30 RX ADMIN — ASPIRIN 81 MG: 81 TABLET, COATED ORAL at 08:46

## 2025-05-30 ASSESSMENT — PAIN SCALES - GENERAL
PAINLEVEL_OUTOF10: 0

## 2025-05-30 NOTE — PROGRESS NOTES
Speech-Language Pathology Contact Note    Consult received and appreciated. Patient discussed with RN and intensivist f2f, decision made to hold on bedside evaluation at this time. Will follow as needed.    Thank you,  Yuridia Muniz M.Ed, CCC-SLP (pronouns: she/her/hers)  Speech-Language Pathologist

## 2025-05-30 NOTE — PROGRESS NOTES
[facility]     ICU TEAM Progress Note           Name: Hector Jensen Jr   : 1950   MRN: [id number]   Date: 2025          ICU PROBLEM LIST   -Hyponatremia  - Acute hypoxic respiratory failure  -CAD status post CABG     HISTORY OF PRESENT ILLNESS:     Hector Jensen Jr is a 74 y.o. male with a history of hypertension, hyperlipidemia, CAD status post CABG on 2025 who presented to the ED for shortness of breath. Patient reported having some shortness of breath that started yesterday, but significantly worsened today. He was found to be hypoxic in the ED 72% on room air sats. He has some resting shortness of breath but worsens with exertion.  He also has some mild lower bilateral extremity swelling. Patient states that he feels thirsty all the time and has been drinking a lot of water. States he drinks about four large bottles. Has been using his breathing device (?ICS) as instructed at home, and recently when home health visited they recommended him starting or drinking electrolytes or gatorade. Denies chest pain, cough, fever, chills, abdominal or back pain.     In the ED labs were drawn and patient was sent to CT for CTA of his chest with and without contrast.  CT of the chest showed diffuse groundglass consolidation involving the lung parenchyma as well as small to moderate bilateral pleural effusions most likely secondary to pulmonary edema less likely a multi focal pneumonia.  The labs came back and was significant for a sodium level of 112, chloride 80.  BUN was 11 creatinine was 0.79.  Glucose 149 calcium 8.2. serum osmolality was 243.  Nephrology was consulted recommended starting patient on urea oral supplements and fluid restriction. Critical care was called for admission.     SUBJECTIVE:     -no acute events, this a.m. on nasal cannula, noted to have increasing hypoxia requiring significant increase in oxygen requirement.    -  Increased oxygen req.   ventilation:          MEDS: Reviewed    Chest X-Ray:  Reviewed      CRITICAL CARE CONSULTANT NOTE  I had a face to face encounter with the patient, reviewed and interpreted patient data including clinical events, labs, images, vital signs, I/O's, and examined patient.  I have discussed the case and the plan and management of the patient's care with the consulting services, the bedside nurses and the respiratory therapist.      NOTE OF PERSONAL INVOLVEMENT IN CARE   This patient has a high probability of imminent, clinically significant deterioration, which requires the highest level of preparedness to intervene urgently. I participated in the decision-making and personally managed or directed the management of the following life and organ supporting interventions that required my frequent assessment to treat or prevent imminent deterioration.    I personally spent 55 minutes of critical care time.  This is time spent at this critically ill patient's bedside actively involved in patient care as well as the coordination of care.  This does not include any procedural time which has been billed separately.    Harmony Carrillo   Staff Intensivist/Infectious Disease  [facility]  5/30/2025

## 2025-05-30 NOTE — PLAN OF CARE
Problem: Physical Therapy - Adult  Goal: By Discharge: Performs mobility at highest level of function for planned discharge setting.  See evaluation for individualized goals.  Description: FUNCTIONAL STATUS PRIOR TO ADMISSION: Patient was independent and active without use of DME.    HOME SUPPORT PRIOR TO ADMISSION: The patient lived with family but did not require assistance.    Physical Therapy Goals  Initiated 5/30/2025  1.  Patient will move from supine to sit and sit to supine, scoot up and down, and roll side to side in bed with contact guard assist within 7 day(s).    2.  Patient will perform sit to stand with contact guard assist within 7 day(s).  3.  Patient will transfer from bed to chair and chair to bed with contact guard assist using the least restrictive device within 7 day(s).  4.  Patient will ambulate with contact guard assist for 150 feet with the least restrictive device within 7 day(s).   5.  Patient will ascend/descend 4 stairs with minimal assistance within 7 day(s).  6.  Patient will verbally and functionally demonstrate 3/3 sternal precautions without cues within 7 days.   Outcome: Progressing       PHYSICAL THERAPY EVALUATION    Patient: Hector Jensen Jr (74 y.o. male)  Date: 5/30/2025  Primary Diagnosis: Hyponatremia [E87.1]  Acute pulmonary edema (HCC) [J81.0]  Acute respiratory failure with hypoxia (HCC) [J96.01]  Dyspnea, unspecified type [R06.00]       Precautions: Restrictions/Precautions  Restrictions/Precautions: Fall Risk            ASSESSMENT : Hector Jensen Jr is a 74 y.o. male with a history of  CAD status post CABG on 5/14/2025 who presented to the ED for acute respiratory distress and hyponatremia  DEFICITS/IMPAIRMENTS:   The patient is limited by decreased functional mobility, strength, activity tolerance, endurance, cognition, coordination, balance, posture who would benefit from PT to address these impairments. Patient on hi flow during treatment; vital signs stable  functional  PROM: Within functional limits    Functional Mobility:  Bed Mobility:     Bed Mobility Training  Bed Mobility Training: Yes  Supine to Sit: Substantial/Maximal assistance;2 Person assistance (secondary to sternal precautions)  Sit to Supine:  (secondary to sternal precautions)  Scooting: Substantial/Maximal assistance  Transfers:     Transfer Training  Transfer Training: Yes  Sit to Stand: Partial/Moderate assistance  Stand to Sit: Minimal assistance  Stand Pivot Transfers: Partial/Moderate assistance  Bed to Chair: Partial/Moderate assistance  Balance:               Balance  Sitting: Impaired  Sitting - Static: Fair (occasional)  Sitting - Dynamic: Fair (occasional)  Standing: Impaired  Standing - Static: Constant support;Fair  Standing - Dynamic: Constant support;Fair  Ambulation/Gait Training:                       Gait  Gait Training: Yes  Overall Level of Assistance: Partial/Moderate assistance  Distance (ft): 2 Feet  Assistive Device: Other (comment) (anterior support)  Gait Abnormalities: Shuffling gait                                                                                                                                                                                                                                                              [facility] AM-PAC®      Basic Mobility Inpatient Short Form (6-Clicks) Version 2  How much HELP from another person do you currently need... (If the patient hasn't done an activity recently, how much help from another person do you think they would need if they tried?) Total A Lot A Little None   1.  Turning from your back to your side while in a flat bed without using bedrails? []  1 [x]  2 []  3  []  4   2.  Moving from lying on your back to sitting on the side of a flat bed without using bedrails? []  1 [x]  2 []  3  []  4   3.  Moving to and from a bed to a chair (including a wheelchair)? []  1 [x]  2 []  3  []  4   4. Standing up from a

## 2025-05-30 NOTE — PLAN OF CARE
acute respiratory failure.  Pt recently admitted for CABG and is to maintain strict sternal precautions.  Sx date 5/14/25.  Pt discharged home and was doing well at home until noted AMS and increased WOB.  Pt found hypoxic on admission.      Today, pt remains on high flow and nursing agreeable to progress bed to chair this date.  Pt noted with improving RR once sitting, with RR in upper 10's.  O2 stable during tasks.  Sodium slowly improving.      Due to the severity of patients deficits, inability to perform self care skills on their own, inability to perform toileting and hygiene independently, and patient notable decline from baseline, recommend discharge to high intensity, multidisciplinary facility to maximize functional out comes and independence prior to return home.     Functional Outcome Measure:  The patient scored 14 on the [facility] outcome measure.         PLAN :  Recommendations and Planned Interventions:   self care training, therapeutic activities, functional mobility training, balance training, patient education, home safety training, and family training/education    Frequency/Duration: OT Plan of Care: 5 times/week    Recommendation for discharge: (in order for the patient to meet his/her long term goals):   High intensity/comprehensive skilled occupational therapy in a multidisciplinary setting as patient is working towards tolerating up to 3 hours of therapy/day 5-7x/week    Other factors to consider for discharge: poor safety awareness, impaired cognition, and high risk for falls    IF patient discharges home will need the following DME: TBD       SUBJECTIVE:   Patient stated, “Thank you.”  OBJECTIVE DATA SUMMARY:     Past Medical History:   Diagnosis Date    CAD (coronary artery disease)     Colon polyp     Environmental and seasonal allergies     Essential hypertension     High cholesterol     Stroke (HCC)     Sun-damaged skin      Past Surgical History:   Procedure Laterality Date    CARDIAC  cues for safety with functional mobility    Skin: see nursing notes    Edema: none noted    Hearing:   Hearing  Hearing: Within functional limits    Vision/Perceptual:          Vision  Vision: Within Functional Limits  Perception  Overall Perceptual Status: WFL  Vision  Vision: Within Functional Limits         Range of Motion:   AROM: Generally decreased, functional  PROM: Generally decreased, functional      Strength:  Strength: Generally decreased, functional      Coordination:  Coordination: Generally decreased, functional     Coordination: Generally decreased, functional      Tone & Sensation:   Tone: Normal  Sensation: Intact          Functional Mobility and Transfers for ADLs:    Bed Mobility:     Bed Mobility Training  Bed Mobility Training: Yes  Supine to Sit: Substantial/Maximal assistance;2 Person assistance (secondary to sternal precautions)  Sit to Supine:  (secondary to sternal precautions)  Scooting: Substantial/Maximal assistance    Transfers:      Transfer Training  Transfer Training: Yes  Sit to Stand: Partial/Moderate assistance  Stand to Sit: Minimal assistance  Stand Pivot Transfers: Partial/Moderate assistance  Bed to Chair: Partial/Moderate assistance  Toilet Transfer: Partial/Moderate assistance (BSC only at this time)                     Balance:      Balance  Sitting: Impaired  Sitting - Static: Fair (occasional)  Sitting - Dynamic: Fair (occasional)  Standing: Impaired  Standing - Static: Constant support;Fair  Standing - Dynamic: Constant support;Fair      ADL Assessment:          Feeding: Supervision;Setup       Grooming: Setup;Supervision  Grooming Skilled Clinical Factors: limited due to high flow O2 at this time    UE Bathing: Maximum assistance  UE Bathing Skilled Clinical Factors: due to respriatory status and needs for reinforcement of strick sternal precautions    Product Used : Bath wipes    LE Bathing: Maximum assistance  LE Bathing Skilled Clinical Factors: due to respriatory

## 2025-05-30 NOTE — PROGRESS NOTES
Cardiac Surgery ICU Progress Note    Admit Date: 2025  Readmission for hypoxia and hyponatremia    Procedure:       CABG 25 during prior admission with Dr. Ragsdale   Subjective:   Afebrile.  Had some Afib yesterday evening/overnight.  BP 90/60s.  Intermittent NIPPV/HFNC for hypoxia - improving ovygen requirements.      Objective:   Vitals:  Blood pressure (!) 80/60, pulse (!) 115, temperature 97.8 °F (36.6 °C), temperature source Oral, resp. rate 23, height 1.626 m (5' 4\"), weight 61.3 kg (135 lb 2.3 oz), SpO2 95%.  Temp (24hrs), Av °F (36.7 °C), Min:97.5 °F (36.4 °C), Max:98.6 °F (37 °C)    NIPPV and HFNC    EKG/Rhythm:  NSR on tele this AM.    CXR:  Xray Result (most recent):  XR CHEST PORTABLE 2025    Narrative  EXAM: Portable CXR.    COMPARISON: 2025    INDICATION: acute resp failure    FINDINGS:  The lungs show partial improvement of pulmonary edema pattern. Heart is normal  in size. There is prior median sternotomy. Small pleural effusions remain.    Impression  Improvement of pulmonary edema. Stable small pleural effusions. No  new finding.      Electronically signed by MARTHA ORELLANA       Admission Weight: Last Weight   Weight - Scale: 63.6 kg (140 lb 3.4 oz) Weight - Scale: 61.3 kg (135 lb 2.3 oz)     Intake / Output / Drain:  Current Shift: No intake/output data recorded.  Last 24 hrs.:   Intake/Output Summary (Last 24 hours) at 2025 1501  Last data filed at 2025 0600  Gross per 24 hour   Intake 240 ml   Output 1300 ml   Net -1060 ml     EXAM:  General:  Lying in bed.             Lungs:   Coarse lung sounds throughout   Incision:  No erythema, drainage or swelling. Prineo previously removed.    Heart:  Irregular rhythm, S1, S2 normal, no murmur, click, rub or gallop.    Abdomen:   Soft, non-tender. Bowel sounds normal.    Extremities:  No edema. Peripheral pulses intact.    Neurologic:  Gross motor and sensory apparatus intact.      Labs:   Recent Labs     25  0500  appreciate ICU team and care. Please do not hesitate to reach out to us with questions or concerns.     DVT ppx: SCDs, Eliquis   Dispo: keep in ICU for tenuous respiratory status support and ongoing hyponatremia.  Supportive family.    Signed By: Susu Brandon PA-C

## 2025-05-30 NOTE — PROGRESS NOTES
Name: Hector Jensen Jr MRN: [id number]   : 1950 Hospital: [facility]   Date: 2025        IMPRESSION:   Hypervolemic hyponatremia of advanced CHF. Na is gradually improving rather slowly. Patient is chronically hyponatremic. Na is correcting with the above therapy. Current Na is 131  Normal renal function  CHF- Cardiology is managing      PLAN:   Continue current management, monitor Na  Oral fluid restriction, diuretics  Stable from our stand point. Call with questions if any over the weekend.     Subjective/Interval History:   I have reviewed the flowsheet and previous day’s notes.    ROS:Pertinent items are noted in HPI.    25 Doing better. No new issues.    Objective:   Vital Signs:    BP (!) 80/60   Pulse (!) 115   Temp 97.8 °F (36.6 °C) (Oral)   Resp 23   Ht 1.626 m (5' 4\")   Wt 61.3 kg (135 lb 2.3 oz)   SpO2 95%   BMI 23.20 kg/m²             Temp (24hrs), Av °F (36.7 °C), Min:97.5 °F (36.4 °C), Max:98.6 °F (37 °C)       Intake/Output:   Last shift:      No intake/output data recorded.  Last 3 shifts:  190 -  0700  In: 731.1 [P.O.:480; I.V.:1.1]  Out: 2925 [Urine:2925]    Intake/Output Summary (Last 24 hours) at 2025 1533  Last data filed at 2025 0600  Gross per 24 hour   Intake 240 ml   Output 1300 ml   Net -1060 ml        Current Facility-Administered Medications   Medication Dose Route Frequency    furosemide (LASIX) injection 20 mg  20 mg IntraVENous BID    benzonatate (TESSALON) capsule 100 mg  100 mg Oral TID PRN    fluticasone (FLONASE) 50 MCG/ACT nasal spray 1 spray  1 spray Each Nostril Daily PRN    albuterol (PROVENTIL) (2.5 MG/3ML) 0.083% nebulizer solution 2.5 mg  2.5 mg Nebulization 4x Daily RT    sodium chloride flush 0.9 % injection 5-40 mL  5-40 mL IntraVENous 2 times per day    sodium chloride flush 0.9 % injection 5-40 mL  5-40 mL IntraVENous PRN    0.9 % sodium chloride infusion   IntraVENous PRN    lidocaine PF 1 % injection 50  \"PROU\"    Radiology  XR CHEST PORTABLE  Result Date: 5/28/2025  Increased diffuse severe interstitial and patchy airspace opacities. Stable trace pleural effusions. Electronically signed by David Peters    CT HEAD WO CONTRAST  Result Date: 5/27/2025  No acute abnormality. Electronically signed by CAROL LAIRD    XR CHEST PORTABLE  Result Date: 5/27/2025  Increased pulmonary edema. Electronically signed by Ángel Walker    CTA CHEST W WO CONTRAST PE Eval  Result Date: 5/26/2025  Diffuse groundglass consolidation involving the lung parenchyma as well as small to moderate bilateral pleural effusions, most likely secondary to pulmonary edema. Multifocal pneumonia is felt less likely. No PE Electronically signed by DEE EM    XR CHEST PORTABLE  Result Date: 5/26/2025  Diffusely increased airspace disease in the right lung and left apex. This could represent diffuse asymmetric pulmonary edema or infection. Electronically signed by Gamal Smalls         Total time spent with patient:  35 minutes    [] Critical Care Provided    Care Plan discussed with:   Family, Nursing, Medical Team    Gillian Meyer MD

## 2025-05-30 NOTE — PLAN OF CARE
Problem: Respiratory - Adult  Goal: Achieves optimal ventilation and oxygenation  Outcome: Not Progressing  Flowsheets (Taken 5/29/2025 2000)  Achieves optimal ventilation and oxygenation:   Assess for changes in respiratory status   Assess for changes in mentation and behavior   Position to facilitate oxygenation and minimize respiratory effort   Oxygen supplementation based on oxygen saturation or arterial blood gases   Encourage broncho-pulmonary hygiene including cough, deep breathe, incentive spirometry   Assess the need for suctioning and aspirate as needed   Assess and instruct to report shortness of breath or any respiratory difficulty   Respiratory therapy support as indicated     Problem: Safety - Adult  Goal: Free from fall injury  Outcome: Progressing  Flowsheets (Taken 5/27/2025 2200)  Free From Fall Injury: Instruct family/caregiver on patient safety     Problem: Chronic Conditions and Co-morbidities  Goal: Patient's chronic conditions and co-morbidity symptoms are monitored and maintained or improved  Outcome: Progressing  Flowsheets (Taken 5/29/2025 2000)  Care Plan - Patient's Chronic Conditions and Co-Morbidity Symptoms are Monitored and Maintained or Improved:   Monitor and assess patient's chronic conditions and comorbid symptoms for stability, deterioration, or improvement   Collaborate with multidisciplinary team to address chronic and comorbid conditions and prevent exacerbation or deterioration   Update acute care plan with appropriate goals if chronic or comorbid symptoms are exacerbated and prevent overall improvement and discharge     Problem: Discharge Planning  Goal: Discharge to home or other facility with appropriate resources  Outcome: Progressing  Flowsheets (Taken 5/29/2025 2000)  Discharge to home or other facility with appropriate resources:   Identify barriers to discharge with patient and caregiver   Arrange for needed discharge resources and transportation as appropriate

## 2025-05-31 ENCOUNTER — APPOINTMENT (OUTPATIENT)
Facility: HOSPITAL | Age: 75
End: 2025-05-31
Payer: MEDICARE

## 2025-05-31 LAB
ANION GAP SERPL CALC-SCNC: 5 MMOL/L (ref 2–12)
BASOPHILS # BLD: 0.01 K/UL (ref 0–0.1)
BASOPHILS NFR BLD: 0.1 % (ref 0–1)
BUN SERPL-MCNC: 30 MG/DL (ref 6–20)
BUN/CREAT SERPL: 41 (ref 12–20)
CALCIUM SERPL-MCNC: 8.2 MG/DL (ref 8.5–10.1)
CHLORIDE SERPL-SCNC: 93 MMOL/L (ref 97–108)
CHLORIDE UR-SCNC: 61 MMOL/L
CO2 SERPL-SCNC: 28 MMOL/L (ref 21–32)
CREAT SERPL-MCNC: 0.73 MG/DL (ref 0.7–1.3)
CREAT UR-MCNC: 44.8 MG/DL
DIFFERENTIAL METHOD BLD: ABNORMAL
EOSINOPHIL # BLD: 0 K/UL (ref 0–0.4)
EOSINOPHIL NFR BLD: 0 % (ref 0–7)
ERYTHROCYTE [DISTWIDTH] IN BLOOD BY AUTOMATED COUNT: 13.2 % (ref 11.5–14.5)
GLUCOSE BLD STRIP.AUTO-MCNC: 137 MG/DL (ref 65–117)
GLUCOSE BLD STRIP.AUTO-MCNC: 176 MG/DL (ref 65–117)
GLUCOSE BLD STRIP.AUTO-MCNC: 186 MG/DL (ref 65–117)
GLUCOSE SERPL-MCNC: 136 MG/DL (ref 65–100)
HCT VFR BLD AUTO: 28.7 % (ref 36.6–50.3)
HGB BLD-MCNC: 9.1 G/DL (ref 12.1–17)
IMM GRANULOCYTES # BLD AUTO: 0.13 K/UL (ref 0–0.04)
IMM GRANULOCYTES NFR BLD AUTO: 0.9 % (ref 0–0.5)
LYMPHOCYTES # BLD: 0.44 K/UL (ref 0.8–3.5)
LYMPHOCYTES NFR BLD: 3.1 % (ref 12–49)
MCH RBC QN AUTO: 30 PG (ref 26–34)
MCHC RBC AUTO-ENTMCNC: 31.7 G/DL (ref 30–36.5)
MCV RBC AUTO: 94.7 FL (ref 80–99)
MONOCYTES # BLD: 0.5 K/UL (ref 0–1)
MONOCYTES NFR BLD: 3.5 % (ref 5–13)
NEUTS SEG # BLD: 13.22 K/UL (ref 1.8–8)
NEUTS SEG NFR BLD: 92.4 % (ref 32–75)
NRBC # BLD: 0 K/UL (ref 0–0.01)
NRBC BLD-RTO: 0 PER 100 WBC
OSMOLALITY UR: 546 MOSM/KG H2O
PLATELET # BLD AUTO: 397 K/UL (ref 150–400)
PMV BLD AUTO: 9 FL (ref 8.9–12.9)
POTASSIUM SERPL-SCNC: 4.4 MMOL/L (ref 3.5–5.1)
POTASSIUM UR-SCNC: 32 MMOL/L
RBC # BLD AUTO: 3.03 M/UL (ref 4.1–5.7)
RBC MORPH BLD: ABNORMAL
SERVICE CMNT-IMP: ABNORMAL
SODIUM SERPL-SCNC: 126 MMOL/L (ref 136–145)
SODIUM UR-SCNC: 47 MMOL/L
WBC # BLD AUTO: 14.3 K/UL (ref 4.1–11.1)

## 2025-05-31 PROCEDURE — 94761 N-INVAS EAR/PLS OXIMETRY MLT: CPT

## 2025-05-31 PROCEDURE — 6370000000 HC RX 637 (ALT 250 FOR IP): Performed by: INTERNAL MEDICINE

## 2025-05-31 PROCEDURE — 2000000000 HC ICU R&B

## 2025-05-31 PROCEDURE — 6370000000 HC RX 637 (ALT 250 FOR IP): Performed by: NURSE PRACTITIONER

## 2025-05-31 PROCEDURE — 85025 COMPLETE CBC W/AUTO DIFF WBC: CPT

## 2025-05-31 PROCEDURE — 84300 ASSAY OF URINE SODIUM: CPT

## 2025-05-31 PROCEDURE — 94640 AIRWAY INHALATION TREATMENT: CPT

## 2025-05-31 PROCEDURE — 83935 ASSAY OF URINE OSMOLALITY: CPT

## 2025-05-31 PROCEDURE — 71045 X-RAY EXAM CHEST 1 VIEW: CPT

## 2025-05-31 PROCEDURE — 2700000000 HC OXYGEN THERAPY PER DAY

## 2025-05-31 PROCEDURE — 82962 GLUCOSE BLOOD TEST: CPT

## 2025-05-31 PROCEDURE — 94760 N-INVAS EAR/PLS OXIMETRY 1: CPT

## 2025-05-31 PROCEDURE — 80048 BASIC METABOLIC PNL TOTAL CA: CPT

## 2025-05-31 PROCEDURE — 94660 CPAP INITIATION&MGMT: CPT

## 2025-05-31 PROCEDURE — 82436 ASSAY OF URINE CHLORIDE: CPT

## 2025-05-31 PROCEDURE — 6360000002 HC RX W HCPCS: Performed by: INTERNAL MEDICINE

## 2025-05-31 PROCEDURE — 84133 ASSAY OF URINE POTASSIUM: CPT

## 2025-05-31 PROCEDURE — 2500000003 HC RX 250 WO HCPCS: Performed by: INTERNAL MEDICINE

## 2025-05-31 PROCEDURE — 82570 ASSAY OF URINE CREATININE: CPT

## 2025-05-31 RX ORDER — FUROSEMIDE 10 MG/ML
20 INJECTION INTRAMUSCULAR; INTRAVENOUS ONCE
Status: COMPLETED | OUTPATIENT
Start: 2025-05-31 | End: 2025-05-31

## 2025-05-31 RX ORDER — FUROSEMIDE 10 MG/ML
20 INJECTION INTRAMUSCULAR; INTRAVENOUS 2 TIMES DAILY
Status: DISCONTINUED | OUTPATIENT
Start: 2025-05-31 | End: 2025-05-31

## 2025-05-31 RX ORDER — FUROSEMIDE 10 MG/ML
40 INJECTION INTRAMUSCULAR; INTRAVENOUS 2 TIMES DAILY
Status: COMPLETED | OUTPATIENT
Start: 2025-05-31 | End: 2025-05-31

## 2025-05-31 RX ADMIN — SODIUM CHLORIDE 1 G: 1 TABLET ORAL at 13:10

## 2025-05-31 RX ADMIN — FUROSEMIDE 20 MG: 10 INJECTION, SOLUTION INTRAMUSCULAR; INTRAVENOUS at 15:27

## 2025-05-31 RX ADMIN — ATORVASTATIN CALCIUM 40 MG: 40 TABLET, FILM COATED ORAL at 20:58

## 2025-05-31 RX ADMIN — METHOCARBAMOL 500 MG: 500 TABLET ORAL at 09:51

## 2025-05-31 RX ADMIN — ASPIRIN 81 MG: 81 TABLET, COATED ORAL at 09:47

## 2025-05-31 RX ADMIN — SODIUM CHLORIDE, PRESERVATIVE FREE 10 ML: 5 INJECTION INTRAVENOUS at 20:59

## 2025-05-31 RX ADMIN — FLUTICASONE PROPIONATE 1 SPRAY: 50 SPRAY, METERED NASAL at 18:00

## 2025-05-31 RX ADMIN — METOPROLOL TARTRATE 12.5 MG: 25 TABLET, FILM COATED ORAL at 09:47

## 2025-05-31 RX ADMIN — WATER 2000 MG: 1 INJECTION INTRAMUSCULAR; INTRAVENOUS; SUBCUTANEOUS at 09:52

## 2025-05-31 RX ADMIN — METHYLPREDNISOLONE SODIUM SUCCINATE 40 MG: 40 INJECTION, POWDER, LYOPHILIZED, FOR SOLUTION INTRAMUSCULAR; INTRAVENOUS at 00:56

## 2025-05-31 RX ADMIN — METHOCARBAMOL 500 MG: 500 TABLET ORAL at 13:09

## 2025-05-31 RX ADMIN — METHOCARBAMOL 500 MG: 500 TABLET ORAL at 16:38

## 2025-05-31 RX ADMIN — FUROSEMIDE 20 MG: 10 INJECTION, SOLUTION INTRAMUSCULAR; INTRAVENOUS at 09:51

## 2025-05-31 RX ADMIN — APIXABAN 5 MG: 5 TABLET, FILM COATED ORAL at 20:58

## 2025-05-31 RX ADMIN — ALBUTEROL SULFATE 2.5 MG: 2.5 SOLUTION RESPIRATORY (INHALATION) at 11:38

## 2025-05-31 RX ADMIN — FUROSEMIDE 40 MG: 10 INJECTION, SOLUTION INTRAMUSCULAR; INTRAVENOUS at 21:00

## 2025-05-31 RX ADMIN — ALBUTEROL SULFATE 2.5 MG: 2.5 SOLUTION RESPIRATORY (INHALATION) at 15:40

## 2025-05-31 RX ADMIN — FLUTICASONE PROPIONATE 1 SPRAY: 50 SPRAY, METERED NASAL at 08:30

## 2025-05-31 RX ADMIN — INSULIN LISPRO 1 UNITS: 100 INJECTION, SOLUTION INTRAVENOUS; SUBCUTANEOUS at 18:12

## 2025-05-31 RX ADMIN — SODIUM CHLORIDE 1 G: 1 TABLET ORAL at 16:38

## 2025-05-31 RX ADMIN — ALBUTEROL SULFATE 2.5 MG: 2.5 SOLUTION RESPIRATORY (INHALATION) at 19:39

## 2025-05-31 RX ADMIN — METOPROLOL TARTRATE 12.5 MG: 25 TABLET, FILM COATED ORAL at 20:58

## 2025-05-31 RX ADMIN — Medication 6 MG: at 21:54

## 2025-05-31 RX ADMIN — METHOCARBAMOL 500 MG: 500 TABLET ORAL at 20:59

## 2025-05-31 RX ADMIN — APIXABAN 5 MG: 5 TABLET, FILM COATED ORAL at 09:50

## 2025-05-31 RX ADMIN — METHYLPREDNISOLONE SODIUM SUCCINATE 40 MG: 40 INJECTION, POWDER, LYOPHILIZED, FOR SOLUTION INTRAMUSCULAR; INTRAVENOUS at 13:11

## 2025-05-31 RX ADMIN — ALBUTEROL SULFATE 2.5 MG: 2.5 SOLUTION RESPIRATORY (INHALATION) at 07:03

## 2025-05-31 RX ADMIN — SODIUM CHLORIDE, PRESERVATIVE FREE 10 ML: 5 INJECTION INTRAVENOUS at 09:53

## 2025-05-31 ASSESSMENT — PAIN SCALES - GENERAL: PAINLEVEL_OUTOF10: 0

## 2025-05-31 NOTE — PROGRESS NOTES
[facility]     ICU TEAM Progress Note           Name: Hector Jensen Jr   : 1950   MRN: [id number]   Date: 2025          ICU PROBLEM LIST   -Hyponatremia  - Acute hypoxic respiratory failure  -CAD status post CABG     HISTORY OF PRESENT ILLNESS:     Hector Jensen Jr is a 74 y.o. male with a history of hypertension, hyperlipidemia, CAD status post CABG on 2025 who presented to the ED for shortness of breath. Patient reported having some shortness of breath that started yesterday, but significantly worsened today. He was found to be hypoxic in the ED 72% on room air sats. He has some resting shortness of breath but worsens with exertion.  He also has some mild lower bilateral extremity swelling. Patient states that he feels thirsty all the time and has been drinking a lot of water. States he drinks about four large bottles. Has been using his breathing device (?ICS) as instructed at home, and recently when home health visited they recommended him starting or drinking electrolytes or gatorade. Denies chest pain, cough, fever, chills, abdominal or back pain.     In the ED labs were drawn and patient was sent to CT for CTA of his chest with and without contrast.  CT of the chest showed diffuse groundglass consolidation involving the lung parenchyma as well as small to moderate bilateral pleural effusions most likely secondary to pulmonary edema less likely a multi focal pneumonia.  The labs came back and was significant for a sodium level of 112, chloride 80.  BUN was 11 creatinine was 0.79.  Glucose 149 calcium 8.2. serum osmolality was 243.  Nephrology was consulted recommended starting patient on urea oral supplements and fluid restriction. Critical care was called for admission.     SUBJECTIVE:     -no acute events, this a.m. on nasal cannula, noted to have increasing hypoxia requiring significant increase in oxygen requirement.    -  Increased oxygen req.

## 2025-05-31 NOTE — PROGRESS NOTES
Name: Hector Jensen Jr MRN: [id number]   : 1950 Hospital: [facility]   Date: 2025        IMPRESSION:   Hypervolemic hyponatremia of advanced CHF. Na worsened incrementally-sodium down to 126 this morning.  Normal renal function  CHF- Cardiology is managing      PLAN:   Adjust fluid restriction to 1.2 L/day.  Resume Urena [15 g once daily] and if increase free water clearance  May need to discontinue/hold trazodone 8 hyponatremia does not improve.  Trazodone can cause ADH release and hyponatremia.  Check lites again in the a.m.  Salt tabs could be restarted in the am as well. Will await the f/u chemistries.     Subjective/Interval History:   I have reviewed the flowsheet and previous day’s notes.    ROS:Pertinent items are noted in HPI.    25 Doing better. No new issues.      2025    No major complaints open this morning.  He worked with therapy team yesterday.      Objective:   Vital Signs:    BP (!) 95/55   Pulse 77   Temp 98.6 °F (37 °C) (Oral)   Resp 28   Ht 1.626 m (5' 4\")   Wt 61.5 kg (135 lb 9.3 oz)   SpO2 92%   BMI 23.27 kg/m²             Temp (24hrs), Av.2 °F (36.8 °C), Min:97.8 °F (36.6 °C), Max:98.7 °F (37.1 °C)       Intake/Output:   Last shift:      No intake/output data recorded.  Last 3 shifts:  190 -  0700  In: 980 [P.O.:980]  Out: 2500 [Urine:2500]    Intake/Output Summary (Last 24 hours) at 2025 1027  Last data filed at 2025 0700  Gross per 24 hour   Intake 740 ml   Output 1750 ml   Net -1010 ml        Current Facility-Administered Medications   Medication Dose Route Frequency    furosemide (LASIX) injection 20 mg  20 mg IntraVENous BID    methylPREDNISolone sodium succ (SOLU-MEDROL) 40 mg in sterile water 1 mL injection  40 mg IntraVENous Q12H    benzonatate (TESSALON) capsule 100 mg  100 mg Oral TID PRN    fluticasone (FLONASE) 50 MCG/ACT nasal spray 1 spray  1 spray Each Nostril Daily PRN    albuterol (PROVENTIL) (2.5 MG/3ML)  ill-appearing gentleman lying quite comfortably in bed    Head: Normocephalic    Mucous membranes moist    ENT: Oxygen via HFNC    Chest: Healing median sternotomy scar    Cardiovascular: S1, S2, no S3 gallop, no pericardial rub    Respiratory: Breath sounds vesicular , no wheezes, no rales, no rhonchi    Abdomen distended    Neuro: Awake and answering all questions appropriate    Psych: Appropriate affect.      DATA:  LABS:  Recent Labs     05/31/25  0627 05/30/25  0500 05/29/25  1906 05/29/25  0631 05/29/25  0036 05/28/25  1916 05/28/25  1728 05/26/25  2305 05/26/25  2039 05/26/25  1503 05/20/25  0235 05/19/25  0308   * 131* 128*   < > 125* 119* 115*   < > 115*   < > 128* 130*   K 4.4 4.2 4.2   < > 4.1 4.3 HEMOLYZED,RECOLLECT REQUESTED   < > 4.3   < > 3.9 4.5   CL 93* 95* 93*   < > 93* 84* 84*   < > 81*   < > 96* 99   CO2 28 28 28   < > 25 28 28   < > 26   < > 26 27   BUN 30* 32* 37*   < > 23* 27* 29*   < > 24*   < > 19 18   CREATININE 0.73 0.81 0.84   < > 0.74 0.75 0.87   < > 0.75   < > 0.71 0.84   CALCIUM 8.2* 8.3* 8.3*   < > 7.4* 8.8 8.6   < > 8.1*   < > 7.9* 8.1*   PHOS  --   --   --   --  2.8 3.0 HEMOLYZED,RECOLLECT REQUESTED   < > 2.8  --   --   --    MG  --   --   --   --   --   --   --   --  1.8  --  2.3 2.5*    < > = values in this interval not displayed.     Recent Labs     05/31/25  0627 05/30/25  0500 05/29/25  0631   WBC 14.3* 12.7* 12.3*   HGB 9.1* 9.1* 8.4*   HCT 28.7* 27.1* 24.5*    433* 434*     No results for input(s): \"KU\", \"CLU\" in the last 720 hours.    Invalid input(s): \"LORELEI\", \"CREAU\", \"PROU\"    Radiology  XR CHEST PORTABLE  Result Date: 5/28/2025  Increased diffuse severe interstitial and patchy airspace opacities. Stable trace pleural effusions. Electronically signed by David Peters    CT HEAD WO CONTRAST  Result Date: 5/27/2025  No acute abnormality. Electronically signed by CAROL LAIRD    XR CHEST PORTABLE  Result Date: 5/27/2025  Increased pulmonary edema. Electronically

## 2025-06-01 LAB
ALBUMIN SERPL-MCNC: 2.4 G/DL (ref 3.5–5)
ANION GAP SERPL CALC-SCNC: 6 MMOL/L (ref 2–12)
ANION GAP SERPL CALC-SCNC: 8 MMOL/L (ref 2–12)
BACTERIA SPEC CULT: NORMAL
BACTERIA SPEC CULT: NORMAL
BASOPHILS # BLD: 0.02 K/UL (ref 0–0.1)
BASOPHILS NFR BLD: 0.1 % (ref 0–1)
BUN SERPL-MCNC: 32 MG/DL (ref 6–20)
BUN SERPL-MCNC: 33 MG/DL (ref 6–20)
BUN/CREAT SERPL: 41 (ref 12–20)
BUN/CREAT SERPL: 42 (ref 12–20)
CALCIUM SERPL-MCNC: 8.1 MG/DL (ref 8.5–10.1)
CALCIUM SERPL-MCNC: 8.1 MG/DL (ref 8.5–10.1)
CHLORIDE SERPL-SCNC: 95 MMOL/L (ref 97–108)
CHLORIDE SERPL-SCNC: 96 MMOL/L (ref 97–108)
CO2 SERPL-SCNC: 28 MMOL/L (ref 21–32)
CO2 SERPL-SCNC: 29 MMOL/L (ref 21–32)
CREAT SERPL-MCNC: 0.76 MG/DL (ref 0.7–1.3)
CREAT SERPL-MCNC: 0.81 MG/DL (ref 0.7–1.3)
DIFFERENTIAL METHOD BLD: ABNORMAL
EOSINOPHIL # BLD: 0.04 K/UL (ref 0–0.4)
EOSINOPHIL NFR BLD: 0.2 % (ref 0–7)
ERYTHROCYTE [DISTWIDTH] IN BLOOD BY AUTOMATED COUNT: 13.3 % (ref 11.5–14.5)
GLUCOSE BLD STRIP.AUTO-MCNC: 121 MG/DL (ref 65–117)
GLUCOSE BLD STRIP.AUTO-MCNC: 132 MG/DL (ref 65–117)
GLUCOSE BLD STRIP.AUTO-MCNC: 180 MG/DL (ref 65–117)
GLUCOSE BLD STRIP.AUTO-MCNC: 212 MG/DL (ref 65–117)
GLUCOSE SERPL-MCNC: 115 MG/DL (ref 65–100)
GLUCOSE SERPL-MCNC: 116 MG/DL (ref 65–100)
HCT VFR BLD AUTO: 29.9 % (ref 36.6–50.3)
HGB BLD-MCNC: 9.8 G/DL (ref 12.1–17)
IMM GRANULOCYTES # BLD AUTO: 0.2 K/UL (ref 0–0.04)
IMM GRANULOCYTES NFR BLD AUTO: 1.2 % (ref 0–0.5)
LYMPHOCYTES # BLD: 0.9 K/UL (ref 0.8–3.5)
LYMPHOCYTES NFR BLD: 5.3 % (ref 12–49)
MCH RBC QN AUTO: 29.7 PG (ref 26–34)
MCHC RBC AUTO-ENTMCNC: 32.8 G/DL (ref 30–36.5)
MCV RBC AUTO: 90.6 FL (ref 80–99)
MONOCYTES # BLD: 0.78 K/UL (ref 0–1)
MONOCYTES NFR BLD: 4.6 % (ref 5–13)
NEUTS SEG # BLD: 15.06 K/UL (ref 1.8–8)
NEUTS SEG NFR BLD: 88.6 % (ref 32–75)
NRBC # BLD: 0 K/UL (ref 0–0.01)
NRBC BLD-RTO: 0 PER 100 WBC
PHOSPHATE SERPL-MCNC: 2.9 MG/DL (ref 2.6–4.7)
PLATELET # BLD AUTO: 400 K/UL (ref 150–400)
PMV BLD AUTO: 9.1 FL (ref 8.9–12.9)
POTASSIUM SERPL-SCNC: 3.9 MMOL/L (ref 3.5–5.1)
POTASSIUM SERPL-SCNC: 4 MMOL/L (ref 3.5–5.1)
PROCALCITONIN SERPL-MCNC: 0.06 NG/ML
RBC # BLD AUTO: 3.3 M/UL (ref 4.1–5.7)
SERVICE CMNT-IMP: ABNORMAL
SERVICE CMNT-IMP: NORMAL
SERVICE CMNT-IMP: NORMAL
SODIUM SERPL-SCNC: 130 MMOL/L (ref 136–145)
SODIUM SERPL-SCNC: 132 MMOL/L (ref 136–145)
WBC # BLD AUTO: 17 K/UL (ref 4.1–11.1)

## 2025-06-01 PROCEDURE — 80069 RENAL FUNCTION PANEL: CPT

## 2025-06-01 PROCEDURE — 94660 CPAP INITIATION&MGMT: CPT

## 2025-06-01 PROCEDURE — 2000000000 HC ICU R&B

## 2025-06-01 PROCEDURE — 6360000002 HC RX W HCPCS: Performed by: INTERNAL MEDICINE

## 2025-06-01 PROCEDURE — 80048 BASIC METABOLIC PNL TOTAL CA: CPT

## 2025-06-01 PROCEDURE — 6370000000 HC RX 637 (ALT 250 FOR IP): Performed by: NURSE PRACTITIONER

## 2025-06-01 PROCEDURE — 2700000000 HC OXYGEN THERAPY PER DAY

## 2025-06-01 PROCEDURE — 94640 AIRWAY INHALATION TREATMENT: CPT

## 2025-06-01 PROCEDURE — 2500000003 HC RX 250 WO HCPCS: Performed by: INTERNAL MEDICINE

## 2025-06-01 PROCEDURE — 82962 GLUCOSE BLOOD TEST: CPT

## 2025-06-01 PROCEDURE — 6370000000 HC RX 637 (ALT 250 FOR IP): Performed by: INTERNAL MEDICINE

## 2025-06-01 PROCEDURE — 94760 N-INVAS EAR/PLS OXIMETRY 1: CPT

## 2025-06-01 PROCEDURE — 84145 PROCALCITONIN (PCT): CPT

## 2025-06-01 PROCEDURE — 2580000003 HC RX 258: Performed by: INTERNAL MEDICINE

## 2025-06-01 PROCEDURE — 85025 COMPLETE CBC W/AUTO DIFF WBC: CPT

## 2025-06-01 RX ADMIN — METHYLPREDNISOLONE SODIUM SUCCINATE 40 MG: 40 INJECTION, POWDER, LYOPHILIZED, FOR SOLUTION INTRAMUSCULAR; INTRAVENOUS at 13:22

## 2025-06-01 RX ADMIN — Medication 8.8 MG: at 13:23

## 2025-06-01 RX ADMIN — INSULIN LISPRO 1 UNITS: 100 INJECTION, SOLUTION INTRAVENOUS; SUBCUTANEOUS at 20:13

## 2025-06-01 RX ADMIN — SODIUM CHLORIDE 1 G: 1 TABLET ORAL at 13:17

## 2025-06-01 RX ADMIN — METHYLPREDNISOLONE SODIUM SUCCINATE 40 MG: 40 INJECTION, POWDER, LYOPHILIZED, FOR SOLUTION INTRAMUSCULAR; INTRAVENOUS at 02:23

## 2025-06-01 RX ADMIN — ASPIRIN 81 MG: 81 TABLET, COATED ORAL at 08:44

## 2025-06-01 RX ADMIN — METHOCARBAMOL 500 MG: 500 TABLET ORAL at 13:17

## 2025-06-01 RX ADMIN — ATORVASTATIN CALCIUM 40 MG: 40 TABLET, FILM COATED ORAL at 20:14

## 2025-06-01 RX ADMIN — ALBUTEROL SULFATE 2.5 MG: 2.5 SOLUTION RESPIRATORY (INHALATION) at 11:00

## 2025-06-01 RX ADMIN — APIXABAN 5 MG: 5 TABLET, FILM COATED ORAL at 20:38

## 2025-06-01 RX ADMIN — FUROSEMIDE 5 MG/HR: 10 INJECTION, SOLUTION INTRAMUSCULAR; INTRAVENOUS at 10:17

## 2025-06-01 RX ADMIN — METHOCARBAMOL 500 MG: 500 TABLET ORAL at 17:27

## 2025-06-01 RX ADMIN — METOPROLOL TARTRATE 12.5 MG: 25 TABLET, FILM COATED ORAL at 08:44

## 2025-06-01 RX ADMIN — METHOCARBAMOL 500 MG: 500 TABLET ORAL at 08:44

## 2025-06-01 RX ADMIN — ALBUTEROL SULFATE 2.5 MG: 2.5 SOLUTION RESPIRATORY (INHALATION) at 14:51

## 2025-06-01 RX ADMIN — SODIUM CHLORIDE, PRESERVATIVE FREE 40 ML: 5 INJECTION INTRAVENOUS at 08:45

## 2025-06-01 RX ADMIN — SODIUM CHLORIDE 1 G: 1 TABLET ORAL at 17:27

## 2025-06-01 RX ADMIN — SODIUM CHLORIDE 1 G: 1 TABLET ORAL at 08:44

## 2025-06-01 RX ADMIN — SODIUM CHLORIDE, PRESERVATIVE FREE 10 ML: 5 INJECTION INTRAVENOUS at 20:14

## 2025-06-01 RX ADMIN — Medication 6 MG: at 20:53

## 2025-06-01 RX ADMIN — APIXABAN 5 MG: 5 TABLET, FILM COATED ORAL at 08:44

## 2025-06-01 RX ADMIN — ALBUTEROL SULFATE 2.5 MG: 2.5 SOLUTION RESPIRATORY (INHALATION) at 07:22

## 2025-06-01 RX ADMIN — METHOCARBAMOL 500 MG: 500 TABLET ORAL at 20:14

## 2025-06-01 RX ADMIN — INSULIN LISPRO 1 UNITS: 100 INJECTION, SOLUTION INTRAVENOUS; SUBCUTANEOUS at 10:25

## 2025-06-01 RX ADMIN — ALBUTEROL SULFATE 2.5 MG: 2.5 SOLUTION RESPIRATORY (INHALATION) at 20:00

## 2025-06-01 RX ADMIN — METOPROLOL TARTRATE 12.5 MG: 25 TABLET, FILM COATED ORAL at 20:14

## 2025-06-01 ASSESSMENT — PAIN SCALES - GENERAL
PAINLEVEL_OUTOF10: 0

## 2025-06-01 NOTE — PROGRESS NOTES
Name: Hector Jensen Jr MRN: [id number]   : 1950 Hospital: [facility]   Date: 2025        IMPRESSION:   Hypervolemic hyponatremia of advanced CHF. Na worsened incrementally-sodium down to 126 yesterday --> improving with resumption of Ure Na --> up to 130 today.  Normal renal function  CHF- Cardiology is managing      PLAN:   Adjusted fluid restriction to 1.2 L/day.  Resumed Urena [15 g once daily] yesterday in an effort to increase free water clearance and raise the serum sodium.   May need to discontinue/hold trazodone 8 hyponatremia does not improve.  Trazodone can cause ADH release and hyponatremia.  Check lites again in the a.m.  Salt tabs could be restarted in the am as well. Would hold off for now.     Subjective/Interval History:   I have reviewed the flowsheet and previous day’s notes.    ROS:Pertinent items are noted in HPI.    25 Doing better. No new issues.      2025    No major complaints open this morning.  He worked with therapy team yesterday.      2025.    The patient was sleeping.  Events reviewed with Giovanna [daughter-in-law] at the bedside.      Objective:   Vital Signs:    BP (!) 113/56   Pulse 67   Temp 98.5 °F (36.9 °C) (Oral)   Resp (!) 32   Ht 1.626 m (5' 4\")   Wt 61.8 kg (136 lb 3.9 oz)   SpO2 97%   BMI 23.39 kg/m²             Temp (24hrs), Av.3 °F (36.8 °C), Min:98 °F (36.7 °C), Max:98.5 °F (36.9 °C)       Intake/Output:   Last shift:      701 -  190  In: 8.6 [I.V.:8.6]  Out: 400 [Urine:400]  Last 3 shifts: 1901 -  0700  In: 1335 [P.O.:1335]  Out: 3290 [Urine:3290]    Intake/Output Summary (Last 24 hours) at 2025 1305  Last data filed at 2025 1200  Gross per 24 hour   Intake 873.58 ml   Output 2790 ml   Net -1916.42 ml        Current Facility-Administered Medications   Medication Dose Route Frequency    furosemide (LASIX) 100 mg in sodium chloride 0.9 % 100 mL infusion  5 mg/hr IntraVENous Continuous     David Peters    CT HEAD WO CONTRAST  Result Date: 5/27/2025  No acute abnormality. Electronically signed by CAROL LAIRD    XR CHEST PORTABLE  Result Date: 5/27/2025  Increased pulmonary edema. Electronically signed by Ángel Walker    CTA CHEST W WO CONTRAST PE Eval  Result Date: 5/26/2025  Diffuse groundglass consolidation involving the lung parenchyma as well as small to moderate bilateral pleural effusions, most likely secondary to pulmonary edema. Multifocal pneumonia is felt less likely. No PE Electronically signed by DEE EM    XR CHEST PORTABLE  Result Date: 5/26/2025  Diffusely increased airspace disease in the right lung and left apex. This could represent diffuse asymmetric pulmonary edema or infection. Electronically signed by Gamal Smalls         Total time spent with patient:      []     Care Plan discussed with:    Giovanna [daughter]         Fran Ramirez MD

## 2025-06-01 NOTE — PROGRESS NOTES
[facility]     ICU TEAM Progress Note           Name: Hector Jensen Jr   : 1950   MRN: [id number]   Date: 2025          ICU PROBLEM LIST   -Hyponatremia  - Acute hypoxic respiratory failure  -CAD status post CABG     HISTORY OF PRESENT ILLNESS:     Hector Jensen Jr is a 74 y.o. male with a history of hypertension, hyperlipidemia, CAD status post CABG on 2025 who presented to the ED for shortness of breath. Patient reported having some shortness of breath that started yesterday, but significantly worsened today. He was found to be hypoxic in the ED 72% on room air sats. He has some resting shortness of breath but worsens with exertion.  He also has some mild lower bilateral extremity swelling. Patient states that he feels thirsty all the time and has been drinking a lot of water. States he drinks about four large bottles. Has been using his breathing device (?ICS) as instructed at home, and recently when home health visited they recommended him starting or drinking electrolytes or gatorade. Denies chest pain, cough, fever, chills, abdominal or back pain.     In the ED labs were drawn and patient was sent to CT for CTA of his chest with and without contrast.  CT of the chest showed diffuse groundglass consolidation involving the lung parenchyma as well as small to moderate bilateral pleural effusions most likely secondary to pulmonary edema less likely a multi focal pneumonia.  The labs came back and was significant for a sodium level of 112, chloride 80.  BUN was 11 creatinine was 0.79.  Glucose 149 calcium 8.2. serum osmolality was 243.  Nephrology was consulted recommended starting patient on urea oral supplements and fluid restriction. Critical care was called for admission.     SUBJECTIVE:     - No acute events, this a.m. on nasal cannula, noted to have increasing hypoxia requiring significant increase in oxygen requirement.    -  Increased oxygen req.

## 2025-06-01 NOTE — PROGRESS NOTES
SLP Contact Note    Discussed pt with medical team. Patient tolerating diet without difficulty. Upon chart review, patient with no significant risk factors for prandial/post-prandial aspiration, but is at low threshold to tolerate any amount of aspiration given tenuous respiratory status. Therefore, SLP will sign off for now. However, if any concern for aspiration arises, patient at low threshold to hold PO and reconsult SLP to complete a Flexible Endoscopic Evaluation of Swallow (FEES) at bedside. Otherwise, will formally sign off.    Doris Paz, Ph.D., CCC-SLP (pronouns: she/her/hers)  Speech-Language Pathologist

## 2025-06-02 ENCOUNTER — APPOINTMENT (OUTPATIENT)
Facility: HOSPITAL | Age: 75
End: 2025-06-02
Payer: MEDICARE

## 2025-06-02 LAB
ANION GAP SERPL CALC-SCNC: 7 MMOL/L (ref 2–12)
BASOPHILS # BLD: 0.02 K/UL (ref 0–0.1)
BASOPHILS NFR BLD: 0.2 % (ref 0–1)
BUN SERPL-MCNC: 29 MG/DL (ref 6–20)
BUN/CREAT SERPL: 38 (ref 12–20)
CALCIUM SERPL-MCNC: 8 MG/DL (ref 8.5–10.1)
CHLORIDE SERPL-SCNC: 96 MMOL/L (ref 97–108)
CO2 SERPL-SCNC: 29 MMOL/L (ref 21–32)
CREAT SERPL-MCNC: 0.76 MG/DL (ref 0.7–1.3)
CRP SERPL-MCNC: 4.22 MG/DL (ref 0–0.3)
DIFFERENTIAL METHOD BLD: ABNORMAL
EOSINOPHIL # BLD: 0.01 K/UL (ref 0–0.4)
EOSINOPHIL NFR BLD: 0.1 % (ref 0–7)
ERYTHROCYTE [DISTWIDTH] IN BLOOD BY AUTOMATED COUNT: 13.4 % (ref 11.5–14.5)
GLUCOSE BLD STRIP.AUTO-MCNC: 103 MG/DL (ref 65–117)
GLUCOSE BLD STRIP.AUTO-MCNC: 128 MG/DL (ref 65–117)
GLUCOSE BLD STRIP.AUTO-MCNC: 135 MG/DL (ref 65–117)
GLUCOSE BLD STRIP.AUTO-MCNC: 147 MG/DL (ref 65–117)
GLUCOSE SERPL-MCNC: 113 MG/DL (ref 65–100)
HCT VFR BLD AUTO: 29.1 % (ref 36.6–50.3)
HGB BLD-MCNC: 9.3 G/DL (ref 12.1–17)
IMM GRANULOCYTES # BLD AUTO: 0.15 K/UL (ref 0–0.04)
IMM GRANULOCYTES NFR BLD AUTO: 1.1 % (ref 0–0.5)
LYMPHOCYTES # BLD: 0.59 K/UL (ref 0.8–3.5)
LYMPHOCYTES NFR BLD: 4.5 % (ref 12–49)
MCH RBC QN AUTO: 29.3 PG (ref 26–34)
MCHC RBC AUTO-ENTMCNC: 32 G/DL (ref 30–36.5)
MCV RBC AUTO: 91.8 FL (ref 80–99)
MONOCYTES # BLD: 0.65 K/UL (ref 0–1)
MONOCYTES NFR BLD: 4.9 % (ref 5–13)
NEUTS SEG # BLD: 11.72 K/UL (ref 1.8–8)
NEUTS SEG NFR BLD: 89.2 % (ref 32–75)
NRBC # BLD: 0 K/UL (ref 0–0.01)
NRBC BLD-RTO: 0 PER 100 WBC
PLATELET # BLD AUTO: 338 K/UL (ref 150–400)
PMV BLD AUTO: 9 FL (ref 8.9–12.9)
POTASSIUM SERPL-SCNC: 4 MMOL/L (ref 3.5–5.1)
RBC # BLD AUTO: 3.17 M/UL (ref 4.1–5.7)
SERVICE CMNT-IMP: ABNORMAL
SERVICE CMNT-IMP: NORMAL
SODIUM SERPL-SCNC: 132 MMOL/L (ref 136–145)
WBC # BLD AUTO: 13.1 K/UL (ref 4.1–11.1)

## 2025-06-02 PROCEDURE — 71045 X-RAY EXAM CHEST 1 VIEW: CPT

## 2025-06-02 PROCEDURE — 85025 COMPLETE CBC W/AUTO DIFF WBC: CPT

## 2025-06-02 PROCEDURE — 94760 N-INVAS EAR/PLS OXIMETRY 1: CPT

## 2025-06-02 PROCEDURE — 97530 THERAPEUTIC ACTIVITIES: CPT

## 2025-06-02 PROCEDURE — 6370000000 HC RX 637 (ALT 250 FOR IP): Performed by: INTERNAL MEDICINE

## 2025-06-02 PROCEDURE — 6370000000 HC RX 637 (ALT 250 FOR IP): Performed by: NURSE PRACTITIONER

## 2025-06-02 PROCEDURE — 6360000002 HC RX W HCPCS: Performed by: INTERNAL MEDICINE

## 2025-06-02 PROCEDURE — 71250 CT THORAX DX C-: CPT

## 2025-06-02 PROCEDURE — 94660 CPAP INITIATION&MGMT: CPT

## 2025-06-02 PROCEDURE — 94640 AIRWAY INHALATION TREATMENT: CPT

## 2025-06-02 PROCEDURE — 6370000000 HC RX 637 (ALT 250 FOR IP)

## 2025-06-02 PROCEDURE — 2700000000 HC OXYGEN THERAPY PER DAY

## 2025-06-02 PROCEDURE — 6360000002 HC RX W HCPCS: Performed by: PHYSICIAN ASSISTANT

## 2025-06-02 PROCEDURE — 80048 BASIC METABOLIC PNL TOTAL CA: CPT

## 2025-06-02 PROCEDURE — 70450 CT HEAD/BRAIN W/O DYE: CPT

## 2025-06-02 PROCEDURE — 86140 C-REACTIVE PROTEIN: CPT

## 2025-06-02 PROCEDURE — 2500000003 HC RX 250 WO HCPCS: Performed by: INTERNAL MEDICINE

## 2025-06-02 PROCEDURE — 82962 GLUCOSE BLOOD TEST: CPT

## 2025-06-02 PROCEDURE — 97535 SELF CARE MNGMENT TRAINING: CPT

## 2025-06-02 PROCEDURE — 2000000000 HC ICU R&B

## 2025-06-02 RX ORDER — BUMETANIDE 0.25 MG/ML
1 INJECTION, SOLUTION INTRAMUSCULAR; INTRAVENOUS 2 TIMES DAILY
Status: DISCONTINUED | OUTPATIENT
Start: 2025-06-02 | End: 2025-06-04

## 2025-06-02 RX ORDER — POLYETHYLENE GLYCOL 3350 17 G/17G
17 POWDER, FOR SOLUTION ORAL DAILY
Status: DISCONTINUED | OUTPATIENT
Start: 2025-06-02 | End: 2025-06-13 | Stop reason: HOSPADM

## 2025-06-02 RX ORDER — SENNA AND DOCUSATE SODIUM 50; 8.6 MG/1; MG/1
2 TABLET, FILM COATED ORAL 2 TIMES DAILY
Status: DISCONTINUED | OUTPATIENT
Start: 2025-06-02 | End: 2025-06-13 | Stop reason: HOSPADM

## 2025-06-02 RX ORDER — BISACODYL 10 MG
10 SUPPOSITORY, RECTAL RECTAL ONCE
Status: COMPLETED | OUTPATIENT
Start: 2025-06-02 | End: 2025-06-02

## 2025-06-02 RX ORDER — IPRATROPIUM BROMIDE AND ALBUTEROL SULFATE 2.5; .5 MG/3ML; MG/3ML
1 SOLUTION RESPIRATORY (INHALATION)
Status: DISCONTINUED | OUTPATIENT
Start: 2025-06-02 | End: 2025-06-10

## 2025-06-02 RX ORDER — BUMETANIDE 0.25 MG/ML
1 INJECTION, SOLUTION INTRAMUSCULAR; INTRAVENOUS ONCE
Status: COMPLETED | OUTPATIENT
Start: 2025-06-02 | End: 2025-06-02

## 2025-06-02 RX ADMIN — METOPROLOL TARTRATE 12.5 MG: 25 TABLET, FILM COATED ORAL at 21:29

## 2025-06-02 RX ADMIN — METHOCARBAMOL 500 MG: 500 TABLET ORAL at 18:37

## 2025-06-02 RX ADMIN — BUMETANIDE 1 MG: 0.25 INJECTION INTRAMUSCULAR; INTRAVENOUS at 09:00

## 2025-06-02 RX ADMIN — METHOCARBAMOL 500 MG: 500 TABLET ORAL at 08:04

## 2025-06-02 RX ADMIN — METOPROLOL TARTRATE 12.5 MG: 25 TABLET, FILM COATED ORAL at 08:04

## 2025-06-02 RX ADMIN — BUMETANIDE 1 MG: 0.25 INJECTION INTRAMUSCULAR; INTRAVENOUS at 18:37

## 2025-06-02 RX ADMIN — BUMETANIDE 1 MG: 0.25 INJECTION INTRAMUSCULAR; INTRAVENOUS at 03:20

## 2025-06-02 RX ADMIN — BISACODYL 10 MG: 10 SUPPOSITORY RECTAL at 16:02

## 2025-06-02 RX ADMIN — IPRATROPIUM BROMIDE AND ALBUTEROL SULFATE 1 DOSE: 2.5; .5 SOLUTION RESPIRATORY (INHALATION) at 21:02

## 2025-06-02 RX ADMIN — METHYLPREDNISOLONE SODIUM SUCCINATE 40 MG: 40 INJECTION, POWDER, LYOPHILIZED, FOR SOLUTION INTRAMUSCULAR; INTRAVENOUS at 01:07

## 2025-06-02 RX ADMIN — ASPIRIN 81 MG: 81 TABLET, COATED ORAL at 08:04

## 2025-06-02 RX ADMIN — SENNOSIDES AND DOCUSATE SODIUM 2 TABLET: 50; 8.6 TABLET ORAL at 20:36

## 2025-06-02 RX ADMIN — ATORVASTATIN CALCIUM 40 MG: 40 TABLET, FILM COATED ORAL at 20:37

## 2025-06-02 RX ADMIN — ALBUTEROL SULFATE 2.5 MG: 2.5 SOLUTION RESPIRATORY (INHALATION) at 16:33

## 2025-06-02 RX ADMIN — APIXABAN 5 MG: 5 TABLET, FILM COATED ORAL at 20:37

## 2025-06-02 RX ADMIN — METHOCARBAMOL 500 MG: 500 TABLET ORAL at 21:30

## 2025-06-02 RX ADMIN — SODIUM CHLORIDE 1 G: 1 TABLET ORAL at 08:04

## 2025-06-02 RX ADMIN — METHYLPREDNISOLONE SODIUM SUCCINATE 40 MG: 40 INJECTION, POWDER, LYOPHILIZED, FOR SOLUTION INTRAMUSCULAR; INTRAVENOUS at 13:49

## 2025-06-02 RX ADMIN — ACETAMINOPHEN 650 MG: 325 TABLET ORAL at 20:42

## 2025-06-02 RX ADMIN — METHOCARBAMOL 500 MG: 500 TABLET ORAL at 13:48

## 2025-06-02 RX ADMIN — ALBUTEROL SULFATE 2.5 MG: 2.5 SOLUTION RESPIRATORY (INHALATION) at 12:42

## 2025-06-02 RX ADMIN — METHYLPREDNISOLONE SODIUM SUCCINATE 40 MG: 40 INJECTION, POWDER, LYOPHILIZED, FOR SOLUTION INTRAMUSCULAR; INTRAVENOUS at 22:05

## 2025-06-02 RX ADMIN — APIXABAN 5 MG: 5 TABLET, FILM COATED ORAL at 08:04

## 2025-06-02 RX ADMIN — SODIUM CHLORIDE, PRESERVATIVE FREE 10 ML: 5 INJECTION INTRAVENOUS at 20:43

## 2025-06-02 RX ADMIN — SODIUM CHLORIDE, PRESERVATIVE FREE 40 ML: 5 INJECTION INTRAVENOUS at 08:05

## 2025-06-02 RX ADMIN — ALBUTEROL SULFATE 2.5 MG: 2.5 SOLUTION RESPIRATORY (INHALATION) at 08:10

## 2025-06-02 RX ADMIN — POLYETHYLENE GLYCOL 3350 17 G: 17 POWDER, FOR SOLUTION ORAL at 13:49

## 2025-06-02 RX ADMIN — Medication 8.8 MG: at 08:04

## 2025-06-02 ASSESSMENT — PAIN SCALES - GENERAL
PAINLEVEL_OUTOF10: 3
PAINLEVEL_OUTOF10: 0
PAINLEVEL_OUTOF10: 3
PAINLEVEL_OUTOF10: 2
PAINLEVEL_OUTOF10: 0
PAINLEVEL_OUTOF10: 0

## 2025-06-02 ASSESSMENT — PAIN DESCRIPTION - DESCRIPTORS
DESCRIPTORS: SORE
DESCRIPTORS: SORE

## 2025-06-02 ASSESSMENT — PAIN DESCRIPTION - LOCATION
LOCATION: BACK
LOCATION: BACK

## 2025-06-02 NOTE — PLAN OF CARE
Problem: Occupational Therapy - Adult  Goal: By Discharge: Performs self-care activities at highest level of function for planned discharge setting.  See evaluation for individualized goals.  Description: Occupational Therapy Goals  Initiated: 5/30/2025   1.  Patient will perform grooming with setup from chair level within 7 day(s).  2.  Patient will perform bathing with mod A within 7 day(s).  3.  Patient will perform upper body dressing with mod A within 7 days.  4.  Patient will perform lower body dressing with mod A within 7 day(s).  5.  Patient will perform toilet transfers with min A within 7 day(s).  6.  Patient will perform all aspects of toileting with min A within 7 day(s).  7.  Patient will be independent with 3/3 sternal precautions within 7 days.  8.  Patient will require minimal cues to following precautions during functional activities within 7 days.       FUNCTIONAL STATUS PRIOR TO ADMISSION:    Receives Help From: Family, Prior Level of Assist for ADLs: Independent, Prior Level of Assist for Homemaking: Independent, Prior Level of Assist for Transfers: Independent, Active : Yes     HOME SUPPORT: Pt lives with his wife and granddaughter.  He is typically independent prior to admission.  Pt with recent CABG and needs to follow strict sternal precautions.     Outcome: Progressing   OCCUPATIONAL THERAPY TREATMENT  Patient: Hector Jensen Jr (74 y.o. male)  Date: 6/2/2025  Primary Diagnosis: Hyponatremia [E87.1]  Acute pulmonary edema (HCC) [J81.0]  Acute respiratory failure with hypoxia (HCC) [J96.01]  Dyspnea, unspecified type [R06.00]       Precautions: Fall Risk   Chart, occupational therapy assessment, plan of care, and goals were reviewed.    ASSESSMENT  Patient continues to benefit from skilled OT services and is slowly progressing towards goals. Patient remains limited by increased oxygen needs and requiring re-education on strict sternal precautions with verbal cueing to maintain  activated, and Caregiver / family present    COMMUNICATION/EDUCATION:   The patient's plan of care was discussed with: physical therapist and registered nurse    Patient Education  Education Given To: Patient;Family  Education Provided: Role of Therapy;ADL Adaptive Strategies;Transfer Training;Home Exercise Program;Mobility Training  Education Method: Verbal;Demonstration  Barriers to Learning: None  Education Outcome: Verbalized understanding;Demonstrated understanding;Continued education needed    Thank you for this referral.  Yuridia Lion OTR/L  Minutes: 23

## 2025-06-02 NOTE — PLAN OF CARE
Problem: Safety - Adult  Goal: Free from fall injury  Outcome: Progressing     Problem: Chronic Conditions and Co-morbidities  Goal: Patient's chronic conditions and co-morbidity symptoms are monitored and maintained or improved  Outcome: Progressing  Flowsheets (Taken 6/1/2025 2000)  Care Plan - Patient's Chronic Conditions and Co-Morbidity Symptoms are Monitored and Maintained or Improved:   Monitor and assess patient's chronic conditions and comorbid symptoms for stability, deterioration, or improvement   Collaborate with multidisciplinary team to address chronic and comorbid conditions and prevent exacerbation or deterioration   Update acute care plan with appropriate goals if chronic or comorbid symptoms are exacerbated and prevent overall improvement and discharge     Problem: Discharge Planning  Goal: Discharge to home or other facility with appropriate resources  Outcome: Progressing  Flowsheets (Taken 6/1/2025 2000)  Discharge to home or other facility with appropriate resources:   Identify barriers to discharge with patient and caregiver   Arrange for needed discharge resources and transportation as appropriate   Identify discharge learning needs (meds, wound care, etc)   Arrange for interpreters to assist at discharge as needed   Refer to discharge planning if patient needs post-hospital services based on physician order or complex needs related to functional status, cognitive ability or social support system     Problem: Pain  Goal: Verbalizes/displays adequate comfort level or baseline comfort level  Outcome: Progressing     Problem: Respiratory - Adult  Goal: Achieves optimal ventilation and oxygenation  Outcome: Progressing  Flowsheets (Taken 6/1/2025 2000)  Achieves optimal ventilation and oxygenation:   Assess for changes in respiratory status   Assess for changes in mentation and behavior   Position to facilitate oxygenation and minimize respiratory effort   Oxygen supplementation based on oxygen  saturation or arterial blood gases   Initiate smoking cessation protocol as indicated   Encourage broncho-pulmonary hygiene including cough, deep breathe, incentive spirometry   Assess the need for suctioning and aspirate as needed   Assess and instruct to report shortness of breath or any respiratory difficulty   Respiratory therapy support as indicated     Problem: Skin/Tissue Integrity - Adult  Goal: Incisions, wounds, or drain sites healing without S/S of infection  Outcome: Progressing  Flowsheets (Taken 6/1/2025 2000)  Incisions, Wounds, or Drain Sites Healing Without Sign and Symptoms of Infection:   ADMISSION and DAILY: Assess and document risk factors for pressure ulcer development   TWICE DAILY: Assess and document skin integrity   TWICE DAILY: Assess and document dressing/incision, wound bed, drain sites and surrounding tissue   Implement wound care per orders   Initiate isolation precautions as appropriate   Initiate pressure ulcer prevention bundle as indicated     Problem: Metabolic/Fluid and Electrolytes - Adult  Goal: Electrolytes maintained within normal limits  Outcome: Progressing  Flowsheets (Taken 6/1/2025 2000)  Electrolytes maintained within normal limits:   Monitor labs and assess patient for signs and symptoms of electrolyte imbalances   Administer electrolyte replacement as ordered   Monitor response to electrolyte replacements, including repeat lab results as appropriate   Fluid restriction as ordered   Instruct patient on fluid and nutrition restrictions as appropriate

## 2025-06-02 NOTE — PROGRESS NOTES
[facility]     ICU TEAM Progress Note           Name: Hector Jensen Jr   : 1950   MRN: [id number]   Date: 2025          ICU PROBLEM LIST   -Hyponatremia  - Acute hypoxic respiratory failure  -CAD status post CABG     HISTORY OF PRESENT ILLNESS:     Hector Jensen Jr is a 74 y.o. male with a history of hypertension, hyperlipidemia, CAD status post CABG on 2025 who presented to the ED for shortness of breath. Patient reported having some shortness of breath that started yesterday, but significantly worsened today. He was found to be hypoxic in the ED 72% on room air sats. He has some resting shortness of breath but worsens with exertion.  He also has some mild lower bilateral extremity swelling. Patient states that he feels thirsty all the time and has been drinking a lot of water. States he drinks about four large bottles. Has been using his breathing device (?ICS) as instructed at home, and recently when home health visited they recommended him starting or drinking electrolytes or gatorade. Denies chest pain, cough, fever, chills, abdominal or back pain.     In the ED labs were drawn and patient was sent to CT for CTA of his chest with and without contrast.  CT of the chest showed diffuse groundglass consolidation involving the lung parenchyma as well as small to moderate bilateral pleural effusions most likely secondary to pulmonary edema less likely a multi focal pneumonia.  The labs came back and was significant for a sodium level of 112, chloride 80.  BUN was 11 creatinine was 0.79.  Glucose 149 calcium 8.2. serum osmolality was 243.  Nephrology was consulted recommended starting patient on urea oral supplements and fluid restriction. Critical care was called for admission.     SUBJECTIVE:     - No acute events, this a.m. on nasal cannula, noted to have increasing hypoxia requiring significant increase in oxygen requirement.    -  Increased oxygen req.   Worsening chest imaging, placed on BiPAP, Lasix of 40, additional 20 of Lasix in the p.m.  Discussed in detail with family about chest imaging.  Poor tolerance to Precedex.    5/29 - No acute events overnight. Improved oxygenation with BIPAP and diuresis.    5/30-No acute events overnight, taken off BiPAP well-tolerated throughout the day.    5/31-No acute events overnight.  Taken off BiPAP, overall continues to improve.    6/1- No acute events overnight.  Continues to improve.    6/2- No acute events overnight, this a.m. questionable some decreased strength in the right hand.    NEUROLOGICAL:      Chronic pain  --Resume muscle relaxant Robaxin at lower dose  --Resume as needed Tylenol, oxycodone    Weakness in right hand,  No other focal deficits  History of prior stroke/infarct  Brain imaging reviewed -CT head no acute stroke.  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  Multifactorial-DDx; pulmonary edema, community acquired pneumonia,   Follow-up CT scan--bilateral groundglass opacities increased abnormality in the lower lobes especially posteriorly, consistent with pulmonary edema.  On the combination peripherally bronchiectasis in the right lower lobe consistent with fibrosis.  Continues to require high flow, oxygen goal of 92% and above  Status post course of antibiotics, with normal procalcitonin.  --Increased diuresis-transition to Bumex 1BID-Today-trial of net -2 L.  --Continue steroids, breathing treatment,     CARDIOVASCULAR:      CAD status post CABG  --cont. Metoprolol, ASA, Statins  --monitor tele  --Appreciate CT surgery recommendations.    Atrial fibrillation, rate controlled   --continue metoprolol  --On aspirin, Eliquis  --Continue holding off on amiodarone  --Consider digoxin for persistent A-fib or RVR.    Echocardiogram EF of

## 2025-06-02 NOTE — PROGRESS NOTES
Heart rhythm fluctuated between sinus rhythm, SVT, and Afib. Afib and SVT sustained for several minutes then self-resolved. Provider notified.      0230- Provider held Lasix gtt and ordered  1mg Bumex as patient had only 650mL urine output. An additional 700mL came out within 1.5 hours.       0300- Patient started coughing and could not catch his breath. O2 went down to 85-87. HFNC requirements increased to maximum for 15 minutes until he was able to catch his breath. Weaned to 55L and 75%.     0400- RR 30-40. RT put patient back on Bipap to rest.    0700- Back on HFNC 55L 70%    0730- Total urine output for shift 2.1L

## 2025-06-02 NOTE — PROGRESS NOTES
Name: Hector Jensen Jr MRN: [id number]   : 1950 Hospital: [facility]   Date: 2025        IMPRESSION:   Hypervolemic hyponatremia of advanced CHF. Improving with diuresis  Normal renal function  CHF- 45-50%-Cardiology is managing      PLAN:   Adjusted fluid restriction to 1.2 L/day.  Ure-Na on hold  stop salt tablets, will cause more fluid overload  C/w diuresis-looks euvolemic now. Recommend to cut back on IV bumex and change to po-defer to ICU team  Plan for CT chest noted  May need to discontinue/hold trazodone 8 hyponatremia does not improve.  Trazodone can cause ADH release and hyponatremia.       Subjective/Interval History:   I have reviewed the flowsheet and previous day’s notes.    ROS:Pertinent items are noted in HPI.    25 Doing better. No new issues.      2025    No major complaints open this morning.  He worked with therapy team yesterday.      2025.    The patient was sleeping.  Events reviewed with Giovanna [daughter-in-law] at the bedside.    -feel ok, on hiflo O2, no edema, has dry crackles-Na 132 this am-in neg fluid balance      Objective:   Vital Signs:    /74   Pulse 81   Temp 97.9 °F (36.6 °C) (Oral)   Resp 24   Ht 1.626 m (5' 4\")   Wt 61.4 kg (135 lb 5.8 oz)   SpO2 100%   BMI 23.23 kg/m²             Temp (24hrs), Av.2 °F (36.8 °C), Min:97.9 °F (36.6 °C), Max:98.5 °F (36.9 °C)       Intake/Output:   Last shift:      No intake/output data recorded.  Last 3 shifts:  1901 -  0700  In: 1625.2 [P.O.:1540; I.V.:85.2]  Out: 4090 [Urine:4090]    Intake/Output Summary (Last 24 hours) at 2025 0920  Last data filed at 2025 0800  Gross per 24 hour   Intake 1185.24 ml   Output 2400 ml   Net -1214.76 ml        Current Facility-Administered Medications   Medication Dose Route Frequency    bumetanide (BUMEX) injection 1 mg  1 mg IntraVENous BID    bisacodyl (DULCOLAX) suppository 10 mg  10 mg Rectal Once    polyethylene glycol

## 2025-06-02 NOTE — CARE COORDINATION
Transition of Care Plan:    RUR: 23 %   Prior Level of Functioning: Independent   Disposition: TBD   CASSANDRA:   If SNF or IPR: Date FOC offered:   Date FOC received:   Accepting facility:   Date authorization started with reference number: NA  Date authorization received and expires: NA  Follow up appointments: PCP, pulmonology   DME needed: TBD  Transportation at discharge: Family vs BLS   IM/IMM Medicare/ letter given: 5/28  Is patient a [place] and connected with VA? No  Caregiver Contact: Wife Suzanna Jensen [phone], Daughter Giovanna Hall [phone]   Discharge Caregiver contacted prior to discharge? Yes   Care Conference needed? No  Barriers to discharge:    Hi flow oxygen   Afib overnight   Fluid restriction   Diuresis   Steroids   Care management is continuing to follow.   Patricia Allan RN,Care Management   Need Cest CT  Medical stability

## 2025-06-02 NOTE — PLAN OF CARE
Problem: Physical Therapy - Adult  Goal: By Discharge: Performs mobility at highest level of function for planned discharge setting.  See evaluation for individualized goals.  Description: FUNCTIONAL STATUS PRIOR TO ADMISSION: Patient was independent and active without use of DME.    HOME SUPPORT PRIOR TO ADMISSION: The patient lived with family but did not require assistance.    Physical Therapy Goals  Initiated 5/30/2025  1.  Patient will move from supine to sit and sit to supine, scoot up and down, and roll side to side in bed with contact guard assist within 7 day(s).    2.  Patient will perform sit to stand with contact guard assist within 7 day(s).  3.  Patient will transfer from bed to chair and chair to bed with contact guard assist using the least restrictive device within 7 day(s).  4.  Patient will ambulate with contact guard assist for 150 feet with the least restrictive device within 7 day(s).   5.  Patient will ascend/descend 4 stairs with minimal assistance within 7 day(s).  6.  Patient will verbally and functionally demonstrate 3/3 sternal precautions without cues within 7 days.   Outcome: Progressing       PHYSICAL THERAPY TREATMENT    Patient: Hector Jensen Jr (74 y.o. male)  Date: 6/2/2025  Diagnosis: Hyponatremia [E87.1]  Acute pulmonary edema (HCC) [J81.0]  Acute respiratory failure with hypoxia (HCC) [J96.01]  Dyspnea, unspecified type [R06.00] Hyponatremia      Precautions: Restrictions/Precautions  Restrictions/Precautions: Fall Risk            ASSESSMENT:  Patient continues to benefit from skilled PT services and is slowly progressing towards goals. Patient with HiFlow at 75% on 50L/min - with O2 sats decreasing to 84% after mobility. Patient needed max verbal cuing for pursed lip breathing with O2 sats returning slowly to 90%. Incentive spirometer and importance of mobility reviewed. Patient able to sit edge of bed with stand by assist, stand with min assist for approx 30 seconds.

## 2025-06-02 NOTE — PROGRESS NOTES
Cardiac Surgery ICU Progress Note    Admit Date: 2025  Readmission for hypoxia and hyponatremia    Procedure:       CABG 25 during prior admission with Dr. Ragsdale     Subjective:   Pt seen and examined. Discussed with Dr. Ragsdale. CC this morning is feeling like he has decreased  in his R hand and also that he has not had a bowel movement in one week. He is afebrile, SR 80, on HFNC 55L 60% fiO2. Reports of afib overnight.     Objective:   Vitals:  Blood pressure 137/74, pulse 81, temperature 97.9 °F (36.6 °C), temperature source Oral, resp. rate 24, height 1.626 m (5' 4\"), weight 61.4 kg (135 lb 5.8 oz), SpO2 100%.  Temp (24hrs), Av.2 °F (36.8 °C), Min:97.9 °F (36.6 °C), Max:98.5 °F (36.9 °C)    NIPPV and HFNC    EKG/Rhythm:  NSR on tele this AM.    CXR:  Xray Result (most recent):  XR CHEST PORTABLE 2025    Narrative  EXAM:  XR CHEST PORTABLE    INDICATION: Ongoing hypoxia, pulmonary edema, re-eval    COMPARISON: 2025    TECHNIQUE: 0539 hours portable chest AP view    FINDINGS: Status post median sternotomy. Heart size is normal. Lungs demonstrate  improved aeration. Pulm edema pattern has decreased. No pneumonia.    Impression  1. Improved aeration with decrease in pulmonary edema.      Electronically signed by Jhon Awad       Admission Weight: Last Weight   Weight - Scale: 63.6 kg (140 lb 3.4 oz) Weight - Scale: 61.4 kg (135 lb 5.8 oz)     Intake / Output / Drain:  Current Shift: No intake/output data recorded.  Last 24 hrs.:   Intake/Output Summary (Last 24 hours) at 2025 0857  Last data filed at 2025 0800  Gross per 24 hour   Intake 1185.24 ml   Output 2400 ml   Net -1214.76 ml     EXAM:  General:  Lying in bed.             Lungs:   Coarse lung sounds throughout   Incision:  No erythema, drainage or swelling. Prineo previously removed.    Heart:  Regular rhythm, S1, S2 normal, no murmur, click, rub or gallop.    Abdomen:   Soft, non-tender. Bowel sounds hypoactive.    Extremities:  No edema. Peripheral pulses intact.    Neurologic:   may be subtly weaker on R hand, otherwise equal strength between upper extremities and equal strength in lower extremities. He denies numbness ands tingling.     Labs:   Recent Labs     06/02/25  0115   WBC 13.1*   HGB 9.3*   HCT 29.1*      *   K 4.0   BUN 29*        Assessment:     Principal Problem:    Hyponatremia  Resolved Problems:    * No resolved hospital problems. *     Plan/Recommendations/Medical Decision Making:      Acute hypoxic respiratory failure  Diffuse groundglass consolidation with pulmonary edema and bilateral pulmonary effusions on CTA. Mixed etiology - pulmonary edema, CAP, possible pneumonitis/fibrosis  - support with NIPPV/HFNC per primary team  - repeat CT  - continue Solu-medrol   - Pulm toilet: acapella, IS, cough, deep breathe, ambulate when appropriate.      Acute on chronic hyponatremia: R/t low sodium diet with high free water intake. Greatly improved.  - appreciate nephrology and intensivist team     CAD s/p NSTEMI, s/p CABG x3:  - ASA and atorvastatin  - Continue Metoprolol 12.5 BID  - Repeat TTE 5/29 with LVEF 45-50%  - Diuresis 20 IV Lasix today     New post-op atrial fibrillation:  Intermittent Afib post-operatively  - Amiodarone discontinued d/t respiratory failure and ground glass opacities  -  metoprolol 12.5 BID  -  Eliquis 5 mg BID  - maintain K > 3.9, mg > 2.4     HTN:  PTA 5 mg bisoprolol daily, Lotrel 5-40 mg daily (bisoprolol not on hospital formulary)  - Metoprolol 12.5 mg BID     HLD: PTA 81 mg ASA,  20 mg Lipitor  - Continue Atorvastatin 40mg daily, optimized post-op for graft patency     R hand weakness: Pt reports his ability to grasp things with his right fingers is diminished, he reports he could not move his hand this morning but is now improved. On exam, there may be slight weakness in his  but otherwise benign exam.  - CT this morning  - Cont to monitor    H/o CVA s/p right

## 2025-06-02 NOTE — CONSULTS
Pulmonary, Critical Care, and Sleep Medicine~Consult Note    Name: Hector Jensen Jr MRN: [id number]   : 1950 Hospital: [facility]   Date: 2025 1:32 PM Admission: 2025     Impression Plan   Acute Hypoxic Respiratory Failure, suspect related to pulmonary edema, lower suspicion for CAP, aspiration pneumonitis, Amiodarone induced pneumonitis/fibrosis. Query underlying MAC given bronchiectasis and RLL fibrosis?   CAD s/p CABG x 3, s/p NSTEMI  Afib  HFrEF  Acute on Chronic Hyponatremia   HTN/HLD Completed course of abx  Oxygen to maintain goal saturation >90%, wean as able.   NIV QHS and for work of breathing  Sputum Culture pending  CRP pending  Diurese for negative fluid balance, may be intravascularly depleted, albumin 2.4, may benefit from coadministration of albumin with loop diuretic.    IVCS   Pulmonary Toilet  Lower suspicion for amiodarione induced fibro proliferative disease but if it is deemed unnecessary by cardiology I would be in favor of discontinuing, agree with holding for now  Will need repeat imaging in 4-6 weeks.     Thanks for the consult! Discussed with RN, family and hospitalist. We will continue to follow.     Patient is critically ill and at high risk for further decline, mechanical ventilation, and ICU admission due to AHRF; CC time spent excluding procedures is > 35 minutes      Daily Progression:    CONSULT NOTE  Consulted by hospitalist for concern for abnormal chest imaging. Presented to the ED on 25 with significantly worsening dyspnea. Satting 72% on RA. Bilateral lower extremity edema. Pt endorsed that he is thirsty all the time and drinking, approximately 4-6 large bottles of water. Recently discharged in May 2025 s/p CABG x 3. Denied any cough, fevers, chills, abdominal or back pain. CTA on admission showed diffuse GGO and bilateral pleural effusions concerning for pulmonary edema. Na 112, Chloride 80, BUN 11, Cr. 0.79. Admitted to ICU. Continued  azelastine (ASTELIN) 137 mcg (0.1 %) nasal spray 10/29/17   Automatic Reconciliation, Ar   loratadine (CLARITIN) 10 MG tablet Take 1 tablet by mouth    Automatic Reconciliation, Ar     Allergies   Allergen Reactions    Chocolate Headaches     Migraines and aura      Social History     Tobacco Use    Smoking status: Former     Current packs/day: 1.00     Average packs/day: 1 pack/day for 26.1 years (26.1 ttl pk-yrs)     Types: Cigarettes     Start date: 1999     Quit date: 1970     Passive exposure: Never    Smokeless tobacco: Never   Substance Use Topics    Alcohol use: No     Alcohol/week: 0.0 standard drinks of alcohol      Family History   Problem Relation Age of Onset    No Known Problems Father     No Known Problems Mother      OBJECTIVE:     Vital Signs:     /62   Pulse 86   Temp 97.9 °F (36.6 °C) (Oral)   Resp 29   Ht 1.626 m (5' 4\")   Wt 61.4 kg (135 lb 5.8 oz)   SpO2 90%   BMI 23.23 kg/m²    Temp (24hrs), Av.1 °F (36.7 °C), Min:97.9 °F (36.6 °C), Max:98.5 °F (36.9 °C)     Intake/Output:     Last shift: 701 -  1900  In: -   Out: 850 [Urine:850]    Last 3 shifts: 1901 -  0700  In: 1625.2 [P.O.:1540; I.V.:85.2]  Out: 4090 [Urine:4090]        Intake/Output Summary (Last 24 hours) at 2025 1332  Last data filed at 2025 0947  Gross per 24 hour   Intake 1171.66 ml   Output 3250 ml   Net -2078.34 ml       Physical Exam:                                        Exam Findings Other   General: No resp distress noted, appears stated age    HEENT:  No ulcers, JVD not elevated, no cervical LAD    Chest: No pectus deformity, normal chest rise b/l    HEART:  RRR, no murmurs/rubs/gallops    Lungs:  CTA b/l, no rhonchi/crackles/wheeze, diminished BS at bases    ABD: Soft/NT, non rigid mildly distended    EXT: No cyanosis/clubbing/edema, normal peripheral pulses    Skin: No rashes or ulcers, no mottling    Neuro: A/O x 3        Medications:  Current Facility-Administered

## 2025-06-03 LAB
ANION GAP SERPL CALC-SCNC: 5 MMOL/L (ref 2–12)
BUN SERPL-MCNC: 23 MG/DL (ref 6–20)
BUN/CREAT SERPL: 35 (ref 12–20)
CALCIUM SERPL-MCNC: 8.1 MG/DL (ref 8.5–10.1)
CHLORIDE SERPL-SCNC: 94 MMOL/L (ref 97–108)
CO2 SERPL-SCNC: 32 MMOL/L (ref 21–32)
CREAT SERPL-MCNC: 0.66 MG/DL (ref 0.7–1.3)
ERYTHROCYTE [DISTWIDTH] IN BLOOD BY AUTOMATED COUNT: 13.2 % (ref 11.5–14.5)
GLUCOSE BLD STRIP.AUTO-MCNC: 131 MG/DL (ref 65–117)
GLUCOSE BLD STRIP.AUTO-MCNC: 167 MG/DL (ref 65–117)
GLUCOSE BLD STRIP.AUTO-MCNC: 179 MG/DL (ref 65–117)
GLUCOSE BLD STRIP.AUTO-MCNC: 185 MG/DL (ref 65–117)
GLUCOSE SERPL-MCNC: 119 MG/DL (ref 65–100)
HCT VFR BLD AUTO: 28.2 % (ref 36.6–50.3)
HGB BLD-MCNC: 9.3 G/DL (ref 12.1–17)
MAGNESIUM SERPL-MCNC: 2.3 MG/DL (ref 1.6–2.4)
MCH RBC QN AUTO: 29.5 PG (ref 26–34)
MCHC RBC AUTO-ENTMCNC: 33 G/DL (ref 30–36.5)
MCV RBC AUTO: 89.5 FL (ref 80–99)
NRBC # BLD: 0 K/UL (ref 0–0.01)
NRBC BLD-RTO: 0 PER 100 WBC
PLATELET # BLD AUTO: 309 K/UL (ref 150–400)
PMV BLD AUTO: 9.1 FL (ref 8.9–12.9)
POTASSIUM SERPL-SCNC: 4.1 MMOL/L (ref 3.5–5.1)
RBC # BLD AUTO: 3.15 M/UL (ref 4.1–5.7)
SERVICE CMNT-IMP: ABNORMAL
SODIUM SERPL-SCNC: 131 MMOL/L (ref 136–145)
WBC # BLD AUTO: 15.6 K/UL (ref 4.1–11.1)

## 2025-06-03 PROCEDURE — 2500000003 HC RX 250 WO HCPCS: Performed by: INTERNAL MEDICINE

## 2025-06-03 PROCEDURE — 6370000000 HC RX 637 (ALT 250 FOR IP): Performed by: INTERNAL MEDICINE

## 2025-06-03 PROCEDURE — 6370000000 HC RX 637 (ALT 250 FOR IP)

## 2025-06-03 PROCEDURE — 94660 CPAP INITIATION&MGMT: CPT

## 2025-06-03 PROCEDURE — 87205 SMEAR GRAM STAIN: CPT

## 2025-06-03 PROCEDURE — 85027 COMPLETE CBC AUTOMATED: CPT

## 2025-06-03 PROCEDURE — 2700000000 HC OXYGEN THERAPY PER DAY

## 2025-06-03 PROCEDURE — 6370000000 HC RX 637 (ALT 250 FOR IP): Performed by: NURSE PRACTITIONER

## 2025-06-03 PROCEDURE — 94640 AIRWAY INHALATION TREATMENT: CPT

## 2025-06-03 PROCEDURE — 87070 CULTURE OTHR SPECIMN AEROBIC: CPT

## 2025-06-03 PROCEDURE — 94760 N-INVAS EAR/PLS OXIMETRY 1: CPT

## 2025-06-03 PROCEDURE — 6360000002 HC RX W HCPCS: Performed by: INTERNAL MEDICINE

## 2025-06-03 PROCEDURE — 82962 GLUCOSE BLOOD TEST: CPT

## 2025-06-03 PROCEDURE — 83735 ASSAY OF MAGNESIUM: CPT

## 2025-06-03 PROCEDURE — 2000000000 HC ICU R&B

## 2025-06-03 PROCEDURE — 80048 BASIC METABOLIC PNL TOTAL CA: CPT

## 2025-06-03 RX ORDER — OXYCODONE HYDROCHLORIDE 5 MG/1
2.5 TABLET ORAL EVERY 6 HOURS PRN
Refills: 0 | Status: DISCONTINUED | OUTPATIENT
Start: 2025-06-03 | End: 2025-06-11

## 2025-06-03 RX ORDER — BISACODYL 10 MG
10 SUPPOSITORY, RECTAL RECTAL ONCE
Status: COMPLETED | OUTPATIENT
Start: 2025-06-03 | End: 2025-06-03

## 2025-06-03 RX ORDER — BUMETANIDE 0.25 MG/ML
2 INJECTION, SOLUTION INTRAMUSCULAR; INTRAVENOUS ONCE
Status: COMPLETED | OUTPATIENT
Start: 2025-06-03 | End: 2025-06-03

## 2025-06-03 RX ADMIN — ASPIRIN 81 MG: 81 TABLET, COATED ORAL at 08:02

## 2025-06-03 RX ADMIN — SODIUM CHLORIDE, PRESERVATIVE FREE 10 ML: 5 INJECTION INTRAVENOUS at 20:14

## 2025-06-03 RX ADMIN — METHYLPREDNISOLONE SODIUM SUCCINATE 40 MG: 40 INJECTION, POWDER, LYOPHILIZED, FOR SOLUTION INTRAMUSCULAR; INTRAVENOUS at 13:30

## 2025-06-03 RX ADMIN — SENNOSIDES AND DOCUSATE SODIUM 2 TABLET: 50; 8.6 TABLET ORAL at 20:43

## 2025-06-03 RX ADMIN — METHYLPREDNISOLONE SODIUM SUCCINATE 40 MG: 40 INJECTION, POWDER, LYOPHILIZED, FOR SOLUTION INTRAMUSCULAR; INTRAVENOUS at 22:08

## 2025-06-03 RX ADMIN — BISACODYL 10 MG: 10 SUPPOSITORY RECTAL at 16:11

## 2025-06-03 RX ADMIN — ACETAMINOPHEN 650 MG: 325 TABLET ORAL at 08:02

## 2025-06-03 RX ADMIN — METHOCARBAMOL 500 MG: 500 TABLET ORAL at 20:43

## 2025-06-03 RX ADMIN — POLYETHYLENE GLYCOL 3350 17 G: 17 POWDER, FOR SOLUTION ORAL at 08:03

## 2025-06-03 RX ADMIN — APIXABAN 5 MG: 5 TABLET, FILM COATED ORAL at 20:43

## 2025-06-03 RX ADMIN — METOPROLOL TARTRATE 12.5 MG: 25 TABLET, FILM COATED ORAL at 21:35

## 2025-06-03 RX ADMIN — OXYCODONE 2.5 MG: 5 TABLET ORAL at 11:50

## 2025-06-03 RX ADMIN — BUMETANIDE 1 MG: 0.25 INJECTION INTRAMUSCULAR; INTRAVENOUS at 08:03

## 2025-06-03 RX ADMIN — IPRATROPIUM BROMIDE AND ALBUTEROL SULFATE 1 DOSE: 2.5; .5 SOLUTION RESPIRATORY (INHALATION) at 20:07

## 2025-06-03 RX ADMIN — SODIUM CHLORIDE, PRESERVATIVE FREE 40 ML: 5 INJECTION INTRAVENOUS at 08:03

## 2025-06-03 RX ADMIN — METHOCARBAMOL 500 MG: 500 TABLET ORAL at 16:11

## 2025-06-03 RX ADMIN — IPRATROPIUM BROMIDE AND ALBUTEROL SULFATE 1 DOSE: 2.5; .5 SOLUTION RESPIRATORY (INHALATION) at 09:10

## 2025-06-03 RX ADMIN — METHYLPREDNISOLONE SODIUM SUCCINATE 40 MG: 40 INJECTION, POWDER, LYOPHILIZED, FOR SOLUTION INTRAMUSCULAR; INTRAVENOUS at 06:00

## 2025-06-03 RX ADMIN — METHOCARBAMOL 500 MG: 500 TABLET ORAL at 08:02

## 2025-06-03 RX ADMIN — BUMETANIDE 2 MG: 0.25 INJECTION INTRAMUSCULAR; INTRAVENOUS at 13:29

## 2025-06-03 RX ADMIN — ATORVASTATIN CALCIUM 40 MG: 40 TABLET, FILM COATED ORAL at 20:43

## 2025-06-03 RX ADMIN — OXYCODONE 2.5 MG: 5 TABLET ORAL at 22:06

## 2025-06-03 RX ADMIN — METHOCARBAMOL 500 MG: 500 TABLET ORAL at 13:29

## 2025-06-03 RX ADMIN — INSULIN LISPRO 1 UNITS: 100 INJECTION, SOLUTION INTRAVENOUS; SUBCUTANEOUS at 20:19

## 2025-06-03 RX ADMIN — METOPROLOL TARTRATE 12.5 MG: 25 TABLET, FILM COATED ORAL at 08:02

## 2025-06-03 RX ADMIN — ACETAMINOPHEN 650 MG: 325 TABLET ORAL at 16:10

## 2025-06-03 RX ADMIN — SENNOSIDES AND DOCUSATE SODIUM 2 TABLET: 50; 8.6 TABLET ORAL at 08:02

## 2025-06-03 RX ADMIN — APIXABAN 5 MG: 5 TABLET, FILM COATED ORAL at 08:02

## 2025-06-03 RX ADMIN — FLUTICASONE PROPIONATE 1 SPRAY: 50 SPRAY, METERED NASAL at 22:09

## 2025-06-03 RX ADMIN — IPRATROPIUM BROMIDE AND ALBUTEROL SULFATE 1 DOSE: 2.5; .5 SOLUTION RESPIRATORY (INHALATION) at 12:49

## 2025-06-03 RX ADMIN — BUMETANIDE 1 MG: 0.25 INJECTION INTRAMUSCULAR; INTRAVENOUS at 18:27

## 2025-06-03 ASSESSMENT — PAIN DESCRIPTION - LOCATION
LOCATION: BACK

## 2025-06-03 ASSESSMENT — PAIN SCALES - GENERAL
PAINLEVEL_OUTOF10: 4
PAINLEVEL_OUTOF10: 6
PAINLEVEL_OUTOF10: 3
PAINLEVEL_OUTOF10: 3
PAINLEVEL_OUTOF10: 2
PAINLEVEL_OUTOF10: 4

## 2025-06-03 ASSESSMENT — PAIN DESCRIPTION - DESCRIPTORS
DESCRIPTORS: ACHING

## 2025-06-03 NOTE — CARE COORDINATION
Signed       Transition of Care Plan:     RUR: 23 %   Prior Level of Functioning: Independent   Disposition: TBD   CASSANDRA:   If SNF or IPR: Date FOC offered:   Date FOC received:   Accepting facility:   Date authorization started with reference number: NA  Date authorization received and expires: NA  Follow up appointments: PCP, pulmonology   DME needed: TBD  Transportation at discharge: Family vs BLS   IM/IMM Medicare/ letter given: 5/28  Is patient a  and connected with VA? No  Caregiver Contact: Wife Suzanna Jensen [phone], Daughter Giovanna Hall [phone]   Discharge Caregiver contacted prior to discharge? Yes   Care Conference needed? No  Barriers to discharge:    Hi flow oxygen   Afib overnight   Fluid restriction   Diuresis   Steroids   Therapy is recommending IPR, provided patient a list of inpatient rehab facilities. Will follow for choice.   Patricia Allan RN,Care Management

## 2025-06-03 NOTE — PROGRESS NOTES
Occupational Therapy Note:     Discussed with nursing this morning.  Pt noted with desaturations with bed to chair transfers and unable to tolerate further engagement with ADL tasks at this time.  Nursing asking to defer at this time.      Yolanda Pantoja OTR/L

## 2025-06-03 NOTE — PROGRESS NOTES
Physical Therapy 6/3/2025         Chart reviewed and discussed with RN- nursing transferred patient to chair and requested to defer further mobility secondary to O2 needs.   Will continue to follow    Vivek Khan, PT

## 2025-06-03 NOTE — PROGRESS NOTES
Name: Hector Jensen Jr MRN: [id number]   : 1950 Hospital: [facility]   Date: 6/3/2025        IMPRESSION:   Hypervolemic hyponatremia of advanced CHF. Improving with diuresis  Normal renal function  CHF- 45-50%-on iv bumex, in negative fluid balance. CT finding consistent with pulmonary congestion-on Hi-blanca O2  metabolic alkalosis due to diuresis        PLAN:   Extra bumex 2 mg iV x1 today  C/w IV diuresis  Adjusted fluid restriction to 1.2 L/day.  Stop Ure-Na   stopped salt tablets, will cause more fluid overload  Off  trazodone   Increase protein intake, prot supplements bid     Subjective/Interval History:   I have reviewed the flowsheet and previous day’s notes.    ROS:Pertinent items are noted in HPI.    25 Doing better. No new issues.      2025    No major complaints open this morning.  He worked with therapy team yesterday.      2025.    The patient was sleeping.  Events reviewed with Giovanna [daughter-in-law] at the bedside.    -feel ok, on hiflo O2, no edema, has dry crackles-Na 132 this am-in neg fluid balance  6/3-sitting in chair, still on Hi-blanca O2-low appetrite. Moved bowel      Objective:   Vital Signs:    /85   Pulse 81   Temp 98.3 °F (36.8 °C) (Oral)   Resp 25   Ht 1.626 m (5' 4\")   Wt 56.8 kg (125 lb 3.5 oz)   SpO2 96%   BMI 21.49 kg/m²             Temp (24hrs), Av.1 °F (36.7 °C), Min:97.8 °F (36.6 °C), Max:98.3 °F (36.8 °C)       Intake/Output:   Last shift:      No intake/output data recorded.  Last 3 shifts:  1901 -  0700  In: 1641.7 [P.O.:1600; I.V.:41.7]  Out: 4475 [Urine:4475]    Intake/Output Summary (Last 24 hours) at 6/3/2025 0926  Last data filed at 6/3/2025 0628  Gross per 24 hour   Intake 1100 ml   Output 2375 ml   Net -1275 ml        Current Facility-Administered Medications   Medication Dose Route Frequency    bumetanide (BUMEX) injection 2 mg  2 mg IntraVENous Once    bumetanide (BUMEX) injection 1 mg  1 mg IntraVENous  pleural effusions. Electronically signed by David Peters    CT HEAD WO CONTRAST  Result Date: 5/27/2025  No acute abnormality. Electronically signed by CAROL LAIRD    XR CHEST PORTABLE  Result Date: 5/27/2025  Increased pulmonary edema. Electronically signed by Ángel Walker    CTA CHEST W WO CONTRAST PE Eval  Result Date: 5/26/2025  Diffuse groundglass consolidation involving the lung parenchyma as well as small to moderate bilateral pleural effusions, most likely secondary to pulmonary edema. Multifocal pneumonia is felt less likely. No PE Electronically signed by DEE EM    XR CHEST PORTABLE  Result Date: 5/26/2025  Diffusely increased airspace disease in the right lung and left apex. This could represent diffuse asymmetric pulmonary edema or infection. Electronically signed by Gamal Smalls         Total time spent with patient:      []     Discussed with family and ICU team         CHRIS MENDES MD

## 2025-06-03 NOTE — PROGRESS NOTES
[facility]     ICU TEAM Progress Note           Name: Hector Jensen Jr   : 1950   MRN: [id number]   Date: 6/3/2025          ICU PROBLEM LIST   -Hyponatremia  - Acute hypoxic respiratory failure  -CAD status post CABG     HISTORY OF PRESENT ILLNESS:     Hector Jensen Jr is a 74 y.o. male with a history of hypertension, hyperlipidemia, CAD status post CABG on 2025 who presented to the ED for shortness of breath. Patient reported having some shortness of breath that started yesterday, but significantly worsened today. He was found to be hypoxic in the ED 72% on room air sats. He has some resting shortness of breath but worsens with exertion.  He also has some mild lower bilateral extremity swelling. Patient states that he feels thirsty all the time and has been drinking a lot of water. States he drinks about four large bottles. Has been using his breathing device (?ICS) as instructed at home, and recently when home health visited they recommended him starting or drinking electrolytes or gatorade. Denies chest pain, cough, fever, chills, abdominal or back pain.     In the ED labs were drawn and patient was sent to CT for CTA of his chest with and without contrast.  CT of the chest showed diffuse groundglass consolidation involving the lung parenchyma as well as small to moderate bilateral pleural effusions most likely secondary to pulmonary edema less likely a multi focal pneumonia.  The labs came back and was significant for a sodium level of 112, chloride 80.  BUN was 11 creatinine was 0.79.  Glucose 149 calcium 8.2. serum osmolality was 243.  Nephrology was consulted recommended starting patient on urea oral supplements and fluid restriction. Critical care was called for admission.     SUBJECTIVE:     - No acute events, this a.m. on nasal cannula, noted to have increasing hypoxia requiring significant increase in oxygen requirement.  -  Increased oxygen req.

## 2025-06-03 NOTE — PROGRESS NOTES
Attended Interdisciplinary Rounds on 7 ICU where patient's care was discussed.    Bashir Woody  Chaplain Resident  [phone]

## 2025-06-03 NOTE — PROGRESS NOTES
Pulmonary, Critical Care, and Sleep Medicine~Consult Note    Name: Hector Jensen Jr MRN: [id number]   : 1950 Hospital: [facility]   Date: 6/3/2025 1:53 PM Admission: 2025     Impression Plan   Acute Hypoxic Respiratory Failure, suspect related to pulmonary edema, lower suspicion for CAP, aspiration pneumonitis, Amiodarone induced pneumonitis/fibrosis. Query underlying MAC given bronchiectasis and RLL fibrosis?   CAD s/p CABG x 3, s/p NSTEMI  Afib  HFrEF  Acute on Chronic Hyponatremia   HTN/HLD Completed course of abx  Oxygen to maintain goal saturation >90%, wean as able.   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   Diurese for negative fluid balance, may be intravascularly depleted, albumin 2.4, may benefit from coadministration of albumin with loop diuretic.    Pulmonary Toilet  Lower suspicion for amiodarione induced fibro proliferative disease but if it is deemed unnecessary by cardiology I would be in favor of discontinuing, agree with holding for now  Will need repeat imaging in 4-6 weeks.     Discussed with RN, family and intensivist. We will continue to follow.     Patient is critically ill and at high risk for further decline, mechanical ventilation, and ICU admission due to AHRF; CC time spent excluding procedures is > 35 minutes      Daily Progression:  6/3: Feels slightly better today. Weaned to 50L 80%. Sitting up in chair. Using IS. Feels like he can't take a deep breath in. Denies any cough or wheeze.     CONSULT NOTE  Consulted by hospitalist for concern for abnormal chest imaging. Presented to the ED on 25 with significantly worsening dyspnea. Satting 72% on RA. Bilateral lower extremity edema. Pt endorsed that he is thirsty all the time and drinking, approximately 4-6 large  IntraVENous PRN    albuterol (PROVENTIL) (2.5 MG/3ML) 0.083% nebulizer solution 2.5 mg  2.5 mg Nebulization Q6H PRN    aluminum & magnesium hydroxide-simethicone (MAALOX PLUS) 200-200-20 MG/5ML suspension 30 mL  30 mL Oral Q6H PRN    senna (SENOKOT) 8.8 MG/5ML syrup 8.8 mg  5 mL Oral BID PRN    acetaminophen (TYLENOL) tablet 650 mg  650 mg Oral Q6H PRN    Or    acetaminophen (TYLENOL) suppository 650 mg  650 mg Rectal Q6H PRN    ondansetron (ZOFRAN) injection 4 mg  4 mg IntraVENous Q6H PRN    aspirin EC tablet 81 mg  81 mg Oral Daily    lidocaine 4 % external patch 1 patch  1 patch TransDERmal Daily       Labs:  ABG Invalid input(s): \"ABG\"     CBC Recent Labs     06/01/25  0353 06/02/25  0115 06/03/25  0554   WBC 17.0* 13.1* 15.6*   HGB 9.8* 9.3* 9.3*   HCT 29.9* 29.1* 28.2*    338 309   MCV 90.6 91.8 89.5   MCH 29.7 29.3 29.5        Metabolic  Panel Recent Labs     06/01/25  0353 06/02/25  0115 06/03/25  0554   *  132* 132* 131*   K 3.9  4.0 4.0 4.1   CL 95*  96* 96* 94*   CO2 29  28 29 32   BUN 33*  32* 29* 23*   MG  --   --  2.3   PHOS 2.9  --   --         Pertinent Labs                Brigitte Clement, APRN - CNP  6/3/2025

## 2025-06-04 ENCOUNTER — APPOINTMENT (OUTPATIENT)
Facility: HOSPITAL | Age: 75
End: 2025-06-04
Payer: MEDICARE

## 2025-06-04 LAB
ANION GAP SERPL CALC-SCNC: 6 MMOL/L (ref 2–12)
ANION GAP SERPL CALC-SCNC: 9 MMOL/L (ref 2–12)
ARTERIAL PATENCY WRIST A: POSITIVE
BASE EXCESS BLD CALC-SCNC: 9 MMOL/L
BDY SITE: ABNORMAL
BUN SERPL-MCNC: 28 MG/DL (ref 6–20)
BUN SERPL-MCNC: 44 MG/DL (ref 6–20)
BUN/CREAT SERPL: 41 (ref 12–20)
BUN/CREAT SERPL: 55 (ref 12–20)
CALCIUM SERPL-MCNC: 8.4 MG/DL (ref 8.5–10.1)
CALCIUM SERPL-MCNC: 8.9 MG/DL (ref 8.5–10.1)
CHLORIDE SERPL-SCNC: 91 MMOL/L (ref 97–108)
CHLORIDE SERPL-SCNC: 93 MMOL/L (ref 97–108)
CO2 SERPL-SCNC: 29 MMOL/L (ref 21–32)
CO2 SERPL-SCNC: 32 MMOL/L (ref 21–32)
CREAT SERPL-MCNC: 0.68 MG/DL (ref 0.7–1.3)
CREAT SERPL-MCNC: 0.8 MG/DL (ref 0.7–1.3)
CRP SERPL-MCNC: 3.24 MG/DL (ref 0–0.3)
ERYTHROCYTE [DISTWIDTH] IN BLOOD BY AUTOMATED COUNT: 13.3 % (ref 11.5–14.5)
GAS FLOW.O2 O2 DELIVERY SYS: ABNORMAL
GLUCOSE BLD STRIP.AUTO-MCNC: 113 MG/DL (ref 65–117)
GLUCOSE BLD STRIP.AUTO-MCNC: 118 MG/DL (ref 65–117)
GLUCOSE BLD STRIP.AUTO-MCNC: 134 MG/DL (ref 65–117)
GLUCOSE BLD STRIP.AUTO-MCNC: 141 MG/DL (ref 65–117)
GLUCOSE SERPL-MCNC: 120 MG/DL (ref 65–100)
GLUCOSE SERPL-MCNC: 152 MG/DL (ref 65–100)
HCO3 BLD-SCNC: 33.4 MMOL/L (ref 21–28)
HCT VFR BLD AUTO: 30.4 % (ref 36.6–50.3)
HGB BLD-MCNC: 10.1 G/DL (ref 12.1–17)
IPAP/PIP/HIGH PEEP: 15
MAGNESIUM SERPL-MCNC: 2.2 MG/DL (ref 1.6–2.4)
MCH RBC QN AUTO: 29.4 PG (ref 26–34)
MCHC RBC AUTO-ENTMCNC: 33.2 G/DL (ref 30–36.5)
MCV RBC AUTO: 88.4 FL (ref 80–99)
NRBC # BLD: 0 K/UL (ref 0–0.01)
NRBC BLD-RTO: 0 PER 100 WBC
O2/TOTAL GAS SETTING VFR VENT: 100 %
PCO2 BLD: 44.1 MMHG (ref 35–48)
PEEP RESPIRATORY: 10 CMH2O
PH BLD: 7.49 (ref 7.35–7.45)
PLATELET # BLD AUTO: 353 K/UL (ref 150–400)
PMV BLD AUTO: 9.5 FL (ref 8.9–12.9)
PO2 BLD: 75 MMHG (ref 83–108)
POTASSIUM SERPL-SCNC: 3.9 MMOL/L (ref 3.5–5.1)
POTASSIUM SERPL-SCNC: 4.2 MMOL/L (ref 3.5–5.1)
PROCALCITONIN SERPL-MCNC: 0.06 NG/ML
RBC # BLD AUTO: 3.44 M/UL (ref 4.1–5.7)
SAO2 % BLD: 95.8 % (ref 92–97)
SERVICE CMNT-IMP: ABNORMAL
SERVICE CMNT-IMP: NORMAL
SODIUM SERPL-SCNC: 129 MMOL/L (ref 136–145)
SODIUM SERPL-SCNC: 131 MMOL/L (ref 136–145)
SPECIMEN TYPE: ABNORMAL
WBC # BLD AUTO: 21.6 K/UL (ref 4.1–11.1)

## 2025-06-04 PROCEDURE — 6370000000 HC RX 637 (ALT 250 FOR IP): Performed by: NURSE PRACTITIONER

## 2025-06-04 PROCEDURE — 2580000003 HC RX 258: Performed by: STUDENT IN AN ORGANIZED HEALTH CARE EDUCATION/TRAINING PROGRAM

## 2025-06-04 PROCEDURE — 6360000002 HC RX W HCPCS: Performed by: INTERNAL MEDICINE

## 2025-06-04 PROCEDURE — 85027 COMPLETE CBC AUTOMATED: CPT

## 2025-06-04 PROCEDURE — 6370000000 HC RX 637 (ALT 250 FOR IP): Performed by: INTERNAL MEDICINE

## 2025-06-04 PROCEDURE — 6360000002 HC RX W HCPCS: Performed by: STUDENT IN AN ORGANIZED HEALTH CARE EDUCATION/TRAINING PROGRAM

## 2025-06-04 PROCEDURE — 86606 ASPERGILLUS ANTIBODY: CPT

## 2025-06-04 PROCEDURE — 84145 PROCALCITONIN (PCT): CPT

## 2025-06-04 PROCEDURE — 2500000003 HC RX 250 WO HCPCS: Performed by: STUDENT IN AN ORGANIZED HEALTH CARE EDUCATION/TRAINING PROGRAM

## 2025-06-04 PROCEDURE — 71045 X-RAY EXAM CHEST 1 VIEW: CPT

## 2025-06-04 PROCEDURE — 86140 C-REACTIVE PROTEIN: CPT

## 2025-06-04 PROCEDURE — 82962 GLUCOSE BLOOD TEST: CPT

## 2025-06-04 PROCEDURE — 2500000003 HC RX 250 WO HCPCS: Performed by: INTERNAL MEDICINE

## 2025-06-04 PROCEDURE — 86671 FUNGUS NES ANTIBODY: CPT

## 2025-06-04 PROCEDURE — 2000000000 HC ICU R&B

## 2025-06-04 PROCEDURE — 97530 THERAPEUTIC ACTIVITIES: CPT

## 2025-06-04 PROCEDURE — 36600 WITHDRAWAL OF ARTERIAL BLOOD: CPT

## 2025-06-04 PROCEDURE — C8929 TTE W OR WO FOL WCON,DOPPLER: HCPCS

## 2025-06-04 PROCEDURE — 86200 CCP ANTIBODY: CPT

## 2025-06-04 PROCEDURE — 94660 CPAP INITIATION&MGMT: CPT

## 2025-06-04 PROCEDURE — 86331 IMMUNODIFFUSION OUCHTERLONY: CPT

## 2025-06-04 PROCEDURE — 86038 ANTINUCLEAR ANTIBODIES: CPT

## 2025-06-04 PROCEDURE — 6370000000 HC RX 637 (ALT 250 FOR IP): Performed by: STUDENT IN AN ORGANIZED HEALTH CARE EDUCATION/TRAINING PROGRAM

## 2025-06-04 PROCEDURE — 83516 IMMUNOASSAY NONANTIBODY: CPT

## 2025-06-04 PROCEDURE — 86037 ANCA TITER EACH ANTIBODY: CPT

## 2025-06-04 PROCEDURE — 86602 ANTINOMYCES ANTIBODY: CPT

## 2025-06-04 PROCEDURE — 80048 BASIC METABOLIC PNL TOTAL CA: CPT

## 2025-06-04 PROCEDURE — 83735 ASSAY OF MAGNESIUM: CPT

## 2025-06-04 PROCEDURE — 6370000000 HC RX 637 (ALT 250 FOR IP)

## 2025-06-04 PROCEDURE — 94640 AIRWAY INHALATION TREATMENT: CPT

## 2025-06-04 PROCEDURE — 82803 BLOOD GASES ANY COMBINATION: CPT

## 2025-06-04 PROCEDURE — 2700000000 HC OXYGEN THERAPY PER DAY

## 2025-06-04 RX ORDER — ALBUMIN (HUMAN) 12.5 G/50ML
25 SOLUTION INTRAVENOUS ONCE
Status: COMPLETED | OUTPATIENT
Start: 2025-06-04 | End: 2025-06-05

## 2025-06-04 RX ORDER — DIGOXIN 0.25 MG/ML
250 INJECTION INTRAMUSCULAR; INTRAVENOUS ONCE
Status: COMPLETED | OUTPATIENT
Start: 2025-06-04 | End: 2025-06-04

## 2025-06-04 RX ORDER — CALCIUM GLUCONATE 20 MG/ML
1000 INJECTION, SOLUTION INTRAVENOUS ONCE
Status: COMPLETED | OUTPATIENT
Start: 2025-06-04 | End: 2025-06-04

## 2025-06-04 RX ORDER — BUMETANIDE 0.25 MG/ML
2 INJECTION, SOLUTION INTRAMUSCULAR; INTRAVENOUS ONCE
Status: COMPLETED | OUTPATIENT
Start: 2025-06-04 | End: 2025-06-04

## 2025-06-04 RX ORDER — 3% SODIUM CHLORIDE 3 G/100ML
50 INJECTION, SOLUTION INTRAVENOUS ONCE
Status: DISCONTINUED | OUTPATIENT
Start: 2025-06-04 | End: 2025-06-04

## 2025-06-04 RX ORDER — NOREPINEPHRINE BITARTRATE 0.06 MG/ML
1-100 INJECTION, SOLUTION INTRAVENOUS CONTINUOUS
Status: DISCONTINUED | OUTPATIENT
Start: 2025-06-04 | End: 2025-06-09

## 2025-06-04 RX ORDER — 3% SODIUM CHLORIDE 3 G/100ML
50 INJECTION, SOLUTION INTRAVENOUS ONCE
Status: COMPLETED | OUTPATIENT
Start: 2025-06-04 | End: 2025-06-04

## 2025-06-04 RX ORDER — TRAZODONE HYDROCHLORIDE 50 MG/1
50 TABLET ORAL NIGHTLY PRN
Status: DISCONTINUED | OUTPATIENT
Start: 2025-06-04 | End: 2025-06-09

## 2025-06-04 RX ORDER — METOPROLOL TARTRATE 25 MG/1
12.5 TABLET, FILM COATED ORAL EVERY 6 HOURS
Status: COMPLETED | OUTPATIENT
Start: 2025-06-04 | End: 2025-06-06

## 2025-06-04 RX ORDER — BUMETANIDE 0.25 MG/ML
2 INJECTION, SOLUTION INTRAMUSCULAR; INTRAVENOUS 2 TIMES DAILY
Status: DISCONTINUED | OUTPATIENT
Start: 2025-06-04 | End: 2025-06-06

## 2025-06-04 RX ADMIN — Medication 6 MG: at 00:29

## 2025-06-04 RX ADMIN — METOPROLOL TARTRATE 12.5 MG: 25 TABLET, FILM COATED ORAL at 08:51

## 2025-06-04 RX ADMIN — METHOCARBAMOL 500 MG: 500 TABLET ORAL at 13:18

## 2025-06-04 RX ADMIN — METOPROLOL TARTRATE 12.5 MG: 25 TABLET, FILM COATED ORAL at 21:05

## 2025-06-04 RX ADMIN — SODIUM CHLORIDE, PRESERVATIVE FREE 40 ML: 5 INJECTION INTRAVENOUS at 08:54

## 2025-06-04 RX ADMIN — ACETAMINOPHEN 650 MG: 325 TABLET ORAL at 08:51

## 2025-06-04 RX ADMIN — BUMETANIDE 2 MG: 0.25 INJECTION INTRAMUSCULAR; INTRAVENOUS at 17:48

## 2025-06-04 RX ADMIN — SODIUM CHLORIDE, PRESERVATIVE FREE 10 ML: 5 INJECTION INTRAVENOUS at 20:21

## 2025-06-04 RX ADMIN — APIXABAN 5 MG: 5 TABLET, FILM COATED ORAL at 21:05

## 2025-06-04 RX ADMIN — CEFEPIME 2000 MG: 2 INJECTION, POWDER, FOR SOLUTION INTRAVENOUS at 17:46

## 2025-06-04 RX ADMIN — BUMETANIDE 2 MG: 0.25 INJECTION INTRAMUSCULAR; INTRAVENOUS at 12:21

## 2025-06-04 RX ADMIN — ACETAMINOPHEN 650 MG: 325 TABLET ORAL at 13:24

## 2025-06-04 RX ADMIN — SODIUM CHLORIDE 50 ML: 3 INJECTION, SOLUTION INTRAVENOUS at 11:55

## 2025-06-04 RX ADMIN — BUMETANIDE 1 MG: 0.25 INJECTION INTRAMUSCULAR; INTRAVENOUS at 08:53

## 2025-06-04 RX ADMIN — METHOCARBAMOL 500 MG: 500 TABLET ORAL at 16:42

## 2025-06-04 RX ADMIN — METHYLPREDNISOLONE SODIUM SUCCINATE 40 MG: 40 INJECTION, POWDER, LYOPHILIZED, FOR SOLUTION INTRAMUSCULAR; INTRAVENOUS at 05:16

## 2025-06-04 RX ADMIN — TRAZODONE HYDROCHLORIDE 50 MG: 50 TABLET ORAL at 02:16

## 2025-06-04 RX ADMIN — Medication 6 MG: at 22:10

## 2025-06-04 RX ADMIN — Medication 15 G: at 10:56

## 2025-06-04 RX ADMIN — METHOCARBAMOL 500 MG: 500 TABLET ORAL at 08:51

## 2025-06-04 RX ADMIN — WATER 2000 MG: 1 INJECTION INTRAMUSCULAR; INTRAVENOUS; SUBCUTANEOUS at 10:55

## 2025-06-04 RX ADMIN — METOPROLOL TARTRATE 12.5 MG: 25 TABLET, FILM COATED ORAL at 15:25

## 2025-06-04 RX ADMIN — IPRATROPIUM BROMIDE AND ALBUTEROL SULFATE 1 DOSE: 2.5; .5 SOLUTION RESPIRATORY (INHALATION) at 11:26

## 2025-06-04 RX ADMIN — DIGOXIN 250 MCG: 0.25 INJECTION INTRAMUSCULAR; INTRAVENOUS at 12:18

## 2025-06-04 RX ADMIN — METHYLPREDNISOLONE SODIUM SUCCINATE 40 MG: 40 INJECTION, POWDER, LYOPHILIZED, FOR SOLUTION INTRAMUSCULAR; INTRAVENOUS at 17:46

## 2025-06-04 RX ADMIN — METHOCARBAMOL 500 MG: 500 TABLET ORAL at 21:05

## 2025-06-04 RX ADMIN — IPRATROPIUM BROMIDE AND ALBUTEROL SULFATE 1 DOSE: 2.5; .5 SOLUTION RESPIRATORY (INHALATION) at 20:28

## 2025-06-04 RX ADMIN — ASPIRIN 81 MG: 81 TABLET, COATED ORAL at 08:51

## 2025-06-04 RX ADMIN — ATORVASTATIN CALCIUM 40 MG: 40 TABLET, FILM COATED ORAL at 21:05

## 2025-06-04 RX ADMIN — CALCIUM GLUCONATE 1000 MG: 20 INJECTION, SOLUTION INTRAVENOUS at 10:54

## 2025-06-04 RX ADMIN — IPRATROPIUM BROMIDE AND ALBUTEROL SULFATE 1 DOSE: 2.5; .5 SOLUTION RESPIRATORY (INHALATION) at 15:28

## 2025-06-04 RX ADMIN — APIXABAN 5 MG: 5 TABLET, FILM COATED ORAL at 08:51

## 2025-06-04 RX ADMIN — IPRATROPIUM BROMIDE AND ALBUTEROL SULFATE 1 DOSE: 2.5; .5 SOLUTION RESPIRATORY (INHALATION) at 07:24

## 2025-06-04 ASSESSMENT — PAIN DESCRIPTION - DESCRIPTORS
DESCRIPTORS: ACHING
DESCRIPTORS: ACHING

## 2025-06-04 ASSESSMENT — PAIN DESCRIPTION - LOCATION
LOCATION: BACK

## 2025-06-04 ASSESSMENT — PAIN SCALES - GENERAL
PAINLEVEL_OUTOF10: 3
PAINLEVEL_OUTOF10: 2
PAINLEVEL_OUTOF10: 2

## 2025-06-04 NOTE — PROGRESS NOTES
COLONOSCOPY POLYPECTOMY SNARE/COLD BIOPSY performed by Finesse Cruz MD at [facility] ENDOSCOPY    CORONARY ARTERY BYPASS GRAFT N/A 5/14/2025    CORONARY ARTERY BYPASS GRAFTING X 3 WITH LIMA, BESVGH, ECC, NEHEMIAH AND EPIAORTIC U/S BY DR KERR performed by Shola Ragsdale MD at [facility] OPEN HEART    ELBOW SURGERY Left     INVASIVE VASCULAR N/A 5/12/2025    Ultrasound guided vascular access performed by Braydon Sorto MD at [facility] CARDIAC CATH LAB    [place] UNLISTED PROCEDURE CARDIAC SURGERY        Prior to Admission medications    Medication Sig Start Date End Date Taking? Authorizing Provider   acetaminophen (TYLENOL) 500 MG tablet Take 2 tablets by mouth every 6 hours as needed for Pain 5/20/25 6/4/25  Susu Brandon PA-C   amiodarone (CORDARONE) 200 MG tablet Take 2 tablets by mouth 2 times daily for 15 days, THEN 1 tablet 2 times daily for 10 days. 5/20/25 6/14/25  Susu Brandon PA-C   apixaban (ELIQUIS) 5 MG TABS tablet Take 1 tablet by mouth 2 times daily 5/20/25   Susu Brandon PA-C   atorvastatin (LIPITOR) 40 MG tablet Take 1 tablet by mouth nightly 5/20/25   Susu Brandon PA-C   metoprolol tartrate (LOPRESSOR) 25 MG tablet Take 0.5 tablets by mouth 2 times daily 5/20/25   Susu Brandon PA-C   lidocaine 4 % external patch Apply 2 patches topically daily 5/21/25   Susu Brandon PA-C   furosemide (LASIX) 40 MG tablet Take 1 tablet by mouth daily for 7 days 5/20/25 5/27/25  [employer] Brandon PA-C   polyethylene glycol (GLYCOLAX) 17 g packet Take 1 packet by mouth daily for 15 days 5/21/25 6/5/25  Susu Brandon PA-C   sennosides-docusate sodium (SENOKOT-S) 8.6-50 MG tablet Take 1 tablet by mouth 2 times daily for 15 days 5/20/25 6/4/25  Susu Brandon PA-C   magnesium oxide (MAG-OX) 400 (240 Mg) MG tablet Take 1 tablet by mouth 2 times daily for 20 days 5/20/25 6/9/25  Susu Brandon PA-C   methocarbamol (ROBAXIN) 750 MG tablet Take 1 tablet by mouth 4 times daily 5/20/25 6/19/25  Susu Brandon PA-C   potassium  chloride (KLOR-CON M) 20 MEQ extended release tablet Take 1 tablet by mouth daily for 7 days 25  Susu Brandon PA-C   alendronate (FOSAMAX) 70 MG tablet Take 1 tablet by mouth every 7 days    Provider, MD Kelsey   Triamcinolone Acetonide (NASACORT ALLERGY 24HR NA) 1 spray by Nasal route as needed    Provider, Kelsey, MD   aspirin 81 MG EC tablet Take by mouth daily    Automatic Reconciliation, Ar   azelastine (ASTELIN) 0.1 % nasal spray ceived the following from [facility]: Outside name: azelastine (ASTELIN) 137 mcg (0.1 %) nasal spray 10/29/17   Automatic Reconciliation, Ar   loratadine (CLARITIN) 10 MG tablet Take 1 tablet by mouth    Automatic Reconciliation, Ar     Allergies   Allergen Reactions    Chocolate Headaches     Migraines and aura      Social History     Tobacco Use    Smoking status: Former     Current packs/day: 1.00     Average packs/day: 1 pack/day for 26.1 years (26.1 ttl pk-yrs)     Types: Cigarettes     Start date: 1999     Quit date: 1970     Passive exposure: Never    Smokeless tobacco: Never   Substance Use Topics    Alcohol use: No     Alcohol/week: 0.0 standard drinks of alcohol      Family History   Problem Relation Age of Onset    No Known Problems Father     No Known Problems Mother      OBJECTIVE:     Vital Signs:     /63   Pulse 79   Temp 98 °F (36.7 °C) (Oral)   Resp 19   Ht 1.626 m (5' 4.02\")   Wt 55.9 kg (123 lb 3.8 oz)   SpO2 92%   BMI 21.14 kg/m²    Temp (24hrs), Av.1 °F (36.7 °C), Min:97.7 °F (36.5 °C), Max:98.3 °F (36.8 °C)     Intake/Output:     Last shift: 701 - 1900  In: 97   Out: 400 [Urine:400]    Last 3 shifts:  190 -  0700  In: 1700 [P.O.:1700]  Out: 2875 [Urine:2875]        Intake/Output Summary (Last 24 hours) at 2025 1654  Last data filed at 2025 1230  Gross per 24 hour   Intake 1297.04 ml   Output 1300 ml   Net -2.96 ml       Physical Exam:

## 2025-06-04 NOTE — PLAN OF CARE
Intensivist present to adjust O2 during session, patient noted to desaturate to low 80s at EOB and intensivist increased hi flow to 90% and then placed NRB over hi flow set to 15 L. Recovered to upper 80s for transfer to recliner and then desaturated to mid 70s. Nursing present to transition patient to BiPAP and required >10 minutes to recover oxygen back to low 90s. Further mobility held at this time due to oxygen needs and unable to complete seated grooming tasks this date due to need for BiPAP to recover O2. Patient would benefit from skilled OT services during admission to improve independence with self care and functional mobility/transfers. Recommend discharge to post-acute rehab at this time.         PLAN :  Patient continues to benefit from skilled intervention to address the above impairments.  Continue treatment per established plan of care to address goals.    Recommend with staff: up to chair for meals as tolerated, assist x2 for transfers    Recommend next OT session: continue POC, seated grooming as tolerated    Recommendation for discharge: (in order for the patient to meet his/her long term goals):   High intensity/comprehensive skilled occupational therapy in a multidisciplinary setting as patient is working towards tolerating up to 3 hours of therapy/day 5-7x/week    Other factors to consider for discharge: high risk for falls, concern for safely navigating or managing the home environment, and increased O2 needs    IF patient discharges home will need the following DME: continuing to assess with progress     SUBJECTIVE:   Patient stated “It made me nauseous.” re: BiPAP, only tolerated ~2 hours over night per family in room.     OBJECTIVE DATA SUMMARY:     Cognitive/Behavioral Status:  Orientation  Orientation Level: Oriented to person;Oriented to place  Cognition  Overall Cognitive Status: Exceptions  Arousal/Alertness: Appears intact  Following Commands: Follows one step commands with increased  Verbal;Demonstration  Barriers to Learning: None  Education Outcome: Verbalized understanding;Demonstrated understanding;Continued education needed    Thank you for this referral.  Yuridia Lion OTR/L  Minutes: 24

## 2025-06-04 NOTE — PROGRESS NOTES
[facility]     ICU TEAM Progress Note           Name: Hector Jensen Jr   : 1950   MRN: [id number]   Date: 2025          ICU PROBLEM LIST   -Hyponatremia  - Acute hypoxic respiratory failure  -CAD status post CABG     HISTORY OF PRESENT ILLNESS:     Hector Jensen Jr is a 74 y.o. male with a history of hypertension, hyperlipidemia, CAD status post CABG on 2025 who presented to the ED for shortness of breath. Patient reported having some shortness of breath that started yesterday, but significantly worsened today. He was found to be hypoxic in the ED 72% on room air sats. He has some resting shortness of breath but worsens with exertion.  He also has some mild lower bilateral extremity swelling. Patient states that he feels thirsty all the time and has been drinking a lot of water. States he drinks about four large bottles. Has been using his breathing device (?ICS) as instructed at home, and recently when home health visited they recommended him starting or drinking electrolytes or gatorade. Denies chest pain, cough, fever, chills, abdominal or back pain.     In the ED labs were drawn and patient was sent to CT for CTA of his chest with and without contrast.  CT of the chest showed diffuse groundglass consolidation involving the lung parenchyma as well as small to moderate bilateral pleural effusions most likely secondary to pulmonary edema less likely a multi focal pneumonia.  The labs came back and was significant for a sodium level of 112, chloride 80.  BUN was 11 creatinine was 0.79.  Glucose 149 calcium 8.2. serum osmolality was 243.  Nephrology was consulted recommended starting patient on urea oral supplements and fluid restriction. Critical care was called for admission.     SUBJECTIVE:     - No acute events, this a.m. on nasal cannula, noted to have increasing hypoxia requiring significant increase in oxygen requirement.  -  Increased oxygen req.   case and the plan and management of the patient's care with the consulting services, the bedside nurses and the respiratory therapist.      NOTE OF PERSONAL INVOLVEMENT IN CARE   This patient has a high probability of imminent, clinically significant deterioration, which requires the highest level of preparedness to intervene urgently. I participated in the decision-making and personally managed or directed the management of the following life and organ supporting interventions that required my frequent assessment to treat or prevent imminent deterioration.    I personally spent 60 minutes of critical care time.  This is time spent at this critically ill patient's bedside actively involved in patient care as well as the coordination of care.  This does not include any procedural time which has been billed separately.      DO Beba [facility]  06/04/25

## 2025-06-04 NOTE — CARE COORDINATION
Transition of Care Plan:    RUR: 23 %   Prior Level of Functioning:Independent   Disposition: IPR  CASSANDRA:   If SNF or IPR: Date FOC offered: 6/3  Date FOC received: 6/4   Accepting facility:   Date authorization started with reference number: NA  Date authorization received and expires: NA  Follow up appointments: PCP, Nephrology , pulmonology   DME needed: TBD  Transportation at discharge: Family vs BLS  IM/IMM Medicare/ letter given: 5/28  Is patient a  and connected with VA? No  Caregiver Contact: Spouse  Suzanna Jordan Mikey   Discharge Caregiver contacted prior to discharge? Yes  Care Conference needed? No  Barriers to discharge:    Medical stability   Hi Flow   Per notes patient desats with minimal exertion  Continue diuresis  Met with family and they have chosen [facility], referral made through [facility].   Patricia Allan RN,Care Management

## 2025-06-04 NOTE — PROCEDURES
[address]  [address]                         PULMONARY FUNCTION TEST      PATIENT NAME: RIGO KERR JR            : 1950  MED REC NO: [id number]                       ROOM: 7112  ACCOUNT NO: [id number]                       ADMIT DATE: 2025  PROVIDER: Shelton Wheat MD    DATE OF SERVICE:  2025    INDICATION:  None given.    Technician noted ATS criteria for interpretation was not met and interpretation may not be valid.    FINDINGS:  Normal FVC, normal FEV1, normal FEV1/FVC ratio.    IMPRESSION:  Normal spirometry.        SHELTON WHEAT MD      SJ/AQS  D:  2025 12:40:02  T:  2025 13:46:50  JOB #:  [id number]

## 2025-06-04 NOTE — PROGRESS NOTES
Name: Hector Jensen Jr MRN: [id number]   : 1950 Hospital: [facility]   Date: 2025        IMPRESSION:   Hypervolemic hyponatremia of advanced CHF. Improving with diuresis-Na down to 129 today  Normal renal function  CHF- 45-50%-on iv bumex, in negative fluid balance. CT finding consistent with pulmonary congestion-on Hi-blanca O2  metabolic alkalosis due to diuresis        PLAN:     C/w IV diuresis  Hypertonic saline with bumex ordered by intensivist. At risk for rapid increase in serum sodium-discussed with Dr Escamilla intensivist  Adjusted fluid restriction to 1.2 L/day.   Ure-Na 15 gr x1  stopped salt tablets, will cause more fluid overload  Off  trazodone   Increase protein intake, prot supplements bid     Subjective/Interval History:   I have reviewed the flowsheet and previous day’s notes.    ROS:Pertinent items are noted in HPI.    25 Doing better. No new issues.      2025    No major complaints open this morning.  He worked with therapy team yesterday.      2025.    The patient was sleeping.  Events reviewed with Giovanna [daughter-in-law] at the bedside.    -feel ok, on hiflo O2, no edema, has dry crackles-Na 132 this am-in neg fluid balance  6/3-sitting in chair, still on Hi-blanca O2-low appetrite. Moved bowel  -no change , remains on high flow O2, no significant diuresis with extra dose of bumex      Objective:   Vital Signs:    BP (!) 144/81   Pulse 99   Temp 98.3 °F (36.8 °C) (Oral)   Resp (!) 39   Ht 1.626 m (5' 4\")   Wt 55.9 kg (123 lb 3.8 oz)   SpO2 91%   BMI 21.15 kg/m²             Temp (24hrs), Av.2 °F (36.8 °C), Min:97.7 °F (36.5 °C), Max:98.6 °F (37 °C)       Intake/Output:   Last shift:      No intake/output data recorded.  Last 3 shifts:  1901 -  0700  In: 1700 [P.O.:1700]  Out: 2875 [Urine:2875]    Intake/Output Summary (Last 24 hours) at 2025 1018  Last data filed at 2025 0500  Gross per 24 hour   Intake 1200 ml   Output 1700

## 2025-06-04 NOTE — PROGRESS NOTES
Comprehensive Nutrition Assessment    Type and Reason for Visit: TOVA    Nutrition Recommendations/Plan:   Resume diet and ONS once off BiPAP  Modify ONS: High Calorie/High Protein BID and Frozen supplement once daily (to limit fluid)  Consider DHT (post-pyloric) and EN support if unable to resume diet       Malnutrition Assessment:  Malnutrition Status:  At risk for malnutrition (06/04/25 9932)    Context:  Acute Illness     Findings of the 6 clinical characteristics of malnutrition:  Energy Intake:  50% or less of estimated energy requirements for 5 or more days  Weight Loss:  No weight loss     Body Fat Loss:  Unable to assess     Muscle Mass Loss:  Unable to assess    Fluid Accumulation:  Mild (not nutrition related; hx HFrEF) Extremities   Strength:  Not Performed       Nutrition Assessment:    Pt admitted with Hyponatremia. PMHx: HTN, HLD, CAD-s/p CABG 5/14/2025, CVA. Acute on chronic hyponatremia on admission d/t low sodium diet with high free water intake. Sodium worse 5/31-FR and ur-NA ordered. Hypoxic respiratory failure d/t pulmonary edema; worse today-does not tolerate time off of BiPAP. Discussed during IDR and with nursing.    MST negative on admission indicating no nutritional risk PTA. Inadequate PO intake for several days-averaging less than 50%. Today pt has been unable to consume anything. May need to consider starting EN support if respiratory failure does not improve. Does appear pt accepted Ensure supplements well when able to consume (see intake below).    Hyponatremia overall much improved from admission; Nephrology following. Noted negative fluid balance of 11 liters since admission 2/2 diuresis.       Nutritionally Significant Medications:  Lipitor, Bumex x 1, Calcium gluconate, SSI, Solumedrol, Glycolax, Senokot-S, ure-Na x 1      Estimated Daily Nutrient Needs:  Energy Requirements Based On: Kcal/kg  Weight Used for Energy Requirements: Current  Energy (kcal/day): 1700 (30  26-50%.    Nutrition Interventions:   Food and/or Nutrient Delivery: Continue NPO-resume diet once off BiPAP  Nutrition Education/Counseling: No recommendation at this time  Coordination of Nutrition Care: Continue to monitor while inpatient, Interdisciplinary Rounds       Goals:     Goals: PO intake 50% or greater, by next RD assessment       Nutrition Monitoring and Evaluation:   Behavioral-Environmental Outcomes: None Identified  Food/Nutrient Intake Outcomes: Food and Nutrient Intake, Supplement Intake  Physical Signs/Symptoms Outcomes: Biochemical Data, Meal Time Behavior, Weight, Fluid Status or Edema    Discharge Planning:    Too soon to determine     Sowmya Nash RD CNSC  Available via [employer]

## 2025-06-04 NOTE — PLAN OF CARE
Problem: Physical Therapy - Adult  Goal: By Discharge: Performs mobility at highest level of function for planned discharge setting.  See evaluation for individualized goals.  Description: FUNCTIONAL STATUS PRIOR TO ADMISSION: Patient was independent and active without use of DME.    HOME SUPPORT PRIOR TO ADMISSION: The patient lived with family but did not require assistance.    Physical Therapy Goals  Initiated 5/30/2025  1.  Patient will move from supine to sit and sit to supine, scoot up and down, and roll side to side in bed with contact guard assist within 7 day(s).    2.  Patient will perform sit to stand with contact guard assist within 7 day(s).  3.  Patient will transfer from bed to chair and chair to bed with contact guard assist using the least restrictive device within 7 day(s).  4.  Patient will ambulate with contact guard assist for 150 feet with the least restrictive device within 7 day(s).   5.  Patient will ascend/descend 4 stairs with minimal assistance within 7 day(s).  6.  Patient will verbally and functionally demonstrate 3/3 sternal precautions without cues within 7 days.   Outcome: Not Progressing       PHYSICAL THERAPY TREATMENT    Patient: Hector Jensen Jr (74 y.o. male)  Date: 6/4/2025  Diagnosis: Hyponatremia [E87.1]  Acute pulmonary edema (HCC) [J81.0]  Acute respiratory failure with hypoxia (HCC) [J96.01]  Dyspnea, unspecified type [R06.00] Hyponatremia      Precautions: Restrictions/Precautions  Restrictions/Precautions: Fall Risk            ASSESSMENT:  Patient continues to benefit from skilled PT services and is not progressing towards goals. Patient mobility limited by respiratory status. Patient received in bed with O2 sats >90%. Patient desaturated once edge of bed to low 80s to high 70s. Intensivist present - increased hi flow from 60% to 90% and then placed NRB over hi flow set to 15 L. Recovered to upper 80s for transfer to recliner and then desaturated to mid 70s. Nursing  Partial/Moderate assistance  Bed to Chair: Partial/Moderate assistance  Balance:  Balance  Sitting: Impaired  Sitting - Static: Good (unsupported)  Sitting - Dynamic: Fair (occasional)  Standing: Impaired  Standing - Static: Fair;Constant support  Standing - Dynamic: Fair;Constant support                 Pain Rating:  No complaints of pain   Pain Intervention(s):       Activity Tolerance:   Poor    After treatment:   Patient left in no apparent distress sitting up in chair, Call bell within reach, Caregiver / family present, and nursing present      COMMUNICATION/EDUCATION:   The patient's plan of care was discussed with: registered nurse and physician           Vivek Khan, PT  Minutes: 23

## 2025-06-04 NOTE — PROGRESS NOTES
Physician Progress Note      PATIENT:               RIGO KERR JR  CSN #:                  [id number]  :                       1950  ADMIT DATE:       2025 2:46 PM  DISCH DATE:  RESPONDING  PROVIDER #:        Twan Escamilla DO          QUERY TEXT:    Congestive Heart Failure is documented in the medical record  LONNIE Clement.  Please document the acuity:    The clinical indicators include:  adm with resp failure, pulm edema, CAP, hx CAD s/p CABG x 3, s/p NSTEMI, afib   NP Racquel-HFrEF, Echocardiogram EF of 45%  Regional wall motion abnormalities.  Hypokinesis of mid inferior, and apical inferior.  pbnp 5450  tx:iv bumex, echo  Options provided:  -- Acute on Chronic Systolic CHF/HFrEF  -- Acute Systolic CHF/HFrEF  -- Chronic Systolic CHF/HFrEF  -- Other - I will add my own diagnosis  -- Disagree - Not applicable / Not valid  -- Disagree - Clinically unable to determine / Unknown  -- Refer to Clinical Documentation Reviewer    PROVIDER RESPONSE TEXT:    This patient is in acute on chronic systolic CHF/HFrEF.    Query created by: Susu Davila on 2025 4:59 PM      Electronically signed by:  Twan Escamilla DO 2025 4:08 PM

## 2025-06-05 ENCOUNTER — APPOINTMENT (OUTPATIENT)
Facility: HOSPITAL | Age: 75
End: 2025-06-05
Payer: MEDICARE

## 2025-06-05 LAB
ANA SER QL: NEGATIVE
ANION GAP SERPL CALC-SCNC: 6 MMOL/L (ref 2–12)
BACTERIA SPEC CULT: NORMAL
BUN SERPL-MCNC: 37 MG/DL (ref 6–20)
BUN/CREAT SERPL: 47 (ref 12–20)
C-ANCA TITR SER IF: NORMAL TITER
CALCIUM SERPL-MCNC: 8.6 MG/DL (ref 8.5–10.1)
CCP IGA+IGG SERPL IA-ACNC: 2 UNITS (ref 0–19)
CHLORIDE SERPL-SCNC: 91 MMOL/L (ref 97–108)
CO2 SERPL-SCNC: 34 MMOL/L (ref 21–32)
CREAT SERPL-MCNC: 0.79 MG/DL (ref 0.7–1.3)
ECHO AO ROOT DIAM: 2.8 CM
ECHO AO ROOT INDEX: 1.76 CM/M2
ECHO AV AREA PEAK VELOCITY: 2.8 CM2
ECHO AV AREA VTI: 3.1 CM2
ECHO AV AREA/BSA PEAK VELOCITY: 1.8 CM2/M2
ECHO AV AREA/BSA VTI: 1.9 CM2/M2
ECHO AV MEAN GRADIENT: 9 MMHG
ECHO AV MEAN VELOCITY: 1.4 M/S
ECHO AV PEAK GRADIENT: 18 MMHG
ECHO AV PEAK VELOCITY: 2.1 M/S
ECHO AV VELOCITY RATIO: 0.76
ECHO AV VTI: 33.9 CM
ECHO BSA: 1.59 M2
ECHO LA DIAMETER INDEX: 2.64 CM/M2
ECHO LA DIAMETER: 4.2 CM
ECHO LA TO AORTIC ROOT RATIO: 1.5
ECHO LV E' LATERAL VELOCITY: 7.47 CM/S
ECHO LV E' SEPTAL VELOCITY: 5.05 CM/S
ECHO LV EF PHYSICIAN: 45 %
ECHO LV FRACTIONAL SHORTENING: 40 % (ref 28–44)
ECHO LV INTERNAL DIMENSION DIASTOLE INDEX: 2.52 CM/M2
ECHO LV INTERNAL DIMENSION DIASTOLIC: 4 CM (ref 4.2–5.9)
ECHO LV INTERNAL DIMENSION SYSTOLIC INDEX: 1.51 CM/M2
ECHO LV INTERNAL DIMENSION SYSTOLIC: 2.4 CM
ECHO LV IVSD: 1 CM (ref 0.6–1)
ECHO LV MASS 2D: 145.6 G (ref 88–224)
ECHO LV MASS INDEX 2D: 91.6 G/M2 (ref 49–115)
ECHO LV POSTERIOR WALL DIASTOLIC: 1.2 CM (ref 0.6–1)
ECHO LV RELATIVE WALL THICKNESS RATIO: 0.6
ECHO LVOT AREA: 3.8 CM2
ECHO LVOT AV VTI INDEX: 0.83
ECHO LVOT DIAM: 2.2 CM
ECHO LVOT MEAN GRADIENT: 6 MMHG
ECHO LVOT PEAK GRADIENT: 10 MMHG
ECHO LVOT PEAK VELOCITY: 1.6 M/S
ECHO LVOT STROKE VOLUME INDEX: 67.4 ML/M2
ECHO LVOT SV: 107.1 ML
ECHO LVOT VTI: 28.2 CM
ECHO MV A VELOCITY: 0.58 M/S
ECHO MV AREA PHT: 3 CM2
ECHO MV E DECELERATION TIME (DT): 252.9 MS
ECHO MV E VELOCITY: 0.39 M/S
ECHO MV E/A RATIO: 0.67
ECHO MV E/E' LATERAL: 5.22
ECHO MV E/E' RATIO (AVERAGED): 6.47
ECHO MV E/E' SEPTAL: 7.72
ECHO MV PRESSURE HALF TIME (PHT): 73.3 MS
ECHO PV MAX VELOCITY: 1.5 M/S
ECHO PV PEAK GRADIENT: 9 MMHG
ECHO RV TAPSE: 1.3 CM (ref 1.7–?)
ERYTHROCYTE [DISTWIDTH] IN BLOOD BY AUTOMATED COUNT: 13.3 % (ref 11.5–14.5)
GLUCOSE BLD STRIP.AUTO-MCNC: 113 MG/DL (ref 65–117)
GLUCOSE BLD STRIP.AUTO-MCNC: 133 MG/DL (ref 65–117)
GLUCOSE BLD STRIP.AUTO-MCNC: 148 MG/DL (ref 65–117)
GLUCOSE BLD STRIP.AUTO-MCNC: 165 MG/DL (ref 65–117)
GLUCOSE SERPL-MCNC: 111 MG/DL (ref 65–100)
GRAM STN SPEC: NORMAL
HCT VFR BLD AUTO: 30 % (ref 36.6–50.3)
HGB BLD-MCNC: 9.3 G/DL (ref 12.1–17)
MAGNESIUM SERPL-MCNC: 2.5 MG/DL (ref 1.6–2.4)
MCH RBC QN AUTO: 29.1 PG (ref 26–34)
MCHC RBC AUTO-ENTMCNC: 31 G/DL (ref 30–36.5)
MCV RBC AUTO: 93.8 FL (ref 80–99)
MYELOPEROXIDASE AB SER IA-ACNC: <0.2 UNITS (ref 0–0.9)
NRBC # BLD: 0 K/UL (ref 0–0.01)
NRBC BLD-RTO: 0 PER 100 WBC
P-ANCA ATYPICAL TITR SER IF: NORMAL TITER
P-ANCA TITR SER IF: NORMAL TITER
PLATELET # BLD AUTO: 294 K/UL (ref 150–400)
PMV BLD AUTO: 9.7 FL (ref 8.9–12.9)
POTASSIUM SERPL-SCNC: 4.2 MMOL/L (ref 3.5–5.1)
PROCALCITONIN SERPL-MCNC: 0.08 NG/ML
PROTEINASE3 AB SER IA-ACNC: <0.2 UNITS (ref 0–0.9)
RBC # BLD AUTO: 3.2 M/UL (ref 4.1–5.7)
SERVICE CMNT-IMP: ABNORMAL
SERVICE CMNT-IMP: NORMAL
SODIUM SERPL-SCNC: 131 MMOL/L (ref 136–145)
WBC # BLD AUTO: 16.5 K/UL (ref 4.1–11.1)

## 2025-06-05 PROCEDURE — 2580000003 HC RX 258: Performed by: STUDENT IN AN ORGANIZED HEALTH CARE EDUCATION/TRAINING PROGRAM

## 2025-06-05 PROCEDURE — 2000000000 HC ICU R&B

## 2025-06-05 PROCEDURE — 6360000002 HC RX W HCPCS: Performed by: NURSE PRACTITIONER

## 2025-06-05 PROCEDURE — 6370000000 HC RX 637 (ALT 250 FOR IP)

## 2025-06-05 PROCEDURE — 6370000000 HC RX 637 (ALT 250 FOR IP): Performed by: NURSE PRACTITIONER

## 2025-06-05 PROCEDURE — 94640 AIRWAY INHALATION TREATMENT: CPT

## 2025-06-05 PROCEDURE — 6360000002 HC RX W HCPCS: Performed by: STUDENT IN AN ORGANIZED HEALTH CARE EDUCATION/TRAINING PROGRAM

## 2025-06-05 PROCEDURE — 2500000003 HC RX 250 WO HCPCS: Performed by: INTERNAL MEDICINE

## 2025-06-05 PROCEDURE — 71045 X-RAY EXAM CHEST 1 VIEW: CPT

## 2025-06-05 PROCEDURE — 82962 GLUCOSE BLOOD TEST: CPT

## 2025-06-05 PROCEDURE — 83735 ASSAY OF MAGNESIUM: CPT

## 2025-06-05 PROCEDURE — 94760 N-INVAS EAR/PLS OXIMETRY 1: CPT

## 2025-06-05 PROCEDURE — 85027 COMPLETE CBC AUTOMATED: CPT

## 2025-06-05 PROCEDURE — 6370000000 HC RX 637 (ALT 250 FOR IP): Performed by: INTERNAL MEDICINE

## 2025-06-05 PROCEDURE — 80048 BASIC METABOLIC PNL TOTAL CA: CPT

## 2025-06-05 PROCEDURE — 2500000003 HC RX 250 WO HCPCS: Performed by: STUDENT IN AN ORGANIZED HEALTH CARE EDUCATION/TRAINING PROGRAM

## 2025-06-05 PROCEDURE — 6360000002 HC RX W HCPCS: Performed by: INTERNAL MEDICINE

## 2025-06-05 PROCEDURE — 2700000000 HC OXYGEN THERAPY PER DAY

## 2025-06-05 PROCEDURE — 84145 PROCALCITONIN (PCT): CPT

## 2025-06-05 PROCEDURE — P9047 ALBUMIN (HUMAN), 25%, 50ML: HCPCS | Performed by: NURSE PRACTITIONER

## 2025-06-05 PROCEDURE — 94660 CPAP INITIATION&MGMT: CPT

## 2025-06-05 PROCEDURE — 6370000000 HC RX 637 (ALT 250 FOR IP): Performed by: STUDENT IN AN ORGANIZED HEALTH CARE EDUCATION/TRAINING PROGRAM

## 2025-06-05 RX ADMIN — CEFEPIME 2000 MG: 2 INJECTION, POWDER, FOR SOLUTION INTRAVENOUS at 02:23

## 2025-06-05 RX ADMIN — ASPIRIN 81 MG: 81 TABLET, COATED ORAL at 08:22

## 2025-06-05 RX ADMIN — SODIUM CHLORIDE, PRESERVATIVE FREE 40 ML: 5 INJECTION INTRAVENOUS at 08:24

## 2025-06-05 RX ADMIN — IPRATROPIUM BROMIDE AND ALBUTEROL SULFATE 1 DOSE: 2.5; .5 SOLUTION RESPIRATORY (INHALATION) at 12:09

## 2025-06-05 RX ADMIN — ATORVASTATIN CALCIUM 40 MG: 40 TABLET, FILM COATED ORAL at 20:57

## 2025-06-05 RX ADMIN — CEFEPIME 2000 MG: 2 INJECTION, POWDER, FOR SOLUTION INTRAVENOUS at 09:08

## 2025-06-05 RX ADMIN — APIXABAN 5 MG: 5 TABLET, FILM COATED ORAL at 20:56

## 2025-06-05 RX ADMIN — METHOCARBAMOL 500 MG: 500 TABLET ORAL at 13:18

## 2025-06-05 RX ADMIN — METOPROLOL TARTRATE 12.5 MG: 25 TABLET, FILM COATED ORAL at 08:23

## 2025-06-05 RX ADMIN — IPRATROPIUM BROMIDE AND ALBUTEROL SULFATE 1 DOSE: 2.5; .5 SOLUTION RESPIRATORY (INHALATION) at 15:39

## 2025-06-05 RX ADMIN — SENNOSIDES AND DOCUSATE SODIUM 2 TABLET: 50; 8.6 TABLET ORAL at 20:56

## 2025-06-05 RX ADMIN — CEFEPIME 2000 MG: 2 INJECTION, POWDER, FOR SOLUTION INTRAVENOUS at 17:59

## 2025-06-05 RX ADMIN — IPRATROPIUM BROMIDE AND ALBUTEROL SULFATE 1 DOSE: 2.5; .5 SOLUTION RESPIRATORY (INHALATION) at 07:19

## 2025-06-05 RX ADMIN — IPRATROPIUM BROMIDE AND ALBUTEROL SULFATE 1 DOSE: 2.5; .5 SOLUTION RESPIRATORY (INHALATION) at 20:25

## 2025-06-05 RX ADMIN — ACETAMINOPHEN 650 MG: 325 TABLET ORAL at 08:28

## 2025-06-05 RX ADMIN — METHYLPREDNISOLONE SODIUM SUCCINATE 40 MG: 40 INJECTION, POWDER, LYOPHILIZED, FOR SOLUTION INTRAMUSCULAR; INTRAVENOUS at 05:11

## 2025-06-05 RX ADMIN — METOPROLOL TARTRATE 12.5 MG: 25 TABLET, FILM COATED ORAL at 18:04

## 2025-06-05 RX ADMIN — METHOCARBAMOL 500 MG: 500 TABLET ORAL at 18:04

## 2025-06-05 RX ADMIN — METHOCARBAMOL 500 MG: 500 TABLET ORAL at 20:56

## 2025-06-05 RX ADMIN — TRAZODONE HYDROCHLORIDE 50 MG: 50 TABLET ORAL at 00:10

## 2025-06-05 RX ADMIN — ALBUMIN (HUMAN) 25 G: 0.25 INJECTION, SOLUTION INTRAVENOUS at 00:10

## 2025-06-05 RX ADMIN — METHYLPREDNISOLONE SODIUM SUCCINATE 40 MG: 40 INJECTION, POWDER, LYOPHILIZED, FOR SOLUTION INTRAMUSCULAR; INTRAVENOUS at 17:59

## 2025-06-05 RX ADMIN — OXYCODONE 2.5 MG: 5 TABLET ORAL at 11:44

## 2025-06-05 RX ADMIN — METHOCARBAMOL 500 MG: 500 TABLET ORAL at 08:23

## 2025-06-05 RX ADMIN — METOPROLOL TARTRATE 12.5 MG: 25 TABLET, FILM COATED ORAL at 02:23

## 2025-06-05 RX ADMIN — SENNOSIDES AND DOCUSATE SODIUM 2 TABLET: 50; 8.6 TABLET ORAL at 08:22

## 2025-06-05 RX ADMIN — BUMETANIDE 2 MG: 0.25 INJECTION INTRAMUSCULAR; INTRAVENOUS at 08:23

## 2025-06-05 RX ADMIN — ACETAMINOPHEN 650 MG: 325 TABLET ORAL at 18:19

## 2025-06-05 RX ADMIN — BUMETANIDE 2 MG: 0.25 INJECTION INTRAMUSCULAR; INTRAVENOUS at 17:59

## 2025-06-05 RX ADMIN — SODIUM CHLORIDE, PRESERVATIVE FREE 10 ML: 5 INJECTION INTRAVENOUS at 20:57

## 2025-06-05 RX ADMIN — APIXABAN 5 MG: 5 TABLET, FILM COATED ORAL at 08:22

## 2025-06-05 RX ADMIN — METOPROLOL TARTRATE 12.5 MG: 25 TABLET, FILM COATED ORAL at 20:57

## 2025-06-05 RX ADMIN — ACETAZOLAMIDE SODIUM 250 MG: 500 INJECTION, POWDER, LYOPHILIZED, FOR SOLUTION INTRAVENOUS at 11:45

## 2025-06-05 ASSESSMENT — PAIN SCALES - GENERAL
PAINLEVEL_OUTOF10: 2
PAINLEVEL_OUTOF10: 2

## 2025-06-05 ASSESSMENT — PAIN DESCRIPTION - LOCATION
LOCATION: BACK
LOCATION: BACK

## 2025-06-05 NOTE — PROGRESS NOTES
Name: Hector Jensen Jr MRN: [id number]   : 1950 Hospital: [facility]   Date: 2025        IMPRESSION:   Hypervolemic hyponatremia of advanced CHF. Improving with diuresis-s/p hypertonic saline and Bumex  Normal renal function  CHF- 45-50%-on iv bumex, in negative fluid balance. CT finding consistent with pulmonary congestion-on Hi-blanca O2  metabolic alkalosis due to diuresis  Hypotension        PLAN:     C/w IV diuresis-agree with increasing Bumex to 2 mg bid  Add one dose diamox today  Adjusted fluid restriction to 1.2 L/day.  Ure-Na prn  Off  trazodone   Increase protein intake, prot supplements bid     Subjective/Interval History:   I have reviewed the flowsheet and previous day’s notes.    ROS:Pertinent items are noted in HPI.    25 Doing better. No new issues.      2025    No major complaints open this morning.  He worked with therapy team yesterday.      2025.    The patient was sleeping.  Events reviewed with Giovanna [daughter-in-law] at the bedside.    -feel ok, on hiflo O2, no edema, has dry crackles-Na 132 this am-in neg fluid balance  6/3-sitting in chair, still on Hi-blanca O2-low appetrite. Moved bowel  -no change , remains on high flow O2, no significant diuresis with extra dose of bumex  -patient feels better today.  Still requires high flow oxygen.  Diuresed well after hypertonic saline and Bumex.  But he dropped his blood pressure.  Sodium is improving      Objective:   Vital Signs:    BP (!) 112/59   Pulse 80   Temp 97.9 °F (36.6 °C) (Axillary)   Resp 21   Ht 1.626 m (5' 4\")   Wt 55.2 kg (121 lb 11.1 oz)   SpO2 100%   BMI 20.89 kg/m²             Temp (24hrs), Av.1 °F (36.7 °C), Min:97.7 °F (36.5 °C), Max:98.4 °F (36.9 °C)       Intake/Output:   Last shift:      No intake/output data recorded.  Last 3 shifts:  1901 -  0700  In: 1402 [P.O.:1135]  Out: 3350 [Urine:3350]    Intake/Output Summary (Last 24 hours) at 2025 0947  Last data

## 2025-06-05 NOTE — NURSE NAVIGATOR
HEART FAILURE NURSE NAVIGATOR NOTE  [facility]    Patient chart was reviewed by Heart Failure (HF) Nurse Navigators for compliance of prescribed treatment with guidelines directed medical therapy (GDMT) and HF database completed.     Please, review beneath recommendations for symptomatic patients with HF with Mildly Reduced Ejection Fraction 41-49% (HFmrEF) for your consideration when taking care of this patient.    Current Medical Therapy:      Name Hector Jensen Jr    1950   LVEF  45/50%   NYHA Functional Class  Documentation needed   ARNi/ACEi/ARB    Beta-blocker    Aldosterone Antagonist    SGLT2 inhibitor    Hydralazine/Isosorbide Dinitrate    Consulting Cardiologist      Recommendations:    For patients with previously normal or newly diagnosed HFmrEF 41-49%, consider adding the following GDMT, if appropriate:  SGLT2 inhibitor [Class 2a]  ARNi/ACEi/ARB [Class 2b]  Beta-blockers (carvedilol, sustained-release metoprolol succinate or bisoprolol) [Class 2b]  Aldosterone antagonists GFR > 30 and K< 5 [Class 2b]  Adjust diuretic dose at discharge if hospitalized for volume overload [Class 1]    For patients who have recovered LVEF to 41-49%, GDMT should be continued to prevent heart failure and relapse of LV dysfunction [Class 1].   Please, add the following GDMT [Class 1] or document in discharge summary/progress note why patient cannot take the medication:  ARNi/ACEi or ARB  Beta-blockers (carvedilol, sustained-release metoprolol succinate or bisoprolol)  Aldosterone antagonists GFR > 30 and K< 5  SGLT2 inhibitor  Hydralazine/Isosorbide dinitrate for  Americans with Class III/IV symptoms  Adjust diuretic dose at discharge if hospitalized for volume overload  Note: the following medications may be potentially harmful in heart failure [Class 3]:  Calcium channel blockers (doxazosin, diltiazem, verapamil, nifedipine)  Antiarrhythmics (flecanide, disopyrimide, sotalol,

## 2025-06-05 NOTE — PROGRESS NOTES
Occupational Therapy Note:     Spoke with PT this date. Nursing requesting patient remain in bed at this time.  Will defer today and follow up as appropriate.     Yolanda Pantoja, OTR/L

## 2025-06-05 NOTE — PROGRESS NOTES
Pulmonary, Critical Care, and Sleep Medicine~Consult Note    Name: Hector Jensen Jr MRN: [id number]   : 1950 Hospital: [facility]   Date: 2025 1:24 PM Admission: 2025     Impression Plan   Acute Hypoxic Respiratory Failure, suspect related to pulmonary edema, lower suspicion for CAP, aspiration pneumonitis, Amiodarone induced pneumonitis/fibrosis. Query underlying MAC given bronchiectasis and RLL fibrosis?   CAD s/p CABG x 3, s/p NSTEMI  Afib  HFrEF  Acute on Chronic Hyponatremia   HTN/HLD  Small Bilateral Apical PTX with small Pneumomediastinum.    Oxygen to maintain goal saturation >90%, wean as able.   Sputum Culture-NGTD  Conservative management, no indication for pigtail at this time. No NIV with PTX.   CRP downtrending, improved oxygen requirements, can start weaning IVCS given underlying osteoporosis.   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   Continued diuresis, appreciated nephro's recs- plan for 2mg Bumex BID and diamox today   Abx- Cefepime, WBC decreasing     Pulmonary Toilet  Rate Control-metoprolol increased yesterday  TTE improved.   Lower suspicion for amiodarione induced fibro proliferative disease but if it is deemed unnecessary by cardiology I would be in favor of discontinuing, agree with holding for now  Will need repeat imaging in 4-6 weeks.     Discussed with RN, family and intensivist. We will continue to follow.     Patient is critically ill and at high risk for further decline, mechanical ventilation, and ICU admission due to AHRF; CC time spent excluding procedures is > 35 minutes      Daily Progression:  : Feeling bettter. O2 requirements have decreased. Weaned to 35L 60%. Discussed results of TTE and CXR this AM.     : Feels about the same but worsening oxygen requirements and WOB after working with PT this AM. On NIV @ 90%. CXR this AM with decreased aeration and

## 2025-06-05 NOTE — CARE COORDINATION
Transition of Care Plan:     RUR: 23 %     Referral to [facility] and will continue to follow     Prior Level of Functioning:Independent   Disposition: IPR  CASSANDRA: TBD  If SNF or IPR: Date FOC offered: 6/3  Date FOC received: 6/4   Accepting facility: [facility] to follow  Date authorization started with reference number: NA  Date authorization received and expires: NA  Follow up appointments: PCP, Nephrology , pulmonology   DME needed: TBD  Transportation at discharge: Family vs BLS  IM/IMM Medicare/ letter given: 5/28  Is patient a [place] and connected with [facility]? No  Caregiver Contact: Spouse  KacyNikki Jensen   Discharge Caregiver contacted prior to discharge? Yes  Care Conference needed? No  Barriers to discharge:  Medical stability   .   ADIS Baldwin MSA, RN, CM

## 2025-06-05 NOTE — PROGRESS NOTES
Physical Therapy 6/5/2025         Chart reviewed. Nursing requested to defer therapy today.   Will continue to follow      Vivek Khan, PT

## 2025-06-05 NOTE — PROGRESS NOTES
[facility]     ICU TEAM Progress Note           Name: Hector Jensen Jr   : 1950   MRN: [id number]   Date: 2025          ICU PROBLEM LIST   -Hyponatremia  - Acute hypoxic respiratory failure  -CAD status post CABG     HISTORY OF PRESENT ILLNESS:     Hector Jensen Jr is a 74 y.o. male with a history of hypertension, hyperlipidemia, CAD status post CABG on 2025 who presented to the ED for shortness of breath. Patient reported having some shortness of breath that started yesterday, but significantly worsened today. He was found to be hypoxic in the ED 72% on room air sats. He has some resting shortness of breath but worsens with exertion.  He also has some mild lower bilateral extremity swelling. Patient states that he feels thirsty all the time and has been drinking a lot of water. States he drinks about four large bottles. Has been using his breathing device (?ICS) as instructed at home, and recently when home health visited they recommended him starting or drinking electrolytes or gatorade. Denies chest pain, cough, fever, chills, abdominal or back pain.     In the ED labs were drawn and patient was sent to CT for CTA of his chest with and without contrast.  CT of the chest showed diffuse groundglass consolidation involving the lung parenchyma as well as small to moderate bilateral pleural effusions most likely secondary to pulmonary edema less likely a multi focal pneumonia.  The labs came back and was significant for a sodium level of 112, chloride 80.  BUN was 11 creatinine was 0.79.  Glucose 149 calcium 8.2. serum osmolality was 243.  Nephrology was consulted recommended starting patient on urea oral supplements and fluid restriction. Critical care was called for admission.     SUBJECTIVE:     - No acute events, this a.m. on nasal cannula, noted to have increasing hypoxia requiring significant increase in oxygen requirement.  -  Increased oxygen req.   events, labs, images, vital signs, I/O's, and examined patient.  I have discussed the case and the plan and management of the patient's care with the consulting services, the bedside nurses and the respiratory therapist.      NOTE OF PERSONAL INVOLVEMENT IN CARE   This patient has a high probability of imminent, clinically significant deterioration, which requires the highest level of preparedness to intervene urgently. I participated in the decision-making and personally managed or directed the management of the following life and organ supporting interventions that required my frequent assessment to treat or prevent imminent deterioration.    I personally spent 60 minutes of critical care time.  This is time spent at this critically ill patient's bedside actively involved in patient care as well as the coordination of care.  This does not include any procedural time which has been billed separately.      DO Beba [facility] Critical Care  06/05/25

## 2025-06-06 ENCOUNTER — APPOINTMENT (OUTPATIENT)
Facility: HOSPITAL | Age: 75
End: 2025-06-06
Payer: MEDICARE

## 2025-06-06 LAB
ANION GAP SERPL CALC-SCNC: 7 MMOL/L (ref 2–12)
BUN SERPL-MCNC: 38 MG/DL (ref 6–20)
BUN/CREAT SERPL: 58 (ref 12–20)
CALCIUM SERPL-MCNC: 8.6 MG/DL (ref 8.5–10.1)
CHLORIDE SERPL-SCNC: 95 MMOL/L (ref 97–108)
CO2 SERPL-SCNC: 27 MMOL/L (ref 21–32)
CREAT SERPL-MCNC: 0.65 MG/DL (ref 0.7–1.3)
ERYTHROCYTE [DISTWIDTH] IN BLOOD BY AUTOMATED COUNT: 13.3 % (ref 11.5–14.5)
GLUCOSE BLD STRIP.AUTO-MCNC: 109 MG/DL (ref 65–117)
GLUCOSE BLD STRIP.AUTO-MCNC: 146 MG/DL (ref 65–117)
GLUCOSE BLD STRIP.AUTO-MCNC: 159 MG/DL (ref 65–117)
GLUCOSE BLD STRIP.AUTO-MCNC: 200 MG/DL (ref 65–117)
GLUCOSE SERPL-MCNC: 127 MG/DL (ref 65–100)
HCT VFR BLD AUTO: 31.9 % (ref 36.6–50.3)
HGB BLD-MCNC: 10.5 G/DL (ref 12.1–17)
MAGNESIUM SERPL-MCNC: 2.5 MG/DL (ref 1.6–2.4)
MCH RBC QN AUTO: 29.2 PG (ref 26–34)
MCHC RBC AUTO-ENTMCNC: 32.9 G/DL (ref 30–36.5)
MCV RBC AUTO: 88.6 FL (ref 80–99)
NRBC # BLD: 0 K/UL (ref 0–0.01)
NRBC BLD-RTO: 0 PER 100 WBC
PLATELET # BLD AUTO: 255 K/UL (ref 150–400)
PMV BLD AUTO: 9.6 FL (ref 8.9–12.9)
POTASSIUM SERPL-SCNC: 3.9 MMOL/L (ref 3.5–5.1)
PROCALCITONIN SERPL-MCNC: 0.1 NG/ML
RBC # BLD AUTO: 3.6 M/UL (ref 4.1–5.7)
SERVICE CMNT-IMP: ABNORMAL
SERVICE CMNT-IMP: NORMAL
SODIUM SERPL-SCNC: 129 MMOL/L (ref 136–145)
WBC # BLD AUTO: 18.4 K/UL (ref 4.1–11.1)

## 2025-06-06 PROCEDURE — 71045 X-RAY EXAM CHEST 1 VIEW: CPT

## 2025-06-06 PROCEDURE — 6370000000 HC RX 637 (ALT 250 FOR IP)

## 2025-06-06 PROCEDURE — 6370000000 HC RX 637 (ALT 250 FOR IP): Performed by: NURSE PRACTITIONER

## 2025-06-06 PROCEDURE — 2500000003 HC RX 250 WO HCPCS: Performed by: STUDENT IN AN ORGANIZED HEALTH CARE EDUCATION/TRAINING PROGRAM

## 2025-06-06 PROCEDURE — 6370000000 HC RX 637 (ALT 250 FOR IP): Performed by: INTERNAL MEDICINE

## 2025-06-06 PROCEDURE — 94640 AIRWAY INHALATION TREATMENT: CPT

## 2025-06-06 PROCEDURE — 80048 BASIC METABOLIC PNL TOTAL CA: CPT

## 2025-06-06 PROCEDURE — 2700000000 HC OXYGEN THERAPY PER DAY

## 2025-06-06 PROCEDURE — 2500000003 HC RX 250 WO HCPCS: Performed by: INTERNAL MEDICINE

## 2025-06-06 PROCEDURE — 2580000003 HC RX 258: Performed by: STUDENT IN AN ORGANIZED HEALTH CARE EDUCATION/TRAINING PROGRAM

## 2025-06-06 PROCEDURE — 83735 ASSAY OF MAGNESIUM: CPT

## 2025-06-06 PROCEDURE — 84145 PROCALCITONIN (PCT): CPT

## 2025-06-06 PROCEDURE — 6370000000 HC RX 637 (ALT 250 FOR IP): Performed by: STUDENT IN AN ORGANIZED HEALTH CARE EDUCATION/TRAINING PROGRAM

## 2025-06-06 PROCEDURE — 2000000000 HC ICU R&B

## 2025-06-06 PROCEDURE — 94761 N-INVAS EAR/PLS OXIMETRY MLT: CPT

## 2025-06-06 PROCEDURE — 6360000002 HC RX W HCPCS: Performed by: STUDENT IN AN ORGANIZED HEALTH CARE EDUCATION/TRAINING PROGRAM

## 2025-06-06 PROCEDURE — 82962 GLUCOSE BLOOD TEST: CPT

## 2025-06-06 PROCEDURE — 94760 N-INVAS EAR/PLS OXIMETRY 1: CPT

## 2025-06-06 PROCEDURE — 99223 1ST HOSP IP/OBS HIGH 75: CPT

## 2025-06-06 PROCEDURE — 85027 COMPLETE CBC AUTOMATED: CPT

## 2025-06-06 RX ORDER — BUMETANIDE 0.25 MG/ML
2 INJECTION, SOLUTION INTRAMUSCULAR; INTRAVENOUS ONCE
Status: COMPLETED | OUTPATIENT
Start: 2025-06-06 | End: 2025-06-06

## 2025-06-06 RX ORDER — METOPROLOL SUCCINATE 50 MG/1
25 TABLET, EXTENDED RELEASE ORAL EVERY 12 HOURS
Status: DISCONTINUED | OUTPATIENT
Start: 2025-06-06 | End: 2025-06-13 | Stop reason: HOSPADM

## 2025-06-06 RX ORDER — INDOMETHACIN 25 MG/1
CAPSULE ORAL
Status: DISCONTINUED
Start: 2025-06-06 | End: 2025-06-06 | Stop reason: WASHOUT

## 2025-06-06 RX ORDER — ATROPINE SULFATE 0.1 MG/ML
INJECTION INTRAVENOUS
Status: DISCONTINUED
Start: 2025-06-06 | End: 2025-06-06 | Stop reason: WASHOUT

## 2025-06-06 RX ORDER — AMIODARONE HYDROCHLORIDE 150 MG/3ML
INJECTION, SOLUTION INTRAVENOUS
Status: DISCONTINUED
Start: 2025-06-06 | End: 2025-06-06 | Stop reason: WASHOUT

## 2025-06-06 RX ORDER — METHOCARBAMOL 500 MG/1
500 TABLET, FILM COATED ORAL 2 TIMES DAILY
Status: DISCONTINUED | OUTPATIENT
Start: 2025-06-06 | End: 2025-06-11

## 2025-06-06 RX ORDER — EPINEPHRINE 0.1 MG/ML
INJECTION INTRAVENOUS
Status: DISCONTINUED
Start: 2025-06-06 | End: 2025-06-06 | Stop reason: WASHOUT

## 2025-06-06 RX ORDER — 3% SODIUM CHLORIDE 3 G/100ML
50 INJECTION, SOLUTION INTRAVENOUS ONCE
Status: COMPLETED | OUTPATIENT
Start: 2025-06-06 | End: 2025-06-06

## 2025-06-06 RX ORDER — DEXMEDETOMIDINE HYDROCHLORIDE 4 UG/ML
.1-1 INJECTION, SOLUTION INTRAVENOUS CONTINUOUS
Status: DISPENSED | OUTPATIENT
Start: 2025-06-06 | End: 2025-06-07

## 2025-06-06 RX ORDER — BUMETANIDE 0.25 MG/ML
2 INJECTION, SOLUTION INTRAMUSCULAR; INTRAVENOUS EVERY 8 HOURS
Status: DISCONTINUED | OUTPATIENT
Start: 2025-06-06 | End: 2025-06-09

## 2025-06-06 RX ADMIN — Medication 15 G: at 20:47

## 2025-06-06 RX ADMIN — BUMETANIDE 2 MG: 0.25 INJECTION INTRAMUSCULAR; INTRAVENOUS at 12:50

## 2025-06-06 RX ADMIN — SENNOSIDES AND DOCUSATE SODIUM 2 TABLET: 50; 8.6 TABLET ORAL at 20:43

## 2025-06-06 RX ADMIN — METHYLPREDNISOLONE SODIUM SUCCINATE 40 MG: 40 INJECTION, POWDER, LYOPHILIZED, FOR SOLUTION INTRAMUSCULAR; INTRAVENOUS at 06:15

## 2025-06-06 RX ADMIN — IPRATROPIUM BROMIDE AND ALBUTEROL SULFATE 1 DOSE: 2.5; .5 SOLUTION RESPIRATORY (INHALATION) at 19:36

## 2025-06-06 RX ADMIN — ATORVASTATIN CALCIUM 40 MG: 40 TABLET, FILM COATED ORAL at 20:43

## 2025-06-06 RX ADMIN — BUMETANIDE 2 MG: 0.25 INJECTION INTRAMUSCULAR; INTRAVENOUS at 20:32

## 2025-06-06 RX ADMIN — METHYLPREDNISOLONE SODIUM SUCCINATE 40 MG: 40 INJECTION, POWDER, LYOPHILIZED, FOR SOLUTION INTRAMUSCULAR; INTRAVENOUS at 16:29

## 2025-06-06 RX ADMIN — INSULIN LISPRO 1 UNITS: 100 INJECTION, SOLUTION INTRAVENOUS; SUBCUTANEOUS at 21:11

## 2025-06-06 RX ADMIN — SODIUM CHLORIDE 50 ML: 3 INJECTION, SOLUTION INTRAVENOUS at 12:55

## 2025-06-06 RX ADMIN — TRAZODONE HYDROCHLORIDE 50 MG: 50 TABLET ORAL at 00:08

## 2025-06-06 RX ADMIN — IPRATROPIUM BROMIDE AND ALBUTEROL SULFATE 1 DOSE: 2.5; .5 SOLUTION RESPIRATORY (INHALATION) at 15:51

## 2025-06-06 RX ADMIN — METOPROLOL TARTRATE 12.5 MG: 25 TABLET, FILM COATED ORAL at 02:09

## 2025-06-06 RX ADMIN — Medication 6 MG: at 02:08

## 2025-06-06 RX ADMIN — CEFEPIME 2000 MG: 2 INJECTION, POWDER, FOR SOLUTION INTRAVENOUS at 02:00

## 2025-06-06 RX ADMIN — SODIUM CHLORIDE, PRESERVATIVE FREE 10 ML: 5 INJECTION INTRAVENOUS at 20:31

## 2025-06-06 RX ADMIN — SALINE NASAL SPRAY 1 SPRAY: 1.5 SOLUTION NASAL at 18:21

## 2025-06-06 RX ADMIN — DICLOFENAC SODIUM 2 G: 10 GEL TOPICAL at 18:50

## 2025-06-06 RX ADMIN — Medication 15 G: at 10:54

## 2025-06-06 RX ADMIN — IPRATROPIUM BROMIDE AND ALBUTEROL SULFATE 1 DOSE: 2.5; .5 SOLUTION RESPIRATORY (INHALATION) at 12:07

## 2025-06-06 RX ADMIN — METOPROLOL TARTRATE 12.5 MG: 25 TABLET, FILM COATED ORAL at 16:29

## 2025-06-06 RX ADMIN — SENNOSIDES AND DOCUSATE SODIUM 2 TABLET: 50; 8.6 TABLET ORAL at 08:01

## 2025-06-06 RX ADMIN — METHOCARBAMOL 500 MG: 500 TABLET ORAL at 20:45

## 2025-06-06 RX ADMIN — METOPROLOL TARTRATE 12.5 MG: 25 TABLET, FILM COATED ORAL at 08:01

## 2025-06-06 RX ADMIN — CEFEPIME 2000 MG: 2 INJECTION, POWDER, FOR SOLUTION INTRAVENOUS at 18:01

## 2025-06-06 RX ADMIN — POLYETHYLENE GLYCOL 3350 17 G: 17 POWDER, FOR SOLUTION ORAL at 08:01

## 2025-06-06 RX ADMIN — METOPROLOL SUCCINATE 25 MG: 50 TABLET, EXTENDED RELEASE ORAL at 20:44

## 2025-06-06 RX ADMIN — APIXABAN 5 MG: 5 TABLET, FILM COATED ORAL at 08:01

## 2025-06-06 RX ADMIN — METHOCARBAMOL 500 MG: 500 TABLET ORAL at 08:02

## 2025-06-06 RX ADMIN — CEFEPIME 2000 MG: 2 INJECTION, POWDER, FOR SOLUTION INTRAVENOUS at 10:12

## 2025-06-06 RX ADMIN — ACETAMINOPHEN 650 MG: 325 TABLET ORAL at 14:14

## 2025-06-06 RX ADMIN — ACETAMINOPHEN 650 MG: 325 TABLET ORAL at 20:54

## 2025-06-06 RX ADMIN — ASPIRIN 81 MG: 81 TABLET, COATED ORAL at 08:02

## 2025-06-06 RX ADMIN — APIXABAN 5 MG: 5 TABLET, FILM COATED ORAL at 20:42

## 2025-06-06 RX ADMIN — BUMETANIDE 2 MG: 0.25 INJECTION INTRAMUSCULAR; INTRAVENOUS at 08:08

## 2025-06-06 RX ADMIN — IPRATROPIUM BROMIDE AND ALBUTEROL SULFATE 1 DOSE: 2.5; .5 SOLUTION RESPIRATORY (INHALATION) at 08:03

## 2025-06-06 ASSESSMENT — PAIN DESCRIPTION - PAIN TYPE
TYPE: CHRONIC PAIN

## 2025-06-06 ASSESSMENT — PAIN DESCRIPTION - LOCATION
LOCATION: BACK

## 2025-06-06 ASSESSMENT — PAIN DESCRIPTION - DESCRIPTORS
DESCRIPTORS: ACHING

## 2025-06-06 ASSESSMENT — PAIN SCALES - GENERAL
PAINLEVEL_OUTOF10: 1
PAINLEVEL_OUTOF10: 6
PAINLEVEL_OUTOF10: 5
PAINLEVEL_OUTOF10: 3

## 2025-06-06 ASSESSMENT — PAIN DESCRIPTION - ORIENTATION
ORIENTATION: MID

## 2025-06-06 NOTE — PROGRESS NOTES
Name: Hector Jensen Jr MRN: [id number]   : 1950 Hospital: [facility]   Date: 2025        IMPRESSION:   Hypervolemic hyponatremia of advanced CHF. Improving with diuresis-s/p hypertonic saline and Bumex  Normal renal function  CHF- 45-50%-on iv bumex, in negative fluid balance. CT finding consistent with pulmonary congestion-on Hi-blanca O2  metabolic alkalosis due to diuresis-s/p one dose Diamox 250 mg IV 25-improved  Hypotension        PLAN:       Recommend switching to Bumex drip.    Using hypertonic saline might increase diuresis transiently but overall increases body sodium load and eventually more fluid retention   fluid restriction  1.2 L/day.  Restart Ure-Na 15 gr BID-it helps diuresis too (osmotic diuresis)  Off  trazodone   Off amiodarone for possible lung toxicity  Increase protein intake, prot supplements bid  F/u serum sodium and RFT    Discussed with Dr Escamilla       Subjective/Interval History:   I have reviewed the flowsheet and previous day’s notes.    ROS:Pertinent items are noted in HPI.    25 Doing better. No new issues.      2025    No major complaints open this morning.  He worked with therapy team yesterday.      2025.    The patient was sleeping.  Events reviewed with Giovanna [daughter-in-law] at the bedside.    -feel ok, on hiflo O2, no edema, has dry crackles-Na 132 this am-in neg fluid balance  6/3-sitting in chair, still on Hi-blanca O2-low appetrite. Moved bowel  -no change , remains on high flow O2, no significant diuresis with extra dose of bumex  -patient feels better today.  Still requires high flow oxygen.  Diuresed well after hypertonic saline and Bumex.  But he dropped his blood pressure.  Sodium is improving    -remains hypoxic on high flow oxygen.  Fluid balance -1200 but has gained 2 kg!  Sodium down to 129. CXR with worsening pulmonary edema      Objective:   Vital Signs:    /68   Pulse 80   Temp 98.4 °F (36.9 °C) (Oral)   BILL AMBULATORY ENCOUNTER  INTERNAL MEDICINE CONSULTATION PRE-OP PHYSICAL      CHIEF COMPLAINT:    Chief Complaint   Patient presents with   • Pre-Op Exam   • Imm/Inj     Flu vaccine       HISTORY OF PRESENT ILLNESS:    Love Sandoval is 68 year old female who presents today requesting evaluation for pre-op evaluation. The patient is scheduled to have left patellar arthroplasty by Dr. Painter at [facility] on 9/20/22. The patient has medical history of osteoarthritis of knee, right knee surgery, hypertension, CKD stage 2, obesity, iron deficiency anemia, and hypothyroidism. She denies anesthesia complications in the past. She uses a cane to assist ambulation. She exercises at home for rehab therapy and denies chest pain, chest tightness, shortness of breath, nausea, vomiting, or blood in stool.    PROBLEM LIST:    Patient Active Problem List   Diagnosis   • Acquired hypothyroidism   • Routine gynecological examination   • SCREENING MAMM-MAILG NEOPL NEC   • Obesity, unspecified   • Special screening for osteoporosis   • Special screening for malignant neoplasms, colon   • Intramural leiomyoma of uterus   • Dysmenorrhea   • Essential (primary) hypertension   • Chronic kidney disease, stage II (mild)   • Other and unspecified hyperlipidemia   • Acidosis, on labs5/10/10   • Unspecified vitamin D deficiency, insufficiency   • Anemia   • Chronic pain of left knee   • Localized swelling of both lower legs   • Benign hypertensive kidney disease with chronic kidney disease stage I through stage IV, or unspecified(403.10)   • Aftercare following left knee joint replacement surgery       HISTORIES:    ALLERGIES:  No Known Allergies  Current Outpatient Medications   Medication Sig Dispense Refill   • ferrous sulfate 325 (65 FE) MG EC tablet Take 1 tablet by mouth daily (with breakfast). With a meal. 180 tablet 1   • levothyroxine 125 MCG tablet Take 1 tablet by mouth daily. 90 tablet 1   • lisinopril-hydroCHLOROthiazide   Resp 26   Ht 1.626 m (5' 4\")   Wt 57 kg (125 lb 10.6 oz)   SpO2 94%   BMI 21.57 kg/m²             Temp (24hrs), Av.4 °F (36.9 °C), Min:98.2 °F (36.8 °C), Max:98.6 °F (37 °C)       Intake/Output:   Last shift:       07 -  190  In: 179.6 [P.O.:90]  Out: 800 [Urine:800]  Last 3 shifts: 1901 -  0700  In: 1988.6 [P.O.:1735]  Out: 3450 [Urine:3450]    Intake/Output Summary (Last 24 hours) at 2025 1217  Last data filed at 2025 1100  Gross per 24 hour   Intake 1478.52 ml   Output 2800 ml   Net -1321.48 ml        Current Facility-Administered Medications   Medication Dose Route Frequency    urea (URE-NA) packet 15 g  15 g Oral BID    3% sodium chloride (HYPERTONIC) IV infusion 50 mL  50 mL IntraVENous Once    bumetanide (BUMEX) injection 2 mg  2 mg IntraVENous Once    sodium chloride (OCEAN, BABY AYR) 0.65 % nasal spray 1 spray  1 spray Each Nostril Q2H PRN    metoprolol succinate (TOPROL XL) extended release tablet 25 mg  25 mg Oral Q12H    methocarbamol (ROBAXIN) tablet 500 mg  500 mg Oral BID    bumetanide (BUMEX) injection 2 mg  2 mg IntraVENous q8h    ceFEPIme (MAXIPIME) 2,000 mg in sodium chloride 0.9 % 50 mL IVPB (addEASE)  2,000 mg IntraVENous Q8H    traZODone (DESYREL) tablet 50 mg  50 mg Oral Nightly PRN    methylPREDNISolone sodium succ (SOLU-MEDROL) 40 mg in sterile water 1 mL injection  40 mg IntraVENous Q12H    metoprolol tartrate (LOPRESSOR) tablet 12.5 mg  12.5 mg Oral Q6H    norepinephrine (LEVOPHED) 16 mg in sodium chloride 0.9 % 250 mL infusion (premix)  1-100 mcg/min IntraVENous Continuous    oxyCODONE (ROXICODONE) immediate release tablet 2.5 mg  2.5 mg Oral Q6H PRN    polyethylene glycol (GLYCOLAX) packet 17 g  17 g Oral Daily    sennosides-docusate sodium (SENOKOT-S) 8.6-50 MG tablet 2 tablet  2 tablet Oral BID    ipratropium 0.5 mg-albuterol 2.5 mg (DUONEB) nebulizer solution 1 Dose  1 Dose Inhalation Q4H WA RT    melatonin tablet 6 mg  6 mg Oral Nightly PRN  (ZESTORETIC) 10-12.5 MG per tablet Take 1 tablet by mouth daily. 90 tablet 1   • aspirin 325 MG EC tablet Take 1 tablet by mouth 2 times daily. 60 tablet 0   • VITAMIN D PO Take 1 tablet by mouth daily.      • Ascorbic Acid (VITAMIN C PO) Take 1 tablet by mouth daily.      • Acetaminophen (TYLENOL 8 HOUR PO) Take by mouth as needed.      • MULTI-DAY TABS   PO 1 tab QD 30 0     No current facility-administered medications for this visit.     Immunization History   Administered Date(s) Administered   • COVID-19 Moderna Vaccine 03/07/2021, 04/10/2021, 11/03/2021   • Hep A/Hep B 01/14/2004   • Hepatitis A - Adult 01/14/2004, 01/11/2005   • Influenza, High Dose quadrivalent, preserve-free 01/08/2021, 10/11/2021, 09/12/2022   • Influenza, Unspecified Formulation 10/29/2003, 12/27/2004, 10/29/2005, 12/13/2006, 11/07/2007, 11/08/2008, 11/01/2009   • Influenza, high dose seasonal, preservative-free 11/27/2019, 01/18/2021   • Influenza, quadrivalent, multi-dose 10/12/2015, 10/11/2017   • Influenza, quadrivalent, preserve-free 10/16/2014, 10/06/2016, 10/03/2018   • Influenza, seasonal, injectable, preservative free 11/19/2009   • Influenza, seasonal, injectable, trivalent 10/29/2003, 12/27/2004, 10/29/2005, 12/13/2006, 11/07/2007, 11/08/2008, 11/01/2009, 10/27/2010, 10/29/2013   • Novel Influenza [id number], Unspecified Formulation 12/01/2009   • PPD 11/06/2002   • Pneumococcal Conjugate 13 Valent Vacc (Prevnar 13) 11/27/2019   • Pneumococcal Polysaccharide Vacc (Pneumovax 23) 01/08/2021   • Shingrix (Shingles Zoster) 10/11/2021, 05/16/2022   • TD Adult, Adsorbed 01/14/2004   • TD Adult, Not Adsorbed 01/14/2004   • Td:Adult type tetanus/diphtheria 01/14/2004   • Tdap 05/16/2022   • Tuberculin Skin Test Unspecified Formulation 06/25/2018   • Zostavax (Zoster Shingles) 03/20/2015     Past Medical History:   Diagnosis Date   • Calculus of kidney 10/11/07    Seen at [facility]   • Callus    • Chronic kidney disease, stage II (mild)  1/11/2008   • Difficulty initiating walking    • Diverticulosis of colon (without mention of hemorrhage)    • Dysmenorrhea 1/25/2006   • Essential hypertension, benign 12/14/2007   • Intramural leiomyoma of uterus 1/25/2006   • Left knee pain    • Measles without mention of complication     childhood   • Mumps without mention of complication     childhood   • Obesity, unspecified 9/16/2002   • Unspecified hypothyroidism    • Unspecified paranoid state 2002   • Varicella without mention of complication childhood     Past Surgical History:   Procedure Laterality Date   • Colonoscopy diagnostic  12/9/05    Dr. Holm/diagnostic brushing/WNL/recall 12/2015   • Colonoscopy diagnostic  December 20, 2013    Dr. Ronaldo Nayak.  repeat 2023   • Joint replacement     • Punc/aspir breast cyst  9/3/08    left breast cyst   • Total knee replacement Left 06/15/2021    Dr. Kurt acevesnnthgbz-ctrvtavz-[id number] not resurfaced     Social History     Tobacco Use   • Smoking status: Never Smoker   • Smokeless tobacco: Never Used   • Tobacco comment: no smokers in home   Substance Use Topics   • Alcohol use: Not Currently     Alcohol/week: 0.0 standard drinks     Comment: rare   • Drug use: No     Family History   Problem Relation Age of Onset   • Heart disease Mother    • Heart disease Father        I have reviewed the patient's medications and allergies, past medical, surgical, social and family history, updating these as appropriate.  See Histories section of the electronic medical record for a display of this information.    REVIEW OF SYSTEMS:    Constitutional:  Patient denies fever, chills, malaise, unintentional weight loss or gain.  Eyes:  Denies any change in visual acuity.  HEENT:  Denies sinus drainage/pain, ear pain, nasal congestion, nose bleeds, bleeding gums, or sore throat.  Respiratory:  Denies cough or shortness of breath.   Cardiovascular:  Denies chest pain, palpitations, dizziness, or decreased exercise tolerance over  the last 6 months.    Gastrointestinal:  Denies abdominal pain, heartburn, nausea, vomiting, diarrhea.  Denies black, bloody or tarry stools.  Genitourinary:  Denies painful urination, urinary frequency, blood in urine.  Neurologic:  Denies headache  Musculoskeletal:  Denies back pain or neck pain. Positive for leg swelling.   Lymphatics:  Denies painful or swollen glands.    PHYSICAL EXAM:  Vital Signs:    Visit Vitals  /72 (BP Location: RUE - Right upper extremity, Patient Position: Sitting, Cuff Size: Regular)   Pulse 84   Temp 97.5 °F (36.4 °C) (Oral)   Resp 20   Ht 5' (1.524 m)   Wt 84.5 kg (186 lb 3.2 oz)   LMP 12/08/2008   SpO2 99%   BMI 36.36 kg/m²     Pulse Ox Interpretation:  Within normal limits.  General:   Alert, cooperative, conversive in no acute distress.  Head:  Normocephalic, atraumatic.   Eyes:  Normal conjunctivae and sclerae. Extraocular movements intact.  ENT:  Wearing a mask.  Cardiovascular:  Symmetrical pulses.  Regular rate and rhythm without murmur.  Respiratory:  Normal respiratory effort.  Clear to auscultation.  No wheezes, rales or rhonchi.  Gastrointestinal:  Soft and non tender.   Musculoskeletal:  Mild pitting edema of lower extremities L > R. No deformity. No tenderness to palpation.  Back: Kyphosis.   Neurologic:  No focal deficits or lateralizing signs.  Psychiatric:  Cooperative.  Appropriate mood and affect.    LABORATORY DATA:    Lab Results   Component Value Date    SODIUM 140 09/06/2022    SODIUM 141 05/24/2022    POTASSIUM 4.2 09/06/2022    POTASSIUM 3.8 05/24/2022    CHLORIDE 108 09/06/2022    CHLORIDE 108 05/24/2022    CO2 26 09/06/2022    CO2 28 05/24/2022    BUN 29 (H) 09/06/2022    BUN 21 (H) 05/24/2022    CREATININE 0.91 09/06/2022    CREATININE 0.93 05/24/2022    GLUCOSE 78 09/06/2022    GLUCOSE 84 05/24/2022     Lab Results   Component Value Date    WBC 6.2 09/06/2022    WBC 5.2 05/24/2022    HCT 34.5 (L) 09/06/2022    HCT 31.8 (L) 05/24/2022    HGB 10.8 (L)  09/06/2022    HGB 9.7 (L) 05/24/2022     09/06/2022     05/24/2022     Lab Results   Component Value Date    GLUCOSE 78 09/06/2022    GLUCOSE 84 05/24/2022    SODIUM 140 09/06/2022    SODIUM 141 05/24/2022    POTASSIUM 4.2 09/06/2022    POTASSIUM 3.8 05/24/2022    CHLORIDE 108 09/06/2022    CHLORIDE 108 05/24/2022    BUN 29 (H) 09/06/2022    BUN 21 (H) 05/24/2022    CREATININE 0.91 09/06/2022    CREATININE 0.93 05/24/2022    CALCIUM 9.2 09/06/2022    CALCIUM 8.8 05/24/2022    ALBUMIN 3.5 (L) 05/24/2022    ALBUMIN 3.5 (L) 10/05/2021    AST 17 05/24/2022    AST 17 10/05/2021    ALKPT 76 05/24/2022    ALKPT 83 10/05/2021    GPT 20 05/24/2022    GPT 23 10/05/2021    ANIONGAP 10 09/06/2022    ANIONGAP 9 05/24/2022    BCRAT 32 (H) 09/06/2022    BCRAT 23 05/24/2022    GLOB 2.9 05/24/2022    GLOB 3.8 10/05/2021    AGR 1.2 05/24/2022    AGR 0.9 (L) 10/05/2021     Hemoglobin A1C (%)   Date Value   05/24/2022 5.2   10/05/2021 5.0     No results found for: INR  TSH (mcUnits/mL)   Date Value   07/25/2022 5.636 (H)   05/24/2022 5.874 (H)     Lab Results   Component Value Date    COL Straw 09/06/2022    UAPP Clear 09/06/2022    USPG 1.025 09/06/2022    UPH 5.5 09/06/2022    UPROT Negative 09/06/2022    UGLU Negative 09/06/2022    UKET Negative 09/06/2022    UBILI Negative 09/06/2022    URBC Negative 09/06/2022    UNITR Negative 09/06/2022    UROB 0.2 09/06/2022    UWBC Negative 09/06/2022     Lab Results   Component Value Date    CHOLESTEROL 163 05/24/2022    CHOLESTEROL 188 10/05/2021    HDL 50 05/24/2022    HDL 60 10/05/2021    CALCLDL 102 05/24/2022    CALCLDL 116 10/05/2021    TRIGLYCERIDE 54 05/24/2022    TRIGLYCERIDE 62 10/05/2021     Lab Results   Component Value Date    AST 17 05/24/2022    AST 17 10/05/2021    GPT 20 05/24/2022    GPT 23 10/05/2021    ALKPT 76 05/24/2022    ALKPT 83 10/05/2021    BILIRUBIN 0.3 05/24/2022    BILIRUBIN 0.3 10/05/2021     No results found for: PSA  No results found for:  CPK    IMAGING STUDIES:        XR CHEST PA AND LATERAL - 2 views    Electronically signed by: Nirali Dutta MA on 09/01/22 1418 Status: Completed   Ordering user: Nirali Dutta MA 09/01/22 1418 Authorized by: Talat Marcano DO   Ordering mode: Standard   Cosigning events   Electronically cosigned by Talat Marcano DO 09/01/22 1419 for Ordering   Frequency:  09/06/22 1407 - 1  occurrence Quantity: 1   Diagnoses   Pre-op evaluation [Z01.818]     Results  XR CHEST PA AND LATERAL - 2 views (Accession [id number]) (Order [id number])        End Exam    Date Time   Sep 6, 2022  2:32 PM         Associated Diagnoses    Pre-op evaluation           Sedation Narrator    Link to Narrator       IR Timeline    IR Event Log       PACS Images     Show images for XR CHEST PA AND LATERAL - 2 views      Result Information    Date and Time: Exam End: 9/6/2022 14:32 Status: Final result Provider Status: Reviewed   Priority: Routine              Study Result    Narrative & Impression   XR CHEST PA AND LATERAL 9/6/2022 2:32 PM     HISTORY: Encounter for other preprocedural examination.     COMPARISON: Chest radiograph 06/01/2021.     TECHNIQUE:  Frontal and lateral views of the chest.     FINDINGS:     The cardiomediastinal contour and pulmonary vasculature are within normal  limits. Calcific atherosclerosis of the thoracic aorta. No focal  consolidation. The bilateral lung apices are partially obscured by the  patient's chin. No pleural effusion or pneumothorax. Well-circumscribed  oval retrocardiac opacity with an air-fluid level, compatible with a hiatal  hernia.     The visualized osseous structures demonstrate no acute abnormality.           IMPRESSION:     1.  No acute cardiopulmonary findings.  2.  Moderate to large hiatal hernia.     I, Attending Radiologist Lianne Tran MD, have reviewed the images and  report and concur with these findings interpreted by Resident Radiologist,  Cipriano Jeffery DO.        ASSESSMENT / PLAN    Pre-op  evaluation  - Love Sandoval  is a 68 year old female  who is medically stable with no contraindications to the planned surgery. Reviewed final results of all preoperative labs and diagnostic studies. Stress test was performed. The risk of major cardiac events is low, no further cardiac workup required before proceeding to surgery. The anesthesia plan will be determined by the anesthesiologist in consultation with the surgeon.      1. Patient is medically optimized for surgery.   2. Perioperative management and restrictions as per the recommendation of the surgeon.  3. The patient is to avoid nonsteroidal anti-inflammatory drugs, aspirin and alcohol for the week preceding surgery.  4. All other chronic medications are to be continued unless an alternative plan was advised.    Iron deficiency  - ferrous sulfate 325 (65 FE) MG EC tablet; Take 1 tablet by mouth daily (with breakfast). With a meal.  Dispense: 180 tablet; Refill: 1    Need for vaccination  - INFLUENZA QUADRIVALENT HIGH DOSE PRES FREE 0.7 ML VACC,IM       Orders Placed This Encounter   • Influenza Quadrivalent Enhanced High Dose Split Pres Free 0.7 mL Pre-filled Vacc (Fluzone High Dose Quad; ages 65+ yrs) - [id number]   • ferrous sulfate 325 (65 FE) MG EC tablet       Return as scheduled.    Instructions provided as documented in the after visit summary.    The patient indicated understanding of the diagnosis and agreed with the plan of care.     I spent 33 minutes reviewing previous notes, seeing patient, going over test results with patient, providing counseling and education to patient, and documenting in the medical record.

## 2025-06-06 NOTE — CONSULTS
PLANNING:   [] [facility] Interdisciplinary Team has updated the ACP Navigator with Health Care Decision Maker and Patient Capacity      Confirm Advance Directive: None  Patient Would Like to Complete Advance Directive: No/refused    Current Code Status: Full Code     Goals of Care: Goals of Care and Interventions  Patient/Health Care Proxy Stated Goals: Recovery from acute illness  Life Goals  Patient and Family Personal Goals: Recover from acute illness    Please refer to Palliative Medicine ACP notes for further details.    PALLIATIVE ASSESSMENT:      Palliative Performance Scale (PPS):  PPS: 30    ECOG:   ECOG Status : Completely disabled [4]    Modified ESAS:  Modified-Caballo Symptom Assessment Scale (ESAS)  Pain Score: No pain  Dyspnea Score: 6    Clinical Pain Assessment (nonverbal scale for severity on nonverbal patients):   Clinical Pain Assessment  Severity: 0  Location: N/A  Character: N/A  Duration: N/A  Factors: N/A  Frequency: N/A       NVPS:       RDOS:  RDOS  Heart rate per minute:   Respiratory Rate per Minute: 19 to 30  Restlessness:non-purposeful: None  Paradoxical breathing pattern:abdomen moves in on inspiration: None  Accessory muscle use: rise in clavicle during inspiration: None  Grunting at end-expiration: guttural sound: None  Nasal flaring: involuntary movement of nares: None  Total : 2      Vital Signs: Blood pressure 110/64, pulse 86, temperature 98.2 °F (36.8 °C), temperature source Axillary, resp. rate 25, height 1.626 m (5' 4\"), weight 57 kg (125 lb 10.6 oz), SpO2 99%.    PHYSICAL ASSESSMENT:   General: [x] Oriented x3  [] Well appearing  [] Intubated  [x]Ill appearing  []Other:  Mental Status: [x] Normal mental status exam  [] Drowsy  [] Confused  []Other:  Cardiovascular: [] Regular rate/rhythm  [x] Arrhythmia  [] Other: rate controlled   Chest: [] Effort normal  []Lungs clear  [] Respiratory distress  [x]Tachypnea  [] Other: dyspnea with short conversation   Abdomen: []

## 2025-06-06 NOTE — PROGRESS NOTES
Cardiac Surgery ICU Progress Note    Admit Date: 2025  Readmission for hypoxia and hyponatremia    Procedure:       CABG 25 during prior admission with Dr. Ragsdale     Subjective:   Pt seen and examined. Discussed with Dr. Del Valle. Pt is lying bed voiding in [facility]. He is afebrile, afib rate 110 -128, on HFNC 35L 60% fiO2.      Objective:   Vitals:  Blood pressure 101/73, pulse (!) 119, temperature 98.2 °F (36.8 °C), temperature source Oral, resp. rate (!) 41, height 1.626 m (5' 4\"), weight 57 kg (125 lb 10.6 oz), SpO2 (!) 88%.  Temp (24hrs), Av.4 °F (36.9 °C), Min:98.2 °F (36.8 °C), Max:98.6 °F (37 °C)    NIPPV and HFNC    EKG/Rhythm:  NSR on tele this AM.    CXR:  Xray Result (most recent):  XR CHEST PORTABLE 2025    Narrative  INDICATION: respiratory failure    COMPARISON: 2025    FINDINGS: Single AP portable view of the chest demonstrates slightly improved  pneumomediastinum. Small bilateral pneumothoraces, slightly improved on the  right. No definite change on the left. Worsening diffuse interstitial and  airspace opacities. Right chest and neck emphysema decreased slightly.    Impression  1. Slight decrease in pneumomediastinum and right neck/chest wall emphysema.  2. Small bilateral pneumothoraces. Slight decrease on the right, unchanged on  the left.  3. Worsening pulmonary edema/pneumonia.    Electronically signed by Elvin Lopez       Admission Weight: Last Weight   Weight - Scale: 63.6 kg (140 lb 3.4 oz) Weight - Scale: 57 kg (125 lb 10.6 oz)     Intake / Output / Drain:  Current Shift: No intake/output data recorded.  Last 24 hrs.:   Intake/Output Summary (Last 24 hours) at 2025 1037  Last data filed at 2025 0600  Gross per 24 hour   Intake 1298.92 ml   Output 2500 ml   Net -1201.08 ml     EXAM:  General:  Lying in bed.             Lungs:   Coarse lung sounds throughout   Incision:  No erythema, drainage or swelling. Incision healed.   Heart:  Regular rhythm, S1, S2 normal,

## 2025-06-06 NOTE — PROGRESS NOTES
Pulmonary, Critical Care, and Sleep Medicine~Consult Note    Name: Hector Jensen Jr MRN: [id number]   : 1950 Hospital: [facility]   Date: 2025 2:42 PM Admission: 2025     Impression Plan   Acute Hypoxic Respiratory Failure, suspect related to pulmonary edema, lower suspicion for CAP, aspiration pneumonitis, Amiodarone induced pneumonitis/fibrosis. Query underlying MAC given bronchiectasis and RLL fibrosis?   CAD s/p CABG x 3, s/p NSTEMI  Afib  HFrEF  Acute on Chronic Hyponatremia   HTN/HLD  Small Bilateral Apical PTX with small Pneumomediastinum.    Oxygen to maintain goal saturation >90%, wean as able.   Sputum Culture-NGTD  Conservative management, no indication for pigtail at this time. No NIV with PTX.   CRP downtrending, improved oxygen requirements, can start weaning IVCS given underlying osteoporosis.   Continued diuresis, appreciated nephro's recs- increased, now 2mg Bumex Q8  Abx- Cefepime, WBC slight increase.   Pulmonary Toilet  Rate Control-metoprolol increased   TTE improved.   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     Discussed with RN, family and intensivist. We will continue to follow.     Patient is critically ill and at high risk for further decline, mechanical ventilation, and ICU admission due to AHRF; CC time spent excluding procedures is > 35 minutes      Daily Progression:  : Weight up 2 lb. More tachypneic and tachycardiac this AM. K 3.9, procal 0.1, WBC slight increase. Worsening pulmonary edema on CXR. No coughing, wheezing on exam.     : Feeling bettter. O2 requirements have decreased. Weaned to 35L 60%. Discussed results of TTE and CXR this AM.

## 2025-06-06 NOTE — PROGRESS NOTES
Pupils are equal, round, and reactive to light.   Cardiovascular:      Rate and Rhythm: Tachycardia present. Rhythm irregular.      Pulses: Normal pulses.      Heart sounds: Normal heart sounds. No murmur heard.  Pulmonary:      Effort: No respiratory distress (controlled).      Breath sounds: No wheezing, rhonchi or rales.      Comments: HFNC  Abdominal:      General: Bowel sounds are normal. There is no distension.      Palpations: Abdomen is soft.      Tenderness: There is no abdominal tenderness. There is no guarding or rebound.   Musculoskeletal:         General: No swelling or deformity. Normal range of motion.      Cervical back: Normal range of motion. No rigidity.      Comments: Thin extremities    Skin:     General: Skin is warm and dry.      Capillary Refill: Capillary refill takes less than 2 seconds.   Neurological:      General: No focal deficit present.      Mental Status: He is alert and oriented to person, place, and time. Mental status is at baseline.      Motor: Weakness (general) present.   Psychiatric:         Mood and Affect: Mood normal.         Behavior: Behavior normal.           LABS AND  DATA: Personally reviewed  Recent Labs     06/05/25 0417 06/06/25  0420   WBC 16.5* 18.4*   HGB 9.3* 10.5*   HCT 30.0* 31.9*    255     Recent Labs     06/05/25 0417 06/06/25  0420 06/06/25  0421   * 129*  --    K 4.2 3.9  --    CL 91* 95*  --    CO2 34* 27  --    BUN 37* 38*  --    MG 2.5*  --  2.5*     No results for input(s): \"TP\", \"GLOB\" in the last 72 hours.    Invalid input(s): \"SGOT\", \"GPT\", \"AP\", \"TBIL\", \"ALB\", \"AML\", \"AMYP\", \"LPSE\"    No results for input(s): \"INR\", \"APTT\" in the last 72 hours.    Invalid input(s): \"PTP\"     Invalid input(s): \"PHI\", \"PCO2I\", \"PO2I\", \"FIO2I\"  No results for input(s): \"CPK\", \"CKMB\" in the last 72 hours.    Invalid input(s): \"TROIQ\", \"BNPP\"    Ventilator Settings:  Mode Rate Tidal Volume Pressure FiO2 PEEP                    Peak airway pressure:       Minute ventilation:          MEDS: Reviewed    Chest X-Ray:  Reviewed      CRITICAL CARE DOCUMENTATION  I had a face to face encounter with the patient, reviewed and interpreted patient data including clinical events, labs, images, vital signs, I/O's, and examined patient.  I have discussed the case and the plan and management of the patient's care with the consulting services, the bedside nurses and the respiratory therapist.      NOTE OF PERSONAL INVOLVEMENT IN CARE   This patient has a high probability of imminent, clinically significant deterioration, which requires the highest level of preparedness to intervene urgently. I participated in the decision-making and personally managed or directed the management of the following life and organ supporting interventions that required my frequent assessment to treat or prevent imminent deterioration.    I personally spent 90 minutes of critical care time.  This is time spent at this critically ill patient's bedside actively involved in patient care as well as the coordination of care.  This does not include any procedural time which has been billed separately.      DO [facility]  06/06/25

## 2025-06-07 ENCOUNTER — APPOINTMENT (OUTPATIENT)
Facility: HOSPITAL | Age: 75
End: 2025-06-07
Payer: MEDICARE

## 2025-06-07 LAB
ANION GAP SERPL CALC-SCNC: 8 MMOL/L (ref 2–12)
BUN SERPL-MCNC: 60 MG/DL (ref 6–20)
BUN/CREAT SERPL: 83 (ref 12–20)
CALCIUM SERPL-MCNC: 9 MG/DL (ref 8.5–10.1)
CHLORIDE SERPL-SCNC: 96 MMOL/L (ref 97–108)
CO2 SERPL-SCNC: 29 MMOL/L (ref 21–32)
CREAT SERPL-MCNC: 0.72 MG/DL (ref 0.7–1.3)
ERYTHROCYTE [DISTWIDTH] IN BLOOD BY AUTOMATED COUNT: 13.2 % (ref 11.5–14.5)
GLUCOSE BLD STRIP.AUTO-MCNC: 108 MG/DL (ref 65–117)
GLUCOSE BLD STRIP.AUTO-MCNC: 129 MG/DL (ref 65–117)
GLUCOSE BLD STRIP.AUTO-MCNC: 177 MG/DL (ref 65–117)
GLUCOSE BLD STRIP.AUTO-MCNC: 195 MG/DL (ref 65–117)
GLUCOSE SERPL-MCNC: 119 MG/DL (ref 65–100)
HCT VFR BLD AUTO: 35.6 % (ref 36.6–50.3)
HGB BLD-MCNC: 11.7 G/DL (ref 12.1–17)
MAGNESIUM SERPL-MCNC: 2.6 MG/DL (ref 1.6–2.4)
MCH RBC QN AUTO: 29 PG (ref 26–34)
MCHC RBC AUTO-ENTMCNC: 32.9 G/DL (ref 30–36.5)
MCV RBC AUTO: 88.1 FL (ref 80–99)
NRBC # BLD: 0 K/UL (ref 0–0.01)
NRBC BLD-RTO: 0 PER 100 WBC
PLATELET # BLD AUTO: 289 K/UL (ref 150–400)
PMV BLD AUTO: 10.2 FL (ref 8.9–12.9)
POTASSIUM SERPL-SCNC: 3.6 MMOL/L (ref 3.5–5.1)
PROCALCITONIN SERPL-MCNC: 0.08 NG/ML
RBC # BLD AUTO: 4.04 M/UL (ref 4.1–5.7)
SERVICE CMNT-IMP: ABNORMAL
SERVICE CMNT-IMP: NORMAL
SODIUM SERPL-SCNC: 133 MMOL/L (ref 136–145)
WBC # BLD AUTO: 21.9 K/UL (ref 4.1–11.1)

## 2025-06-07 PROCEDURE — 2500000003 HC RX 250 WO HCPCS: Performed by: INTERNAL MEDICINE

## 2025-06-07 PROCEDURE — 80048 BASIC METABOLIC PNL TOTAL CA: CPT

## 2025-06-07 PROCEDURE — 6370000000 HC RX 637 (ALT 250 FOR IP): Performed by: INTERNAL MEDICINE

## 2025-06-07 PROCEDURE — 83735 ASSAY OF MAGNESIUM: CPT

## 2025-06-07 PROCEDURE — 85027 COMPLETE CBC AUTOMATED: CPT

## 2025-06-07 PROCEDURE — 2580000003 HC RX 258: Performed by: STUDENT IN AN ORGANIZED HEALTH CARE EDUCATION/TRAINING PROGRAM

## 2025-06-07 PROCEDURE — 6370000000 HC RX 637 (ALT 250 FOR IP): Performed by: NURSE PRACTITIONER

## 2025-06-07 PROCEDURE — 2000000000 HC ICU R&B

## 2025-06-07 PROCEDURE — 6370000000 HC RX 637 (ALT 250 FOR IP)

## 2025-06-07 PROCEDURE — 94640 AIRWAY INHALATION TREATMENT: CPT

## 2025-06-07 PROCEDURE — 6360000002 HC RX W HCPCS: Performed by: STUDENT IN AN ORGANIZED HEALTH CARE EDUCATION/TRAINING PROGRAM

## 2025-06-07 PROCEDURE — 82962 GLUCOSE BLOOD TEST: CPT

## 2025-06-07 PROCEDURE — P9047 ALBUMIN (HUMAN), 25%, 50ML: HCPCS | Performed by: STUDENT IN AN ORGANIZED HEALTH CARE EDUCATION/TRAINING PROGRAM

## 2025-06-07 PROCEDURE — 6370000000 HC RX 637 (ALT 250 FOR IP): Performed by: STUDENT IN AN ORGANIZED HEALTH CARE EDUCATION/TRAINING PROGRAM

## 2025-06-07 PROCEDURE — 84145 PROCALCITONIN (PCT): CPT

## 2025-06-07 PROCEDURE — 2500000003 HC RX 250 WO HCPCS: Performed by: STUDENT IN AN ORGANIZED HEALTH CARE EDUCATION/TRAINING PROGRAM

## 2025-06-07 PROCEDURE — 93005 ELECTROCARDIOGRAM TRACING: CPT | Performed by: STUDENT IN AN ORGANIZED HEALTH CARE EDUCATION/TRAINING PROGRAM

## 2025-06-07 PROCEDURE — 71045 X-RAY EXAM CHEST 1 VIEW: CPT

## 2025-06-07 RX ORDER — DIGOXIN 0.25 MG/ML
250 INJECTION INTRAMUSCULAR; INTRAVENOUS ONCE
Status: COMPLETED | OUTPATIENT
Start: 2025-06-07 | End: 2025-06-07

## 2025-06-07 RX ORDER — POTASSIUM CHLORIDE 750 MG/1
40 TABLET, EXTENDED RELEASE ORAL ONCE
Status: COMPLETED | OUTPATIENT
Start: 2025-06-07 | End: 2025-06-07

## 2025-06-07 RX ORDER — ALBUMIN (HUMAN) 12.5 G/50ML
25 SOLUTION INTRAVENOUS ONCE
Status: COMPLETED | OUTPATIENT
Start: 2025-06-07 | End: 2025-06-07

## 2025-06-07 RX ADMIN — ACETAMINOPHEN 650 MG: 325 TABLET ORAL at 14:03

## 2025-06-07 RX ADMIN — SENNOSIDES AND DOCUSATE SODIUM 2 TABLET: 50; 8.6 TABLET ORAL at 08:46

## 2025-06-07 RX ADMIN — SALINE NASAL SPRAY 1 SPRAY: 1.5 SOLUTION NASAL at 08:50

## 2025-06-07 RX ADMIN — ASPIRIN 81 MG: 81 TABLET, COATED ORAL at 08:45

## 2025-06-07 RX ADMIN — APIXABAN 5 MG: 5 TABLET, FILM COATED ORAL at 08:45

## 2025-06-07 RX ADMIN — INSULIN LISPRO 1 UNITS: 100 INJECTION, SOLUTION INTRAVENOUS; SUBCUTANEOUS at 17:12

## 2025-06-07 RX ADMIN — METHOCARBAMOL 500 MG: 500 TABLET ORAL at 08:45

## 2025-06-07 RX ADMIN — BUMETANIDE 2 MG: 0.25 INJECTION INTRAMUSCULAR; INTRAVENOUS at 11:55

## 2025-06-07 RX ADMIN — ATORVASTATIN CALCIUM 40 MG: 40 TABLET, FILM COATED ORAL at 20:27

## 2025-06-07 RX ADMIN — METHOCARBAMOL 500 MG: 500 TABLET ORAL at 20:27

## 2025-06-07 RX ADMIN — BUMETANIDE 2 MG: 0.25 INJECTION INTRAMUSCULAR; INTRAVENOUS at 03:44

## 2025-06-07 RX ADMIN — ALBUMIN (HUMAN) 25 G: 0.25 INJECTION, SOLUTION INTRAVENOUS at 17:11

## 2025-06-07 RX ADMIN — CEFEPIME 2000 MG: 2 INJECTION, POWDER, FOR SOLUTION INTRAVENOUS at 02:05

## 2025-06-07 RX ADMIN — IPRATROPIUM BROMIDE AND ALBUTEROL SULFATE 1 DOSE: 2.5; .5 SOLUTION RESPIRATORY (INHALATION) at 12:02

## 2025-06-07 RX ADMIN — DIGOXIN 250 MCG: 0.25 INJECTION INTRAMUSCULAR; INTRAVENOUS at 15:46

## 2025-06-07 RX ADMIN — IPRATROPIUM BROMIDE AND ALBUTEROL SULFATE 1 DOSE: 2.5; .5 SOLUTION RESPIRATORY (INHALATION) at 19:11

## 2025-06-07 RX ADMIN — OXYCODONE 2.5 MG: 5 TABLET ORAL at 02:07

## 2025-06-07 RX ADMIN — BUMETANIDE 2 MG: 0.25 INJECTION INTRAMUSCULAR; INTRAVENOUS at 20:27

## 2025-06-07 RX ADMIN — DEXMEDETOMIDINE HYDROCHLORIDE 0.2 MCG/KG/HR: 400 INJECTION, SOLUTION INTRAVENOUS at 05:09

## 2025-06-07 RX ADMIN — Medication 6 MG: at 20:27

## 2025-06-07 RX ADMIN — METOPROLOL SUCCINATE 25 MG: 50 TABLET, EXTENDED RELEASE ORAL at 09:08

## 2025-06-07 RX ADMIN — ACETAMINOPHEN 650 MG: 325 TABLET ORAL at 20:32

## 2025-06-07 RX ADMIN — DIGOXIN 250 MCG: 0.25 INJECTION INTRAMUSCULAR; INTRAVENOUS at 10:19

## 2025-06-07 RX ADMIN — SENNOSIDES AND DOCUSATE SODIUM 2 TABLET: 50; 8.6 TABLET ORAL at 20:27

## 2025-06-07 RX ADMIN — DICLOFENAC SODIUM 2 G: 10 GEL TOPICAL at 08:52

## 2025-06-07 RX ADMIN — CEFEPIME 2000 MG: 2 INJECTION, POWDER, FOR SOLUTION INTRAVENOUS at 10:16

## 2025-06-07 RX ADMIN — METHYLPREDNISOLONE SODIUM SUCCINATE 40 MG: 40 INJECTION, POWDER, LYOPHILIZED, FOR SOLUTION INTRAMUSCULAR; INTRAVENOUS at 17:08

## 2025-06-07 RX ADMIN — IPRATROPIUM BROMIDE AND ALBUTEROL SULFATE 1 DOSE: 2.5; .5 SOLUTION RESPIRATORY (INHALATION) at 07:37

## 2025-06-07 RX ADMIN — IPRATROPIUM BROMIDE AND ALBUTEROL SULFATE 1 DOSE: 2.5; .5 SOLUTION RESPIRATORY (INHALATION) at 15:44

## 2025-06-07 RX ADMIN — METOPROLOL SUCCINATE 25 MG: 50 TABLET, EXTENDED RELEASE ORAL at 20:27

## 2025-06-07 RX ADMIN — OXYCODONE 2.5 MG: 5 TABLET ORAL at 11:55

## 2025-06-07 RX ADMIN — SODIUM CHLORIDE, PRESERVATIVE FREE 10 ML: 5 INJECTION INTRAVENOUS at 20:29

## 2025-06-07 RX ADMIN — APIXABAN 5 MG: 5 TABLET, FILM COATED ORAL at 20:31

## 2025-06-07 RX ADMIN — METHYLPREDNISOLONE SODIUM SUCCINATE 40 MG: 40 INJECTION, POWDER, LYOPHILIZED, FOR SOLUTION INTRAMUSCULAR; INTRAVENOUS at 05:29

## 2025-06-07 RX ADMIN — ACETAMINOPHEN 650 MG: 325 TABLET ORAL at 05:29

## 2025-06-07 RX ADMIN — Medication 15 G: at 08:46

## 2025-06-07 RX ADMIN — POTASSIUM CHLORIDE 40 MEQ: 750 TABLET, FILM COATED, EXTENDED RELEASE ORAL at 10:17

## 2025-06-07 ASSESSMENT — PAIN DESCRIPTION - ONSET: ONSET: GRADUAL

## 2025-06-07 ASSESSMENT — PAIN DESCRIPTION - ORIENTATION
ORIENTATION: MID
ORIENTATION: MID
ORIENTATION: LOWER
ORIENTATION: MID

## 2025-06-07 ASSESSMENT — PAIN SCALES - GENERAL
PAINLEVEL_OUTOF10: 3
PAINLEVEL_OUTOF10: 6
PAINLEVEL_OUTOF10: 7
PAINLEVEL_OUTOF10: 5
PAINLEVEL_OUTOF10: 0
PAINLEVEL_OUTOF10: 7
PAINLEVEL_OUTOF10: 6

## 2025-06-07 ASSESSMENT — PAIN DESCRIPTION - LOCATION
LOCATION: BACK

## 2025-06-07 ASSESSMENT — PAIN DESCRIPTION - DESCRIPTORS
DESCRIPTORS: ACHING

## 2025-06-07 ASSESSMENT — PAIN DESCRIPTION - PAIN TYPE
TYPE: CHRONIC PAIN

## 2025-06-07 ASSESSMENT — PAIN DESCRIPTION - FREQUENCY: FREQUENCY: INTERMITTENT

## 2025-06-07 ASSESSMENT — PAIN - FUNCTIONAL ASSESSMENT: PAIN_FUNCTIONAL_ASSESSMENT: ACTIVITIES ARE NOT PREVENTED

## 2025-06-07 NOTE — PROGRESS NOTES
1000 Pt's HR remains 120-130's, sinus tach to A Fib. Dr. Escamilla notified and orders received for one time dose of Digoxin IV.     1100 Pt's son in law asking about pt receiving weekly dose of Alendronate for osteoporosis on Monday. I spoke with pharmacy who states that they do not carry this medication nor allow pt's to bring in home doses as it can be held until pt is discharged/has side effects when pt has prolonged periods laying in bed such as when in hospital.     1200 Pt continues to fluctuate between sinus tach and A fib with 's-130's. Dr. Escamilla notified and orders received to continue to monitor and will consider another dose of digoxin or metoprolol if pt sustains HR > 130's.     1500 Pt asleep in recliner, BP 78/60 (MAP 66) with HR still 100's-130's, fluctuating between A fib and ST. Dr. Escamilla notified, will continue to monitor.     1545 Pt continues to fluctuate between sinus tach and A fib with 's-130's, Dr. Escamilla notified. Orders received for one time dose of Digoxin IV.    1648 Pt's HR sustaining 130's, fluctuating between sinus tach and A fib. Dr. Escamilla notified and orders received for EKG.

## 2025-06-07 NOTE — PROGRESS NOTES
Pulmonary, Critical Care, and Sleep Medicine~Consult Note    Name: Hector Jensen Jr MRN: [id number]   : 1950 Hospital: [facility]   Date: 2025 1:23 PM Admission: 2025     Impression Plan   Acute Hypoxic Respiratory Failure, suspect  pulmonary edema, lower suspicion for CAP superimposed on chronic ILD  ILD- CT is grossly abnormal with an element of chronic intra lung disease.  Etiology of fibrosis is unclear although differential includes aspiration pneumonitis, Amiodarone induced pneumonitis/fibrosis. Query underlying MAC given bronchiectasis and RLL fibrosis?  Autoimmune/rheumatologic serologies negative  CAD s/p CABG x 3, s/p NSTEMI  Afib  HFrEF  Acute on Chronic Hyponatremia   HTN/HLD  Small Bilateral Apical PTX with small Pneumomediastinum.    Oxygen to maintain goal saturation >90%, wean as able.  Currently on 13 L nasal cannula  Sputum Culture-NGTD  Conservative management, no indication for pigtail at this time. No NIV with PTX.   CRP downtrending, improved oxygen requirements, can start weaning IVCS given underlying osteoporosis.   Continued diuresis, appreciated nephro's recs  Abx- Cefepime,  Pulmonary Toilet  Rate Control-metoprolol increased   TTE improved.   Eliquis  Lower suspicion for amiodarione induced fibro proliferative disease but if it is deemed unnecessary by cardiology I would be in favor of discontinuing, agree with holding for now  Will need repeat imaging in 4-6 weeks.     Discussed with son-in-law at bedside.  Patient will need close outpatient follow-up.  We will ensure hospital follow-up visit at time of discharge.    Patient was thank you patient reasonable Will have ordered x-ray for an antibiotic tomorrow I will x-ray for Monday morning is tomorrow acute he is staying what time steroids and and I do and then Monday morning will get x-ray and if is that we cannot IR A Monday got also they said they have help guide appreciated  Patient is  (TYLENOL) suppository 650 mg  650 mg Rectal Q6H PRN    ondansetron (ZOFRAN) injection 4 mg  4 mg IntraVENous Q6H PRN    aspirin EC tablet 81 mg  81 mg Oral Daily    lidocaine 4 % external patch 1 patch  1 patch TransDERmal Daily       Labs:  ABG Invalid input(s): \"ABG\"     CBC Recent Labs     06/05/25 0417 06/06/25  0420 06/07/25  0348   WBC 16.5* 18.4* 21.9*   HGB 9.3* 10.5* 11.7*   HCT 30.0* 31.9* 35.6*    255 289   MCV 93.8 88.6 88.1   MCH 29.1 29.2 29.0        Metabolic  Panel Recent Labs     06/05/25 0417 06/06/25 0420 06/06/25 0421 06/07/25  0348   * 129*  --  133*   K 4.2 3.9  --  3.6   CL 91* 95*  --  96*   CO2 34* 27  --  29   BUN 37* 38*  --  60*   MG 2.5*  --  2.5* 2.6*        Pertinent Labs                Luis Armando Sena DO  6/7/2025

## 2025-06-07 NOTE — PROGRESS NOTES
SCD  T/L/D: PIV  SUP: PPI  Diet: Regular  Activity Level: Out of bed to chair, and increase mobility. Advance with decreased oxygen requirement  ABCDEF Bundle/Checklist Completed: Yes  Disposition: ICU   Multidisciplinary Rounds Completed: Yes  Patient/Family Updated: Family at bedside.      POD:  * No surgery found *    S/P:       Active Problem List:     [unfilled]    Past Medical History:      has a past medical history of CAD (coronary artery disease), Colon polyp, Environmental and seasonal allergies, Essential hypertension, High cholesterol, Stroke (HCC), and Sun-damaged skin.    Past Surgical History:      has a past surgical history that includes Elbow surgery (Left); pr unlisted procedure cardiac surgery; Colonoscopy; Carotid endarterectomy (Right); Colonoscopy (N/A, 12/29/2023); Colonoscopy (N/A, 12/29/2023); Cardiac procedure (N/A, 5/12/2025); invasive vascular (N/A, 5/12/2025); and Coronary artery bypass graft (N/A, 5/14/2025).    Home Medications:     Prior to Admission medications    Medication Sig Start Date End Date Taking? Authorizing Provider   acetaminophen (TYLENOL) 500 MG tablet Take 2 tablets by mouth every 6 hours as needed for Pain 5/20/25 6/4/25  Susu Brandon PA-C   amiodarone (CORDARONE) 200 MG tablet Take 2 tablets by mouth 2 times daily for 15 days, THEN 1 tablet 2 times daily for 10 days. 5/20/25 6/14/25  Susu Brandon PA-C   apixaban (ELIQUIS) 5 MG TABS tablet Take 1 tablet by mouth 2 times daily 5/20/25   Susu Brandon PA-C   atorvastatin (LIPITOR) 40 MG tablet Take 1 tablet by mouth nightly 5/20/25   Susu Brandon PA-C   metoprolol tartrate (LOPRESSOR) 25 MG tablet Take 0.5 tablets by mouth 2 times daily 5/20/25   Susu Brandon PA-C   lidocaine 4 % external patch Apply 2 patches topically daily 5/21/25   Susu Brandon PA-C   furosemide (LASIX) 40 MG tablet Take 1 tablet by mouth daily for 7 days 5/20/25 5/27/25  Susu Brandon PA-C   magnesium oxide (MAG-OX) 400 (240 Mg)  hour   Intake 1489.02 ml   Output 3200 ml   Net -1710.98 ml         Physical Exam  Vitals reviewed.   Constitutional:       Appearance: He is not ill-appearing or diaphoretic.   HENT:      Head: Normocephalic and atraumatic.      Nose: Nose normal.      Mouth/Throat:      Mouth: Mucous membranes are dry.   Eyes:      Extraocular Movements: Extraocular movements intact.      Pupils: Pupils are equal, round, and reactive to light.   Cardiovascular:      Rate and Rhythm: Tachycardia present. Rhythm irregular.      Pulses: Normal pulses.      Heart sounds: Normal heart sounds. No murmur heard.  Pulmonary:      Effort: No respiratory distress (controlled).      Breath sounds: No wheezing, rhonchi or rales.      Comments: Mid-flow  Abdominal:      General: Bowel sounds are normal. There is no distension.      Palpations: Abdomen is soft.      Tenderness: There is no abdominal tenderness. There is no guarding or rebound.   Musculoskeletal:         General: No swelling or deformity. Normal range of motion.      Cervical back: Normal range of motion. No rigidity.      Comments: Thin extremities    Skin:     General: Skin is warm and dry.      Capillary Refill: Capillary refill takes less than 2 seconds.   Neurological:      General: No focal deficit present.      Mental Status: He is alert and oriented to person, place, and time. Mental status is at baseline.      Motor: Weakness (general, improving) present.   Psychiatric:         Mood and Affect: Mood normal.         Behavior: Behavior normal.           LABS AND  DATA: Personally reviewed  Recent Labs     06/06/25 0420 06/07/25  0348   WBC 18.4* 21.9*   HGB 10.5* 11.7*   HCT 31.9* 35.6*    289     Recent Labs     06/06/25  0420 06/06/25  0421 06/07/25  0348   *  --  133*   K 3.9  --  3.6   CL 95*  --  96*   CO2 27  --  29   BUN 38*  --  60*   MG  --  2.5* 2.6*     No results for input(s): \"TP\", \"GLOB\" in the last 72 hours.    Invalid input(s): \"SGOT\", \"GPT\",

## 2025-06-07 NOTE — PROGRESS NOTES
Name: Hector Jensen Jr MRN: [id number]   : 1950 Hospital: [facility]   Date: 2025        IMPRESSION:   Hypervolemic hyponatremia of advanced CHF. Improving with diuresis-s/p hypertonic saline and Bumex  Normal renal function  CHF- 45-50%-on iv bumex, in negative fluid balance. CT finding consistent with pulmonary congestion-on Hi-blanca O2  metabolic alkalosis due to diuresis-s/p one dose Diamox 250 mg IV 25-improved  Hypotension        PLAN:       Recommend switching to Bumex drip.    Using hypertonic saline might increase diuresis transiently but overall increases body sodium load and eventually more fluid retention   fluid restriction  1.2 L/day.  Restart Ure-Na 15 gr BID-it helps diuresis too (osmotic diuresis)  Off  trazodone   Off amiodarone for possible lung toxicity  Increase protein intake, prot supplements bid  F/u serum sodium and RFT    Discussed with Dr Escamilla       Subjective/Interval History:   I have reviewed the flowsheet and previous day’s notes.    ROS:Pertinent items are noted in HPI.    25 Doing better. No new issues.      2025    No major complaints open this morning.  He worked with therapy team yesterday.      2025.    The patient was sleeping.  Events reviewed with Giovanna [daughter-in-law] at the bedside.    -feel ok, on hiflo O2, no edema, has dry crackles-Na 132 this am-in neg fluid balance  6/3-sitting in chair, still on Hi-blanca O2-low appetrite. Moved bowel  -no change , remains on high flow O2, no significant diuresis with extra dose of bumex  -patient feels better today.  Still requires high flow oxygen.  Diuresed well after hypertonic saline and Bumex.  But he dropped his blood pressure.  Sodium is improving    -remains hypoxic on high flow oxygen.  Fluid balance -1200 but has gained 2 kg!  Sodium down to 129. CXR with worsening pulmonary edema     awake and alert, sitting up in chair.  Finish partial breakfast.  Son-in-law at

## 2025-06-08 ENCOUNTER — APPOINTMENT (OUTPATIENT)
Facility: HOSPITAL | Age: 75
End: 2025-06-08
Payer: MEDICARE

## 2025-06-08 LAB
ANION GAP SERPL CALC-SCNC: 7 MMOL/L (ref 2–12)
BUN SERPL-MCNC: 62 MG/DL (ref 6–20)
BUN/CREAT SERPL: 70 (ref 12–20)
CALCIUM SERPL-MCNC: 9.4 MG/DL (ref 8.5–10.1)
CHLORIDE SERPL-SCNC: 95 MMOL/L (ref 97–108)
CO2 SERPL-SCNC: 30 MMOL/L (ref 21–32)
CREAT SERPL-MCNC: 0.88 MG/DL (ref 0.7–1.3)
ERYTHROCYTE [DISTWIDTH] IN BLOOD BY AUTOMATED COUNT: 13.2 % (ref 11.5–14.5)
GLUCOSE BLD STRIP.AUTO-MCNC: 122 MG/DL (ref 65–117)
GLUCOSE BLD STRIP.AUTO-MCNC: 128 MG/DL (ref 65–117)
GLUCOSE BLD STRIP.AUTO-MCNC: 152 MG/DL (ref 65–117)
GLUCOSE BLD STRIP.AUTO-MCNC: 184 MG/DL (ref 65–117)
GLUCOSE SERPL-MCNC: 136 MG/DL (ref 65–100)
HCT VFR BLD AUTO: 36.7 % (ref 36.6–50.3)
HGB BLD-MCNC: 11.5 G/DL (ref 12.1–17)
MAGNESIUM SERPL-MCNC: 2.7 MG/DL (ref 1.6–2.4)
MCH RBC QN AUTO: 29.1 PG (ref 26–34)
MCHC RBC AUTO-ENTMCNC: 31.3 G/DL (ref 30–36.5)
MCV RBC AUTO: 92.9 FL (ref 80–99)
NRBC # BLD: 0 K/UL (ref 0–0.01)
NRBC BLD-RTO: 0 PER 100 WBC
PLATELET # BLD AUTO: 262 K/UL (ref 150–400)
PMV BLD AUTO: 10.2 FL (ref 8.9–12.9)
POTASSIUM SERPL-SCNC: 4.1 MMOL/L (ref 3.5–5.1)
RBC # BLD AUTO: 3.95 M/UL (ref 4.1–5.7)
SERVICE CMNT-IMP: ABNORMAL
SODIUM SERPL-SCNC: 132 MMOL/L (ref 136–145)
WBC # BLD AUTO: 22.3 K/UL (ref 4.1–11.1)

## 2025-06-08 PROCEDURE — 2500000003 HC RX 250 WO HCPCS: Performed by: INTERNAL MEDICINE

## 2025-06-08 PROCEDURE — 6370000000 HC RX 637 (ALT 250 FOR IP): Performed by: INTERNAL MEDICINE

## 2025-06-08 PROCEDURE — 2000000000 HC ICU R&B

## 2025-06-08 PROCEDURE — 94640 AIRWAY INHALATION TREATMENT: CPT

## 2025-06-08 PROCEDURE — 71045 X-RAY EXAM CHEST 1 VIEW: CPT

## 2025-06-08 PROCEDURE — 94760 N-INVAS EAR/PLS OXIMETRY 1: CPT

## 2025-06-08 PROCEDURE — 85027 COMPLETE CBC AUTOMATED: CPT

## 2025-06-08 PROCEDURE — 6360000002 HC RX W HCPCS: Performed by: STUDENT IN AN ORGANIZED HEALTH CARE EDUCATION/TRAINING PROGRAM

## 2025-06-08 PROCEDURE — 80048 BASIC METABOLIC PNL TOTAL CA: CPT

## 2025-06-08 PROCEDURE — 82962 GLUCOSE BLOOD TEST: CPT

## 2025-06-08 PROCEDURE — 6370000000 HC RX 637 (ALT 250 FOR IP)

## 2025-06-08 PROCEDURE — 6360000002 HC RX W HCPCS: Performed by: PHYSICAL MEDICINE & REHABILITATION

## 2025-06-08 PROCEDURE — 6370000000 HC RX 637 (ALT 250 FOR IP): Performed by: NURSE PRACTITIONER

## 2025-06-08 PROCEDURE — 6370000000 HC RX 637 (ALT 250 FOR IP): Performed by: STUDENT IN AN ORGANIZED HEALTH CARE EDUCATION/TRAINING PROGRAM

## 2025-06-08 PROCEDURE — 2500000003 HC RX 250 WO HCPCS: Performed by: STUDENT IN AN ORGANIZED HEALTH CARE EDUCATION/TRAINING PROGRAM

## 2025-06-08 PROCEDURE — 83735 ASSAY OF MAGNESIUM: CPT

## 2025-06-08 RX ORDER — BUDESONIDE 0.5 MG/2ML
0.5 INHALANT ORAL
Status: DISCONTINUED | OUTPATIENT
Start: 2025-06-08 | End: 2025-06-13 | Stop reason: HOSPADM

## 2025-06-08 RX ORDER — DEXMEDETOMIDINE HYDROCHLORIDE 4 UG/ML
.1-1 INJECTION, SOLUTION INTRAVENOUS CONTINUOUS
Status: DISPENSED | OUTPATIENT
Start: 2025-06-08 | End: 2025-06-09

## 2025-06-08 RX ORDER — DIGOXIN 0.25 MG/ML
250 INJECTION INTRAMUSCULAR; INTRAVENOUS ONCE
Status: COMPLETED | OUTPATIENT
Start: 2025-06-08 | End: 2025-06-08

## 2025-06-08 RX ORDER — DIGOXIN 125 MCG
125 TABLET ORAL DAILY
Status: DISCONTINUED | OUTPATIENT
Start: 2025-06-09 | End: 2025-06-12

## 2025-06-08 RX ORDER — LORAZEPAM 1 MG/1
0.5 TABLET ORAL EVERY 8 HOURS PRN
Status: DISCONTINUED | OUTPATIENT
Start: 2025-06-08 | End: 2025-06-09

## 2025-06-08 RX ORDER — CASTOR OIL AND BALSAM, PERU 788; 87 MG/G; MG/G
OINTMENT TOPICAL 2 TIMES DAILY
Status: DISCONTINUED | OUTPATIENT
Start: 2025-06-08 | End: 2025-06-13 | Stop reason: HOSPADM

## 2025-06-08 RX ADMIN — ATORVASTATIN CALCIUM 40 MG: 40 TABLET, FILM COATED ORAL at 20:21

## 2025-06-08 RX ADMIN — SENNOSIDES AND DOCUSATE SODIUM 2 TABLET: 50; 8.6 TABLET ORAL at 20:21

## 2025-06-08 RX ADMIN — IPRATROPIUM BROMIDE AND ALBUTEROL SULFATE 1 DOSE: 2.5; .5 SOLUTION RESPIRATORY (INHALATION) at 11:28

## 2025-06-08 RX ADMIN — INSULIN LISPRO 1 UNITS: 100 INJECTION, SOLUTION INTRAVENOUS; SUBCUTANEOUS at 20:20

## 2025-06-08 RX ADMIN — IPRATROPIUM BROMIDE AND ALBUTEROL SULFATE 1 DOSE: 2.5; .5 SOLUTION RESPIRATORY (INHALATION) at 19:54

## 2025-06-08 RX ADMIN — IPRATROPIUM BROMIDE AND ALBUTEROL SULFATE 1 DOSE: 2.5; .5 SOLUTION RESPIRATORY (INHALATION) at 16:08

## 2025-06-08 RX ADMIN — METHOCARBAMOL 500 MG: 500 TABLET ORAL at 09:03

## 2025-06-08 RX ADMIN — POLYETHYLENE GLYCOL 3350 17 G: 17 POWDER, FOR SOLUTION ORAL at 09:04

## 2025-06-08 RX ADMIN — SODIUM CHLORIDE, PRESERVATIVE FREE 10 ML: 5 INJECTION INTRAVENOUS at 20:22

## 2025-06-08 RX ADMIN — OXYCODONE 2.5 MG: 5 TABLET ORAL at 06:35

## 2025-06-08 RX ADMIN — TRAZODONE HYDROCHLORIDE 50 MG: 50 TABLET ORAL at 00:16

## 2025-06-08 RX ADMIN — METHOCARBAMOL 500 MG: 500 TABLET ORAL at 20:21

## 2025-06-08 RX ADMIN — IPRATROPIUM BROMIDE AND ALBUTEROL SULFATE 1 DOSE: 2.5; .5 SOLUTION RESPIRATORY (INHALATION) at 07:34

## 2025-06-08 RX ADMIN — Medication: at 20:21

## 2025-06-08 RX ADMIN — DIGOXIN 250 MCG: 0.25 INJECTION INTRAMUSCULAR; INTRAVENOUS at 11:04

## 2025-06-08 RX ADMIN — METHYLPREDNISOLONE SODIUM SUCCINATE 40 MG: 40 INJECTION, POWDER, LYOPHILIZED, FOR SOLUTION INTRAMUSCULAR; INTRAVENOUS at 06:34

## 2025-06-08 RX ADMIN — METHYLPREDNISOLONE SODIUM SUCCINATE 20 MG: 40 INJECTION INTRAMUSCULAR; INTRAVENOUS at 18:33

## 2025-06-08 RX ADMIN — BUMETANIDE 2 MG: 0.25 INJECTION INTRAMUSCULAR; INTRAVENOUS at 04:02

## 2025-06-08 RX ADMIN — SODIUM CHLORIDE, PRESERVATIVE FREE 10 ML: 5 INJECTION INTRAVENOUS at 09:05

## 2025-06-08 RX ADMIN — ASPIRIN 81 MG: 81 TABLET, COATED ORAL at 09:03

## 2025-06-08 RX ADMIN — FLUTICASONE PROPIONATE 1 SPRAY: 50 SPRAY, METERED NASAL at 05:21

## 2025-06-08 RX ADMIN — APIXABAN 5 MG: 5 TABLET, FILM COATED ORAL at 20:35

## 2025-06-08 RX ADMIN — METOPROLOL SUCCINATE 25 MG: 50 TABLET, EXTENDED RELEASE ORAL at 09:03

## 2025-06-08 RX ADMIN — Medication: at 11:01

## 2025-06-08 RX ADMIN — BUMETANIDE 2 MG: 0.25 INJECTION INTRAMUSCULAR; INTRAVENOUS at 12:03

## 2025-06-08 RX ADMIN — OXYCODONE 2.5 MG: 5 TABLET ORAL at 00:16

## 2025-06-08 RX ADMIN — BUDESONIDE 500 MCG: 0.5 INHALANT RESPIRATORY (INHALATION) at 07:39

## 2025-06-08 RX ADMIN — SENNOSIDES AND DOCUSATE SODIUM 2 TABLET: 50; 8.6 TABLET ORAL at 09:03

## 2025-06-08 RX ADMIN — OXYCODONE 2.5 MG: 5 TABLET ORAL at 18:32

## 2025-06-08 RX ADMIN — METOPROLOL SUCCINATE 25 MG: 50 TABLET, EXTENDED RELEASE ORAL at 20:21

## 2025-06-08 RX ADMIN — ACETAMINOPHEN 650 MG: 325 TABLET ORAL at 09:11

## 2025-06-08 RX ADMIN — APIXABAN 5 MG: 5 TABLET, FILM COATED ORAL at 09:03

## 2025-06-08 RX ADMIN — BUDESONIDE 500 MCG: 0.5 INHALANT RESPIRATORY (INHALATION) at 19:54

## 2025-06-08 RX ADMIN — BUMETANIDE 2 MG: 0.25 INJECTION INTRAMUSCULAR; INTRAVENOUS at 20:21

## 2025-06-08 RX ADMIN — DEXMEDETOMIDINE HYDROCHLORIDE 0.2 MCG/KG/HR: 400 INJECTION, SOLUTION INTRAVENOUS at 20:42

## 2025-06-08 ASSESSMENT — PAIN DESCRIPTION - LOCATION
LOCATION: BACK

## 2025-06-08 ASSESSMENT — PAIN SCALES - GENERAL
PAINLEVEL_OUTOF10: 0
PAINLEVEL_OUTOF10: 6
PAINLEVEL_OUTOF10: 0
PAINLEVEL_OUTOF10: 0
PAINLEVEL_OUTOF10: 7
PAINLEVEL_OUTOF10: 7
PAINLEVEL_OUTOF10: 6
PAINLEVEL_OUTOF10: 0
PAINLEVEL_OUTOF10: 7
PAINLEVEL_OUTOF10: 0

## 2025-06-08 ASSESSMENT — PAIN DESCRIPTION - ORIENTATION
ORIENTATION: LOWER
ORIENTATION: LOWER
ORIENTATION: LOWER;RIGHT
ORIENTATION: LOWER
ORIENTATION: ANTERIOR;POSTERIOR;LOWER

## 2025-06-08 ASSESSMENT — PAIN DESCRIPTION - DESCRIPTORS
DESCRIPTORS: ACHING
DESCRIPTORS: ACHING;SORE
DESCRIPTORS: ACHING
DESCRIPTORS: ACHING;SORE
DESCRIPTORS: ACHING

## 2025-06-08 NOTE — PROGRESS NOTES
1300: Pt with significant difference in pupil size R>L with no neurological deficits. Pupils both round, brisk, and reactive. Dr. Escamilla called to bedside.

## 2025-06-08 NOTE — PROGRESS NOTES
Name: Hector Jensen Jr MRN: [id number]   : 1950 Hospital: [facility]   Date: 2025        IMPRESSION:   Hypervolemic hyponatremia of advanced CHF. Improving with diuresis-s/p hypertonic saline and Bumex  Normal renal function  CHF- 45-50%-on iv bumex, in negative fluid balance. CT finding consistent with pulmonary congestion-on Hi-blanca O2  metabolic alkalosis due to diuresis-s/p one dose Diamox 250 mg IV 25-improved  Hypotension        PLAN:       Recommend switching to Bumex drip.    Using hypertonic saline might increase diuresis transiently but overall increases body sodium load and eventually more fluid retention   fluid restriction  1.2 L/day.  Restart Ure-Na 15 gr BID-it helps diuresis too (osmotic diuresis)  Off  trazodone   Off amiodarone for possible lung toxicity  Increase protein intake, prot supplements bid  F/u serum sodium and RFT    Discussed with Dr Escamilla       Subjective/Interval History:   I have reviewed the flowsheet and previous day’s notes.    ROS:Pertinent items are noted in HPI.    25 Doing better. No new issues.      2025    No major complaints open this morning.  He worked with therapy team yesterday.      2025.    The patient was sleeping.  Events reviewed with Giovanna [daughter-in-law] at the bedside.    -feel ok, on hiflo O2, no edema, has dry crackles-Na 132 this am-in neg fluid balance  6/3-sitting in chair, still on Hi-blanca O2-low appetrite. Moved bowel  -no change , remains on high flow O2, no significant diuresis with extra dose of bumex  -patient feels better today.  Still requires high flow oxygen.  Diuresed well after hypertonic saline and Bumex.  But he dropped his blood pressure.  Sodium is improving    -remains hypoxic on high flow oxygen.  Fluid balance -1200 but has gained 2 kg!  Sodium down to 129. CXR with worsening pulmonary edema     awake and alert, sitting up in chair.  Finish partial breakfast.  Son-in-law at

## 2025-06-08 NOTE — PROGRESS NOTES
[facility]     ICU TEAM Progress Note           Name: Hector Jensen Jr   : 1950   MRN: [id number]   Date: 2025          ICU PROBLEM LIST   - Hyponatremia  - Acute hypoxic respiratory failure  - CAD status post CABG     HISTORY OF PRESENT ILLNESS:     Hector Jensen Jr is a 74 y.o. male with a history of hypertension, hyperlipidemia, CAD status post CABG on 2025 who presented to the ED for shortness of breath. Patient reported having some shortness of breath that started yesterday, but significantly worsened today. He was found to be hypoxic in the ED 72% on room air sats. He has some resting shortness of breath but worsens with exertion.  He also has some mild lower bilateral extremity swelling. Patient states that he feels thirsty all the time and has been drinking a lot of water. States he drinks about four large bottles. Has been using his breathing device (?ICS) as instructed at home, and recently when home health visited they recommended him starting or drinking electrolytes or gatorade. Denies chest pain, cough, fever, chills, abdominal or back pain.     In the ED labs were drawn and patient was sent to CT for CTA of his chest with and without contrast.  CT of the chest showed diffuse groundglass consolidation involving the lung parenchyma as well as small to moderate bilateral pleural effusions most likely secondary to pulmonary edema less likely a multi focal pneumonia.  The labs came back and was significant for a sodium level of 112, chloride 80.  BUN was 11 creatinine was 0.79.  Glucose 149 calcium 8.2. serum osmolality was 243.  Nephrology was consulted recommended starting patient on urea oral supplements and fluid restriction. Critical care was called for admission.      SUBJECTIVE:     - No acute events, this a.m. on nasal cannula, noted to have increasing hypoxia requiring significant increase in oxygen requirement.  -  Increased oxygen req.   11.7* 11.5*   HCT 35.6* 36.7    262     Recent Labs     06/07/25  0348 06/08/25  0415   * 132*   K 3.6 4.1   CL 96* 95*   CO2 29 30   BUN 60* 62*   MG 2.6* 2.7*     No results for input(s): \"TP\", \"GLOB\" in the last 72 hours.    Invalid input(s): \"SGOT\", \"GPT\", \"AP\", \"TBIL\", \"ALB\", \"AML\", \"AMYP\", \"LPSE\"    No results for input(s): \"INR\", \"APTT\" in the last 72 hours.    Invalid input(s): \"PTP\"     Invalid input(s): \"PHI\", \"PCO2I\", \"PO2I\", \"FIO2I\"  No results for input(s): \"CPK\", \"CKMB\" in the last 72 hours.    Invalid input(s): \"TROIQ\", \"BNPP\"    Ventilator Settings:  Mode Rate Tidal Volume Pressure FiO2 PEEP                    Peak airway pressure:      Minute ventilation:          MEDS: Reviewed    Chest X-Ray:  Reviewed      CRITICAL CARE DOCUMENTATION  I had a face to face encounter with the patient, reviewed and interpreted patient data including clinical events, labs, images, vital signs, I/O's, and examined patient.  I have discussed the case and the plan and management of the patient's care with the consulting services, the bedside nurses and the respiratory therapist.      NOTE OF PERSONAL INVOLVEMENT IN CARE   This patient has a high probability of imminent, clinically significant deterioration, which requires the highest level of preparedness to intervene urgently. I participated in the decision-making and personally managed or directed the management of the following life and organ supporting interventions that required my frequent assessment to treat or prevent imminent deterioration.    I personally spent 60 minutes of critical care time.  This is time spent at this critically ill patient's bedside actively involved in patient care as well as the coordination of care.  This does not include any procedural time which has been billed separately.      DO [facility]  06/08/25

## 2025-06-09 LAB
A FUMIGATUS1 AB SER QL ID: NEGATIVE
A PULLULANS AB SER QL: NEGATIVE
ANION GAP SERPL CALC-SCNC: 6 MMOL/L (ref 2–12)
BUN SERPL-MCNC: 64 MG/DL (ref 6–20)
BUN/CREAT SERPL: 72 (ref 12–20)
CALCIUM SERPL-MCNC: 9.3 MG/DL (ref 8.5–10.1)
CHLORIDE SERPL-SCNC: 95 MMOL/L (ref 97–108)
CO2 SERPL-SCNC: 32 MMOL/L (ref 21–32)
CREAT SERPL-MCNC: 0.89 MG/DL (ref 0.7–1.3)
CRP SERPL-MCNC: 1.72 MG/DL (ref 0–0.3)
DIGOXIN SERPL-MCNC: 1.8 NG/ML (ref 0.9–2)
ERYTHROCYTE [DISTWIDTH] IN BLOOD BY AUTOMATED COUNT: 13.2 % (ref 11.5–14.5)
GLUCOSE BLD STRIP.AUTO-MCNC: 139 MG/DL (ref 65–117)
GLUCOSE SERPL-MCNC: 146 MG/DL (ref 65–100)
HCT VFR BLD AUTO: 35.7 % (ref 36.6–50.3)
HGB BLD-MCNC: 11.6 G/DL (ref 12.1–17)
LACEYELLA SACCHARI AB SER QL: NEGATIVE
MCH RBC QN AUTO: 29.1 PG (ref 26–34)
MCHC RBC AUTO-ENTMCNC: 32.5 G/DL (ref 30–36.5)
MCV RBC AUTO: 89.7 FL (ref 80–99)
NRBC # BLD: 0 K/UL (ref 0–0.01)
NRBC BLD-RTO: 0 PER 100 WBC
PIGEON SERUM AB QL ID: NEGATIVE
PLATELET # BLD AUTO: 265 K/UL (ref 150–400)
PMV BLD AUTO: 10.3 FL (ref 8.9–12.9)
POTASSIUM SERPL-SCNC: 4.4 MMOL/L (ref 3.5–5.1)
RBC # BLD AUTO: 3.98 M/UL (ref 4.1–5.7)
S RECTIVIRGULA IGG SER QL ID: NEGATIVE
SERVICE CMNT-IMP: ABNORMAL
SODIUM SERPL-SCNC: 133 MMOL/L (ref 136–145)
T VULGARIS AB SER QL ID: NEGATIVE
WBC # BLD AUTO: 20.3 K/UL (ref 4.1–11.1)

## 2025-06-09 PROCEDURE — 97535 SELF CARE MNGMENT TRAINING: CPT

## 2025-06-09 PROCEDURE — 6360000002 HC RX W HCPCS: Performed by: PHYSICAL MEDICINE & REHABILITATION

## 2025-06-09 PROCEDURE — 80162 ASSAY OF DIGOXIN TOTAL: CPT

## 2025-06-09 PROCEDURE — 2500000003 HC RX 250 WO HCPCS: Performed by: STUDENT IN AN ORGANIZED HEALTH CARE EDUCATION/TRAINING PROGRAM

## 2025-06-09 PROCEDURE — 2700000000 HC OXYGEN THERAPY PER DAY

## 2025-06-09 PROCEDURE — 6370000000 HC RX 637 (ALT 250 FOR IP)

## 2025-06-09 PROCEDURE — 2500000003 HC RX 250 WO HCPCS: Performed by: INTERNAL MEDICINE

## 2025-06-09 PROCEDURE — 2000000000 HC ICU R&B

## 2025-06-09 PROCEDURE — 2580000003 HC RX 258: Performed by: STUDENT IN AN ORGANIZED HEALTH CARE EDUCATION/TRAINING PROGRAM

## 2025-06-09 PROCEDURE — 86140 C-REACTIVE PROTEIN: CPT

## 2025-06-09 PROCEDURE — 6360000002 HC RX W HCPCS: Performed by: STUDENT IN AN ORGANIZED HEALTH CARE EDUCATION/TRAINING PROGRAM

## 2025-06-09 PROCEDURE — 6370000000 HC RX 637 (ALT 250 FOR IP): Performed by: STUDENT IN AN ORGANIZED HEALTH CARE EDUCATION/TRAINING PROGRAM

## 2025-06-09 PROCEDURE — 85027 COMPLETE CBC AUTOMATED: CPT

## 2025-06-09 PROCEDURE — 6370000000 HC RX 637 (ALT 250 FOR IP): Performed by: INTERNAL MEDICINE

## 2025-06-09 PROCEDURE — 94640 AIRWAY INHALATION TREATMENT: CPT

## 2025-06-09 PROCEDURE — 97530 THERAPEUTIC ACTIVITIES: CPT

## 2025-06-09 PROCEDURE — 94760 N-INVAS EAR/PLS OXIMETRY 1: CPT

## 2025-06-09 PROCEDURE — 80048 BASIC METABOLIC PNL TOTAL CA: CPT

## 2025-06-09 PROCEDURE — 82962 GLUCOSE BLOOD TEST: CPT

## 2025-06-09 PROCEDURE — 6370000000 HC RX 637 (ALT 250 FOR IP): Performed by: NURSE PRACTITIONER

## 2025-06-09 RX ORDER — BISACODYL 10 MG
10 SUPPOSITORY, RECTAL RECTAL DAILY PRN
Status: DISCONTINUED | OUTPATIENT
Start: 2025-06-09 | End: 2025-06-13 | Stop reason: HOSPADM

## 2025-06-09 RX ORDER — MIRTAZAPINE 15 MG/1
15 TABLET, ORALLY DISINTEGRATING ORAL NIGHTLY PRN
Status: DISCONTINUED | OUTPATIENT
Start: 2025-06-09 | End: 2025-06-12

## 2025-06-09 RX ORDER — LORAZEPAM 2 MG/ML
0.5 INJECTION INTRAMUSCULAR EVERY 4 HOURS PRN
Status: DISCONTINUED | OUTPATIENT
Start: 2025-06-09 | End: 2025-06-11

## 2025-06-09 RX ORDER — NYSTATIN 100000 [USP'U]/ML
5 SUSPENSION ORAL 4 TIMES DAILY
Status: DISCONTINUED | OUTPATIENT
Start: 2025-06-09 | End: 2025-06-13 | Stop reason: HOSPADM

## 2025-06-09 RX ORDER — DEXMEDETOMIDINE HYDROCHLORIDE 4 UG/ML
.1-1.5 INJECTION, SOLUTION INTRAVENOUS CONTINUOUS
Status: DISCONTINUED | OUTPATIENT
Start: 2025-06-09 | End: 2025-06-11

## 2025-06-09 RX ORDER — BUMETANIDE 0.25 MG/ML
2 INJECTION, SOLUTION INTRAMUSCULAR; INTRAVENOUS 2 TIMES DAILY
Status: DISCONTINUED | OUTPATIENT
Start: 2025-06-10 | End: 2025-06-11

## 2025-06-09 RX ORDER — MIRTAZAPINE 15 MG/1
15 TABLET, ORALLY DISINTEGRATING ORAL NIGHTLY
Status: DISCONTINUED | OUTPATIENT
Start: 2025-06-09 | End: 2025-06-09

## 2025-06-09 RX ORDER — DEXAMETHASONE 4 MG/1
4 TABLET ORAL DAILY
Status: DISCONTINUED | OUTPATIENT
Start: 2025-06-10 | End: 2025-06-09

## 2025-06-09 RX ADMIN — METHYLPREDNISOLONE SODIUM SUCCINATE 20 MG: 40 INJECTION INTRAMUSCULAR; INTRAVENOUS at 05:54

## 2025-06-09 RX ADMIN — SODIUM CHLORIDE, PRESERVATIVE FREE 10 ML: 5 INJECTION INTRAVENOUS at 08:04

## 2025-06-09 RX ADMIN — BUDESONIDE 500 MCG: 0.5 INHALANT RESPIRATORY (INHALATION) at 20:24

## 2025-06-09 RX ADMIN — Medication: at 22:24

## 2025-06-09 RX ADMIN — FLUTICASONE PROPIONATE 1 SPRAY: 50 SPRAY, METERED NASAL at 07:25

## 2025-06-09 RX ADMIN — METHOCARBAMOL 500 MG: 500 TABLET ORAL at 08:03

## 2025-06-09 RX ADMIN — BUMETANIDE 2 MG: 0.25 INJECTION INTRAMUSCULAR; INTRAVENOUS at 13:08

## 2025-06-09 RX ADMIN — OXYCODONE 2.5 MG: 5 TABLET ORAL at 08:03

## 2025-06-09 RX ADMIN — APIXABAN 5 MG: 5 TABLET, FILM COATED ORAL at 08:03

## 2025-06-09 RX ADMIN — APIXABAN 5 MG: 5 TABLET, FILM COATED ORAL at 22:24

## 2025-06-09 RX ADMIN — METOPROLOL SUCCINATE 25 MG: 50 TABLET, EXTENDED RELEASE ORAL at 22:35

## 2025-06-09 RX ADMIN — IPRATROPIUM BROMIDE AND ALBUTEROL SULFATE 1 DOSE: 2.5; .5 SOLUTION RESPIRATORY (INHALATION) at 20:24

## 2025-06-09 RX ADMIN — BISACODYL 10 MG: 10 SUPPOSITORY RECTAL at 09:31

## 2025-06-09 RX ADMIN — SENNOSIDES AND DOCUSATE SODIUM 2 TABLET: 50; 8.6 TABLET ORAL at 08:03

## 2025-06-09 RX ADMIN — IPRATROPIUM BROMIDE AND ALBUTEROL SULFATE 1 DOSE: 2.5; .5 SOLUTION RESPIRATORY (INHALATION) at 15:35

## 2025-06-09 RX ADMIN — ATORVASTATIN CALCIUM 40 MG: 40 TABLET, FILM COATED ORAL at 22:24

## 2025-06-09 RX ADMIN — BUMETANIDE 2 MG: 0.25 INJECTION INTRAMUSCULAR; INTRAVENOUS at 04:03

## 2025-06-09 RX ADMIN — OXYCODONE 2.5 MG: 5 TABLET ORAL at 14:16

## 2025-06-09 RX ADMIN — ACETAMINOPHEN 650 MG: 325 TABLET ORAL at 18:09

## 2025-06-09 RX ADMIN — BUDESONIDE 500 MCG: 0.5 INHALANT RESPIRATORY (INHALATION) at 07:29

## 2025-06-09 RX ADMIN — SODIUM CHLORIDE, PRESERVATIVE FREE 10 ML: 5 INJECTION INTRAVENOUS at 22:24

## 2025-06-09 RX ADMIN — IPRATROPIUM BROMIDE AND ALBUTEROL SULFATE 1 DOSE: 2.5; .5 SOLUTION RESPIRATORY (INHALATION) at 07:29

## 2025-06-09 RX ADMIN — IPRATROPIUM BROMIDE AND ALBUTEROL SULFATE 1 DOSE: 2.5; .5 SOLUTION RESPIRATORY (INHALATION) at 11:05

## 2025-06-09 RX ADMIN — ASPIRIN 81 MG: 81 TABLET, COATED ORAL at 08:03

## 2025-06-09 RX ADMIN — POLYETHYLENE GLYCOL 3350 17 G: 17 POWDER, FOR SOLUTION ORAL at 08:00

## 2025-06-09 RX ADMIN — METHOCARBAMOL 500 MG: 500 TABLET ORAL at 22:24

## 2025-06-09 RX ADMIN — SODIUM CHLORIDE 500 MG: 9 INJECTION, SOLUTION INTRAVENOUS at 15:44

## 2025-06-09 RX ADMIN — METOPROLOL SUCCINATE 25 MG: 50 TABLET, EXTENDED RELEASE ORAL at 08:02

## 2025-06-09 RX ADMIN — DIGOXIN 125 MCG: 125 TABLET ORAL at 09:11

## 2025-06-09 RX ADMIN — SENNOSIDES AND DOCUSATE SODIUM 2 TABLET: 50; 8.6 TABLET ORAL at 22:24

## 2025-06-09 RX ADMIN — Medication: at 08:01

## 2025-06-09 ASSESSMENT — PAIN DESCRIPTION - DESCRIPTORS
DESCRIPTORS: ACHING;SORE

## 2025-06-09 ASSESSMENT — PAIN SCALES - GENERAL
PAINLEVEL_OUTOF10: 0
PAINLEVEL_OUTOF10: 5
PAINLEVEL_OUTOF10: 0
PAINLEVEL_OUTOF10: 7
PAINLEVEL_OUTOF10: 7

## 2025-06-09 ASSESSMENT — PAIN DESCRIPTION - ORIENTATION
ORIENTATION: POSTERIOR
ORIENTATION: PROXIMAL;LOWER
ORIENTATION: PROXIMAL

## 2025-06-09 ASSESSMENT — PAIN DESCRIPTION - LOCATION
LOCATION: BACK

## 2025-06-09 NOTE — PROGRESS NOTES
Name: Hector Jensen Jr MRN: [id number]   : 1950 Hospital: [facility]   Date: 2025        IMPRESSION:   Hypervolemic hyponatremia of advanced CHF. Improving with diuresis-s/p hypertonic saline and Bumex  Normal renal function  CHF- 45-50%-on iv bumex, in negative fluid balance. CT finding consistent with pulmonary congestion-on Hi-blanca O2  metabolic alkalosis due to diuresis-s/p one dose Diamox 250 mg IV 25  Hypotension        PLAN:       Defer diuretic therapy to ICU team  fluid restriction  1.2 L/day.  Agree with holding Ure-Na   Off  trazodone   Off amiodarone for possible lung toxicity  Increase protein intake, prot supplements bid  F/u serum sodium and RFT    Discussed with Dr Escamilla       Subjective/Interval History:   I have reviewed the flowsheet and previous day’s notes.    ROS:Pertinent items are noted in HPI.    25 Doing better. No new issues.      2025    No major complaints open this morning.  He worked with therapy team yesterday.      2025.    The patient was sleeping.  Events reviewed with Giovanna [daughter-in-law] at the bedside.    -feel ok, on hiflo O2, no edema, has dry crackles-Na 132 this am-in neg fluid balance  6/3-sitting in chair, still on Hi-blanca O2-low appetrite. Moved bowel  -no change , remains on high flow O2, no significant diuresis with extra dose of bumex  -patient feels better today.  Still requires high flow oxygen.  Diuresed well after hypertonic saline and Bumex.  But he dropped his blood pressure.  Sodium is improving    -remains hypoxic on high flow oxygen.  Fluid balance -1200 but has gained 2 kg!  Sodium down to 129. CXR with worsening pulmonary edema     awake and alert, sitting up in chair.  Finish partial breakfast.  Son-in-law at bedside.  Serum sodium up to 133.  Potassium 3.6 with magnesium 2.6.  Discussed with patient about increasing protein intake.  Can liberalize his potassium intake also.  Tachycardic but not

## 2025-06-09 NOTE — PROGRESS NOTES
Pulmonary, Critical Care, and Sleep Medicine~Consult Note    Name: Hector Jensen Jr MRN: [id number]   : 1950 Hospital: [facility]   Date: 2025 4:24 PM Admission: 2025     Impression Plan   Acute Hypoxic Respiratory Failure, suspect  pulmonary edema, lower suspicion for CAP superimposed on chronic ILD  ILD- CT is grossly abnormal with an element of chronic intra lung disease.  Etiology of fibrosis is unclear although differential includes aspiration pneumonitis, Amiodarone induced pneumonitis/fibrosis. Query underlying MAC given bronchiectasis and RLL fibrosis?  Autoimmune/rheumatologic serologies negative  CAD s/p CABG x 3, s/p NSTEMI  Afib  HFrEF  Acute on Chronic Hyponatremia   HTN/HLD  Small Bilateral Apical PTX with small Pneumomediastinum.    Oxygen to maintain goal saturation >90%, wean as able.  Currently on 13 L nasal cannula  Sputum Culture-NGTD  Conservative management, no indication for pigtail at this time. No NIV with PTX.   CRP downtrending, improved oxygen requirements, can start weaning IVCS given underlying osteoporosis.   Continued diuresis, appreciated nephro's recs  Abx- Cefepime completed  Pulmonary Toilet  Rate Control-metoprolol increased   TTE improved.   Eliquis  Lower suspicion for amiodarione induced fibro proliferative disease but if it is deemed unnecessary by cardiology I would be in favor of discontinuing, agree with holding for now  Will need repeat imaging in 4-6 weeks.     Discussed with daughter at bedside.  Patient will need close outpatient follow-up.  We will ensure hospital follow-up visit at time of discharge.    Patient is critically ill and at high risk for further decline, mechanical ventilation, and ICU admission due to AHRF; CC time spent excluding procedures is > 35 minutes      Daily Progression:  : Up in chair. On 5L satting 89%. Feeling better but remains weak. Reviewed CXR yesterday which has improved.     : Patient seen  (ZOFRAN) injection 4 mg  4 mg IntraVENous Q6H PRN    aspirin EC tablet 81 mg  81 mg Oral Daily    lidocaine 4 % external patch 1 patch  1 patch TransDERmal Daily       Labs:  ABG Invalid input(s): \"ABG\"     CBC Recent Labs     06/07/25 0348 06/08/25 0415 06/09/25  0402   WBC 21.9* 22.3* 20.3*   HGB 11.7* 11.5* 11.6*   HCT 35.6* 36.7 35.7*    262 265   MCV 88.1 92.9 89.7   MCH 29.0 29.1 29.1        Metabolic  Panel Recent Labs     06/07/25 0348 06/08/25 0415 06/09/25  0402   * 132* 133*   K 3.6 4.1 4.4   CL 96* 95* 95*   CO2 29 30 32   BUN 60* 62* 64*   MG 2.6* 2.7*  --         Pertinent Labs                Brigitte Clement, APRN - CNP  6/9/2025

## 2025-06-09 NOTE — PROGRESS NOTES
Spiritual Health History and Assessment/Progress Note  [facility]    Palliative Care,  , Adjustment to illness,      Name: Hector Jensen Jr MRN: [id number]    Age: 74 y.o.     Sex: male   Language: English   Jew: Judaism   Hyponatremia     Date: 6/9/2025            Total Time Calculated: 20 min              Spiritual Assessment continued in [facility] 7S2 INTENSIVE CARE        Referral/Consult From: Palliative Care   Encounter Overview/Reason: Palliative Care  Service Provided For: Patient and family together    Paradise, Belief, Meaning:   Patient identifies as spiritual, is connected with a paradise tradition or spiritual practice, and has beliefs or practices that help with coping during difficult times  Family/Friends identify as spiritual and have beliefs or practices that help with coping during difficult times      Importance and Influence:  Patient has no beliefs influential to healthcare decision-making identified during this visit  Family/Friends have no beliefs influential to healthcare decision-making identified during this visit    Community:  Patient feels well-supported. Support system includes: Spouse/Partner and Children  Family/Friends feel well-supported. Support system includes: Spouse/Partner and Extended family    Assessment and Plan of Care:     Patient Interventions include: Facilitated expression of thoughts and feelings  Family/Friends Interventions include: Facilitated expression of thoughts and feelings    Patient Plan of Care: Spiritual Care available upon further referral  Family/Friends Plan of Care: Spiritual Care available upon further referral    I visited Hector Jensen Jr for a palliative spiritual health assessment.     Psychosocial: , lives at home in [place] with his spouse, Suzanna Jordan. States she has health issues which make it difficult to come to the hospital. Has one daughter, Debby, and one son, Jarrett. Pt also has an adopted daughter (they refer to her as

## 2025-06-09 NOTE — PROGRESS NOTES
ICU multidisciplinary rounds lead by Dr. Mendoza  (Intensivist): The following were reviewed and discussed: current labs,  recent imaging, lines/drains, review of body systems, nutrition, cultures, mobility, length of ICU stay. The plan of care for the day is as follows: wean steroids and diuretic, discontinue glucose checks/sliding scale, and changed made to sleep regiment medications.

## 2025-06-09 NOTE — PLAN OF CARE
Problem: Occupational Therapy - Adult  Goal: By Discharge: Performs self-care activities at highest level of function for planned discharge setting.  See evaluation for individualized goals.  Description: Occupational Therapy Goals  Initiated: 5/30/2025; goals reviewed and all continued at weekly re-assessment 06/09/2025:    1.  Patient will perform grooming with setup from chair level within 7 day(s).  2.  Patient will perform bathing with mod A within 7 day(s).  3.  Patient will perform upper body dressing with mod A within 7 days.  4.  Patient will perform lower body dressing with mod A within 7 day(s).  5.  Patient will perform toilet transfers with min A within 7 day(s).  6.  Patient will perform all aspects of toileting with min A within 7 day(s).  7.  Patient will be independent with 3/3 sternal precautions within 7 days.  8.  Patient will require minimal cues to following precautions during functional activities within 7 days.       FUNCTIONAL STATUS PRIOR TO ADMISSION:    Receives Help From: Family, Prior Level of Assist for ADLs: Independent,  ,  ,  ,  ,  , Prior Level of Assist for Homemaking: Independent,  , Prior Level of Assist for Transfers: Independent, Active : Yes     HOME SUPPORT: Pt lives with his wife and granddaughter.  He is typically independent prior to admission.  Pt with recent CABG and needs to follow strict sternal precautions.     Outcome: Progressing   OCCUPATIONAL THERAPY TREATMENT & WEEKLY RE-ASSESSMENT    Patient: Hector Jensen Jr (74 y.o. male)  Date: 6/9/2025  Primary Diagnosis: Hyponatremia [E87.1]  Acute pulmonary edema (HCC) [J81.0]  Acute respiratory failure with hypoxia (HCC) [J96.01]  Dyspnea, unspecified type [R06.00]       Precautions: Fall Risk                Chart, occupational therapy assessment, plan of care, and goals were reviewed.    ASSESSMENT  Patient continues to benefit from skilled OT services and is slowly progressing towards goals. Pt's performance  family present    COMMUNICATION/EDUCATION:   The patient's plan of care was discussed with: physical therapist and registered nurse    Patient Education  Education Given To: Patient;Family  Education Provided: Role of Therapy;ADL Adaptive Strategies;Transfer Training;Home Exercise Program;Mobility Training  Education Method: Verbal;Demonstration  Barriers to Learning: None  Education Outcome: Verbalized understanding;Demonstrated understanding;Continued education needed    Thank you for this referral.  ADRIENNE Patton, OTR/L  Minutes: 25

## 2025-06-09 NOTE — PROGRESS NOTES
[facility]     ICU TEAM Progress Note           Name: Hector Jensen Jr   : 1950   MRN: [id number]   Date: 2025          ICU PROBLEM LIST   - Hyponatremia  - Acute hypoxic respiratory failure  - CAD status post CABG     HISTORY OF PRESENT ILLNESS:     Hector Jensen Jr is a 74 y.o. male with a history of hypertension, hyperlipidemia, CAD status post CABG on 2025 who presented to the ED for shortness of breath. Patient reported having some shortness of breath that started yesterday, but significantly worsened today. He was found to be hypoxic in the ED 72% on room air sats. He has some resting shortness of breath but worsens with exertion.  He also has some mild lower bilateral extremity swelling. Patient states that he feels thirsty all the time and has been drinking a lot of water. States he drinks about four large bottles. Has been using his breathing device (?ICS) as instructed at home, and recently when home health visited they recommended him starting or drinking electrolytes or gatorade. Denies chest pain, cough, fever, chills, abdominal or back pain.     In the ED labs were drawn and patient was sent to CT for CTA of his chest with and without contrast.  CT of the chest showed diffuse groundglass consolidation involving the lung parenchyma as well as small to moderate bilateral pleural effusions most likely secondary to pulmonary edema less likely a multi focal pneumonia.  The labs came back and was significant for a sodium level of 112, chloride 80.  BUN was 11 creatinine was 0.79.  Glucose 149 calcium 8.2. serum osmolality was 243.  Nephrology was consulted recommended starting patient on urea oral supplements and fluid restriction. Critical care was called for admission.      SUBJECTIVE:     - No acute events, this a.m. on nasal cannula, noted to have increasing hypoxia requiring significant increase in oxygen requirement.  -  Increased oxygen req.   DO  [facility]  06/09/25

## 2025-06-09 NOTE — PLAN OF CARE
Problem: Physical Therapy - Adult  Goal: By Discharge: Performs mobility at highest level of function for planned discharge setting.  See evaluation for individualized goals.  Description: FUNCTIONAL STATUS PRIOR TO ADMISSION: Patient was independent and active without use of DME.    HOME SUPPORT PRIOR TO ADMISSION: The patient lived with family but did not require assistance.    Physical Therapy Goals  Revised 6/9/2025  1.  Patient will move from supine to sit and sit to supine, scoot up and down, and roll side to side in bed with contact guard assist within 7 day(s).    2.  Patient will perform sit to stand with contact guard assist within 7 day(s).  3.  Patient will transfer from bed to chair and chair to bed with contact guard assist using the least restrictive device within 7 day(s).  4.  Patient will ambulate with contact guard assist for 20 feet with the least restrictive device within 7 day(s).   5.  Patient will verbally and functionally demonstrate 3/3 sternal precautions without cues within 7 days.   Outcome: Progressing       PHYSICAL THERAPY TREATMENT: WEEKLY REASSESSMENT    Patient: Hector Jensen Jr (74 y.o. male)  Date: 6/9/2025  Primary Diagnosis: Hyponatremia [E87.1]  Acute pulmonary edema (HCC) [J81.0]  Acute respiratory failure with hypoxia (HCC) [J96.01]  Dyspnea, unspecified type [R06.00]       Precautions: Restrictions/Precautions  Restrictions/Precautions: Fall Risk          ASSESSMENT :  Patient continues to benefit from skilled PT services and is slowly progressing towards goals. Patient able to perform transfer to bedside commode with mod assist, perform standing balance activity with mod assist anteriorly for 45 seconds x 2 reps.   Patient on 14 L NC during treatment; left at 94% O2 sats.   Patient's progression toward goals since last assessment: limited by oxygen requirements and patient continues to require significant support     Functional Outcome Measure:  The patient scored 10/24  Tolerance:   Poor, requires frequent rest breaks, and observed shortness of breath on exertion    After treatment:   Patient left in no apparent distress sitting up in chair, Call bell within reach, Bed/ chair alarm activated, and Caregiver / family present    COMMUNICATION/EDUCATION:   The patient's plan of care was discussed with: registered nurse    Patient Education  Education Given To: Patient;Family  Education Provided: Role of Therapy;Plan of Care;Energy Conservation  Education Method: Verbal;Demonstration  Barriers to Learning: None  Education Outcome: Continued education needed    Thank you for this referral.  Vivek Khan, PT  Minutes: 26

## 2025-06-09 NOTE — CARE COORDINATION
Transition of Care Plan:    RUR: 22% - high readmission  Prior Level of Functioning: home health s/p CABG with [facility]  Disposition: anticipate IPR - pending medical progress; [facility] is following  CASSANDRA: 2+ days  IPR: Date FOC offered: 6/3/25  Date FOC received: 6/4/25  Accepting facility: [facility] is following for progress  Follow up appointments: Specialists  DME needed: defer to rehab  Transportation at discharge: stretcher vs w/c pending progress  IM/IMM Medicare/ letter given: needs 2nd IMM  Caregiver Contact: spouse - Suzanna Jensen p: [phone]   Discharge Caregiver contacted prior to discharge? planned  Care Conference needed? no  Barriers to discharge: medical    Patient readmitted for CHF exacerbation s/p CABG last month. Referral sent last week to [facility] for acute rehab. Facility continues to follow for medical stability and appropriateness for IPR. Medical management is ongoing for patient.     CM will continue to follow.    Jaime Mccarthy, MPH  Care Manager l [facility]  Available via [employer]

## 2025-06-09 NOTE — PROGRESS NOTES
2000: Pupils unequal R > L, both brisk, round and reactive. No neuro deficits noted. HAILEE Angeles made aware and at bedside. No new orders.    0000: Pupils remain unequal R > L and brisk/round/reactive. No neuro changes. HAILEE Angeles made aware.    0400: Pupils remain unequal R > L brisk, round and reactive. Patient remains neurologically intact. HAILEE Angeles made aware. No new orders.

## 2025-06-10 ENCOUNTER — APPOINTMENT (OUTPATIENT)
Facility: HOSPITAL | Age: 75
End: 2025-06-10
Payer: MEDICARE

## 2025-06-10 LAB
ANION GAP SERPL CALC-SCNC: 7 MMOL/L (ref 2–12)
BUN SERPL-MCNC: 82 MG/DL (ref 6–20)
BUN/CREAT SERPL: 80 (ref 12–20)
CALCIUM SERPL-MCNC: 9.1 MG/DL (ref 8.5–10.1)
CHLORIDE SERPL-SCNC: 99 MMOL/L (ref 97–108)
CO2 SERPL-SCNC: 33 MMOL/L (ref 21–32)
CREAT SERPL-MCNC: 1.02 MG/DL (ref 0.7–1.3)
DIGOXIN SERPL-MCNC: 1.7 NG/ML (ref 0.9–2)
EKG ATRIAL RATE: 131 BPM
EKG DIAGNOSIS: NORMAL
EKG P AXIS: 39 DEGREES
EKG P-R INTERVAL: 80 MS
EKG Q-T INTERVAL: 272 MS
EKG QRS DURATION: 86 MS
EKG QTC CALCULATION (BAZETT): 401 MS
EKG R AXIS: 13 DEGREES
EKG T AXIS: 163 DEGREES
EKG VENTRICULAR RATE: 131 BPM
ERYTHROCYTE [DISTWIDTH] IN BLOOD BY AUTOMATED COUNT: 13.5 % (ref 11.5–14.5)
GLUCOSE BLD STRIP.AUTO-MCNC: 153 MG/DL (ref 65–117)
GLUCOSE SERPL-MCNC: 156 MG/DL (ref 65–100)
HCT VFR BLD AUTO: 34.1 % (ref 36.6–50.3)
HGB BLD-MCNC: 10.9 G/DL (ref 12.1–17)
LDH SERPL L TO P-CCNC: 371 U/L (ref 85–241)
MAGNESIUM SERPL-MCNC: 3.2 MG/DL (ref 1.6–2.4)
MCH RBC QN AUTO: 28.8 PG (ref 26–34)
MCHC RBC AUTO-ENTMCNC: 32 G/DL (ref 30–36.5)
MCV RBC AUTO: 90.2 FL (ref 80–99)
NRBC # BLD: 0 K/UL (ref 0–0.01)
NRBC BLD-RTO: 0 PER 100 WBC
PLATELET # BLD AUTO: 273 K/UL (ref 150–400)
PMV BLD AUTO: 10.9 FL (ref 8.9–12.9)
POTASSIUM SERPL-SCNC: 4 MMOL/L (ref 3.5–5.1)
RBC # BLD AUTO: 3.78 M/UL (ref 4.1–5.7)
SERVICE CMNT-IMP: ABNORMAL
SODIUM SERPL-SCNC: 139 MMOL/L (ref 136–145)
WBC # BLD AUTO: 20.8 K/UL (ref 4.1–11.1)

## 2025-06-10 PROCEDURE — 83735 ASSAY OF MAGNESIUM: CPT

## 2025-06-10 PROCEDURE — 94760 N-INVAS EAR/PLS OXIMETRY 1: CPT

## 2025-06-10 PROCEDURE — 71045 X-RAY EXAM CHEST 1 VIEW: CPT

## 2025-06-10 PROCEDURE — 6370000000 HC RX 637 (ALT 250 FOR IP)

## 2025-06-10 PROCEDURE — 2700000000 HC OXYGEN THERAPY PER DAY

## 2025-06-10 PROCEDURE — 87305 ASPERGILLUS AG IA: CPT

## 2025-06-10 PROCEDURE — 6370000000 HC RX 637 (ALT 250 FOR IP): Performed by: INTERNAL MEDICINE

## 2025-06-10 PROCEDURE — 83615 LACTATE (LD) (LDH) ENZYME: CPT

## 2025-06-10 PROCEDURE — P9047 ALBUMIN (HUMAN), 25%, 50ML: HCPCS | Performed by: STUDENT IN AN ORGANIZED HEALTH CARE EDUCATION/TRAINING PROGRAM

## 2025-06-10 PROCEDURE — 82962 GLUCOSE BLOOD TEST: CPT

## 2025-06-10 PROCEDURE — 6370000000 HC RX 637 (ALT 250 FOR IP): Performed by: STUDENT IN AN ORGANIZED HEALTH CARE EDUCATION/TRAINING PROGRAM

## 2025-06-10 PROCEDURE — 6370000000 HC RX 637 (ALT 250 FOR IP): Performed by: NURSE PRACTITIONER

## 2025-06-10 PROCEDURE — 94640 AIRWAY INHALATION TREATMENT: CPT

## 2025-06-10 PROCEDURE — 6360000002 HC RX W HCPCS: Performed by: PHYSICAL MEDICINE & REHABILITATION

## 2025-06-10 PROCEDURE — 87449 NOS EACH ORGANISM AG IA: CPT

## 2025-06-10 PROCEDURE — 97530 THERAPEUTIC ACTIVITIES: CPT

## 2025-06-10 PROCEDURE — 2000000000 HC ICU R&B

## 2025-06-10 PROCEDURE — 6360000002 HC RX W HCPCS: Performed by: STUDENT IN AN ORGANIZED HEALTH CARE EDUCATION/TRAINING PROGRAM

## 2025-06-10 PROCEDURE — 80048 BASIC METABOLIC PNL TOTAL CA: CPT

## 2025-06-10 PROCEDURE — 80162 ASSAY OF DIGOXIN TOTAL: CPT

## 2025-06-10 PROCEDURE — 2500000003 HC RX 250 WO HCPCS: Performed by: INTERNAL MEDICINE

## 2025-06-10 PROCEDURE — 2500000003 HC RX 250 WO HCPCS: Performed by: STUDENT IN AN ORGANIZED HEALTH CARE EDUCATION/TRAINING PROGRAM

## 2025-06-10 PROCEDURE — 2580000003 HC RX 258: Performed by: STUDENT IN AN ORGANIZED HEALTH CARE EDUCATION/TRAINING PROGRAM

## 2025-06-10 PROCEDURE — 85027 COMPLETE CBC AUTOMATED: CPT

## 2025-06-10 PROCEDURE — 93010 ELECTROCARDIOGRAM REPORT: CPT | Performed by: INTERNAL MEDICINE

## 2025-06-10 RX ORDER — IPRATROPIUM BROMIDE AND ALBUTEROL SULFATE 2.5; .5 MG/3ML; MG/3ML
1 SOLUTION RESPIRATORY (INHALATION)
Status: DISCONTINUED | OUTPATIENT
Start: 2025-06-10 | End: 2025-06-11

## 2025-06-10 RX ORDER — INSULIN LISPRO 100 [IU]/ML
0-8 INJECTION, SOLUTION INTRAVENOUS; SUBCUTANEOUS
Status: DISCONTINUED | OUTPATIENT
Start: 2025-06-10 | End: 2025-06-13 | Stop reason: HOSPADM

## 2025-06-10 RX ORDER — ALBUMIN (HUMAN) 12.5 G/50ML
25 SOLUTION INTRAVENOUS ONCE
Status: COMPLETED | OUTPATIENT
Start: 2025-06-10 | End: 2025-06-10

## 2025-06-10 RX ORDER — IPRATROPIUM BROMIDE AND ALBUTEROL SULFATE 2.5; .5 MG/3ML; MG/3ML
1 SOLUTION RESPIRATORY (INHALATION) EVERY 6 HOURS
Status: DISCONTINUED | OUTPATIENT
Start: 2025-06-10 | End: 2025-06-10

## 2025-06-10 RX ORDER — FAMOTIDINE 20 MG/1
20 TABLET, FILM COATED ORAL DAILY
Status: DISCONTINUED | OUTPATIENT
Start: 2025-06-10 | End: 2025-06-13 | Stop reason: HOSPADM

## 2025-06-10 RX ADMIN — LORAZEPAM 0.5 MG: 2 INJECTION INTRAMUSCULAR; INTRAVENOUS at 22:09

## 2025-06-10 RX ADMIN — ALBUMIN (HUMAN) 25 G: 0.25 INJECTION, SOLUTION INTRAVENOUS at 11:34

## 2025-06-10 RX ADMIN — BUMETANIDE 2 MG: 0.25 INJECTION INTRAMUSCULAR; INTRAVENOUS at 18:32

## 2025-06-10 RX ADMIN — NYSTATIN 500000 UNITS: 100000 SUSPENSION ORAL at 13:52

## 2025-06-10 RX ADMIN — ACETAMINOPHEN 650 MG: 325 TABLET ORAL at 11:33

## 2025-06-10 RX ADMIN — SENNOSIDES AND DOCUSATE SODIUM 2 TABLET: 50; 8.6 TABLET ORAL at 20:42

## 2025-06-10 RX ADMIN — BUMETANIDE 2 MG: 0.25 INJECTION INTRAMUSCULAR; INTRAVENOUS at 08:21

## 2025-06-10 RX ADMIN — SODIUM CHLORIDE, PRESERVATIVE FREE 10 ML: 5 INJECTION INTRAVENOUS at 08:21

## 2025-06-10 RX ADMIN — METHOCARBAMOL 500 MG: 500 TABLET ORAL at 20:42

## 2025-06-10 RX ADMIN — DEXMEDETOMIDINE HYDROCHLORIDE 0.6 MCG/KG/HR: 400 INJECTION, SOLUTION INTRAVENOUS at 01:30

## 2025-06-10 RX ADMIN — NYSTATIN 500000 UNITS: 100000 SUSPENSION ORAL at 08:21

## 2025-06-10 RX ADMIN — DIGOXIN 125 MCG: 125 TABLET ORAL at 08:20

## 2025-06-10 RX ADMIN — BUDESONIDE 500 MCG: 0.5 INHALANT RESPIRATORY (INHALATION) at 08:02

## 2025-06-10 RX ADMIN — NYSTATIN 500000 UNITS: 100000 SUSPENSION ORAL at 20:42

## 2025-06-10 RX ADMIN — ATORVASTATIN CALCIUM 40 MG: 40 TABLET, FILM COATED ORAL at 20:42

## 2025-06-10 RX ADMIN — SODIUM CHLORIDE 500 MG: 9 INJECTION, SOLUTION INTRAVENOUS at 18:38

## 2025-06-10 RX ADMIN — FAMOTIDINE 20 MG: 20 TABLET, FILM COATED ORAL at 11:30

## 2025-06-10 RX ADMIN — APIXABAN 5 MG: 5 TABLET, FILM COATED ORAL at 08:20

## 2025-06-10 RX ADMIN — SENNOSIDES AND DOCUSATE SODIUM 2 TABLET: 50; 8.6 TABLET ORAL at 08:20

## 2025-06-10 RX ADMIN — METHOCARBAMOL 500 MG: 500 TABLET ORAL at 08:21

## 2025-06-10 RX ADMIN — APIXABAN 5 MG: 5 TABLET, FILM COATED ORAL at 20:42

## 2025-06-10 RX ADMIN — IPRATROPIUM BROMIDE AND ALBUTEROL SULFATE 1 DOSE: .5; 3 SOLUTION RESPIRATORY (INHALATION) at 20:40

## 2025-06-10 RX ADMIN — ASPIRIN 81 MG: 81 TABLET, COATED ORAL at 08:20

## 2025-06-10 RX ADMIN — IPRATROPIUM BROMIDE AND ALBUTEROL SULFATE 1 DOSE: .5; 3 SOLUTION RESPIRATORY (INHALATION) at 11:51

## 2025-06-10 RX ADMIN — METOPROLOL SUCCINATE 25 MG: 50 TABLET, EXTENDED RELEASE ORAL at 08:20

## 2025-06-10 RX ADMIN — SODIUM CHLORIDE 500 MG: 9 INJECTION, SOLUTION INTRAVENOUS at 06:18

## 2025-06-10 RX ADMIN — Medication: at 08:21

## 2025-06-10 RX ADMIN — NYSTATIN 500000 UNITS: 100000 SUSPENSION ORAL at 18:29

## 2025-06-10 RX ADMIN — BUDESONIDE 500 MCG: 0.5 INHALANT RESPIRATORY (INHALATION) at 20:40

## 2025-06-10 RX ADMIN — OXYCODONE 2.5 MG: 5 TABLET ORAL at 07:24

## 2025-06-10 RX ADMIN — IPRATROPIUM BROMIDE AND ALBUTEROL SULFATE 1 DOSE: 2.5; .5 SOLUTION RESPIRATORY (INHALATION) at 08:02

## 2025-06-10 RX ADMIN — METOPROLOL SUCCINATE 25 MG: 50 TABLET, EXTENDED RELEASE ORAL at 20:42

## 2025-06-10 RX ADMIN — LORAZEPAM 0.5 MG: 2 INJECTION INTRAMUSCULAR; INTRAVENOUS at 13:55

## 2025-06-10 ASSESSMENT — PAIN DESCRIPTION - LOCATION
LOCATION: BACK
LOCATION: BACK

## 2025-06-10 ASSESSMENT — PAIN DESCRIPTION - DESCRIPTORS
DESCRIPTORS: ACHING
DESCRIPTORS: ACHING

## 2025-06-10 ASSESSMENT — PAIN SCALES - GENERAL
PAINLEVEL_OUTOF10: 3
PAINLEVEL_OUTOF10: 5
PAINLEVEL_OUTOF10: 0

## 2025-06-10 ASSESSMENT — PAIN DESCRIPTION - ORIENTATION: ORIENTATION: POSTERIOR

## 2025-06-10 NOTE — PROGRESS NOTES
Name: Hector Jensen Jr MRN: [id number]   : 1950 Hospital: [facility]   Date: 6/10/2025        IMPRESSION:   Hypervolemic hyponatremia of advanced CHF. Improved with diuresis  Prerenal azotemia -normal renal function on admission-creatinine slowly increasing, BUN significantly higher due to prerenal picture, IV steroid  CHF- 45-50%-on iv bumex-oxygenation is improving  Possible underlying interstitial lung disease, started on IV steroid  metabolic alkalosis due to diuresis-s/p one dose Diamox 250 mg IV 25  Hypotension        PLAN:       Defer diuretic therapy to ICU team  Recommend to decrease diuretic dose  IV albumin  fluid restriction    Agree with holding Ure-Na   Off  trazodone   Off amiodarone for possible lung toxicity  Increase protein intake, prot supplements bid  F/u serum sodium and RFT    Discussed with Dr Escamilla       Subjective/Interval History:   I have reviewed the flowsheet and previous day’s notes.    ROS:Pertinent items are noted in HPI.    25 Doing better. No new issues.      2025    No major complaints open this morning.  He worked with therapy team yesterday.      2025.    The patient was sleeping.  Events reviewed with Giovanna [daughter-in-law] at the bedside.    -feel ok, on hiflo O2, no edema, has dry crackles-Na 132 this am-in neg fluid balance  6/3-sitting in chair, still on Hi-blanca O2-low appetrite. Moved bowel  -no change , remains on high flow O2, no significant diuresis with extra dose of bumex  -patient feels better today.  Still requires high flow oxygen.  Diuresed well after hypertonic saline and Bumex.  But he dropped his blood pressure.  Sodium is improving    -remains hypoxic on high flow oxygen.  Fluid balance -1200 but has gained 2 kg!  Sodium down to 129. CXR with worsening pulmonary edema     awake and alert, sitting up in chair.  Finish partial breakfast.  Son-in-law at bedside.  Serum sodium up to 133.  Potassium 3.6 with  magnesium 2.6.  Discussed with patient about increasing protein intake.  Can liberalize his potassium intake also.  Tachycardic but not symptomatic     awake and alert, getting breathing treatment. Heart rate around 130, asymptomatic. No edema, Na up to 132 with K 4.1, Cl decreased with increased HCO3, this could be compatible with intravasc volume depletion. Output > intake. Would consider decreasing Bumex but defer to critical care. Family at bedside    -awake, denies sob. Oxygenation has improved, on Nc now midflo  6/10-no new complaints.  Oxygenation has improved.  Sodium 139.  BUN is up to 82 and creatinine is up to 1 from 0.7    Objective:   Vital Signs:    /71   Pulse 77   Temp 97.6 °F (36.4 °C) (Axillary)   Resp (!) 41   Ht 1.626 m (5' 4\")   Wt 50.3 kg (110 lb 14.3 oz)   SpO2 94%   BMI 19.03 kg/m²             Temp (24hrs), Av.9 °F (36.6 °C), Min:97.6 °F (36.4 °C), Max:98.3 °F (36.8 °C)       Intake/Output:   Last shift:      06/10 0701 - 06/10 1900  In: 248.9 [I.V.:0.1]  Out: 100 [Urine:100]  Last 3 shifts: 1901 - 06/10 0700  In: 1983.1 [P.O.:1660; I.V.:94.1]  Out: 2200 [Urine:2200]    Intake/Output Summary (Last 24 hours) at 6/10/2025 1207  Last data filed at 6/10/2025 0900  Gross per 24 hour   Intake 1548.78 ml   Output 1150 ml   Net 398.78 ml        Current Facility-Administered Medications   Medication Dose Route Frequency    ipratropium 0.5 mg-albuterol 2.5 mg (DUONEB) nebulizer solution 1 Dose  1 Dose Inhalation Q6H    albumin human 25% IV solution 25 g  25 g IntraVENous Once    famotidine (PEPCID) tablet 20 mg  20 mg Oral Daily    bisacodyl (DULCOLAX) suppository 10 mg  10 mg Rectal Daily PRN    dexmedeTOMIDine (PRECEDEX) 400 mcg in sodium chloride 0.9 % 100 mL infusion  0.1-1.5 mcg/kg/hr IntraVENous Continuous    LORazepam (ATIVAN) injection 0.5 mg  0.5 mg IntraVENous Q4H PRN    mirtazapine (REMERON SOL-TAB) disintegrating tablet 15 mg  15 mg Oral Nightly PRN

## 2025-06-10 NOTE — PROGRESS NOTES
Pulmonary, Critical Care, and Sleep Medicine~Consult Note    Name: Hector Jensen Jr MRN: [id number]   : 1950 Hospital: [facility]   Date: 6/10/2025 4:30 PM Admission: 2025     Impression Plan   Acute Hypoxic Respiratory Failure, suspect  pulmonary edema, lower suspicion for CAP superimposed on chronic ILD  ILD- CT is grossly abnormal with an element of chronic intra lung disease.  Etiology of fibrosis is unclear although differential includes aspiration pneumonitis, Amiodarone induced pneumonitis/fibrosis. Query underlying MAC given bronchiectasis and RLL fibrosis?  Autoimmune/rheumatologic serologies negative  CAD s/p CABG x 3, s/p NSTEMI  Afib  HFrEF  Acute on Chronic Hyponatremia   HTN/HLD  Small Bilateral Apical PTX with small Pneumomediastinum.    Oxygen to maintain goal saturation >90%, wean as able.  Currently on 13 L nasal cannula  Sputum Culture-NGTD  Conservative management, no indication for pigtail at this time. No NIV with PTX.   Bumex 2 mg BID.   On pulse dose steroids.   Abx- second course completed.   Pulmonary Toilet  Rate Control-metoprolol increased   TTE improved.   IV albumin.   Eliquis  Lower suspicion for amiodarione induced fibro proliferative disease but if it is deemed unnecessary by cardiology I would be in favor of discontinuing, agree with holding for now  Will need repeat imaging in 4-6 weeks.   PT/OT as able, mobilize as able.     Extensive discussion with patient, son-in law, wife, intensivist, Dr. Ragsdale, bedside Rn, case management and myself today. Attempting to wean O2 to appropriately discharge home with home health.  Patient will need close outpatient follow-up.  We will ensure hospital follow-up visit at time of discharge.    Patient is critically ill and at high risk for further decline, mechanical ventilation, and ICU admission due to AHRF; CC time spent excluding procedures is > 35 minutes      Daily Progression:  6/10: Reviewed events  Topics    Alcohol use: No     Alcohol/week: 0.0 standard drinks of alcohol      Family History   Problem Relation Age of Onset    No Known Problems Father     No Known Problems Mother      OBJECTIVE:     Vital Signs:     /60   Pulse 74   Temp 98 °F (36.7 °C) (Axillary)   Resp (!) 36   Ht 1.626 m (5' 4\")   Wt 50.3 kg (110 lb 14.3 oz)   SpO2 97%   BMI 19.03 kg/m²    Temp (24hrs), Av.9 °F (36.6 °C), Min:97.6 °F (36.4 °C), Max:98.3 °F (36.8 °C)     Intake/Output:     Last shift: 06/10 07 - 06/10 190  In: 285.1 [I.V.:0.1]  Out: 800 [Urine:800]    Last 3 shifts:  190 - 06/10 0700  In: 1983.1 [P.O.:1660; I.V.:94.1]  Out: 2200 [Urine:2200]        Intake/Output Summary (Last 24 hours) at 6/10/2025 1630  Last data filed at 6/10/2025 1200  Gross per 24 hour   Intake 1105.01 ml   Output 1850 ml   Net -744.99 ml       Physical Exam:                                        Exam Findings Other   General: No resp distress noted, appears stated age    HEENT:  No ulcers, JVD not elevated, no cervical LAD    Chest: No pectus deformity, normal chest rise b/l    HEART:  RRR, no murmurs/rubs/gallops    Lungs:  Dimished throughout lung fields, bilateral rales.    ABD: Soft/NT, non rigid mildly distended    EXT: No cyanosis/clubbing/edema, normal peripheral pulses    Skin: No rashes or ulcers, no mottling    Neuro: A/O x 3        Medications:  Current Facility-Administered Medications   Medication Dose Route Frequency    famotidine (PEPCID) tablet 20 mg  20 mg Oral Daily    ipratropium 0.5 mg-albuterol 2.5 mg (DUONEB) nebulizer solution 1 Dose  1 Dose Inhalation Q6H RT    bisacodyl (DULCOLAX) suppository 10 mg  10 mg Rectal Daily PRN    dexmedeTOMIDine (PRECEDEX) 400 mcg in sodium chloride 0.9 % 100 mL infusion  0.1-1.5 mcg/kg/hr IntraVENous Continuous    LORazepam (ATIVAN) injection 0.5 mg  0.5 mg IntraVENous Q4H PRN    mirtazapine (REMERON SOL-TAB) disintegrating tablet 15 mg  15 mg Oral Nightly PRN

## 2025-06-10 NOTE — CARE COORDINATION
Transition of Care Plan:    RUR: 22% - high  Prior Level of Functioning: open with [facility]  Disposition: [facility]; Supplemental Oxygen  CASSANDAR: 2+ days  IPR: Date FOC offered: [facility] is following for possible IPR need  Date FOC received: 6/4/25  Accepting facility: pending  Follow up appointments: specialists  DME needed: therapy to recommend for home  Transportation at discharge: w/c with oxygen support  IM/IMM Medicare/ letter given: will need 2nd IMM  Caregiver Contact: spouse - Suzanna Jensen - p: [phone]  Discharge Caregiver contacted prior to discharge? yes  Care Conference needed? Yes - held today  Barriers to discharge: medical    CM participated in non-formal care conference alongside Intensivist, Cardiothoracic Surgeon, Pulmonary, ICU RN, patient's wife, patient's son-in-law, and patient. Patient was alert and oriented; able to participate in care decisions. Goal is to continue medication adjustments to attempt and optimize pulmonary status so that patient can discharge home on 6-8LPM of supplemental oxygen with home health services. Home health to see patient 2-3 times per week with goal of 3x week under Medicare services. Family is able to supplement help at home and therapy to see patient today and recommend additional DME for patient. Patient's son-in-law had additional concerns and questions about care plan - deferred to Patient Advocacy for request to speak with son-in-law, Jarrett to address additional concerns.    Therapy advised of DME recommendations for home: hospital bed, BSC, wheelchair 16x16, shower chair, RW (likely will be OOP).    CM sent formal referral for [facility] to review for resumption of care in addition to new supplemental oxygen.    Jaime Mccarthy, MPH  Care Manager l [facility]  Available via [employer]

## 2025-06-10 NOTE — PROGRESS NOTES
[facility] ICU Progress Note        Hector Jensen Jr  1950  [id number]  6/10/2025      Brief HPI / Hospital Course:  Mr. Jensen is a 74-year-old male with PMH HTN, CAD status post CABG (5/14/2025) presented for shortness of breath.  Admitted for multifactorial acute hypoxic respiratory failure.  Initial workup consistent with cardiogenic pulmonary edema secondary to acute heart failure and A-fib RVR.  Also, evidence of pulmonary fibrosis initially concerning for amiodarone toxicity (amiodarone discontinued), however later deemed less likely.  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     Earlier during hospital course diuresis regimen escalated for cardiogenic pulm edema.  Empiric antibiotics throughout hospital course for questionable pneumonia, discontinued when deemed unlikely.  Empiric steroids also initiated.  Respiratory support initially with intermittent BiPAP.  Unfortunately patient required escalation to continuous BiPAP on 6/4 (with escalated diuresis and empiric antibiotics - subsequent discontinued when pna ruled out) and suffered bilateral pneumothoraces with pneumomediastinum secondary to positive pressure ventilation in light of pulmonary fibrosis.  Thankfully patient was able to be de-escalated to HFNC (and later mid-flow) and pneumothoraces and pneumomediastinum continued to improve without further intervention.    6/7: Additional digoxin given for intermittent episodes of afib with tachycardia. Transiently required HFNC early this morning, but since deescalated to mid-flow as respiratory status continues to improve. OOBC. Continuing to slowly improve overall.  6/8: additional digoxin today to complete loading and continue with maintenance tomorrow. Pt again required re-escalation to HF overnight but quickly de-escalated this morning back to mid-flow. Mild liberation of fluid

## 2025-06-10 NOTE — PLAN OF CARE
continues to be limited by impaired balance, activity tolerance, generalized weakness, coordination, BLE reach/access, and cognition (insight, safety awareness, attention). Pt received seated in bedside chair, with supportive wife at bedside, and requesting to mobilize back to bed. He completed functional transfers and brief side steps to EOB with min A x1-2 a notable improvement from prior sessions. He does however, continue to demonstrate limited activity tolerance with RR spiking from low 20s at rest to mid 40s requiring prompt return to supine to recover. SpO2 stable throughout session on 10L NC (SpO2 90%-93% with activity). Pt positioned for comfort at end of session and left with all needs met.     Of note, pt and family would like to pursue goal of dc'ing home if medically appropriate. If plan is to return home, pt will require HHOT services, physical assist x1-2 people, and up to total A for basic ADL tasks. DME required for safe dc home also listed below.          PLAN :  Patient continues to benefit from skilled intervention to address the above impairments.  Continue treatment per established plan of care to address goals.    Recommendation for discharge: (in order for the patient to meet his/her long term goals):   Intermittent occupational therapy up to 2-3x/week in previous living setting with additional support needed for safety with all OOB mobility/ADLs (pt will also require DME listed below) .     Other factors to consider for discharge: poor safety awareness, impaired cognition, high risk for falls, and concern for safely navigating or managing the home environment    IF patient discharges home will need the following DME: bedside commode, gait belt, hospital bed, shower chair, and wheelchair 16 inch       SUBJECTIVE:   Patient stated “I can go back to bed.”    OBJECTIVE DATA SUMMARY:   Cognitive/Behavioral Status:  Orientation  Overall Orientation Status: Within Normal Limits  Orientation Level:  Verbal;Demonstration  Barriers to Learning: None  Education Outcome: Verbalized understanding;Demonstrated understanding;Continued education needed    Thank you for this referral.  ADRIENNE Patton, OTR/L  Minutes: 25

## 2025-06-10 NOTE — PROGRESS NOTES
Cardiac Surgery ICU Progress Note    Admit Date: 2025  Readmission for hypoxia and hyponatremia    Procedure:       CABG 25 during prior admission with Dr. Ragsdale     Subjective:   Pt seen and examined. Discussed with Dr. Ragsdale. Pt is sitting up in chair. He is afebrile, afib rate controlled 70s, HFNC 10LNC     Objective:   Vitals:  Blood pressure 112/71, pulse 77, temperature 97.6 °F (36.4 °C), temperature source Axillary, resp. rate (!) 41, height 1.626 m (5' 4\"), weight 50.3 kg (110 lb 14.3 oz), SpO2 94%.  Temp (24hrs), Av.9 °F (36.6 °C), Min:97.6 °F (36.4 °C), Max:98.3 °F (36.8 °C)    NIPPV and HFNC    EKG/Rhythm:  NSR on tele this AM.    CXR:  Xray Result (most recent):  XR CHEST PORTABLE 06/10/2025    Narrative  PORTABLE CHEST RADIOGRAPH/S: 6/10/2025 8:05 AM    INDICATION: increased O2 requirements, pulmonary edema?    COMPARISON: 2025, 2025, 2025.    TECHNIQUE: Portable frontal upright radiograph/s of the chest.    FINDINGS:  Pneumomediastinum and subcutaneous gas in the right neck have increased. There  are probably small right and trace left pneumothoraces; these are difficult to  separate from mediastinal gas.    The lungs are hypoinflated and there is bibasilar atelectasis. The central  airways are patent. No large pleural effusion. Post CABG. There is a moderate to  large hiatal hernia.    Impression  1. Increased pneumomediastinum and subcutaneous gas.  2. Probable small right and trace left pneumothoraces.    Electronically signed by Ángel Walker       Admission Weight: Last Weight   Weight - Scale: 63.6 kg (140 lb 3.4 oz) Weight - Scale: 50.3 kg (110 lb 14.3 oz)     Intake / Output / Drain:  Current Shift: 06/10 0701 - 06/10 1900  In: 248.9 [I.V.:0.1]  Out: 100 [Urine:100]  Last 24 hrs.:   Intake/Output Summary (Last 24 hours) at 6/10/2025 1114  Last data filed at 6/10/2025 0900  Gross per 24 hour   Intake 1548.78 ml   Output 1450 ml   Net 98.78 ml     EXAM:  General:

## 2025-06-10 NOTE — PLAN OF CARE
Problem: Physical Therapy - Adult  Goal: By Discharge: Performs mobility at highest level of function for planned discharge setting.  See evaluation for individualized goals.  Description: FUNCTIONAL STATUS PRIOR TO ADMISSION: Patient was independent and active without use of DME.    HOME SUPPORT PRIOR TO ADMISSION: The patient lived with family but did not require assistance.    Physical Therapy Goals  Revised 6/9/2025  1.  Patient will move from supine to sit and sit to supine, scoot up and down, and roll side to side in bed with contact guard assist within 7 day(s).    2.  Patient will perform sit to stand with contact guard assist within 7 day(s).  3.  Patient will transfer from bed to chair and chair to bed with contact guard assist using the least restrictive device within 7 day(s).  4.  Patient will ambulate with contact guard assist for 20 feet with the least restrictive device within 7 day(s).   5.  Patient will verbally and functionally demonstrate 3/3 sternal precautions without cues within 7 days.   Outcome: Progressing       PHYSICAL THERAPY TREATMENT    Patient: Hector Jensen Jr (74 y.o. male)  Date: 6/10/2025  Diagnosis: Hyponatremia [E87.1]  Acute pulmonary edema (HCC) [J81.0]  Acute respiratory failure with hypoxia (HCC) [J96.01]  Dyspnea, unspecified type [R06.00] Hyponatremia      Precautions: Restrictions/Precautions  Restrictions/Precautions: Fall Risk            ASSESSMENT:  Patient continues to benefit from skilled PT services and is slowly progressing towards goals. Patient on 10 L of O2 during treatment - with O2 sats >89% throughout mobility. Patient able to stand, take steps to bed with hand held assist. Patient with increased work of breathing sitting edge of bed, so assisted supine and head of bed elevated. Patient with less difficulty recovering from mobility, but still very limited secondary to oxygen requirements and increased work of breathing. Patient continues to use accessory

## 2025-06-11 ENCOUNTER — APPOINTMENT (OUTPATIENT)
Facility: HOSPITAL | Age: 75
End: 2025-06-11
Attending: STUDENT IN AN ORGANIZED HEALTH CARE EDUCATION/TRAINING PROGRAM
Payer: MEDICARE

## 2025-06-11 ENCOUNTER — APPOINTMENT (OUTPATIENT)
Facility: HOSPITAL | Age: 75
End: 2025-06-11
Payer: MEDICARE

## 2025-06-11 PROBLEM — R79.89 ELEVATED TROPONIN: Status: RESOLVED | Noted: 2025-05-12 | Resolved: 2025-06-11

## 2025-06-11 LAB
ANION GAP SERPL CALC-SCNC: 8 MMOL/L (ref 2–12)
BUN SERPL-MCNC: 69 MG/DL (ref 6–20)
BUN/CREAT SERPL: 66 (ref 12–20)
CALCIUM SERPL-MCNC: 8.5 MG/DL (ref 8.5–10.1)
CHLORIDE SERPL-SCNC: 98 MMOL/L (ref 97–108)
CO2 SERPL-SCNC: 34 MMOL/L (ref 21–32)
CREAT SERPL-MCNC: 1.05 MG/DL (ref 0.7–1.3)
DIGOXIN SERPL-MCNC: 1.4 NG/ML (ref 0.9–2)
ECHO BSA: 1.59 M2
ECHO LV EF PHYSICIAN: 60 %
ECHO LV FRACTIONAL SHORTENING: 33 % (ref 28–44)
ECHO LV INTERNAL DIMENSION DIASTOLE INDEX: 1.78 CM/M2
ECHO LV INTERNAL DIMENSION DIASTOLIC: 2.7 CM (ref 4.2–5.9)
ECHO LV INTERNAL DIMENSION SYSTOLIC INDEX: 1.18 CM/M2
ECHO LV INTERNAL DIMENSION SYSTOLIC: 1.8 CM
ECHO LV IVSD: 1.1 CM (ref 0.6–1)
ECHO LV MASS 2D: 76.2 G (ref 88–224)
ECHO LV MASS INDEX 2D: 50.2 G/M2 (ref 49–115)
ECHO LV POSTERIOR WALL DIASTOLIC: 1 CM (ref 0.6–1)
ECHO LV RELATIVE WALL THICKNESS RATIO: 0.74
ERYTHROCYTE [DISTWIDTH] IN BLOOD BY AUTOMATED COUNT: 13.6 % (ref 11.5–14.5)
GALACTOMANNAN AG SPEC IA-ACNC: 0.04 INDEX (ref 0–0.49)
GLUCOSE BLD STRIP.AUTO-MCNC: 116 MG/DL (ref 65–117)
GLUCOSE BLD STRIP.AUTO-MCNC: 146 MG/DL (ref 65–117)
GLUCOSE BLD STRIP.AUTO-MCNC: 181 MG/DL (ref 65–117)
GLUCOSE BLD STRIP.AUTO-MCNC: 235 MG/DL (ref 65–117)
GLUCOSE SERPL-MCNC: 141 MG/DL (ref 65–100)
HCT VFR BLD AUTO: 35.4 % (ref 36.6–50.3)
HGB BLD-MCNC: 11.2 G/DL (ref 12.1–17)
MAGNESIUM SERPL-MCNC: 2.9 MG/DL (ref 1.6–2.4)
MCH RBC QN AUTO: 28.9 PG (ref 26–34)
MCHC RBC AUTO-ENTMCNC: 31.6 G/DL (ref 30–36.5)
MCV RBC AUTO: 91.5 FL (ref 80–99)
NRBC # BLD: 0 K/UL (ref 0–0.01)
NRBC BLD-RTO: 0 PER 100 WBC
PLATELET # BLD AUTO: 301 K/UL (ref 150–400)
PMV BLD AUTO: 10.6 FL (ref 8.9–12.9)
POTASSIUM SERPL-SCNC: 4.2 MMOL/L (ref 3.5–5.1)
RBC # BLD AUTO: 3.87 M/UL (ref 4.1–5.7)
SERVICE CMNT-IMP: ABNORMAL
SERVICE CMNT-IMP: NORMAL
SODIUM SERPL-SCNC: 140 MMOL/L (ref 136–145)
WBC # BLD AUTO: 25 K/UL (ref 4.1–11.1)

## 2025-06-11 PROCEDURE — 80162 ASSAY OF DIGOXIN TOTAL: CPT

## 2025-06-11 PROCEDURE — 2500000003 HC RX 250 WO HCPCS: Performed by: INTERNAL MEDICINE

## 2025-06-11 PROCEDURE — 2700000000 HC OXYGEN THERAPY PER DAY

## 2025-06-11 PROCEDURE — 6370000000 HC RX 637 (ALT 250 FOR IP): Performed by: STUDENT IN AN ORGANIZED HEALTH CARE EDUCATION/TRAINING PROGRAM

## 2025-06-11 PROCEDURE — 97550 CAREGIVER TRAING 1ST 30 MIN: CPT

## 2025-06-11 PROCEDURE — 6360000002 HC RX W HCPCS: Performed by: STUDENT IN AN ORGANIZED HEALTH CARE EDUCATION/TRAINING PROGRAM

## 2025-06-11 PROCEDURE — 6360000002 HC RX W HCPCS: Performed by: INTERNAL MEDICINE

## 2025-06-11 PROCEDURE — 6360000002 HC RX W HCPCS: Performed by: PHYSICAL MEDICINE & REHABILITATION

## 2025-06-11 PROCEDURE — 93005 ELECTROCARDIOGRAM TRACING: CPT | Performed by: HOSPITALIST

## 2025-06-11 PROCEDURE — 6370000000 HC RX 637 (ALT 250 FOR IP)

## 2025-06-11 PROCEDURE — 6360000004 HC RX CONTRAST MEDICATION: Performed by: HOSPITALIST

## 2025-06-11 PROCEDURE — 97535 SELF CARE MNGMENT TRAINING: CPT

## 2025-06-11 PROCEDURE — 93308 TTE F-UP OR LMTD: CPT | Performed by: INTERNAL MEDICINE

## 2025-06-11 PROCEDURE — 97530 THERAPEUTIC ACTIVITIES: CPT

## 2025-06-11 PROCEDURE — 2580000003 HC RX 258: Performed by: INTERNAL MEDICINE

## 2025-06-11 PROCEDURE — 71045 X-RAY EXAM CHEST 1 VIEW: CPT

## 2025-06-11 PROCEDURE — 2000000000 HC ICU R&B

## 2025-06-11 PROCEDURE — 85027 COMPLETE CBC AUTOMATED: CPT

## 2025-06-11 PROCEDURE — C8924 2D TTE W OR W/O FOL W/CON,FU: HCPCS

## 2025-06-11 PROCEDURE — 2580000003 HC RX 258: Performed by: STUDENT IN AN ORGANIZED HEALTH CARE EDUCATION/TRAINING PROGRAM

## 2025-06-11 PROCEDURE — 93005 ELECTROCARDIOGRAM TRACING: CPT | Performed by: INTERNAL MEDICINE

## 2025-06-11 PROCEDURE — 80048 BASIC METABOLIC PNL TOTAL CA: CPT

## 2025-06-11 PROCEDURE — 94760 N-INVAS EAR/PLS OXIMETRY 1: CPT

## 2025-06-11 PROCEDURE — 6370000000 HC RX 637 (ALT 250 FOR IP): Performed by: INTERNAL MEDICINE

## 2025-06-11 PROCEDURE — 6370000000 HC RX 637 (ALT 250 FOR IP): Performed by: NURSE PRACTITIONER

## 2025-06-11 PROCEDURE — 83735 ASSAY OF MAGNESIUM: CPT

## 2025-06-11 PROCEDURE — 82962 GLUCOSE BLOOD TEST: CPT

## 2025-06-11 PROCEDURE — 94640 AIRWAY INHALATION TREATMENT: CPT

## 2025-06-11 RX ORDER — 0.9 % SODIUM CHLORIDE 0.9 %
500 INTRAVENOUS SOLUTION INTRAVENOUS ONCE
Status: COMPLETED | OUTPATIENT
Start: 2025-06-11 | End: 2025-06-11

## 2025-06-11 RX ORDER — BUMETANIDE 1 MG/1
2 TABLET ORAL DAILY
Status: DISCONTINUED | OUTPATIENT
Start: 2025-06-11 | End: 2025-06-13 | Stop reason: HOSPADM

## 2025-06-11 RX ORDER — BUMETANIDE 0.25 MG/ML
2 INJECTION, SOLUTION INTRAMUSCULAR; INTRAVENOUS DAILY
Status: DISCONTINUED | OUTPATIENT
Start: 2025-06-12 | End: 2025-06-11

## 2025-06-11 RX ORDER — METHOCARBAMOL 500 MG/1
500 TABLET, FILM COATED ORAL 2 TIMES DAILY PRN
Status: DISCONTINUED | OUTPATIENT
Start: 2025-06-11 | End: 2025-06-13 | Stop reason: HOSPADM

## 2025-06-11 RX ORDER — ALPRAZOLAM 0.5 MG
0.5 TABLET ORAL EVERY 8 HOURS PRN
Status: DISCONTINUED | OUTPATIENT
Start: 2025-06-11 | End: 2025-06-12

## 2025-06-11 RX ADMIN — METOPROLOL SUCCINATE 25 MG: 50 TABLET, EXTENDED RELEASE ORAL at 08:30

## 2025-06-11 RX ADMIN — SENNOSIDES AND DOCUSATE SODIUM 2 TABLET: 50; 8.6 TABLET ORAL at 08:30

## 2025-06-11 RX ADMIN — SODIUM CHLORIDE, PRESERVATIVE FREE 10 ML: 5 INJECTION INTRAVENOUS at 20:29

## 2025-06-11 RX ADMIN — BUMETANIDE 2 MG: 0.25 INJECTION INTRAMUSCULAR; INTRAVENOUS at 08:26

## 2025-06-11 RX ADMIN — ALPRAZOLAM 0.5 MG: 0.5 TABLET ORAL at 12:00

## 2025-06-11 RX ADMIN — WATER 250 MG: 1 INJECTION INTRAMUSCULAR; INTRAVENOUS; SUBCUTANEOUS at 18:36

## 2025-06-11 RX ADMIN — SULFUR HEXAFLUORIDE 2 ML: KIT at 15:44

## 2025-06-11 RX ADMIN — APIXABAN 5 MG: 5 TABLET, FILM COATED ORAL at 08:29

## 2025-06-11 RX ADMIN — NYSTATIN 500000 UNITS: 100000 SUSPENSION ORAL at 17:10

## 2025-06-11 RX ADMIN — Medication: at 20:30

## 2025-06-11 RX ADMIN — IPRATROPIUM BROMIDE AND ALBUTEROL SULFATE 1 DOSE: .5; 3 SOLUTION RESPIRATORY (INHALATION) at 08:18

## 2025-06-11 RX ADMIN — INSULIN LISPRO 2 UNITS: 100 INJECTION, SOLUTION INTRAVENOUS; SUBCUTANEOUS at 20:27

## 2025-06-11 RX ADMIN — INSULIN LISPRO 2 UNITS: 100 INJECTION, SOLUTION INTRAVENOUS; SUBCUTANEOUS at 17:10

## 2025-06-11 RX ADMIN — BUMETANIDE 2 MG: 1 TABLET ORAL at 17:10

## 2025-06-11 RX ADMIN — APIXABAN 5 MG: 5 TABLET, FILM COATED ORAL at 20:28

## 2025-06-11 RX ADMIN — METHOCARBAMOL 500 MG: 500 TABLET ORAL at 08:29

## 2025-06-11 RX ADMIN — SODIUM CHLORIDE 500 ML: 0.9 INJECTION, SOLUTION INTRAVENOUS at 16:02

## 2025-06-11 RX ADMIN — POLYETHYLENE GLYCOL 3350 17 G: 17 POWDER, FOR SOLUTION ORAL at 08:23

## 2025-06-11 RX ADMIN — BUDESONIDE 500 MCG: 0.5 INHALANT RESPIRATORY (INHALATION) at 08:18

## 2025-06-11 RX ADMIN — METOPROLOL SUCCINATE 25 MG: 50 TABLET, EXTENDED RELEASE ORAL at 20:29

## 2025-06-11 RX ADMIN — Medication: at 08:23

## 2025-06-11 RX ADMIN — SODIUM CHLORIDE 500 MG: 9 INJECTION, SOLUTION INTRAVENOUS at 08:20

## 2025-06-11 RX ADMIN — FAMOTIDINE 20 MG: 20 TABLET, FILM COATED ORAL at 08:29

## 2025-06-11 RX ADMIN — BUDESONIDE 500 MCG: 0.5 INHALANT RESPIRATORY (INHALATION) at 19:18

## 2025-06-11 RX ADMIN — NYSTATIN 500000 UNITS: 100000 SUSPENSION ORAL at 20:28

## 2025-06-11 RX ADMIN — ATORVASTATIN CALCIUM 40 MG: 40 TABLET, FILM COATED ORAL at 20:28

## 2025-06-11 RX ADMIN — ASPIRIN 81 MG: 81 TABLET, COATED ORAL at 08:30

## 2025-06-11 RX ADMIN — DIGOXIN 125 MCG: 125 TABLET ORAL at 08:29

## 2025-06-11 RX ADMIN — Medication 6 MG: at 21:52

## 2025-06-11 RX ADMIN — SODIUM CHLORIDE, PRESERVATIVE FREE 10 ML: 5 INJECTION INTRAVENOUS at 08:25

## 2025-06-11 RX ADMIN — SENNOSIDES AND DOCUSATE SODIUM 2 TABLET: 50; 8.6 TABLET ORAL at 20:29

## 2025-06-11 RX ADMIN — NYSTATIN 500000 UNITS: 100000 SUSPENSION ORAL at 14:26

## 2025-06-11 RX ADMIN — NYSTATIN 500000 UNITS: 100000 SUSPENSION ORAL at 08:29

## 2025-06-11 NOTE — PROGRESS NOTES
[facility] Progress Note        Hector Jensen Jr  1950  [id number]  6/11/2025    Summary:  74 yom admitted for hypoxemia and hyponatremia, found to have NSIP.     Assessment and plan:  Acute hypoxic respiratory failure:   -multifactorial with pulmonary fibrosis and pulm edema  -Interstitial Lung Disease - Pulmonary fibrosis.  Scan reviewed and pattern is of diffuse GGOs, bilateral traction bronchiectasis.  Favor NSIP.    -favorable response to pulse steroids but unfavorable side effects.   -taper pulse dose to 250 bid today.      Acute hypotension:  -Hco3 34  -suspect he is overdiuresed  -give 500 ml back NS  -change bumex to PO and one daily dosing will start tomorrow     Afib:    -driven by respiratory failure  -cont BB   -cont dig   -cont eliqius     Anxiety - worse with high dose steroids   -cont xanax low dose prn     Right hand weakness; hx of CVA  - Multimodal pain regimen including Robaxin, lidocaine patch,   - as needed Tylenol, oxycodone, and voltaren  - PT OT  - anxiety improved w/ trial of ativan 0.5 mg IV, given once but available q4h PRN    CAD s/p CABG 5/14/25  -cont asa  -cont statin     Acute Heart Failure, improving  - Home Amio discontinued due to concerns of toxicity  - A-fib controlled with Toprol 25 mg bid and digoxin 125 mcg qd   - f/u digoxin level to ensure maintained therapeutic dosage  - TTE earlier during hospitalization showed reduction in EF to 45%   - EF appears improved on POCUS with now rate control and euvolemia  - f/u limited TTE pending  - Continue home Eliquis, aspirin, and atorvastatin  - Diuresis as below    Leukocytosis:   -worse today  -WBC 25k   -some steroid effect here     Constipation  - Bowel regimen with scheduled MiraLAX and Senokot.    - Will consider suppository or enema if constipation unimproved    Malnutrition  - Optimizing nutrition per RD     Thrush:   -cont nystatin     HPI:  Remains critically ill with refractory hypoxia now on pulse

## 2025-06-11 NOTE — CARE COORDINATION
Transition of Care Plan:    RUR: 22% - high  Prior Level of Functioning: open with [facility]  Disposition: [facility]; Supplemental Oxygen vs [facility]  CASSANDRA: 6/12/25  IPR: Date FOC offered: [facility] is following for possible IPR need  Date FOC received: 6/4/25  Accepting facility: pending  Follow up appointments: specialists  DME needed: therapy to recommend for home  Transportation at discharge: w/c with oxygen support  IM/IMM Medicare/ letter given: will need 2nd IMM  Caregiver Contact: spouse - Suzanna Jensen - p: [phone]  Discharge Caregiver contacted prior to discharge? yes  Care Conference needed? Yes - held today  Barriers to discharge: medical; placement    CM updated by attending MD that patient and daughter would like to try for rehab. Patient currently on 6LPM supplemental oxygen and steroid taper is ongoing for medical management. Therapy to work with patient this morning to determine recommendation for rehab or home health with DME support. Patient input on his choice for disposition is paramount; as yesterday, patient verbalized he wanted to go home.    Spoke with patient and daughter to review plan - rehab vs home health will be determined based on patient's therapy session today and his decision for discharge disposition. Daughter's insurance questions addressed.    1150 - Received a call from ICU team and attending MD. Home hospice appropriate for disposition plan. MD spoke with patient and daughter at the bedside.    1225 - CM spoke with patient and daughter at the bedside about home hospice. Provided an overview of what is covered vs not covered under home hospice services. DME covered under Medicare in addition to oxygen. No therapy with home hospice. Possible aide service available depending on home hospice agency. Patient and daughter agreeable for home hospice referral to be sent to [facility].    CM will continue to follow.    Jaime Mccarthy, MPH  Care Manager l

## 2025-06-11 NOTE — PLAN OF CARE
Problem: Physical Therapy - Adult  Goal: By Discharge: Performs mobility at highest level of function for planned discharge setting.  See evaluation for individualized goals.  Description: FUNCTIONAL STATUS PRIOR TO ADMISSION: Patient was independent and active without use of DME.    HOME SUPPORT PRIOR TO ADMISSION: The patient lived with family but did not require assistance.    Physical Therapy Goals  Revised 6/9/2025  1.  Patient will move from supine to sit and sit to supine, scoot up and down, and roll side to side in bed with contact guard assist within 7 day(s).    2.  Patient will perform sit to stand with contact guard assist within 7 day(s).  3.  Patient will transfer from bed to chair and chair to bed with contact guard assist using the least restrictive device within 7 day(s).  4.  Patient will ambulate with contact guard assist for 20 feet with the least restrictive device within 7 day(s).   5.  Patient will verbally and functionally demonstrate 3/3 sternal precautions without cues within 7 days.   Outcome: Not Progressing       PHYSICAL THERAPY TREATMENT    Patient: Hector Jensen Jr (74 y.o. male)  Date: 6/11/2025  Diagnosis: Hyponatremia [E87.1]  Acute pulmonary edema (HCC) [J81.0]  Acute respiratory failure with hypoxia (HCC) [J96.01]  Dyspnea, unspecified type [R06.00] Hyponatremia      Precautions: Restrictions/Precautions  Restrictions/Precautions: Fall Risk            ASSESSMENT:  Patient continues to benefit from skilled PT services and is slowly progressing towards goals. Patient received in bed with head elevated on 6L; transferred to sitting edge of bed with O2 sats decreasing quickly with increased work of breathing. Patient transferred to chair with anterior support with mod assist - patient O2 decreasing to 79% - needing O2 to be increased to 8L to return to 90%. Patient with less work of breathing when in supported sitting.   Discussed with patient's daughter his progress and

## 2025-06-11 NOTE — PLAN OF CARE
Problem: Occupational Therapy - Adult  Goal: By Discharge: Performs self-care activities at highest level of function for planned discharge setting.  See evaluation for individualized goals.  Description: Occupational Therapy Goals  Initiated: 5/30/2025; goals reviewed and all continued at weekly re-assessment 06/09/2025:    1.  Patient will perform grooming with setup from chair level within 7 day(s).  2.  Patient will perform bathing with mod A within 7 day(s).  3.  Patient will perform upper body dressing with mod A within 7 days.  4.  Patient will perform lower body dressing with mod A within 7 day(s).  5.  Patient will perform toilet transfers with min A within 7 day(s).  6.  Patient will perform all aspects of toileting with min A within 7 day(s).  7.  Patient will be independent with 3/3 sternal precautions within 7 days.  8.  Patient will require minimal cues to following precautions during functional activities within 7 days.       FUNCTIONAL STATUS PRIOR TO ADMISSION:    Receives Help From: Family, Prior Level of Assist for ADLs: Independent,  ,  ,  ,  ,  , Prior Level of Assist for Homemaking: Independent,  , Prior Level of Assist for Transfers: Independent, Active : Yes     HOME SUPPORT: Pt lives with his wife and granddaughter.  He is typically independent prior to admission.  Pt with recent CABG and needs to follow strict sternal precautions.     Outcome: Not Progressing   OCCUPATIONAL THERAPY TREATMENT  Patient: Hector Jensen Jr (74 y.o. male)  Date: 6/11/2025  Primary Diagnosis: Hyponatremia [E87.1]  Acute pulmonary edema (HCC) [J81.0]  Acute respiratory failure with hypoxia (HCC) [J96.01]  Dyspnea, unspecified type [R06.00]       Precautions: Fall Risk                Chart, occupational therapy assessment, plan of care, and goals were reviewed.    ASSESSMENT  Patient continues to benefit from skilled OT services and is not progressing towards goals. Pt's performance of ADL/IADL tasks

## 2025-06-11 NOTE — PROGRESS NOTES
Comprehensive Nutrition Assessment    Type and Reason for Visit: Reassess    Nutrition Recommendations/Plan:   Continue Regular diet with 1500 ml FR  Continue ONS: Reduce High Calorie/High Protein to BID, Frozen supplement once daily        Malnutrition Assessment:  Malnutrition Status:  At risk for malnutrition (06/04/25 1607)    Context:  Acute Illness     Findings of the 6 clinical characteristics of malnutrition:  Energy Intake:  50% or less of estimated energy requirements for 5 or more days  Weight Loss:  No weight loss     Body Fat Loss:  Unable to assess     Muscle Mass Loss:  Unable to assess    Fluid Accumulation:  Mild (not nutrition related; hx HFrEF) Extremities   Strength:  Not Performed       Nutrition Assessment:    Pt admitted with Hyponatremia. PMHx: HTN, HLD, CAD-s/p CABG 5/14/2025, CVA. Acute on chronic hyponatremia on admission d/t low sodium diet with high free water intake. Sodium worse 5/31-FR and ur-NA ordered. Hypoxic respiratory failure d/t pulmonary edema; worse today-does not tolerate time off of BiPAP. Discussed during IDR and with nursing.    6/11: Follow-up. Negative 15 liters from admission with subsequent wt loss-approximately 30#. Remains on fluid restriction and is being diuresed twice daily. Disposition pending-?home with hospice care.    PO intake on average less than 50% of meals. Family is providing food/snacks as well. Unable to speak with patient this afternoon-just got back to bed after being up in the chair (napping now). Continue with ONS but will reduce Ensure to twice daily d/t fluid restriction. If transitions to hospice-stop fluid restriction    Labs reviewed. Sodium WNL.      6/4: MST negative on admission indicating no nutritional risk PTA. Inadequate PO intake for several days-averaging less than 50%. Today pt has been unable to consume anything. May need to consider starting EN support if respiratory failure does not improve. Does appear pt accepted Ensure    12/29/23 58.3 kg (128 lb 8.5 oz)   04/27/23 62.6 kg (138 lb)   04/05/23 61.2 kg (135 lb)         Nutrition Diagnosis:   Inadequate oral intake related to impaired respiratory function as evidenced by intake 26-50%.    Nutrition Interventions:   Food and/or Nutrient Delivery: Continue Current Diet, Continue Oral Nutrition Supplement-resume diet once off BiPAP  Nutrition Education/Counseling: No recommendation at this time  Coordination of Nutrition Care: Continue to monitor while inpatient, Interdisciplinary Rounds       Goals:  Previous Goal Met: Progressing toward Goal(s)  Goals: PO intake 50% or greater, by next RD assessment       Nutrition Monitoring and Evaluation:   Behavioral-Environmental Outcomes: None Identified  Food/Nutrient Intake Outcomes: Food and Nutrient Intake, Supplement Intake  Physical Signs/Symptoms Outcomes: Biochemical Data, Meal Time Behavior, Weight, Fluid Status or Edema    Discharge Planning:    Continue current diet, Continue Oral Nutrition Supplement     Sowmya Nash RD CNSC  Available via [employer]

## 2025-06-11 NOTE — PROGRESS NOTES
Name: Hector Jensen Jr MRN: [id number]   : 1950 Hospital: [facility]   Date: 2025        IMPRESSION:   Hypervolemic hyponatremia of advanced CHF. Improved with diuresis  Prerenal azotemia -normal renal function on a-improving  CHF- 45-50%-on iv bumex-oxygenation is improving  Possible underlying interstitial lung disease,  on IV steroid  metabolic alkalosis due to diuresis-s/p one dose Diamox   Hypotension-resolved        PLAN:       Defer diuretic therapy to ICU team  IV albumin prn  fluid restriction    Off  trazodone   Off amiodarone for possible lung toxicity  Increase protein intake, prot supplements bid  F/u serum sodium and RFT    Will sign off, please call if any questions    Discussed with Dr bhardwaj       Subjective/Interval History:   I have reviewed the flowsheet and previous day’s notes.    ROS:Pertinent items are noted in HPI.    25 Doing better. No new issues.      2025    No major complaints open this morning.  He worked with therapy team yesterday.      2025.    The patient was sleeping.  Events reviewed with Giovanna [daughter-in-law] at the bedside.    -feel ok, on hiflo O2, no edema, has dry crackles-Na 132 this am-in neg fluid balance  6/3-sitting in chair, still on Hi-blanca O2-low appetrite. Moved bowel  -no change , remains on high flow O2, no significant diuresis with extra dose of bumex  -patient feels better today.  Still requires high flow oxygen.  Diuresed well after hypertonic saline and Bumex.  But he dropped his blood pressure.  Sodium is improving    -remains hypoxic on high flow oxygen.  Fluid balance -1200 but has gained 2 kg!  Sodium down to 129. CXR with worsening pulmonary edema     awake and alert, sitting up in chair.  Finish partial breakfast.  Son-in-law at bedside.  Serum sodium up to 133.  Potassium 3.6 with magnesium 2.6.  Discussed with patient about increasing protein intake.  Can liberalize his potassium intake also.

## 2025-06-11 NOTE — PROGRESS NOTES
Cardiac Surgery ICU Progress Note    Admit Date: 2025  Readmission for hypoxia and hyponatremia    Procedure:       CABG 25 during prior admission with Dr. Ragsdale     Subjective:   Pt seen and examined with Dr. Farmer. Pt is sitting up in bed. He is afebrile, afib rate controlled 80s, 6LNC. Feels he is breathing better today     Objective:   Vitals:  Blood pressure 126/65, pulse 82, temperature 96.9 °F (36.1 °C), temperature source Axillary, resp. rate (!) 32, height 1.626 m (5' 4\"), weight 50.3 kg (110 lb 14.3 oz), SpO2 93%.  Temp (24hrs), Av.4 °F (36.3 °C), Min:96.9 °F (36.1 °C), Max:98 °F (36.7 °C)      EKG/Rhythm:  Afib on tele this AM.    CXR:  Xray Result (most recent):  XR CHEST PORTABLE 2025    Narrative  EXAM:  XR CHEST PORTABLE    INDICATION: f/u pulm edema, pneumomediastinum, b/l pnx    COMPARISON: 6/10/2025    TECHNIQUE: portable chest AP view    FINDINGS: Heart size is stable status post median sternotomy. The pulmonary  vasculature is within normal limits.    Lung volumes are moderate with stable perihilar and bibasilar atelectasis and  interstitial prominence. Stable small pneumothorax in the right lung apex. No  definite left pneumothorax visualized. Subcutaneous emphysema in the right neck  soft tissues is slightly increased, and there is new subcutaneous emphysema in  the left neck. The visualized bones and upper abdomen are age-appropriate.    Impression  Slight increase in subcutaneous emphysema. Stable right apical pneumothorax, no  definite left pneumothorax. Stable diffuse perihilar atelectasis and  interstitial prominence.      Electronically signed by HELIO LAYTON       Admission Weight: Last Weight   Weight - Scale: 63.6 kg (140 lb 3.4 oz) Weight - Scale: 50.3 kg (110 lb 14.3 oz)     Intake / Output / Drain:  Current Shift:  0701 -  1900  In: 168.7   Out: 100 [Urine:100]  Last 24 hrs.:   Intake/Output Summary (Last 24 hours) at 2025 1007  Last

## 2025-06-11 NOTE — PROGRESS NOTES
Pulmonary, Critical Care, and Sleep Medicine~Consult Note    Name: Hector Jensen Jr MRN: [id number]   : 1950 Hospital: [facility]   Date: 2025 1:19 PM Admission: 2025     Impression Plan   Acute Hypoxic Respiratory Failure, suspect  pulmonary edema, lower suspicion for CAP superimposed on chronic ILD  ILD- CT is grossly abnormal with an element of chronic intra lung disease.  Etiology of fibrosis is unclear although differential includes aspiration pneumonitis, Amiodarone induced pneumonitis/fibrosis. Query underlying MAC given bronchiectasis and RLL fibrosis?  NSIP? Autoimmune/rheumatologic serologies negative  CAD s/p CABG x 3, s/p NSTEMI  Afib  HFrEF  Acute on Chronic Hyponatremia   HTN/HLD  Small Bilateral Apical PTX with small Pneumomediastinum.    Oxygen to maintain goal saturation >90%, wean as able.  Currently on 6-10 L nasal cannula  Sputum Culture-NGTD  Conservative management, no indication for pigtail at this time. No NIV with PTX.   Pulmicort BID  Bumex 2 mg BID.   On pulse dose steroids, last day  Abx- second course completed.   Pulmonary Toilet  Rate Control-metoprolol increased   TTE improved.   Eliquis  Lower suspicion for amiodarione induced fibro proliferative disease but if it is deemed unnecessary by cardiology I would be in favor of discontinuing, agree with holding for now  Will need repeat imaging in 4-6 weeks.   PT/OT as able, mobilize as able.     Discussed plan with intensivist, RN, PT, daughter and patient today. His oxygen has improved to 6L on my exam but he continues to have severe dyspnea with minimal exertion. Worked with PT/OT today with recommendations of discharge home with HH or rehab. Patient preference is to return home.     Patient is critically ill and at high risk for further decline, mechanical ventilation, and ICU admission due to AHRF; CC time spent excluding procedures is > 35 minutes      Daily Progression:  : Seen sitting up  40 mg  40 mg Oral Nightly    sodium chloride flush 0.9 % injection 5-40 mL  5-40 mL IntraVENous PRN    0.9 % sodium chloride infusion   IntraVENous PRN    albuterol (PROVENTIL) (2.5 MG/3ML) 0.083% nebulizer solution 2.5 mg  2.5 mg Nebulization Q6H PRN    aluminum & magnesium hydroxide-simethicone (MAALOX PLUS) 200-200-20 MG/5ML suspension 30 mL  30 mL Oral Q6H PRN    senna (SENOKOT) 8.8 MG/5ML syrup 8.8 mg  5 mL Oral BID PRN    acetaminophen (TYLENOL) tablet 650 mg  650 mg Oral Q6H PRN    Or    acetaminophen (TYLENOL) suppository 650 mg  650 mg Rectal Q6H PRN    ondansetron (ZOFRAN) injection 4 mg  4 mg IntraVENous Q6H PRN    aspirin EC tablet 81 mg  81 mg Oral Daily    lidocaine 4 % external patch 1 patch  1 patch TransDERmal Daily       Labs:  ABG Invalid input(s): \"ABG\"     CBC Recent Labs     06/09/25  0402 06/10/25  0558 06/11/25  0552   WBC 20.3* 20.8* 25.0*   HGB 11.6* 10.9* 11.2*   HCT 35.7* 34.1* 35.4*    273 301   MCV 89.7 90.2 91.5   MCH 29.1 28.8 28.9        Metabolic  Panel Recent Labs     06/09/25  0402 06/10/25  0558 06/11/25  0552   * 139 140   K 4.4 4.0 4.2   CL 95* 99 98   CO2 32 33* 34*   BUN 64* 82* 69*   MG  --  3.2* 2.9*        Pertinent Labs                Brigitte Clement, APRN - CNP  6/11/2025

## 2025-06-12 ENCOUNTER — APPOINTMENT (OUTPATIENT)
Facility: HOSPITAL | Age: 75
End: 2025-06-12
Payer: MEDICARE

## 2025-06-12 LAB
ANION GAP SERPL CALC-SCNC: 6 MMOL/L (ref 2–12)
BUN SERPL-MCNC: 62 MG/DL (ref 6–20)
BUN/CREAT SERPL: 65 (ref 12–20)
CALCIUM SERPL-MCNC: 8.3 MG/DL (ref 8.5–10.1)
CHLORIDE SERPL-SCNC: 97 MMOL/L (ref 97–108)
CO2 SERPL-SCNC: 36 MMOL/L (ref 21–32)
CREAT SERPL-MCNC: 0.96 MG/DL (ref 0.7–1.3)
DIGOXIN SERPL-MCNC: 1.5 NG/ML (ref 0.9–2)
EKG DIAGNOSIS: NORMAL
EKG DIAGNOSIS: NORMAL
EKG Q-T INTERVAL: 302 MS
EKG Q-T INTERVAL: 326 MS
EKG QRS DURATION: 84 MS
EKG QRS DURATION: 84 MS
EKG QTC CALCULATION (BAZETT): 416 MS
EKG QTC CALCULATION (BAZETT): 430 MS
EKG R AXIS: 10 DEGREES
EKG R AXIS: 8 DEGREES
EKG T AXIS: -78 DEGREES
EKG T AXIS: -81 DEGREES
EKG VENTRICULAR RATE: 105 BPM
EKG VENTRICULAR RATE: 114 BPM
ERYTHROCYTE [DISTWIDTH] IN BLOOD BY AUTOMATED COUNT: 13.5 % (ref 11.5–14.5)
FUNGITELL INTERPRETATION: NORMAL
FUNGITELL: 40.22 PG/ML
GLUCOSE BLD STRIP.AUTO-MCNC: 124 MG/DL (ref 65–117)
GLUCOSE BLD STRIP.AUTO-MCNC: 134 MG/DL (ref 65–117)
GLUCOSE BLD STRIP.AUTO-MCNC: 146 MG/DL (ref 65–117)
GLUCOSE SERPL-MCNC: 126 MG/DL (ref 65–100)
HCT VFR BLD AUTO: 36.2 % (ref 36.6–50.3)
HGB BLD-MCNC: 11.3 G/DL (ref 12.1–17)
Lab: NORMAL
MAGNESIUM SERPL-MCNC: 2.9 MG/DL (ref 1.6–2.4)
MCH RBC QN AUTO: 28.8 PG (ref 26–34)
MCHC RBC AUTO-ENTMCNC: 31.2 G/DL (ref 30–36.5)
MCV RBC AUTO: 92.1 FL (ref 80–99)
NRBC # BLD: 0 K/UL (ref 0–0.01)
NRBC BLD-RTO: 0 PER 100 WBC
PLATELET # BLD AUTO: 308 K/UL (ref 150–400)
PMV BLD AUTO: 10.7 FL (ref 8.9–12.9)
POTASSIUM SERPL-SCNC: 3.7 MMOL/L (ref 3.5–5.1)
RBC # BLD AUTO: 3.93 M/UL (ref 4.1–5.7)
REFERENCE VALUE: NORMAL
RESULT: NEGATIVE
SERVICE CMNT-IMP: ABNORMAL
SODIUM SERPL-SCNC: 139 MMOL/L (ref 136–145)
WBC # BLD AUTO: 22.5 K/UL (ref 4.1–11.1)

## 2025-06-12 PROCEDURE — 97530 THERAPEUTIC ACTIVITIES: CPT

## 2025-06-12 PROCEDURE — 6370000000 HC RX 637 (ALT 250 FOR IP)

## 2025-06-12 PROCEDURE — 2500000003 HC RX 250 WO HCPCS: Performed by: INTERNAL MEDICINE

## 2025-06-12 PROCEDURE — 6370000000 HC RX 637 (ALT 250 FOR IP): Performed by: NURSE PRACTITIONER

## 2025-06-12 PROCEDURE — 83735 ASSAY OF MAGNESIUM: CPT

## 2025-06-12 PROCEDURE — 80048 BASIC METABOLIC PNL TOTAL CA: CPT

## 2025-06-12 PROCEDURE — 80162 ASSAY OF DIGOXIN TOTAL: CPT

## 2025-06-12 PROCEDURE — 6370000000 HC RX 637 (ALT 250 FOR IP): Performed by: INTERNAL MEDICINE

## 2025-06-12 PROCEDURE — 71045 X-RAY EXAM CHEST 1 VIEW: CPT

## 2025-06-12 PROCEDURE — 6360000002 HC RX W HCPCS: Performed by: PHYSICAL MEDICINE & REHABILITATION

## 2025-06-12 PROCEDURE — 85027 COMPLETE CBC AUTOMATED: CPT

## 2025-06-12 PROCEDURE — 82962 GLUCOSE BLOOD TEST: CPT

## 2025-06-12 PROCEDURE — 94640 AIRWAY INHALATION TREATMENT: CPT

## 2025-06-12 PROCEDURE — 6360000002 HC RX W HCPCS: Performed by: INTERNAL MEDICINE

## 2025-06-12 PROCEDURE — 6370000000 HC RX 637 (ALT 250 FOR IP): Performed by: STUDENT IN AN ORGANIZED HEALTH CARE EDUCATION/TRAINING PROGRAM

## 2025-06-12 PROCEDURE — 93010 ELECTROCARDIOGRAM REPORT: CPT | Performed by: INTERNAL MEDICINE

## 2025-06-12 PROCEDURE — 2000000000 HC ICU R&B

## 2025-06-12 RX ORDER — ALPRAZOLAM 0.5 MG
0.25 TABLET ORAL EVERY 8 HOURS PRN
Status: DISCONTINUED | OUTPATIENT
Start: 2025-06-12 | End: 2025-06-13 | Stop reason: HOSPADM

## 2025-06-12 RX ORDER — GABAPENTIN 600 MG/1
300 TABLET ORAL 3 TIMES DAILY
Status: DISCONTINUED | OUTPATIENT
Start: 2025-06-12 | End: 2025-06-13 | Stop reason: HOSPADM

## 2025-06-12 RX ORDER — MIRTAZAPINE 15 MG/1
30 TABLET, ORALLY DISINTEGRATING ORAL NIGHTLY
Status: DISCONTINUED | OUTPATIENT
Start: 2025-06-12 | End: 2025-06-13 | Stop reason: HOSPADM

## 2025-06-12 RX ORDER — BENZONATATE 100 MG/1
200 CAPSULE ORAL 3 TIMES DAILY PRN
Status: DISCONTINUED | OUTPATIENT
Start: 2025-06-12 | End: 2025-06-13 | Stop reason: HOSPADM

## 2025-06-12 RX ORDER — MIRTAZAPINE 15 MG/1
30 TABLET, ORALLY DISINTEGRATING ORAL NIGHTLY PRN
Status: DISCONTINUED | OUTPATIENT
Start: 2025-06-12 | End: 2025-06-12

## 2025-06-12 RX ADMIN — Medication: at 22:00

## 2025-06-12 RX ADMIN — Medication 6 MG: at 22:00

## 2025-06-12 RX ADMIN — GABAPENTIN 300 MG: 600 TABLET, FILM COATED ORAL at 21:30

## 2025-06-12 RX ADMIN — FAMOTIDINE 20 MG: 20 TABLET, FILM COATED ORAL at 08:13

## 2025-06-12 RX ADMIN — SENNOSIDES AND DOCUSATE SODIUM 2 TABLET: 50; 8.6 TABLET ORAL at 22:01

## 2025-06-12 RX ADMIN — METHOCARBAMOL 500 MG: 500 TABLET ORAL at 22:01

## 2025-06-12 RX ADMIN — ALPRAZOLAM 0.25 MG: 0.5 TABLET ORAL at 22:02

## 2025-06-12 RX ADMIN — BUDESONIDE 500 MCG: 0.5 INHALANT RESPIRATORY (INHALATION) at 20:23

## 2025-06-12 RX ADMIN — Medication: at 08:13

## 2025-06-12 RX ADMIN — BENZONATATE 100 MG: 100 CAPSULE ORAL at 01:33

## 2025-06-12 RX ADMIN — APIXABAN 5 MG: 5 TABLET, FILM COATED ORAL at 08:12

## 2025-06-12 RX ADMIN — METOPROLOL SUCCINATE 25 MG: 50 TABLET, EXTENDED RELEASE ORAL at 08:12

## 2025-06-12 RX ADMIN — DIGOXIN 125 MCG: 125 TABLET ORAL at 08:13

## 2025-06-12 RX ADMIN — GABAPENTIN 300 MG: 600 TABLET, FILM COATED ORAL at 12:16

## 2025-06-12 RX ADMIN — NYSTATIN 500000 UNITS: 100000 SUSPENSION ORAL at 08:12

## 2025-06-12 RX ADMIN — NYSTATIN 500000 UNITS: 100000 SUSPENSION ORAL at 16:54

## 2025-06-12 RX ADMIN — BUMETANIDE 2 MG: 1 TABLET ORAL at 08:19

## 2025-06-12 RX ADMIN — SODIUM CHLORIDE, PRESERVATIVE FREE 10 ML: 5 INJECTION INTRAVENOUS at 08:14

## 2025-06-12 RX ADMIN — BUDESONIDE 500 MCG: 0.5 INHALANT RESPIRATORY (INHALATION) at 08:37

## 2025-06-12 RX ADMIN — ALPRAZOLAM 0.5 MG: 0.5 TABLET ORAL at 01:30

## 2025-06-12 RX ADMIN — NYSTATIN 500000 UNITS: 100000 SUSPENSION ORAL at 12:17

## 2025-06-12 RX ADMIN — MIRTAZAPINE 30 MG: 15 TABLET, ORALLY DISINTEGRATING ORAL at 22:10

## 2025-06-12 RX ADMIN — APIXABAN 5 MG: 5 TABLET, FILM COATED ORAL at 21:30

## 2025-06-12 RX ADMIN — BENZONATATE 200 MG: 100 CAPSULE ORAL at 22:01

## 2025-06-12 RX ADMIN — SODIUM CHLORIDE, PRESERVATIVE FREE 10 ML: 5 INJECTION INTRAVENOUS at 20:30

## 2025-06-12 RX ADMIN — ASPIRIN 81 MG: 81 TABLET, COATED ORAL at 08:12

## 2025-06-12 RX ADMIN — ACETAMINOPHEN 650 MG: 325 TABLET ORAL at 08:12

## 2025-06-12 RX ADMIN — ATORVASTATIN CALCIUM 40 MG: 40 TABLET, FILM COATED ORAL at 22:01

## 2025-06-12 RX ADMIN — METHOCARBAMOL 500 MG: 500 TABLET ORAL at 11:56

## 2025-06-12 RX ADMIN — NYSTATIN 500000 UNITS: 100000 SUSPENSION ORAL at 22:00

## 2025-06-12 RX ADMIN — POLYETHYLENE GLYCOL 3350 17 G: 17 POWDER, FOR SOLUTION ORAL at 08:13

## 2025-06-12 RX ADMIN — WATER 250 MG: 1 INJECTION INTRAMUSCULAR; INTRAVENOUS; SUBCUTANEOUS at 06:12

## 2025-06-12 RX ADMIN — SENNOSIDES AND DOCUSATE SODIUM 2 TABLET: 50; 8.6 TABLET ORAL at 08:13

## 2025-06-12 ASSESSMENT — PAIN DESCRIPTION - LOCATION
LOCATION: BACK

## 2025-06-12 ASSESSMENT — PAIN DESCRIPTION - DESCRIPTORS
DESCRIPTORS: ACHING

## 2025-06-12 ASSESSMENT — PAIN SCALES - GENERAL
PAINLEVEL_OUTOF10: 6
PAINLEVEL_OUTOF10: 0
PAINLEVEL_OUTOF10: 7
PAINLEVEL_OUTOF10: 6
PAINLEVEL_OUTOF10: 1
PAINLEVEL_OUTOF10: 0

## 2025-06-12 ASSESSMENT — PAIN DESCRIPTION - ORIENTATION
ORIENTATION: MID
ORIENTATION: POSTERIOR
ORIENTATION: POSTERIOR

## 2025-06-12 NOTE — CARE COORDINATION
Transition of Care Plan:    RUR: 22% - high  Prior Level of Functioning: open with [facility]  Disposition: [facility]  CASSANDRA: 6/13/25  IPR: Date FOC offered: RUIZ is following for possible IPR need  Date FOC received: 6/4/25  Accepting facility: N/A  Follow up appointments: hospice  DME needed: defer to hospice  Transportation at discharge: stretcher w oxygen support  IM/IMM Medicare/ letter given: will need 2nd IMM  Caregiver Contact: spouse - Suzanna Jensen - p: [phone]  Discharge Caregiver contacted prior to discharge? yes  Care Conference needed? no  Barriers to discharge: medical    CM updated by attending MD that steroid taper continues today. Goal is to discharge tomorrow with home hospice.    Spoke with [facility] and DME will be delivered to patient's home in [place]. [facility] requests stretcher transport set up for mid morning tomorrow.    10am transport time scheduled with [facility] for 6/13/25.    CM will continue to follow.    Jaime Mccarthy, MPH  Care Manager l [facility]  Available via [employer]

## 2025-06-12 NOTE — PLAN OF CARE
Problem: Physical Therapy - Adult  Goal: By Discharge: Performs mobility at highest level of function for planned discharge setting.  See evaluation for individualized goals.  Description: FUNCTIONAL STATUS PRIOR TO ADMISSION: Patient was independent and active without use of DME.    HOME SUPPORT PRIOR TO ADMISSION: The patient lived with family but did not require assistance.    Physical Therapy Goals  Revised 6/9/2025  1.  Patient will move from supine to sit and sit to supine, scoot up and down, and roll side to side in bed with contact guard assist within 7 day(s).    2.  Patient will perform sit to stand with contact guard assist within 7 day(s).  3.  Patient will transfer from bed to chair and chair to bed with contact guard assist using the least restrictive device within 7 day(s).  4.  Patient will ambulate with contact guard assist for 20 feet with the least restrictive device within 7 day(s).   5.  Patient will verbally and functionally demonstrate 3/3 sternal precautions without cues within 7 days.   Outcome: Progressing       PHYSICAL THERAPY TREATMENT    Patient: Hector Jensen Jr (74 y.o. male)  Date: 6/12/2025  Diagnosis: Hyponatremia [E87.1]  Acute pulmonary edema (HCC) [J81.0]  Acute respiratory failure with hypoxia (HCC) [J96.01]  Dyspnea, unspecified type [R06.00] Hyponatremia      Precautions: Restrictions/Precautions  Restrictions/Precautions: Fall Risk            ASSESSMENT:  Patient continues to benefit from skilled PT services and is slowly progressing towards goals. Patient still very limited by increased work of breathing with minimal mobility but recovering quicker with decreased O2 requirements. Patient received on 4L via nc which was increased to 6L after transfer back to bed to recover from 78% to 98% - left with O2 reduced back to 4L. Patient and wife educated on energy conservation, importance of communication, limiting sitting unsupported.          PLAN:  Patient continues to

## 2025-06-12 NOTE — PROGRESS NOTES
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      Assessment and plan:  Acute hypoxic respiratory failure:   -multifactorial with pulmonary fibrosis and pulm edema  -Interstitial Lung Disease - Pulmonary fibrosis.  Scan reviewed and pattern is of diffuse GGOs, bilateral traction bronchiectasis.  Favor NSIP.    -favorable response to pulse steroids but unfavorable side effects.   -taper pulse dose to 125 daily today  -home on extended taper       Acute hypotension:  better this am   -Hco3 34  -suspect he is overdiuresed  -change bumex to PO and one daily dosing will start tomorrow      Afib:    -driven by respiratory failure  -cont BB   -cont eliqius   -dc dig      Anxiety - worse with high dose steroids   -cont xanax low dose prn      Right hand weakness; hx of CVA  - Multimodal pain regimen including Robaxin, lidocaine patch,   - as needed Tylenol, oxycodone, and voltaren  - PT OT  - anxiety improved w/ trial of ativan 0.5 mg IV, given once but available q4h PRN     CAD s/p CABG 5/14/25  -cont asa  -cont statin      Acute Heart Failure, improving  - TTE earlier during hospitalization showed reduction in EF to 45%   - EF appears improved on POCUS with now rate control and euvolemia  - f/u limited TTE pending  - Continue home Eliquis, aspirin, and atorvastatin  - Diuresis as below     Leukocytosis:   -trend is down with decreased steroids  -WBC 25k ---> 22k   -some steroid effect here     Constipation  - Bowel regimen with scheduled MiraLAX and Senokot.    - Will consider suppository or enema if constipation unimproved     Malnutrition  - Optimizing nutrition per RD     Thrush:   -cont nystatin     HPI:  remains critically ill with acute hypoxic respiratory failure, refractory afib with RVR and severe CAD      ICU DAILY CHECKLIST     Code Status:DNR   DVT  Prophylaxis:eliqius  T/L/D: PIVs  SUP: na   Diet: regular  Activity Level: as tolerated  PT OT  ABCDEF Bundle/Checklist Completed:Yes  Disposition: cont icu care  Multidisciplinary Rounds Completed: yes  Goals of Care Discussion/Palliative: yes  Patient/Family Updated: yes      OBJECTIVE  Vitals:    06/12/25 1400 06/12/25 1500 06/12/25 1600 06/12/25 1700   BP: 108/66 116/65 (!) 114/31 115/73   Pulse: 76 75 76 79   Resp: 25 (!) 32 22 21   Temp:   98.4 °F (36.9 °C)    TempSrc:   Oral    SpO2: 94% 97% 100% 97%   Weight:       Height:         EXAM:   GEN: awake alert and oriented   HEENT  -Head: NC/AT;  -Eyes: PERRL, EOMI. No discharge or redness;  -Ears: External ears are normal. Normal TMs.  -Nose: Normal nares.  -Mouth and throat: MMM. Normal gums, mucosa, palate,. Good dentition.  NECK: Supple, with no masses. No JVD   CV: RRR, no m/r/g.  LUNGS: CTAB, no w/r/c.  ABD: Soft, NT/ND, no masses, NTTP  SKIN: Warm, well perfused. No skin rashes or abnormal lesions.  EXT: No clubbing, cyanosis, or edema.  NEURO: Normal muscle strength and tone. No focal deficits.cranial nerves intact      CCM time:   45 mins.  This patient is critically ill with risk of clinical deterioration.  The documented time was time spent providing direct patient care, formulating care plan and discussing this plan with other providers, updating family and discussing goals of care with patient and/or family. It does not include time spent performing any procedures that are billed separtately.    Nighat Ross MD  [facility]   6/12/2025

## 2025-06-12 NOTE — PROGRESS NOTES
Attended Interdisciplinary Rounds on 7S ICU  where patient's care was discussed.    Bashir Woody  Chaplain Resident  [phone]

## 2025-06-12 NOTE — PLAN OF CARE
Problem: Safety - Adult  Goal: Free from fall injury  Outcome: Progressing     Problem: Chronic Conditions and Co-morbidities  Goal: Patient's chronic conditions and co-morbidity symptoms are monitored and maintained or improved  Outcome: Progressing  Flowsheets (Taken 6/12/2025 1600)  Care Plan - Patient's Chronic Conditions and Co-Morbidity Symptoms are Monitored and Maintained or Improved:   Monitor and assess patient's chronic conditions and comorbid symptoms for stability, deterioration, or improvement   Collaborate with multidisciplinary team to address chronic and comorbid conditions and prevent exacerbation or deterioration     Problem: Discharge Planning  Goal: Discharge to home or other facility with appropriate resources  Outcome: Progressing  Flowsheets (Taken 6/12/2025 1600)  Discharge to home or other facility with appropriate resources: Identify barriers to discharge with patient and caregiver     Problem: Pain  Goal: Verbalizes/displays adequate comfort level or baseline comfort level  Outcome: Progressing  Flowsheets (Taken 6/12/2025 0400 by Holly Reed RN)  Verbalizes/displays adequate comfort level or baseline comfort level: Administer analgesics based on type and severity of pain and evaluate response     Problem: Respiratory - Adult  Goal: Achieves optimal ventilation and oxygenation  Outcome: Progressing     Problem: Skin/Tissue Integrity - Adult  Goal: Incisions, wounds, or drain sites healing without S/S of infection  Outcome: Progressing     Problem: Metabolic/Fluid and Electrolytes - Adult  Goal: Electrolytes maintained within normal limits  Outcome: Progressing  Flowsheets (Taken 6/12/2025 1600)  Electrolytes maintained within normal limits:   Monitor labs and assess patient for signs and symptoms of electrolyte imbalances   Administer electrolyte replacement as ordered     Problem: Physical Therapy - Adult  Goal: By Discharge: Performs mobility at highest level of function for

## 2025-06-12 NOTE — PLAN OF CARE
Problem: Physical Therapy - Adult  Goal: By Discharge: Performs mobility at highest level of function for planned discharge setting.  See evaluation for individualized goals.  Description: FUNCTIONAL STATUS PRIOR TO ADMISSION: Patient was independent and active without use of DME.    HOME SUPPORT PRIOR TO ADMISSION: The patient lived with family but did not require assistance.    Physical Therapy Goals  Revised 6/9/2025  1.  Patient will move from supine to sit and sit to supine, scoot up and down, and roll side to side in bed with contact guard assist within 7 day(s).    2.  Patient will perform sit to stand with contact guard assist within 7 day(s).  3.  Patient will transfer from bed to chair and chair to bed with contact guard assist using the least restrictive device within 7 day(s).  4.  Patient will ambulate with contact guard assist for 20 feet with the least restrictive device within 7 day(s).   5.  Patient will verbally and functionally demonstrate 3/3 sternal precautions without cues within 7 days.   6/11/2025 1448 by Vivek Khan, PT  Outcome: Not Progressing     Problem: Occupational Therapy - Adult  Goal: By Discharge: Performs self-care activities at highest level of function for planned discharge setting.  See evaluation for individualized goals.  Description: Occupational Therapy Goals  Initiated: 5/30/2025; goals reviewed and all continued at weekly re-assessment 06/09/2025:    1.  Patient will perform grooming with setup from chair level within 7 day(s).  2.  Patient will perform bathing with mod A within 7 day(s).  3.  Patient will perform upper body dressing with mod A within 7 days.  4.  Patient will perform lower body dressing with mod A within 7 day(s).  5.  Patient will perform toilet transfers with min A within 7 day(s).  6.  Patient will perform all aspects of toileting with min A within 7 day(s).  7.  Patient will be independent with 3/3 sternal precautions within 7 days.  8.   Patient will require minimal cues to following precautions during functional activities within 7 days.       FUNCTIONAL STATUS PRIOR TO ADMISSION:    Receives Help From: Family, Prior Level of Assist for ADLs: Independent,  ,  ,  ,  ,  , Prior Level of Assist for Homemaking: Independent,  , Prior Level of Assist for Transfers: Independent, Active : Yes     HOME SUPPORT: Pt lives with his wife and granddaughter.  He is typically independent prior to admission.  Pt with recent CABG and needs to follow strict sternal precautions.     6/11/2025 1517 by Yuridia Loomis, KAREN  Outcome: Not Progressing     Problem: Physical Therapy - Adult  Goal: By Discharge: Performs mobility at highest level of function for planned discharge setting.  See evaluation for individualized goals.  Description: FUNCTIONAL STATUS PRIOR TO ADMISSION: Patient was independent and active without use of DME.    HOME SUPPORT PRIOR TO ADMISSION: The patient lived with family but did not require assistance.    Physical Therapy Goals  Revised 6/9/2025  1.  Patient will move from supine to sit and sit to supine, scoot up and down, and roll side to side in bed with contact guard assist within 7 day(s).    2.  Patient will perform sit to stand with contact guard assist within 7 day(s).  3.  Patient will transfer from bed to chair and chair to bed with contact guard assist using the least restrictive device within 7 day(s).  4.  Patient will ambulate with contact guard assist for 20 feet with the least restrictive device within 7 day(s).   5.  Patient will verbally and functionally demonstrate 3/3 sternal precautions without cues within 7 days.   6/11/2025 1448 by Vivek Khan, PT  Outcome: Not Progressing     Problem: Occupational Therapy - Adult  Goal: By Discharge: Performs self-care activities at highest level of function for planned discharge setting.  See evaluation for individualized goals.  Description: Occupational Therapy

## 2025-06-12 NOTE — CERTIFICATION
Inpatient re-certification    I recertify the continued Inpatient admisson based on the followin. Continued hospitalization is required for ongoing medical treatment or medically required diagnostic study for NSIP.     2. The patient is expected to remain in the hospital for :1-2 additional days.    3. The plans for post-hospital care include (choose one):Return to home with home health / hospice services

## 2025-06-12 NOTE — PROGRESS NOTES
Pulmonary, Critical Care, and Sleep Medicine~Consult Note    Name: Hector Jensen Jr MRN: [id number]   : 1950 Hospital: [facility]   Date: 2025 2:49 PM Admission: 2025     Impression Plan   Acute Hypoxic Respiratory Failure, suspect  pulmonary edema, lower suspicion for CAP superimposed on chronic ILD, on 4L   ILD- CT is grossly abnormal with an element of chronic intra lung disease.  Etiology of fibrosis is unclear although differential includes aspiration pneumonitis, Amiodarone induced pneumonitis/fibrosis. Query underlying MAC given bronchiectasis and RLL fibrosis?  NSIP? Autoimmune/rheumatologic serologies negative  CAD s/p CABG x 3, s/p NSTEMI  Afib  HFrEF  Acute on Chronic Hyponatremia   HTN/HLD  Small Bilateral Apical PTX with small Pneumomediastinum.    Oxygen to maintain goal saturation >90%, wean as able.  Currently on 6-10 L nasal cannula  Sputum Culture-NGTD  Conservative management, no indication for pigtail at this time. No NIV with PTX.   Pulmicort BID  Steroids weaned, will need a prolonged taper, recommend 40 mg/day x 7 days, 30 mg/day x 7 days, then maintaining 20 mg/day until follow up with pulm   Abx- second course completed.   Pulmonary Toilet  Rate Control-metoprolol increased   TTE improved.   Eliquis  Lower suspicion for amiodarione induced fibro proliferative disease but if it is deemed unnecessary by cardiology I would be in favor of discontinuing, agree with holding for now  Will need repeat imaging in 4-6 weeks.   PT/OT as able, mobilize as able.     Discussed plan with intensivist, daughter, wife and patient today. His oxygen has improved to 4L on my exam but he continues to have severe dyspnea with minimal exertion. Worked with PT/OT today with recommendations of discharge home with HH or rehab. Patient preference is to return home. Plans to discharge home in the works. Will arrange outpatient follow up for continued management of underlying  mg Oral BID    atorvastatin (LIPITOR) tablet 40 mg  40 mg Oral Nightly    sodium chloride flush 0.9 % injection 5-40 mL  5-40 mL IntraVENous PRN    0.9 % sodium chloride infusion   IntraVENous PRN    albuterol (PROVENTIL) (2.5 MG/3ML) 0.083% nebulizer solution 2.5 mg  2.5 mg Nebulization Q6H PRN    aluminum & magnesium hydroxide-simethicone (MAALOX PLUS) 200-200-20 MG/5ML suspension 30 mL  30 mL Oral Q6H PRN    senna (SENOKOT) 8.8 MG/5ML syrup 8.8 mg  5 mL Oral BID PRN    acetaminophen (TYLENOL) tablet 650 mg  650 mg Oral Q6H PRN    Or    acetaminophen (TYLENOL) suppository 650 mg  650 mg Rectal Q6H PRN    ondansetron (ZOFRAN) injection 4 mg  4 mg IntraVENous Q6H PRN    aspirin EC tablet 81 mg  81 mg Oral Daily    lidocaine 4 % external patch 1 patch  1 patch TransDERmal Daily       Labs:  ABG Invalid input(s): \"ABG\"     CBC Recent Labs     06/10/25  0558 06/11/25  0552 06/12/25  0508   WBC 20.8* 25.0* 22.5*   HGB 10.9* 11.2* 11.3*   HCT 34.1* 35.4* 36.2*    301 308   MCV 90.2 91.5 92.1   MCH 28.8 28.9 28.8        Metabolic  Panel Recent Labs     06/10/25  0558 06/11/25  0552 06/12/25  0508    140 139   K 4.0 4.2 3.7   CL 99 98 97   CO2 33* 34* 36*   BUN 82* 69* 62*   MG 3.2* 2.9* 2.9*        Pertinent Labs                Brigitte Clement, APRN - CNP  6/12/2025

## 2025-06-12 NOTE — PLAN OF CARE
Problem: Occupational Therapy - Adult  Goal: By Discharge: Performs self-care activities at highest level of function for planned discharge setting.  See evaluation for individualized goals.  Description: Occupational Therapy Goals  Initiated: 5/30/2025; goals reviewed and all continued at weekly re-assessment 06/09/2025:    1.  Patient will perform grooming with setup from chair level within 7 day(s).  2.  Patient will perform bathing with mod A within 7 day(s).  3.  Patient will perform upper body dressing with mod A within 7 days.  4.  Patient will perform lower body dressing with mod A within 7 day(s).  5.  Patient will perform toilet transfers with min A within 7 day(s).  6.  Patient will perform all aspects of toileting with min A within 7 day(s).  7.  Patient will be independent with 3/3 sternal precautions within 7 days.  8.  Patient will require minimal cues to following precautions during functional activities within 7 days.       FUNCTIONAL STATUS PRIOR TO ADMISSION:    Receives Help From: Family, Prior Level of Assist for ADLs: Independent,  ,  ,  ,  ,  , Prior Level of Assist for Homemaking: Independent,  , Prior Level of Assist for Transfers: Independent, Active : Yes     HOME SUPPORT: Pt lives with his wife and granddaughter.  He is typically independent prior to admission.  Pt with recent CABG and needs to follow strict sternal precautions.     Outcome: Progressing   OCCUPATIONAL THERAPY TREATMENT    Patient: Hector Jensen Jr (74 y.o. male)  Date: 6/12/2025  Primary Diagnosis: Hyponatremia [E87.1]  Acute pulmonary edema (HCC) [J81.0]  Acute respiratory failure with hypoxia (HCC) [J96.01]  Dyspnea, unspecified type [R06.00]       Precautions: Fall Risk                Chart, occupational therapy assessment, plan of care, and goals were reviewed.    ASSESSMENT  Patient continues to benefit from skilled OT services and is slowly progressing towards goals. Pt's performance of ADL/IADL tasks

## 2025-06-12 NOTE — PROGRESS NOTES
Cardiac Surgery ICU Progress Note    Admit Date: 2025  Readmission for hypoxia and hyponatremia    Procedure:       CABG 25 during prior admission with Dr. Ragsdale     Subjective:   Pt seen and examined with Dr. Farmer. Pt is up in chair, per family stood up with assist and pivoted to chair. He is afebrile, oxygen is down to 6L NC this morning, some afib overnight but currently in SR.      Objective:   Vitals:  Blood pressure 109/66, pulse 76, temperature 96.8 °F (36 °C), temperature source Axillary, resp. rate (!) 32, height 1.626 m (5' 4\"), weight 50.1 kg (110 lb 7.2 oz), SpO2 94%.  Temp (24hrs), Av.5 °F (36.4 °C), Min:96.8 °F (36 °C), Max:98 °F (36.7 °C)      EKG/Rhythm:  SR     CXR:  Xray Result (most recent):  XR CHEST PORTABLE 2025    Narrative  EXAM: Portable CXR.    COMPARISON: 2025    INDICATION: f/u pulm edema, pneumomediastinum, b/l pnx    FINDINGS:  No definite pneumothorax, although right lung apex is partly obscured by partly  improved pneumomediastinum and subcutaneous emphysema. Stable bilateral airspace  disease/edema. Heart size is normal. Prior median sternotomy. No apparent  pleural effusion or new finding.    Impression  No apparent pneumothorax.  Partly improved pneumomediastinum and right subcutaneous emphysema.  Stable bilateral airspace disease/edema.      Electronically signed by MARTHA ORELLANA       Admission Weight: Last Weight   Weight - Scale: 63.6 kg (140 lb 3.4 oz) Weight - Scale: 50.1 kg (110 lb 7.2 oz)     Intake / Output / Drain:  Current Shift: No intake/output data recorded.  Last 24 hrs.:   Intake/Output Summary (Last 24 hours) at 2025 1339  Last data filed at 2025 0800  Gross per 24 hour   Intake 1924.14 ml   Output 1725 ml   Net 199.14 ml     EXAM:  General:  Up in chair, frail          Lungs:   Coarse lung sounds throughout.    Incision:  No erythema, drainage or swelling. Incision healed.   Cardiac: Regular rate, Irregular rhythm. No  murmur, rub.    Extremities:  No edema.    Neurologic:   may be subtly weaker on R hand, otherwise equal strength between upper extremities and equal strength in lower extremities. He denies numbness ands tingling.     Labs:   Recent Labs     06/12/25  0508   WBC 22.5*   HGB 11.3*   HCT 36.2*         K 3.7   BUN 62*        Assessment:     Principal Problem:    Hyponatremia  Resolved Problems:    * No resolved hospital problems. *     Plan/Recommendations/Medical Decision Making:      Acute hypoxic respiratory failure  Diffuse groundglass consolidation with pulmonary edema and bilateral pulmonary effusions on CTA. Mixed etiology - pulmonary edema, CAP, possible pneumonitis/fibrosis.   - Pulmonology following  - Cultures NGTD.   - Improving oxygen demands, down to 6L NC which is the lowest it has been in weeks  - Continue diureses per primary team   - He is contracted on labs, would recommend decreasing diuretics   - Pt DNR/DNI at this time.  - Pulse dose steroids are being weaned but seemed to have been helpful as he has actually been able to come down on his oxygen needs (15L midflow to 6L NC)    Small bilateral pneumothoraces, small pneumomediastinum: Like secondary to positive pressure. Treatment per intensivist and pulmonology appreciated.   - support with NIPPV/HFNC per primary team - improved to 6LNC this AM.  - Pulm toilet: acapella, IS, cough, deep breathe, ambulate when appropriate.   - diuresis and steroids per primary team     Atrial fibrillation with RVR New postop.  - Amiodarone discontinued d/t respiratory failure and ground glass opacities  - Toprol XL 25mg with hold parameters  - If continues to have Afib, would transition back to Metoprolol 12.5mg BID or potentially q8hr  - Continue digoxin. (Will need dig levels assessed, monitor K)  -  Eliquis 5 mg BID  - maintain K > 3.9, mg > 2.4    CAD s/p NSTEMI, s/p CABG x3:  - ASA and atorvastatin  - Continue Toprol XL    Acute on chronic

## 2025-06-13 VITALS
BODY MASS INDEX: 18.71 KG/M2 | OXYGEN SATURATION: 90 % | RESPIRATION RATE: 28 BRPM | WEIGHT: 109.57 LBS | HEART RATE: 88 BPM | DIASTOLIC BLOOD PRESSURE: 94 MMHG | TEMPERATURE: 98.4 F | HEIGHT: 64 IN | SYSTOLIC BLOOD PRESSURE: 131 MMHG

## 2025-06-13 LAB
ANION GAP SERPL CALC-SCNC: 3 MMOL/L (ref 2–12)
BUN SERPL-MCNC: 47 MG/DL (ref 6–20)
BUN/CREAT SERPL: 68 (ref 12–20)
CALCIUM SERPL-MCNC: 8.7 MG/DL (ref 8.5–10.1)
CHLORIDE SERPL-SCNC: 94 MMOL/L (ref 97–108)
CO2 SERPL-SCNC: 40 MMOL/L (ref 21–32)
CREAT SERPL-MCNC: 0.69 MG/DL (ref 0.7–1.3)
GLUCOSE SERPL-MCNC: 97 MG/DL (ref 65–100)
POTASSIUM SERPL-SCNC: 4.5 MMOL/L (ref 3.5–5.1)
SODIUM SERPL-SCNC: 137 MMOL/L (ref 136–145)

## 2025-06-13 PROCEDURE — 2500000003 HC RX 250 WO HCPCS: Performed by: INTERNAL MEDICINE

## 2025-06-13 PROCEDURE — 2700000000 HC OXYGEN THERAPY PER DAY

## 2025-06-13 PROCEDURE — 97530 THERAPEUTIC ACTIVITIES: CPT

## 2025-06-13 PROCEDURE — 6370000000 HC RX 637 (ALT 250 FOR IP): Performed by: NURSE PRACTITIONER

## 2025-06-13 PROCEDURE — 94640 AIRWAY INHALATION TREATMENT: CPT

## 2025-06-13 PROCEDURE — 80048 BASIC METABOLIC PNL TOTAL CA: CPT

## 2025-06-13 PROCEDURE — 94760 N-INVAS EAR/PLS OXIMETRY 1: CPT

## 2025-06-13 PROCEDURE — 6360000002 HC RX W HCPCS: Performed by: INTERNAL MEDICINE

## 2025-06-13 PROCEDURE — 6370000000 HC RX 637 (ALT 250 FOR IP): Performed by: INTERNAL MEDICINE

## 2025-06-13 PROCEDURE — 6360000002 HC RX W HCPCS: Performed by: PHYSICAL MEDICINE & REHABILITATION

## 2025-06-13 PROCEDURE — 6370000000 HC RX 637 (ALT 250 FOR IP): Performed by: STUDENT IN AN ORGANIZED HEALTH CARE EDUCATION/TRAINING PROGRAM

## 2025-06-13 PROCEDURE — 6370000000 HC RX 637 (ALT 250 FOR IP)

## 2025-06-13 RX ORDER — PREDNISONE 20 MG/1
TABLET ORAL
Qty: 65 TABLET | Refills: 0 | Status: SHIPPED | OUTPATIENT
Start: 2025-06-14 | End: 2025-07-28

## 2025-06-13 RX ORDER — GABAPENTIN 300 MG/1
300 CAPSULE ORAL 3 TIMES DAILY
Qty: 90 CAPSULE | Refills: 1 | Status: SHIPPED | OUTPATIENT
Start: 2025-06-13 | End: 2025-09-11

## 2025-06-13 RX ORDER — FAMOTIDINE 20 MG/1
20 TABLET, FILM COATED ORAL DAILY
Qty: 60 TABLET | Refills: 3 | Status: SHIPPED | OUTPATIENT
Start: 2025-06-14

## 2025-06-13 RX ORDER — ALBUTEROL SULFATE 0.83 MG/ML
2.5 SOLUTION RESPIRATORY (INHALATION) EVERY 6 HOURS PRN
Qty: 336 DEVICE | Refills: 3 | Status: SHIPPED | OUTPATIENT
Start: 2025-06-13

## 2025-06-13 RX ORDER — BUMETANIDE 2 MG/1
2 TABLET ORAL DAILY
Qty: 30 TABLET | Refills: 3 | Status: SHIPPED | OUTPATIENT
Start: 2025-06-14

## 2025-06-13 RX ORDER — PREDNISONE 20 MG/1
40 TABLET ORAL DAILY
Status: DISCONTINUED | OUTPATIENT
Start: 2025-06-21 | End: 2025-06-13 | Stop reason: HOSPADM

## 2025-06-13 RX ORDER — ALPRAZOLAM 0.25 MG
0.25 TABLET ORAL EVERY 8 HOURS PRN
Qty: 30 TABLET | Refills: 0 | Status: SHIPPED | OUTPATIENT
Start: 2025-06-13 | End: 2025-07-13

## 2025-06-13 RX ORDER — METHOCARBAMOL 500 MG/1
500 TABLET, FILM COATED ORAL 2 TIMES DAILY PRN
Qty: 60 TABLET | Refills: 1 | Status: SHIPPED | OUTPATIENT
Start: 2025-06-13 | End: 2025-08-12

## 2025-06-13 RX ORDER — PREDNISONE 20 MG/1
20 TABLET ORAL DAILY
Status: DISCONTINUED | OUTPATIENT
Start: 2025-06-28 | End: 2025-06-13 | Stop reason: HOSPADM

## 2025-06-13 RX ORDER — BENZONATATE 200 MG/1
200 CAPSULE ORAL 3 TIMES DAILY PRN
Qty: 60 CAPSULE | Refills: 2 | Status: SHIPPED | OUTPATIENT
Start: 2025-06-13

## 2025-06-13 RX ORDER — PREDNISONE 20 MG/1
60 TABLET ORAL DAILY
Status: DISCONTINUED | OUTPATIENT
Start: 2025-06-14 | End: 2025-06-13 | Stop reason: HOSPADM

## 2025-06-13 RX ORDER — MIRTAZAPINE 30 MG/1
30 TABLET, ORALLY DISINTEGRATING ORAL NIGHTLY
Qty: 30 TABLET | Refills: 3 | Status: SHIPPED | OUTPATIENT
Start: 2025-06-13

## 2025-06-13 RX ADMIN — ASPIRIN 81 MG: 81 TABLET, COATED ORAL at 08:43

## 2025-06-13 RX ADMIN — WATER 125 MG: 1 INJECTION INTRAMUSCULAR; INTRAVENOUS; SUBCUTANEOUS at 08:57

## 2025-06-13 RX ADMIN — APIXABAN 5 MG: 5 TABLET, FILM COATED ORAL at 08:45

## 2025-06-13 RX ADMIN — Medication: at 09:04

## 2025-06-13 RX ADMIN — GABAPENTIN 300 MG: 600 TABLET, FILM COATED ORAL at 08:45

## 2025-06-13 RX ADMIN — METOPROLOL SUCCINATE 25 MG: 50 TABLET, EXTENDED RELEASE ORAL at 08:44

## 2025-06-13 RX ADMIN — NYSTATIN 500000 UNITS: 100000 SUSPENSION ORAL at 08:43

## 2025-06-13 RX ADMIN — FAMOTIDINE 20 MG: 20 TABLET, FILM COATED ORAL at 08:45

## 2025-06-13 RX ADMIN — SENNOSIDES AND DOCUSATE SODIUM 2 TABLET: 50; 8.6 TABLET ORAL at 08:46

## 2025-06-13 RX ADMIN — BUDESONIDE 500 MCG: 0.5 INHALANT RESPIRATORY (INHALATION) at 07:20

## 2025-06-13 RX ADMIN — BUMETANIDE 2 MG: 1 TABLET ORAL at 08:44

## 2025-06-13 RX ADMIN — POLYETHYLENE GLYCOL 3350 17 G: 17 POWDER, FOR SOLUTION ORAL at 08:43

## 2025-06-13 NOTE — PLAN OF CARE
Problem: Occupational Therapy - Adult  Goal: By Discharge: Performs self-care activities at highest level of function for planned discharge setting.  See evaluation for individualized goals.  Description: Occupational Therapy Goals  Initiated: 5/30/2025; goals reviewed and all continued at weekly re-assessment 06/09/2025:    1.  Patient will perform grooming with setup from chair level within 7 day(s).  2.  Patient will perform bathing with mod A within 7 day(s).  3.  Patient will perform upper body dressing with mod A within 7 days.  4.  Patient will perform lower body dressing with mod A within 7 day(s).  5.  Patient will perform toilet transfers with min A within 7 day(s).  6.  Patient will perform all aspects of toileting with min A within 7 day(s).  7.  Patient will be independent with 3/3 sternal precautions within 7 days.  8.  Patient will require minimal cues to following precautions during functional activities within 7 days.       FUNCTIONAL STATUS PRIOR TO ADMISSION:    Receives Help From: Family, Prior Level of Assist for ADLs: Independent,  ,  ,  ,  ,  , Prior Level of Assist for Homemaking: Independent,  , Prior Level of Assist for Transfers: Independent, Active : Yes     HOME SUPPORT: Pt lives with his wife and granddaughter.  He is typically independent prior to admission.  Pt with recent CABG and needs to follow strict sternal precautions.     Outcome: Progressing     OCCUPATIONAL THERAPY TREATMENT    Patient: Hector Jensen Jr (74 y.o. male)  Date: 6/13/2025  Primary Diagnosis: Hyponatremia [E87.1]  Acute pulmonary edema (HCC) [J81.0]  Acute respiratory failure with hypoxia (HCC) [J96.01]  Dyspnea, unspecified type [R06.00]       Precautions: Fall Risk                Chart, occupational therapy assessment, plan of care, and goals were reviewed.    ASSESSMENT  Patient continues to benefit from skilled OT services and is slowly progressing towards goals. Pt's performance of ADL/IADL tasks

## 2025-06-13 NOTE — PROGRESS NOTES
Cardiac Surgery Coordinator- Met with Hector Jensen Jr and his family. He is preparing to leave this morning. Encouraged family to verbalize and offered emotional support. They are without questions.

## 2025-06-13 NOTE — CARE COORDINATION
CM completed transportation packet. Provided to ICU RN. Patient ready for discharge with [facility]. [facility]  on time for 10am.    Jaime Mccarthy, MPH  Care Manager l [facility]  Available via [employer]

## 2025-06-13 NOTE — DISCHARGE SUMMARY
Discharge Summary     Patient: Hector Jensen Jr       MRN: [id number]       YOB: 1950       Age: 74 y.o.     Date of admission:  5/26/2025    Date of discharge:  6/13/2025    Primary care provider:  Dada Goss Jr., APRN - NP     Admitting provider:  Barrie Campbell MD    Discharging provider(s): Nighat Ross MD - Staff Intensivist - [facility]     Consultations  IP CONSULT TO NEPHROLOGY  IP CONSULT TO VASCULAR ACCESS TEAM  IP CONSULT TO PULMONOLOGY  IP CONSULT TO PALLIATIVE CARE  IP CONSULT TO HOSPICE    Procedures  None     Discharge Condition: stable.  Discharge Destination: home.  The patient is stable for discharge.    Admission diagnosis  Hyponatremia [E87.1]  Acute pulmonary edema (HCC) [J81.0]  Acute respiratory failure with hypoxia (HCC) [J96.01]  Dyspnea, unspecified type [R06.00]    Please refer to the admission history and physical for details on the presenting problem.     Final discharge diagnoses and brief hospital course    74 yom admitted for hypoxemia and hyponatremia, found to have NSIP.      Assessment and plan:  Acute hypoxic respiratory failure:   -multifactorial with pulmonary fibrosis and pulm edema  -Interstitial Lung Disease - Pulmonary fibrosis.  Scan reviewed and pattern is of diffuse GGOs, bilateral traction bronchiectasis.  Favor NSIP.    -favorable response to pulse steroids but unfavorable side effects.   -taper pulse dose to 125 daily today  -home on extended taper       Acute hypotension:  better this am   -Hco3 34  -suspect he is overdiuresed  -change bumex to PO and one daily dosing will start tomorrow      Afib:    -driven by respiratory failure  -cont BB   -cont eliqius   -dc dig      Anxiety - worse with high dose steroids   -cont xanax low dose prn      Right hand weakness; hx of CVA  - Multimodal pain regimen including Robaxin, lidocaine patch,   - as needed Tylenol, oxycodone, and voltaren  - PT OT  - anxiety improved w/ trial of ativan

## 2025-06-13 NOTE — PLAN OF CARE
Problem: Safety - Adult  Goal: Free from fall injury  6/12/2025 2150 by Ethel Marsh RN  Outcome: Progressing  6/12/2025 1753 by Silver Andersen RN  Outcome: Progressing     Problem: Chronic Conditions and Co-morbidities  Goal: Patient's chronic conditions and co-morbidity symptoms are monitored and maintained or improved  6/12/2025 2150 by Ethel Marsh RN  Outcome: Progressing  6/12/2025 1753 by Silver Andersen RN  Outcome: Progressing  Flowsheets (Taken 6/12/2025 1600)  Care Plan - Patient's Chronic Conditions and Co-Morbidity Symptoms are Monitored and Maintained or Improved:   Monitor and assess patient's chronic conditions and comorbid symptoms for stability, deterioration, or improvement   Collaborate with multidisciplinary team to address chronic and comorbid conditions and prevent exacerbation or deterioration     Problem: Discharge Planning  Goal: Discharge to home or other facility with appropriate resources  6/12/2025 2150 by Ethel Marsh RN  Outcome: Progressing  6/12/2025 1753 by Silver Andersen RN  Outcome: Progressing  Flowsheets (Taken 6/12/2025 1600)  Discharge to home or other facility with appropriate resources: Identify barriers to discharge with patient and caregiver     Problem: Pain  Goal: Verbalizes/displays adequate comfort level or baseline comfort level  6/12/2025 2150 by Ethel Marsh RN  Outcome: Progressing  6/12/2025 1753 by Silver Andersen RN  Outcome: Progressing  Flowsheets (Taken 6/12/2025 0400 by Holly Reed RN)  Verbalizes/displays adequate comfort level or baseline comfort level: Administer analgesics based on type and severity of pain and evaluate response     Problem: Respiratory - Adult  Goal: Achieves optimal ventilation and oxygenation  6/12/2025 2150 by Ethel Marsh RN  Outcome: Progressing  6/12/2025 1753 by Silver Andersen RN  Outcome: Progressing     Problem: Skin/Tissue Integrity - Adult  Goal: Incisions, wounds, or drain sites healing

## 2025-06-13 NOTE — PROGRESS NOTES
Problem: Physical Therapy - Adult  Goal: By Discharge: Performs mobility at highest level of function for planned discharge setting.  See evaluation for individualized goals.  Description: FUNCTIONAL STATUS PRIOR TO ADMISSION: Patient was independent and active without use of DME.    HOME SUPPORT PRIOR TO ADMISSION: The patient lived with family but did not require assistance.    Physical Therapy Goals  Revised 6/9/2025  1.  Patient will move from supine to sit and sit to supine, scoot up and down, and roll side to side in bed with contact guard assist within 7 day(s).    2.  Patient will perform sit to stand with contact guard assist within 7 day(s).  3.  Patient will transfer from bed to chair and chair to bed with contact guard assist using the least restrictive device within 7 day(s).  4.  Patient will ambulate with contact guard assist for 20 feet with the least restrictive device within 7 day(s).   5.  Patient will verbally and functionally demonstrate 3/3 sternal precautions without cues within 7 days.   Outcome: Progressing    PHYSICAL THERAPY TREATMENT    Patient: Hector Jensen Jr (74 y.o. male)  Date: 6/13/2025  Diagnosis: Hyponatremia [E87.1]  Acute pulmonary edema (HCC) [J81.0]  Acute respiratory failure with hypoxia (HCC) [J96.01]  Dyspnea, unspecified type [R06.00] Hyponatremia      Precautions: Restrictions/Precautions  Restrictions/Precautions: Fall Risk            ASSESSMENT:  Patient continues to benefit from skilled PT services and is slowly progressing towards goals. Patient to be discharge home with family - discussed with family about mobility, body mechanics, energy conservation, use of gait belt, vital sign monitoring. Family verbalized understanding and had no further concerns about returning home.   Patient on 4L O2 via nasal cannula     PLAN:  Patient continues to benefit from skilled intervention to address the above impairments.  Continue treatment per established plan of  care.    Recommendations for staff mobility and toileting assistance:  Recommend that staff completes patient mobility with assist x1 using gait belt.      Recommendation for discharge: (in order for the patient to meet his/her long term goals):   Intermittent physical therapy up to 2-3x/week in previous living setting with additional support needed for safety, transfers    Other factors to consider for discharge: no additional factors    IF patient discharges home will need the following DME: patient owns DME required for discharge       SUBJECTIVE:   Patient stated, \"I'm nervous.\"    OBJECTIVE DATA SUMMARY:   Critical Behavior:  Orientation  Overall Orientation Status: Within Normal Limits  Orientation Level: Oriented X4             COMMUNICATION/EDUCATION:   The patient's plan of care was discussed with: registered nurse           Vivek Khan, PT  Minutes: 25

## 2025-06-13 NOTE — PROGRESS NOTES
1000: Discharge instructions provided to patient and patient's daughter. Family verbalized understanding. PIVs removed and pt changed into brief. All belongings sent home with the patient and his family.

## (undated) DEVICE — LEAD PACE L475MM CHNL A OR V MYOCARDIAL STEROID ELUT SIL

## (undated) DEVICE — 6 FOOT DISPOSABLE EXTENSION CABLE WITH SAFE CONNECT / SCREW-DOWN

## (undated) DEVICE — BANDAGE COMPR ELASTIC 5 YDX6 IN

## (undated) DEVICE — AGENT HEMSTAT W4XL4IN OXIDIZED REGENERATED CELOS ABSRB SFT

## (undated) DEVICE — 40418 TRENDELENBURG ONE-STEP ARM PROTECTORS LARGE (1 PAIR): Brand: 40418 TRENDELENBURG ONE-STEP ARM PROTECTORS LARGE (1 PAIR)

## (undated) DEVICE — DRAIN,WOUND,ROUND,24FR,5/16",FULL-FLUTED: Brand: MEDLINE

## (undated) DEVICE — SOLUTION IV 500 ML 0.9 NACL INJ EXCEL CONTAINER USP LF

## (undated) DEVICE — SUTURE MONOCRYL + SZ 3-0 L27IN ABSRB UD PS1 L24MM 3/8 CIR REV MCP936H

## (undated) DEVICE — BAND COMPR L24CM REG CLR PLAS HEMSTAT EXT HK AND LOOP RETEN

## (undated) DEVICE — TBG INSUFFLATION LUER LOCK: Brand: MEDLINE INDUSTRIES, INC.

## (undated) DEVICE — BLADE OPHTH 180DEG CUT SURF BLU STR SHRP DBL BVL GRINDLESS

## (undated) DEVICE — OPEN HEART B-RICHMOND: Brand: MEDLINE INDUSTRIES, INC.

## (undated) DEVICE — Device

## (undated) DEVICE — SYSTEM SKIN CLOSURE 42CM DERMABOND PRINEO

## (undated) DEVICE — KIT,ANTI FOG,W/SPONGE & FLUID,SOFT PACK: Brand: MEDLINE

## (undated) DEVICE — HI-TORQUE VERSACORE MODIFIED J GUIDE WIRE SYSTEM 260 CM: Brand: HI-TORQUE VERSACORE

## (undated) DEVICE — BANDAGE COMPR M W6INXL10YD WHT BGE VELC E MTRX HK AND LOOP

## (undated) DEVICE — SYRINGE MED 50ML LUERLOCK TIP

## (undated) DEVICE — SUTURE S STL SZ 6 L18IN NONABSORBABLE SIL L48MM V-40 1/2 M649G

## (undated) DEVICE — CONNECTOR DRNGE 3/8 1/2X3/16X3/16IN BASE L5MM ARM L10-13MM

## (undated) DEVICE — HEART CATH-SFMC: Brand: MEDLINE INDUSTRIES, INC.

## (undated) DEVICE — SUTURE NONABSORBABLE MONOFILAMENT 6-0 C-1 1X30 IN PROLENE 8706H

## (undated) DEVICE — GOWN,SIRUS,NONRNF,SETINSLV,XL,20/CS: Brand: MEDLINE

## (undated) DEVICE — SYSTEM ENDOSCP VES HARV W/ TOOL CANN SEAL SHT PRT BLNT TIP

## (undated) DEVICE — GLIDESHEATH SLENDER ACCESS KIT: Brand: GLIDESHEATH SLENDER

## (undated) DEVICE — CANNULA PERF L12IN DIA20FR GWIRE DIA0.038IN ART ELONG 1 PC

## (undated) DEVICE — LUER-LOK 360°: Brand: CONNECTA, LUER-LOK

## (undated) DEVICE — Device: Brand: JELCO

## (undated) DEVICE — SUTURE PROL SZ 4-0 L36IN NONABSORBABLE BLU L26MM SH 1/2 CIR 8521H

## (undated) DEVICE — AVID DUAL STAGE VENOUS DRAINAGE CANNULA: Brand: AVID DUAL STAGE VENOUS DRAINAGE CANNULA

## (undated) DEVICE — BLADE OPHTH W1.5MM 15DEG ORNG HNDL SHRP SHRP DEL FOR CATRCT

## (undated) DEVICE — KIT BLWR MISTER 5P 15L W/ TBNG SET IRRIG MIST TO IMPROVE

## (undated) DEVICE — OPEN HEART A- RICHMOND: Brand: MEDLINE INDUSTRIES, INC.

## (undated) DEVICE — X-RAY DETECTABLE SPONGES,16 PLY: Brand: VISTEC

## (undated) DEVICE — CANNULA AORT ROOT INTRO STD TIP 5FR OVERALL LEN 55IN DLP

## (undated) DEVICE — COVER,LIGHT,CAMERA,HARD,1/PK,STRL: Brand: MEDLINE

## (undated) DEVICE — MEDI-TRACE CADENCE ADULT, DEFIBRILLATION ELECTRODE -RTS  (10 PR/PK) - PHYSIO-CONTROL: Brand: MEDI-TRACE CADENCE

## (undated) DEVICE — PRESSURE MONITORING SET: Brand: TRUWAVE

## (undated) DEVICE — SOLUTION IV 1000ML PH 7.4 INJ NRMSOL R

## (undated) DEVICE — SYRINGE MEDICAL 3ML CLEAR PLASTIC STANDARD NON CONTROL LUERLOCK TIP DISPOSABLE

## (undated) DEVICE — PROVE COVER: Brand: UNBRANDED

## (undated) DEVICE — SOLUTION IV 1000 ML 0.9 NACL INJ USP EXCEL PLAS CONTAINER

## (undated) DEVICE — 1000ML,PRESSURE INFUSER W/STOPCOCK: Brand: MEDLINE

## (undated) DEVICE — CANNULA INJ L2.5IN BLNT TIP 3MM CLR BODY W/ 1 W VLV DLP

## (undated) DEVICE — SYRINGE MED 10ML LUERLOCK TIP W/O SFTY DISP

## (undated) DEVICE — DRAIN SURG SGL COLL PT TB FOR ATS BG OASIS

## (undated) DEVICE — TRANSFER BAG 300 ML: Brand: HAEMONETICS

## (undated) DEVICE — TIDISHIELD TRANSPORT, CONTAINMENT COVER FOR BACK TABLE 4'6" (1.37M) TO 8' (2.43M) IN LENGTH: Brand: TIDISHIELD

## (undated) DEVICE — SET PERF L203CM 12IN RED AND BLU AORT ROOT MULT SLIP CONN

## (undated) DEVICE — SOLUTION IV 1000ML 140MEQ/L SOD 5MEQ/L K 3MEQ/L MG 27MEQ/L

## (undated) DEVICE — SUTURE PROL SZ 7-0 L24IN NONABSORBABLE BLU L8MM BV175-6 3/8 8735H

## (undated) DEVICE — CATHETER KIT JL4 JR4 5FR 100CM 145 PGTL DXTERITY

## (undated) DEVICE — AORTIC PUNCHES ARE USED TO CREATE A UNIFORM OPENING IN BLOOD VESSELS DURING CARDIOVASCULAR SURGERY. THE VESSEL GRAFT IS INSERTED INTO THE CREATED OPENING AND SUTURED TO THE VESSEL WALL. AORTIC LANCETS ARE USED TO MAKE A SMALL UNIFORM CUT IN A BLOOD VESSEL TO FACILITATE INSERTION OF AN AORTIC PUNCH.  PUNCHES COME IN VARIOUS LENGTHS, DIAMETERS AND TIP CONFIGURATIONS.: Brand: CLEANCUT ROTATING AORTIC PUNCH

## (undated) DEVICE — KENDALL DL ECG DUAL CONNECT RADIOLUCENT LEAD WIRES, 5-LEAD, SINGLE PATIENT USE: Brand: KENDALL

## (undated) DEVICE — DRESSING FOAM W8.7XL9.1IN SAFETAC LAYR SELF ADH MEPILEX

## (undated) DEVICE — WRAP SURG W1.31XL1.34M CARD FOR PT 165-172CM THERMOWRP

## (undated) DEVICE — SUTURE DEK POLY GRN BR SZ3 0 T 2 2N 6716